# Patient Record
Sex: MALE | Race: OTHER | HISPANIC OR LATINO
[De-identification: names, ages, dates, MRNs, and addresses within clinical notes are randomized per-mention and may not be internally consistent; named-entity substitution may affect disease eponyms.]

---

## 2018-09-14 ENCOUNTER — APPOINTMENT (OUTPATIENT)
Dept: UROLOGY | Facility: CLINIC | Age: 78
End: 2018-09-14

## 2018-12-31 ENCOUNTER — OUTPATIENT (OUTPATIENT)
Dept: OUTPATIENT SERVICES | Facility: HOSPITAL | Age: 78
LOS: 1 days | Discharge: HOME | End: 2018-12-31
Payer: MEDICARE

## 2018-12-31 DIAGNOSIS — I11.0 HYPERTENSIVE HEART DISEASE WITH HEART FAILURE: ICD-10-CM

## 2018-12-31 PROCEDURE — 93306 TTE W/DOPPLER COMPLETE: CPT | Mod: 26

## 2019-09-18 ENCOUNTER — APPOINTMENT (OUTPATIENT)
Dept: CARDIOLOGY | Facility: CLINIC | Age: 79
End: 2019-09-18
Payer: MEDICARE

## 2019-09-18 PROCEDURE — 93000 ELECTROCARDIOGRAM COMPLETE: CPT

## 2019-09-18 PROCEDURE — 99204 OFFICE O/P NEW MOD 45 MIN: CPT

## 2019-09-26 ENCOUNTER — APPOINTMENT (OUTPATIENT)
Dept: CARDIOLOGY | Facility: CLINIC | Age: 79
End: 2019-09-26
Payer: MEDICARE

## 2019-09-26 PROCEDURE — 93880 EXTRACRANIAL BILAT STUDY: CPT

## 2019-10-28 ENCOUNTER — APPOINTMENT (OUTPATIENT)
Dept: CARDIOLOGY | Facility: CLINIC | Age: 79
End: 2019-10-28

## 2019-11-26 ENCOUNTER — APPOINTMENT (OUTPATIENT)
Dept: CARDIOLOGY | Facility: CLINIC | Age: 79
End: 2019-11-26
Payer: MEDICARE

## 2019-11-26 PROCEDURE — 93000 ELECTROCARDIOGRAM COMPLETE: CPT

## 2019-11-26 PROCEDURE — 99213 OFFICE O/P EST LOW 20 MIN: CPT

## 2020-01-22 ENCOUNTER — APPOINTMENT (OUTPATIENT)
Dept: CARDIOLOGY | Facility: CLINIC | Age: 80
End: 2020-01-22
Payer: MEDICARE

## 2020-01-22 PROCEDURE — 93325 DOPPLER ECHO COLOR FLOW MAPG: CPT

## 2020-01-22 PROCEDURE — 93320 DOPPLER ECHO COMPLETE: CPT

## 2020-01-22 PROCEDURE — 93351 STRESS TTE COMPLETE: CPT

## 2020-02-27 ENCOUNTER — APPOINTMENT (OUTPATIENT)
Dept: CARDIOLOGY | Facility: CLINIC | Age: 80
End: 2020-02-27
Payer: MEDICARE

## 2020-02-27 PROCEDURE — 93000 ELECTROCARDIOGRAM COMPLETE: CPT

## 2020-02-27 PROCEDURE — 99214 OFFICE O/P EST MOD 30 MIN: CPT

## 2020-07-22 ENCOUNTER — RECORD ABSTRACTING (OUTPATIENT)
Age: 80
End: 2020-07-22

## 2020-08-06 ENCOUNTER — RECORD ABSTRACTING (OUTPATIENT)
Age: 80
End: 2020-08-06

## 2020-08-06 DIAGNOSIS — Z78.9 OTHER SPECIFIED HEALTH STATUS: ICD-10-CM

## 2020-08-06 DIAGNOSIS — Z86.39 PERSONAL HISTORY OF OTHER ENDOCRINE, NUTRITIONAL AND METABOLIC DISEASE: ICD-10-CM

## 2020-08-06 DIAGNOSIS — Z86.79 PERSONAL HISTORY OF OTHER DISEASES OF THE CIRCULATORY SYSTEM: ICD-10-CM

## 2020-08-21 ENCOUNTER — APPOINTMENT (OUTPATIENT)
Dept: CARDIOLOGY | Facility: CLINIC | Age: 80
End: 2020-08-21
Payer: MEDICARE

## 2020-08-21 ENCOUNTER — RESULT CHARGE (OUTPATIENT)
Age: 80
End: 2020-08-21

## 2020-08-21 VITALS
HEIGHT: 72 IN | BODY MASS INDEX: 31.56 KG/M2 | SYSTOLIC BLOOD PRESSURE: 118 MMHG | DIASTOLIC BLOOD PRESSURE: 70 MMHG | WEIGHT: 233 LBS

## 2020-08-21 PROCEDURE — 93000 ELECTROCARDIOGRAM COMPLETE: CPT

## 2020-08-21 PROCEDURE — 99213 OFFICE O/P EST LOW 20 MIN: CPT | Mod: 25

## 2020-08-21 RX ORDER — UBIDECARENONE 100 MG
100 CAPSULE ORAL DAILY
Refills: 0 | Status: DISCONTINUED | COMMUNITY
End: 2020-08-21

## 2020-08-21 NOTE — PHYSICAL EXAM
[Well Groomed] : well groomed [General Appearance - Well Developed] : well developed [Normal Appearance] : normal appearance [General Appearance - Well Nourished] : well nourished [No Deformities] : no deformities [General Appearance - In No Acute Distress] : no acute distress [Normal Conjunctiva] : the conjunctiva exhibited no abnormalities [Eyelids - No Xanthelasma] : the eyelids demonstrated no xanthelasmas [Rhythm Regular] : regular [Normal Rate] : normal [Normal S1] : normal S1 [Normal S2] : normal S2 [S3] : no S3 [S4] : an S4 was heard [No Murmur] : no murmurs heard [2+] : left 2+ [No Pitting Edema] : no pitting edema present [Bowel Sounds] : normal bowel sounds [Abdomen Soft] : soft [Abdomen Tenderness] : non-tender [Abdomen Mass (___ Cm)] : no abdominal mass palpated [Skin Color & Pigmentation] : normal skin color and pigmentation [Cyanosis, Localized] : no localized cyanosis [Oriented To Time, Place, And Person] : oriented to person, place, and time [] : no rash [Mood] : the mood was normal [Affect] : the affect was normal

## 2020-08-21 NOTE — HISTORY OF PRESENT ILLNESS
[FreeTextEntry1] : 80-yo male who c/o increased COLLIER (climbing stairs), was found to have mild LV dysfunction (LVEF 44%).\par \par H/o HTN, hyperlipidemia. Quit smoking 45 years ago.\par \par BP has been better controlled. \par \par Patient denies CP, SOB but he has walked very little due to Covid.

## 2020-08-21 NOTE — REVIEW OF SYSTEMS
[Blurry Vision] : no blurred vision [Seeing Double (Diplopia)] : no diplopia [Skin: A Rash] : no rash: [Anxiety] : no anxiety [Easy Bruising] : no tendency for easy bruising [Negative] : Heme/Lymph

## 2020-08-21 NOTE — DISCUSSION/SUMMARY
[FreeTextEntry1] : 80-yo male with h/o HTN, well controlled now. Mild LV dysfunction, clinically stable, no evidence of ischemia.\par \par Plan:\par Continue treatment.\par Blood work ordered.\par Will need repeat ECHO after next visit.\par F/u in 6 months.\par \par Tera Judd MD\par

## 2020-08-21 NOTE — ASSESSMENT
[FreeTextEntry1] : ECG: SR 80/min, no ST changes.\par \par Stress ECHO 01/22/2020  \par 4.6 METs, no ischemia\par LVEF 44% (global hypokinesis)\par Mild LVH, grade 1 diastolic dysfunction\par Mild AI  \par

## 2021-02-26 ENCOUNTER — APPOINTMENT (OUTPATIENT)
Dept: CARDIOLOGY | Facility: CLINIC | Age: 81
End: 2021-02-26
Payer: MEDICARE

## 2021-02-26 VITALS
DIASTOLIC BLOOD PRESSURE: 70 MMHG | SYSTOLIC BLOOD PRESSURE: 122 MMHG | WEIGHT: 234 LBS | BODY MASS INDEX: 32.76 KG/M2 | HEIGHT: 71 IN

## 2021-02-26 PROCEDURE — 93000 ELECTROCARDIOGRAM COMPLETE: CPT

## 2021-02-26 PROCEDURE — 99214 OFFICE O/P EST MOD 30 MIN: CPT

## 2021-03-02 NOTE — ASSESSMENT
[FreeTextEntry1] : 81-yo male with h/o HTN, mild systolic dysfunction.\par Patient appears euvolemic today.\par Anemia, significant Hb drop in 11 months.\par \par Plan:\par Continue treatment.\par Repeat blood work with iron studies.\par Colonoscopy scheduled next month.\par F/u in 4  months. Will repeat ECHO after that.\par \par Tera Judd MD\par

## 2021-03-02 NOTE — HISTORY OF PRESENT ILLNESS
[FreeTextEntry1] : 81-yo male who c/o increased COLLIER (climbing stairs), was found to have mild LV dysfunction (LVEF 44%).\par \par H/o HTN, hyperlipidemia. Quit smoking 45 years ago.\par \par BP has been better controlled. \par \par Patient denies CP, SOB but he has walked very little due to Covid.\par \par GFR 74\par Hb 11.6 (14 in Februalry)\par LDL 76\par HDL 55\par Triglycerides 123\par TSH 2.08.\par

## 2021-03-21 ENCOUNTER — RX RENEWAL (OUTPATIENT)
Age: 81
End: 2021-03-21

## 2021-04-30 ENCOUNTER — RESULT CHARGE (OUTPATIENT)
Age: 81
End: 2021-04-30

## 2021-04-30 ENCOUNTER — APPOINTMENT (OUTPATIENT)
Dept: CARDIOLOGY | Facility: CLINIC | Age: 81
End: 2021-04-30
Payer: MEDICARE

## 2021-04-30 VITALS
HEIGHT: 71 IN | WEIGHT: 231 LBS | DIASTOLIC BLOOD PRESSURE: 71 MMHG | BODY MASS INDEX: 32.34 KG/M2 | SYSTOLIC BLOOD PRESSURE: 128 MMHG

## 2021-04-30 PROCEDURE — 93000 ELECTROCARDIOGRAM COMPLETE: CPT

## 2021-04-30 PROCEDURE — 99214 OFFICE O/P EST MOD 30 MIN: CPT

## 2021-04-30 NOTE — ASSESSMENT
[FreeTextEntry1] : 81-yo male with h/o HTN, mild systolic dysfunction.\par New-onset A-fib with RVR. CHADS Vasc score 4.\par Iron deficiency anemia, significant Hb drop in 11 months. Recent colonoscopy normal according to patient.\par \par Plan:\par Increase Metoprolol to 100 mg daily.\par Hold Losartan for now.\par Start Xarelto 20 mg (samples given).\par Repeat CBC in 2 weeks.\par 2D ECHO.\par Patient will call his gastroenterologist to schedule EGD.\par F/u in 3 weeks. Will schedule cardioversion if tolerates  anticoagulation.\par \par Tera Judd MD\par

## 2021-04-30 NOTE — PHYSICAL EXAM
[Well Developed] : well developed [Well Nourished] : well nourished [No Acute Distress] : no acute distress [Normal Conjunctiva] : normal conjunctiva [Normal Venous Pressure] : normal venous pressure [No Carotid Bruit] : no carotid bruit [No Murmur] : no murmur [No Rub] : no rub [No Gallop] : no gallop [Normal Rate] : normal [Irregularly Irregular] : irregularly irregular [Normal S1] : normal S1 [S3] : no S3 [S4] : no S4 [II] : a grade 2 [No Pitting Edema] : no pitting edema present [Good Air Entry] : good air entry [Clear Lung Fields] : clear lung fields [No Respiratory Distress] : no respiratory distress  [Soft] : abdomen soft [Non Tender] : non-tender [Normal Bowel Sounds] : normal bowel sounds [Normal Gait] : normal gait [No Edema] : no edema [No Cyanosis] : no cyanosis [No Clubbing] : no clubbing [No Varicosities] : no varicosities [No Rash] : no rash [Moves all extremities] : moves all extremities [No Focal Deficits] : no focal deficits [Normal Speech] : normal speech [Alert and Oriented] : alert and oriented [Normal memory] : normal memory

## 2021-04-30 NOTE — REVIEW OF SYSTEMS
[Rash] : no rash [Anxiety] : no anxiety [Easy Bleeding] : no tendency for easy bleeding [Easy Bruising] : no tendency for easy bruising [Negative] : Neurological

## 2021-04-30 NOTE — HISTORY OF PRESENT ILLNESS
[FreeTextEntry1] : 81-yo male who c/o increased COLLIER (climbing stairs), was found to have mild LV dysfunction (LVEF 44%).\par \par H/o HTN, hyperlipidemia. Quit smoking 45 years ago.\par \par BP has been better controlled. \par \par Patient denies CP, SOB, palpitations but he has walked very little due to Covid. Colonoscopy normal 2 weeks ago according to patient.\par \par Hb 10.8 (11.6 in Februalry)\par Iron 22\par % saturation 6.

## 2021-05-14 ENCOUNTER — APPOINTMENT (OUTPATIENT)
Dept: CARDIOLOGY | Facility: CLINIC | Age: 81
End: 2021-05-14
Payer: MEDICARE

## 2021-05-14 PROCEDURE — 93306 TTE W/DOPPLER COMPLETE: CPT

## 2021-05-21 LAB
ALBUMIN SERPL ELPH-MCNC: 3.8 G/DL
ALP BLD-CCNC: 56 U/L
ALT SERPL-CCNC: 11 U/L
ANION GAP SERPL CALC-SCNC: 12 MMOL/L
AST SERPL-CCNC: 16 U/L
BASOPHILS # BLD AUTO: 0.02 K/UL
BASOPHILS NFR BLD AUTO: 0.2 %
BILIRUB SERPL-MCNC: 0.5 MG/DL
BUN SERPL-MCNC: 7 MG/DL
CALCIUM SERPL-MCNC: 9.2 MG/DL
CHLORIDE SERPL-SCNC: 105 MMOL/L
CO2 SERPL-SCNC: 25 MMOL/L
CREAT SERPL-MCNC: 1 MG/DL
EOSINOPHIL # BLD AUTO: 0.18 K/UL
EOSINOPHIL NFR BLD AUTO: 2.1 %
GLUCOSE SERPL-MCNC: 102 MG/DL
HCT VFR BLD CALC: 32.7 %
HGB BLD-MCNC: 9.4 G/DL
IMM GRANULOCYTES NFR BLD AUTO: 0.5 %
LYMPHOCYTES # BLD AUTO: 1.87 K/UL
LYMPHOCYTES NFR BLD AUTO: 21.8 %
MAN DIFF?: NORMAL
MCHC RBC-ENTMCNC: 22.9 PG
MCHC RBC-ENTMCNC: 28.7 G/DL
MCV RBC AUTO: 79.6 FL
MONOCYTES # BLD AUTO: 0.9 K/UL
MONOCYTES NFR BLD AUTO: 10.5 %
NEUTROPHILS # BLD AUTO: 5.57 K/UL
NEUTROPHILS NFR BLD AUTO: 64.9 %
PLATELET # BLD AUTO: 313 K/UL
POTASSIUM SERPL-SCNC: 4.1 MMOL/L
PROT SERPL-MCNC: 7 G/DL
RBC # BLD: 4.11 M/UL
RBC # FLD: 18 %
SODIUM SERPL-SCNC: 142 MMOL/L
TSH SERPL-ACNC: 2.38 UIU/ML
WBC # FLD AUTO: 8.58 K/UL

## 2021-05-25 LAB
BASOPHILS # BLD AUTO: 0.03 K/UL
BASOPHILS NFR BLD AUTO: 0.4 %
EOSINOPHIL # BLD AUTO: 0.13 K/UL
EOSINOPHIL NFR BLD AUTO: 1.7 %
FERRITIN SERPL-MCNC: 15 NG/ML
HCT VFR BLD CALC: 32.7 %
HGB BLD-MCNC: 9.6 G/DL
IMM GRANULOCYTES NFR BLD AUTO: 0.4 %
IRON SATN MFR SERPL: 7 %
IRON SERPL-MCNC: 24 UG/DL
LYMPHOCYTES # BLD AUTO: 1.93 K/UL
LYMPHOCYTES NFR BLD AUTO: 24.6 %
MAN DIFF?: NORMAL
MCHC RBC-ENTMCNC: 22.7 PG
MCHC RBC-ENTMCNC: 29.4 G/DL
MCV RBC AUTO: 77.5 FL
MONOCYTES # BLD AUTO: 0.76 K/UL
MONOCYTES NFR BLD AUTO: 9.7 %
NEUTROPHILS # BLD AUTO: 4.98 K/UL
NEUTROPHILS NFR BLD AUTO: 63.2 %
PLATELET # BLD AUTO: 361 K/UL
RBC # BLD: 4.22 M/UL
RBC # FLD: 17.3 %
TIBC SERPL-MCNC: 322 UG/DL
UIBC SERPL-MCNC: 298 UG/DL
WBC # FLD AUTO: 7.86 K/UL

## 2021-05-27 ENCOUNTER — APPOINTMENT (OUTPATIENT)
Dept: CARDIOLOGY | Facility: CLINIC | Age: 81
End: 2021-05-27
Payer: MEDICARE

## 2021-05-27 VITALS
BODY MASS INDEX: 31.64 KG/M2 | HEIGHT: 71 IN | WEIGHT: 226 LBS | TEMPERATURE: 97.9 F | DIASTOLIC BLOOD PRESSURE: 80 MMHG | HEART RATE: 90 BPM | SYSTOLIC BLOOD PRESSURE: 120 MMHG

## 2021-05-27 DIAGNOSIS — Z00.00 ENCOUNTER FOR GENERAL ADULT MEDICAL EXAMINATION W/OUT ABNORMAL FINDINGS: ICD-10-CM

## 2021-05-27 PROCEDURE — 93000 ELECTROCARDIOGRAM COMPLETE: CPT

## 2021-05-27 PROCEDURE — 99214 OFFICE O/P EST MOD 30 MIN: CPT

## 2021-05-27 NOTE — CARDIOLOGY SUMMARY
[de-identified] : 05/27/21:\par SR 90/min, incomplete RBBB, no ST changes. [de-identified] : 05/14/21:\par LVEF 44%\par G2DD\par Mod ALKE\par Mod-severe MR\par Mild AI.

## 2021-05-27 NOTE — ASSESSMENT
[FreeTextEntry1] : 81-yo male with h/o HTN, mild systolic dysfunction.\par New-onset A-fib. CHADS Vasc score 4. in SR today.\par Moderate to severe MR now.\par Iron deficiency anemia, significant Hb drop in 11 months. Recent colonoscopy normal according to patient.\par \par Plan:\par Continue Metoprolol 100 mg daily.\par Continue Xarelto 20 mg.\par Start Ferrous sulfate.\par Repeat CBC in 3 weeks.\par Patient will call his gastroenterologist to schedule EGD.\par F/u in 4 weeks.\par \par Tera Judd MD\par

## 2021-05-27 NOTE — REVIEW OF SYSTEMS
0 [Negative] : Neurological [Lower Ext Edema] : no extremity edema [Leg Claudication] : no intermittent leg claudication [Orthopnea] : no orthopnea [Rash] : no rash [Anxiety] : no anxiety [Easy Bleeding] : no tendency for easy bleeding [Easy Bruising] : no tendency for easy bruising

## 2021-05-27 NOTE — HISTORY OF PRESENT ILLNESS
[FreeTextEntry1] : 81-yo male with h/o mild LV dysfunction (LVEF 44%).\par \par H/o HTN, hyperlipidemia. Patient was found to be in A-fib 2 weeks ago. Xarelto started.\par \par Patient denies blood in the stool or black stool. Colonoscopy normal in April 2021 according to patient. He denies CP but c/o increased COLLIER.\par \par Hb 9.6 05/24 (9.4 on 05/14)\par Iron 24\par % saturation 7\par Ferritin 15.

## 2021-05-27 NOTE — PHYSICAL EXAM
[Well Developed] : well developed [Well Nourished] : well nourished [No Acute Distress] : no acute distress [Normal Conjunctiva] : normal conjunctiva [Normal Venous Pressure] : normal venous pressure [No Carotid Bruit] : no carotid bruit [No Gallop] : no gallop [Normal Rate] : normal [Normal S1] : normal S1 [II] : a grade 2 [No Pitting Edema] : no pitting edema present [Clear Lung Fields] : clear lung fields [Good Air Entry] : good air entry [No Respiratory Distress] : no respiratory distress  [Soft] : abdomen soft [Non Tender] : non-tender [Normal Bowel Sounds] : normal bowel sounds [Normal Gait] : normal gait [No Edema] : no edema [No Cyanosis] : no cyanosis [No Clubbing] : no clubbing [No Varicosities] : no varicosities [No Rash] : no rash [Moves all extremities] : moves all extremities [No Focal Deficits] : no focal deficits [Normal Speech] : normal speech [Alert and Oriented] : alert and oriented [Normal memory] : normal memory [Rhythm Regular] : regular [Normal S2] : normal S2 [S4] : an S4 was heard [S3] : no S3

## 2021-06-01 ENCOUNTER — APPOINTMENT (OUTPATIENT)
Age: 81
End: 2021-06-01
Payer: MEDICARE

## 2021-06-01 VITALS
SYSTOLIC BLOOD PRESSURE: 118 MMHG | BODY MASS INDEX: 31.92 KG/M2 | HEART RATE: 84 BPM | WEIGHT: 228 LBS | RESPIRATION RATE: 14 BRPM | OXYGEN SATURATION: 100 % | DIASTOLIC BLOOD PRESSURE: 72 MMHG | HEIGHT: 71 IN

## 2021-06-01 DIAGNOSIS — Z86.79 PERSONAL HISTORY OF OTHER DISEASES OF THE CIRCULATORY SYSTEM: ICD-10-CM

## 2021-06-01 PROCEDURE — 99203 OFFICE O/P NEW LOW 30 MIN: CPT | Mod: 25

## 2021-06-01 PROCEDURE — 71046 X-RAY EXAM CHEST 2 VIEWS: CPT

## 2021-06-28 ENCOUNTER — LABORATORY RESULT (OUTPATIENT)
Age: 81
End: 2021-06-28

## 2021-06-28 LAB
RBC # BLD: 5.1 M/UL
RETICS # AUTO: 1.8 %
RETICS AGGREG/RBC NFR: 89.8 K/UL

## 2021-07-02 ENCOUNTER — APPOINTMENT (OUTPATIENT)
Dept: CARDIOLOGY | Facility: CLINIC | Age: 81
End: 2021-07-02
Payer: MEDICARE

## 2021-07-02 ENCOUNTER — RESULT CHARGE (OUTPATIENT)
Age: 81
End: 2021-07-02

## 2021-07-02 VITALS
HEIGHT: 71 IN | BODY MASS INDEX: 31.36 KG/M2 | DIASTOLIC BLOOD PRESSURE: 70 MMHG | WEIGHT: 224 LBS | SYSTOLIC BLOOD PRESSURE: 118 MMHG

## 2021-07-02 LAB
BASOPHILS # BLD AUTO: 0.02 K/UL
BASOPHILS NFR BLD AUTO: 0.3 %
EOSINOPHIL # BLD AUTO: 0.12 K/UL
EOSINOPHIL NFR BLD AUTO: 1.6 %
HCT VFR BLD CALC: 42.1 %
HGB BLD-MCNC: 12.5 G/DL
IMM GRANULOCYTES NFR BLD AUTO: 0.4 %
LYMPHOCYTES # BLD AUTO: 1.42 K/UL
LYMPHOCYTES NFR BLD AUTO: 18.7 %
MAN DIFF?: NORMAL
MCHC RBC-ENTMCNC: 24.5 PG
MCHC RBC-ENTMCNC: 29.7 G/DL
MCV RBC AUTO: 82.5 FL
MONOCYTES # BLD AUTO: 0.89 K/UL
MONOCYTES NFR BLD AUTO: 11.7 %
NEUTROPHILS # BLD AUTO: 5.11 K/UL
NEUTROPHILS NFR BLD AUTO: 67.3 %
PLATELET # BLD AUTO: 286 K/UL
RBC # BLD: 5.1 M/UL
RBC # FLD: 21.6 %
WBC # FLD AUTO: 7.59 K/UL

## 2021-07-02 PROCEDURE — 93000 ELECTROCARDIOGRAM COMPLETE: CPT

## 2021-07-02 PROCEDURE — 99214 OFFICE O/P EST MOD 30 MIN: CPT

## 2021-07-02 RX ORDER — FAMOTIDINE 20 MG/1
20 TABLET, FILM COATED ORAL
Refills: 0 | Status: DISCONTINUED | COMMUNITY
Start: 2019-08-08 | End: 2021-07-02

## 2021-07-04 RX ORDER — LATANOPROST/PF 0.005 %
0.01 DROPS OPHTHALMIC (EYE)
Qty: 8 | Refills: 0 | Status: DISCONTINUED | COMMUNITY
Start: 2021-03-25

## 2021-07-04 RX ORDER — LOSARTAN POTASSIUM AND HYDROCHLOROTHIAZIDE 25; 100 MG/1; MG/1
100-25 TABLET ORAL
Qty: 90 | Refills: 0 | Status: DISCONTINUED | COMMUNITY
Start: 2020-11-18

## 2021-07-04 NOTE — PHYSICAL EXAM
[Well Developed] : well developed [Well Nourished] : well nourished [No Acute Distress] : no acute distress [Normal Conjunctiva] : normal conjunctiva [Normal Venous Pressure] : normal venous pressure [No Carotid Bruit] : no carotid bruit [Normal S1, S2] : normal S1, S2 [No Murmur] : no murmur [No Rub] : no rub [S4] : S4 [Clear Lung Fields] : clear lung fields [Good Air Entry] : good air entry [No Respiratory Distress] : no respiratory distress  [Soft] : abdomen soft [Non Tender] : non-tender [Normal Bowel Sounds] : normal bowel sounds [Normal Gait] : normal gait [No Cyanosis] : no cyanosis [No Edema] : no edema [No Clubbing] : no clubbing [No Varicosities] : no varicosities [No Rash] : no rash [Moves all extremities] : moves all extremities [Normal Speech] : normal speech [Alert and Oriented] : alert and oriented [Normal memory] : normal memory

## 2021-07-04 NOTE — ASSESSMENT
[FreeTextEntry1] : 81-yo male with h/o HTN, mild systolic dysfunction.\par New-onset A-fib. CHADS Vasc score 4. in SR today.\par Moderate to severe MR now.\par Iron deficiency anemia, improving with iron supplementation. Recent colonoscopy normal according to patient.\par \par Plan:\par Continue Metoprolol 100 mg daily.\par Resume Xarelto 20 mg (samples given again). Will try to clarify coverage for Xarelto, Eliquis).\par Continue Ferrous sulfate.\par GI f/u.\par F/u in 4 weeks.\par \par Tera Judd MD\par

## 2021-07-04 NOTE — HISTORY OF PRESENT ILLNESS
[FreeTextEntry1] : 81-yo male with h/o mild LV dysfunction (LVEF 44%).\par \par H/o HTN, hyperlipidemia. Patient was found to be in A-fib several weeks ago. Xarelto started (patient has finished the samples, has not picked up from the pharmacy due to high price).\par \par Patient denies blood in the stool or black stool. Colonoscopy normal in April 2021 according to patient. He denies CP but c/o increased COLLIER. GI f/u scheduled next week.\par \par Hb 12.5 06/26 (9.6 05/24, 9.4 on 05/14)\par Iron 24\par % saturation 7\par Ferritin 15.

## 2021-07-04 NOTE — REVIEW OF SYSTEMS
[Lower Ext Edema] : no extremity edema [Palpitations] : no palpitations [Leg Claudication] : no intermittent leg claudication [Orthopnea] : no orthopnea [Syncope] : no syncope [Rash] : no rash [Easy Bleeding] : no tendency for easy bleeding [Anxiety] : no anxiety [Easy Bruising] : no tendency for easy bruising [Negative] : Neurological

## 2021-08-06 ENCOUNTER — APPOINTMENT (OUTPATIENT)
Dept: CARDIOLOGY | Facility: CLINIC | Age: 81
End: 2021-08-06
Payer: MEDICARE

## 2021-08-06 ENCOUNTER — RESULT CHARGE (OUTPATIENT)
Age: 81
End: 2021-08-06

## 2021-08-06 VITALS
SYSTOLIC BLOOD PRESSURE: 122 MMHG | WEIGHT: 226 LBS | BODY MASS INDEX: 31.64 KG/M2 | DIASTOLIC BLOOD PRESSURE: 70 MMHG | HEIGHT: 71 IN

## 2021-08-06 PROCEDURE — 93000 ELECTROCARDIOGRAM COMPLETE: CPT

## 2021-08-06 PROCEDURE — 99214 OFFICE O/P EST MOD 30 MIN: CPT

## 2021-08-06 RX ORDER — METOPROLOL SUCCINATE 50 MG/1
50 TABLET, EXTENDED RELEASE ORAL
Qty: 90 | Refills: 0 | Status: DISCONTINUED | COMMUNITY
Start: 2021-03-21 | End: 2021-08-06

## 2021-08-06 RX ORDER — RIVAROXABAN 20 MG/1
20 TABLET, FILM COATED ORAL
Qty: 90 | Refills: 1 | Status: DISCONTINUED | COMMUNITY
Start: 2021-05-27 | End: 2021-08-06

## 2021-08-06 RX ORDER — FUROSEMIDE 20 MG/1
20 TABLET ORAL
Qty: 90 | Refills: 3 | Status: DISCONTINUED | COMMUNITY
Start: 2021-07-09 | End: 2021-08-06

## 2021-08-06 NOTE — CARDIOLOGY SUMMARY
[de-identified] : 08/06021:\par SR 95/min, incomplete RBBB. [de-identified] : 05/14/21:\par LVEF 44%, G2DD\par Mod LAE\par Mod-severe MR.

## 2021-08-06 NOTE — ASSESSMENT
[FreeTextEntry1] : 81-yo male with h/o HTN, mild systolic dysfunction.\par Paroxysmal A-fib. CHADS Vasc score 4. In SR today.\par Moderate to severe MR now.\par Iron deficiency anemia, improving with iron supplementation. Recent colonoscopy normal according to patient.\par \par Plan:\par Continue Metoprolol 100 mg daily.\par Resume Xarelto 20 mg (samples given again). Will try to clarify coverage for Xarelto, Eliquis).\par Continue Ferrous sulfate.\par F/u in 8 weeks.\par \par Tera Judd MD\par

## 2021-08-06 NOTE — HISTORY OF PRESENT ILLNESS
[FreeTextEntry1] : 81-yo male with h/o mild LV dysfunction (LVEF 44%).\par \par H/o HTN, hyperlipidemia. Patient was found to be in A-fib in April 2021. Xarelto started (patient has finished the samples, has not picked up from the pharmacy due to high price).\par \par Patient denies blood in the stool or black stool. Colonoscopy normal in April 2021 according to patient. He denies CP. COLLIER has also improved.\par \par Hb 12.5 06/26 (9.6 05/24, 9.4 on 05/14)\par

## 2021-08-06 NOTE — REVIEW OF SYSTEMS
[Negative] : Neurological [Lower Ext Edema] : no extremity edema [Leg Claudication] : no intermittent leg claudication [Palpitations] : no palpitations [Orthopnea] : no orthopnea [Syncope] : no syncope [Rash] : no rash [Anxiety] : no anxiety [Easy Bleeding] : no tendency for easy bleeding [Easy Bruising] : no tendency for easy bruising

## 2021-08-10 ENCOUNTER — OUTPATIENT (OUTPATIENT)
Dept: OUTPATIENT SERVICES | Facility: HOSPITAL | Age: 81
LOS: 1 days | Discharge: HOME | End: 2021-08-10

## 2021-08-10 ENCOUNTER — LABORATORY RESULT (OUTPATIENT)
Age: 81
End: 2021-08-10

## 2021-08-10 DIAGNOSIS — Z11.59 ENCOUNTER FOR SCREENING FOR OTHER VIRAL DISEASES: ICD-10-CM

## 2021-09-19 ENCOUNTER — OUTPATIENT (OUTPATIENT)
Dept: OUTPATIENT SERVICES | Facility: HOSPITAL | Age: 81
LOS: 1 days | Discharge: HOME | End: 2021-09-19

## 2021-09-19 DIAGNOSIS — Z11.59 ENCOUNTER FOR SCREENING FOR OTHER VIRAL DISEASES: ICD-10-CM

## 2021-09-22 ENCOUNTER — TRANSCRIPTION ENCOUNTER (OUTPATIENT)
Age: 81
End: 2021-09-22

## 2021-09-22 ENCOUNTER — OUTPATIENT (OUTPATIENT)
Dept: OUTPATIENT SERVICES | Facility: HOSPITAL | Age: 81
LOS: 1 days | Discharge: HOME | End: 2021-09-22
Payer: MEDICARE

## 2021-09-22 ENCOUNTER — RESULT REVIEW (OUTPATIENT)
Age: 81
End: 2021-09-22

## 2021-09-22 VITALS
HEART RATE: 94 BPM | DIASTOLIC BLOOD PRESSURE: 84 MMHG | WEIGHT: 218.92 LBS | HEIGHT: 78 IN | SYSTOLIC BLOOD PRESSURE: 119 MMHG | TEMPERATURE: 98 F | RESPIRATION RATE: 18 BRPM

## 2021-09-22 VITALS
DIASTOLIC BLOOD PRESSURE: 72 MMHG | SYSTOLIC BLOOD PRESSURE: 103 MMHG | RESPIRATION RATE: 15 BRPM | OXYGEN SATURATION: 99 % | HEART RATE: 80 BPM

## 2021-09-22 PROCEDURE — 88305 TISSUE EXAM BY PATHOLOGIST: CPT | Mod: 26

## 2021-09-22 PROCEDURE — 88312 SPECIAL STAINS GROUP 1: CPT | Mod: 26

## 2021-09-22 NOTE — PRE-ANESTHESIA EVALUATION ADULT - NSANTHOSAYNRD_GEN_A_CORE
denies/No. AURELIO screening performed.  STOP BANG Legend: 0-2 = LOW Risk; 3-4 = INTERMEDIATE Risk; 5-8 = HIGH Risk

## 2021-09-22 NOTE — ASU PREOP CHECKLIST - WARM FLUIDS/WARM BLANKETS
Shyann with [facility]  called to inform you pt is being d/c'd from nursing services she has met all goals.   
fyi  
no

## 2021-09-22 NOTE — PRE-ANESTHESIA EVALUATION ADULT - NSANTHADDINFOFT_GEN_ALL_CORE
risks, benefits, alternatives, general anesthesia as a backup discussed with the patient and he agrees to proceed as planned. Patient seen and evaluated prior to transport to endoscopy procedure room

## 2021-09-23 LAB — SURGICAL PATHOLOGY STUDY: SIGNIFICANT CHANGE UP

## 2021-09-29 DIAGNOSIS — K20.90 ESOPHAGITIS, UNSPECIFIED WITHOUT BLEEDING: ICD-10-CM

## 2021-09-29 DIAGNOSIS — K44.9 DIAPHRAGMATIC HERNIA WITHOUT OBSTRUCTION OR GANGRENE: ICD-10-CM

## 2021-09-29 DIAGNOSIS — K31.811 ANGIODYSPLASIA OF STOMACH AND DUODENUM WITH BLEEDING: ICD-10-CM

## 2021-09-29 DIAGNOSIS — E78.00 PURE HYPERCHOLESTEROLEMIA, UNSPECIFIED: ICD-10-CM

## 2021-09-29 DIAGNOSIS — I10 ESSENTIAL (PRIMARY) HYPERTENSION: ICD-10-CM

## 2021-09-29 DIAGNOSIS — Z87.891 PERSONAL HISTORY OF NICOTINE DEPENDENCE: ICD-10-CM

## 2021-09-29 DIAGNOSIS — K31.9 DISEASE OF STOMACH AND DUODENUM, UNSPECIFIED: ICD-10-CM

## 2021-10-10 ENCOUNTER — LABORATORY RESULT (OUTPATIENT)
Age: 81
End: 2021-10-10

## 2021-10-10 ENCOUNTER — OUTPATIENT (OUTPATIENT)
Dept: OUTPATIENT SERVICES | Facility: HOSPITAL | Age: 81
LOS: 1 days | Discharge: HOME | End: 2021-10-10

## 2021-10-10 DIAGNOSIS — Z11.59 ENCOUNTER FOR SCREENING FOR OTHER VIRAL DISEASES: ICD-10-CM

## 2021-10-10 PROBLEM — E78.00 PURE HYPERCHOLESTEROLEMIA, UNSPECIFIED: Chronic | Status: ACTIVE | Noted: 2021-09-22

## 2021-10-10 PROBLEM — I10 ESSENTIAL (PRIMARY) HYPERTENSION: Chronic | Status: ACTIVE | Noted: 2021-09-22

## 2021-10-10 PROBLEM — K21.9 GASTRO-ESOPHAGEAL REFLUX DISEASE WITHOUT ESOPHAGITIS: Chronic | Status: ACTIVE | Noted: 2021-09-22

## 2021-10-13 ENCOUNTER — APPOINTMENT (OUTPATIENT)
Age: 81
End: 2021-10-13
Payer: MEDICARE

## 2021-10-13 VITALS
HEIGHT: 71 IN | DIASTOLIC BLOOD PRESSURE: 80 MMHG | RESPIRATION RATE: 14 BRPM | HEART RATE: 80 BPM | OXYGEN SATURATION: 92 % | SYSTOLIC BLOOD PRESSURE: 128 MMHG | BODY MASS INDEX: 31.64 KG/M2 | WEIGHT: 226 LBS

## 2021-10-13 PROCEDURE — 94727 GAS DIL/WSHOT DETER LNG VOL: CPT

## 2021-10-13 PROCEDURE — 94010 BREATHING CAPACITY TEST: CPT

## 2021-10-13 PROCEDURE — 94729 DIFFUSING CAPACITY: CPT

## 2021-10-13 PROCEDURE — 99214 OFFICE O/P EST MOD 30 MIN: CPT | Mod: 25

## 2021-10-13 NOTE — HISTORY OF PRESENT ILLNESS
[Dyspnea] : dyspnea [Exercise Intolerance] : exercise intolerance [Fatigue] : fatigue [Chest Tightness] : chest tightness [Cough] : cough [Fever] : no fever [Hemoptysis] : no hemoptysis [Leg Pain] : no leg pain [Edema] : no edema

## 2021-10-29 ENCOUNTER — APPOINTMENT (OUTPATIENT)
Dept: CARDIOLOGY | Facility: CLINIC | Age: 81
End: 2021-10-29
Payer: MEDICARE

## 2021-10-29 VITALS
WEIGHT: 221 LBS | BODY MASS INDEX: 30.94 KG/M2 | DIASTOLIC BLOOD PRESSURE: 80 MMHG | SYSTOLIC BLOOD PRESSURE: 126 MMHG | HEIGHT: 71 IN

## 2021-10-29 PROCEDURE — 93000 ELECTROCARDIOGRAM COMPLETE: CPT

## 2021-10-29 PROCEDURE — 99214 OFFICE O/P EST MOD 30 MIN: CPT

## 2021-10-31 NOTE — HISTORY OF PRESENT ILLNESS
[FreeTextEntry1] : 81-yo male with h/o mild LV dysfunction (LVEF 44%).\par \par H/o HTN, hyperlipidemia. Patient was found to be in A-fib in April 2021. Eliquis started.\par \par Iron deficiency anemia, no overt bleeding. Colonoscopy negative in 2021.\par \par He denies chest pain or palpitations but c/o persistent COLLIER. He is not always compliant with Furosemide.\par \par GFR 65\par LDL 44\par TSH 2.96\par Hb 10.6.

## 2021-10-31 NOTE — ASSESSMENT
[FreeTextEntry1] : 81-yo male with h/o HTN, mild systolic dysfunction.\par Paroxysmal A-fib. CHADS Vasc score 4. In SR today.\par Moderate to severe MR now.\par Iron deficiency anemia, improving with iron supplementation. Recent colonoscopy normal according to patient.\par \par Plan:\par Continue treatment.\par Resume Furosemide 20 mg daily.\par Right and left heart catheterization discussed with patient. Will schedule at [facility].\par F/u after the procedure.\par \par Tera Judd MD\par

## 2021-10-31 NOTE — REVIEW OF SYSTEMS
[Negative] : Neurological [Dyspnea on exertion] : dyspnea during exertion [Lower Ext Edema] : no extremity edema [Leg Claudication] : no intermittent leg claudication [Palpitations] : no palpitations [Orthopnea] : no orthopnea [Syncope] : no syncope [Rash] : no rash [Anxiety] : no anxiety [Easy Bleeding] : no tendency for easy bleeding [Easy Bruising] : no tendency for easy bruising

## 2021-10-31 NOTE — CARDIOLOGY SUMMARY
[de-identified] : [id number]:\par SR 98/min, incomplete RBBB. [de-identified] : 05/14/21:\par LVEF 44%, G2DD\par Mod LAE\par Mod-severe MR.

## 2021-11-01 ENCOUNTER — RESULT CHARGE (OUTPATIENT)
Age: 81
End: 2021-11-01

## 2021-11-06 ENCOUNTER — LABORATORY RESULT (OUTPATIENT)
Age: 81
End: 2021-11-06

## 2021-11-09 ENCOUNTER — INPATIENT (INPATIENT)
Facility: HOSPITAL | Age: 81
LOS: 0 days | Discharge: HOME | End: 2021-11-10
Attending: STUDENT IN AN ORGANIZED HEALTH CARE EDUCATION/TRAINING PROGRAM | Admitting: STUDENT IN AN ORGANIZED HEALTH CARE EDUCATION/TRAINING PROGRAM
Payer: MEDICARE

## 2021-11-09 ENCOUNTER — OUTPATIENT (OUTPATIENT)
Dept: OUTPATIENT SERVICES | Facility: HOSPITAL | Age: 81
LOS: 1 days | Discharge: HOME | End: 2021-11-09

## 2021-11-09 VITALS
WEIGHT: 210.1 LBS | OXYGEN SATURATION: 98 % | DIASTOLIC BLOOD PRESSURE: 84 MMHG | RESPIRATION RATE: 12 BRPM | SYSTOLIC BLOOD PRESSURE: 121 MMHG

## 2021-11-09 DIAGNOSIS — E78.00 PURE HYPERCHOLESTEROLEMIA, UNSPECIFIED: ICD-10-CM

## 2021-11-09 DIAGNOSIS — D64.9 ANEMIA, UNSPECIFIED: ICD-10-CM

## 2021-11-09 DIAGNOSIS — Z79.01 LONG TERM (CURRENT) USE OF ANTICOAGULANTS: ICD-10-CM

## 2021-11-09 DIAGNOSIS — I48.91 UNSPECIFIED ATRIAL FIBRILLATION: ICD-10-CM

## 2021-11-09 DIAGNOSIS — I50.9 HEART FAILURE, UNSPECIFIED: ICD-10-CM

## 2021-11-09 DIAGNOSIS — I11.0 HYPERTENSIVE HEART DISEASE WITH HEART FAILURE: ICD-10-CM

## 2021-11-09 DIAGNOSIS — I25.10 ATHEROSCLEROTIC HEART DISEASE OF NATIVE CORONARY ARTERY WITHOUT ANGINA PECTORIS: ICD-10-CM

## 2021-11-09 LAB
ANION GAP SERPL CALC-SCNC: 16 MMOL/L — HIGH (ref 7–14)
BUN SERPL-MCNC: 7 MG/DL — LOW (ref 10–20)
CALCIUM SERPL-MCNC: 9.2 MG/DL — SIGNIFICANT CHANGE UP (ref 8.5–10.1)
CHLORIDE SERPL-SCNC: 103 MMOL/L — SIGNIFICANT CHANGE UP (ref 98–110)
CO2 SERPL-SCNC: 21 MMOL/L — SIGNIFICANT CHANGE UP (ref 17–32)
CREAT SERPL-MCNC: 1.1 MG/DL — SIGNIFICANT CHANGE UP (ref 0.7–1.5)
GLUCOSE SERPL-MCNC: 118 MG/DL — HIGH (ref 70–99)
HCT VFR BLD CALC: 35.9 % — LOW (ref 42–52)
HCT VFR BLD CALC: 37.4 % — LOW (ref 42–52)
HGB BLD-MCNC: 10.4 G/DL — LOW (ref 14–18)
HGB BLD-MCNC: 11 G/DL — LOW (ref 14–18)
MCHC RBC-ENTMCNC: 22.5 PG — LOW (ref 27–31)
MCHC RBC-ENTMCNC: 22.7 PG — LOW (ref 27–31)
MCHC RBC-ENTMCNC: 29 G/DL — LOW (ref 32–37)
MCHC RBC-ENTMCNC: 29.4 G/DL — LOW (ref 32–37)
MCV RBC AUTO: 77.1 FL — LOW (ref 80–94)
MCV RBC AUTO: 77.7 FL — LOW (ref 80–94)
NRBC # BLD: 0 /100 WBCS — SIGNIFICANT CHANGE UP (ref 0–0)
NRBC # BLD: 0 /100 WBCS — SIGNIFICANT CHANGE UP (ref 0–0)
PLATELET # BLD AUTO: 265 K/UL — SIGNIFICANT CHANGE UP (ref 130–400)
PLATELET # BLD AUTO: 265 K/UL — SIGNIFICANT CHANGE UP (ref 130–400)
POTASSIUM SERPL-MCNC: 4.3 MMOL/L — SIGNIFICANT CHANGE UP (ref 3.5–5)
POTASSIUM SERPL-SCNC: 4.3 MMOL/L — SIGNIFICANT CHANGE UP (ref 3.5–5)
RBC # BLD: 4.62 M/UL — LOW (ref 4.7–6.1)
RBC # BLD: 4.85 M/UL — SIGNIFICANT CHANGE UP (ref 4.7–6.1)
RBC # FLD: 16.8 % — HIGH (ref 11.5–14.5)
RBC # FLD: 16.8 % — HIGH (ref 11.5–14.5)
SODIUM SERPL-SCNC: 140 MMOL/L — SIGNIFICANT CHANGE UP (ref 135–146)
WBC # BLD: 7.37 K/UL — SIGNIFICANT CHANGE UP (ref 4.8–10.8)
WBC # BLD: 8.35 K/UL — SIGNIFICANT CHANGE UP (ref 4.8–10.8)
WBC # FLD AUTO: 7.37 K/UL — SIGNIFICANT CHANGE UP (ref 4.8–10.8)
WBC # FLD AUTO: 8.35 K/UL — SIGNIFICANT CHANGE UP (ref 4.8–10.8)

## 2021-11-09 PROCEDURE — 93456 R HRT CORONARY ARTERY ANGIO: CPT | Mod: 26

## 2021-11-09 PROCEDURE — 92928 PRQ TCAT PLMT NTRAC ST 1 LES: CPT | Mod: LD

## 2021-11-09 PROCEDURE — 92978 ENDOLUMINL IVUS OCT C 1ST: CPT | Mod: 26,LD

## 2021-11-09 RX ORDER — PANTOPRAZOLE SODIUM 20 MG/1
40 TABLET, DELAYED RELEASE ORAL
Refills: 0 | Status: DISCONTINUED | OUTPATIENT
Start: 2021-11-09 | End: 2021-11-10

## 2021-11-09 RX ORDER — METOPROLOL TARTRATE 50 MG
100 TABLET ORAL DAILY
Refills: 0 | Status: DISCONTINUED | OUTPATIENT
Start: 2021-11-09 | End: 2021-11-10

## 2021-11-09 RX ORDER — LOSARTAN POTASSIUM 100 MG/1
25 TABLET, FILM COATED ORAL DAILY
Refills: 0 | Status: DISCONTINUED | OUTPATIENT
Start: 2021-11-10 | End: 2021-11-10

## 2021-11-09 RX ORDER — INFLUENZA VIRUS VACCINE 15; 15; 15; 15 UG/.5ML; UG/.5ML; UG/.5ML; UG/.5ML
0.7 SUSPENSION INTRAMUSCULAR ONCE
Refills: 0 | Status: DISCONTINUED | OUTPATIENT
Start: 2021-11-09 | End: 2021-11-10

## 2021-11-09 RX ORDER — ATORVASTATIN CALCIUM 80 MG/1
40 TABLET, FILM COATED ORAL AT BEDTIME
Refills: 0 | Status: DISCONTINUED | OUTPATIENT
Start: 2021-11-09 | End: 2021-11-10

## 2021-11-09 RX ORDER — METOPROLOL TARTRATE 50 MG
0 TABLET ORAL
Qty: 0 | Refills: 0 | DISCHARGE

## 2021-11-09 RX ORDER — APIXABAN 2.5 MG/1
5 TABLET, FILM COATED ORAL EVERY 12 HOURS
Refills: 0 | Status: DISCONTINUED | OUTPATIENT
Start: 2021-11-10 | End: 2021-11-10

## 2021-11-09 RX ORDER — CLOPIDOGREL BISULFATE 75 MG/1
75 TABLET, FILM COATED ORAL DAILY
Refills: 0 | Status: DISCONTINUED | OUTPATIENT
Start: 2021-11-10 | End: 2021-11-10

## 2021-11-09 RX ORDER — FAMOTIDINE 10 MG/ML
0 INJECTION INTRAVENOUS
Qty: 0 | Refills: 0 | DISCHARGE

## 2021-11-09 RX ORDER — ATORVASTATIN CALCIUM 80 MG/1
0 TABLET, FILM COATED ORAL
Qty: 0 | Refills: 0 | DISCHARGE

## 2021-11-09 RX ADMIN — ATORVASTATIN CALCIUM 40 MILLIGRAM(S): 80 TABLET, FILM COATED ORAL at 21:31

## 2021-11-09 NOTE — CHART NOTE - NSCHARTNOTEFT_GEN_A_CORE
PRE-OP DIAGNOSIS:  Stable angina; new cardiomyopathy      PROCEDURE:     [X] Coronary Angiogram     [] LHC     [] LVG     [X] RHC     [X] Intervention (see below)         PHYSICIAN:  Dr. Judd / Dr. Bliss    FELLOW: Dr. Feng         PROCEDURE DESCRIPTION:     Consent:      [X] Patient     [] Family Member     []  Used        Anesthesia:     [] General     [X] Sedation     [] Local        Access & Closure:     [] Fr Radial Artery     [X] 6Fr Femoral Artery --> Perclose     [X] 7Fr Femoral Vein --> Manual      [] Fr Brachial Vein       IV Contrast: 220mL        Intervention: IVUS guided PCI to       Implants: 4.5 X 20 Synergy XD CAMDEN       FINDINGS:     Coronary Dominance: Right      LM: No disease    LAD:        Prox: 95% hazy stenosis; s/p IVUS guided PCI with balloon angioplasty and CAMDEN (4.5 X 20 Synergy XD CAMDEN)       Mid: Mild disease       Dist: Mild disease  Diag: small vessel, 80% stenosis     CX: Mild diffuse disease    RCA: Ectatic with diffuse mild to moderate disease    LVEDP: Not measured    EF: 44% on echo (5/14/2021)        ESTIMATED BLOOD LOSS: < 10 mL        CONDITION:     [X] Good     [] Fair     [] Critical        SPECIMEN REMOVED: N/A       POST-OP DIAGNOSIS:      [] Normal Coronary Angiogram     [] Mild Coronary Artery Disease (< 50% stenosis)     [X] 1 Vessel Coronary Artery Disease: AUC 7 for revascularization; s/p IVUS guided PCI to LAD with CAMDEN       PLAN OF CARE:     [X] Admit for observation     [X] Will give one dose of IV lasix; re-assess volume status in AM for diuretics    [X] Medications: Plavix 75mg daily, Eliquis 5mg BID (to start on 11/10/2021 if no bleeding); continue statin, beta-blockers and ARB     [X] IV Fluids: No IV fluids; patient in fluid overload PRE-OP DIAGNOSIS:  Stable angina; new cardiomyopathy      PROCEDURE:     [X] Coronary Angiogram     [] LHC     [] LVG     [X] RHC     [X] Intervention (see below)         PHYSICIAN:  Dr. Judd / Dr. Bliss    FELLOW: Dr. Feng         PROCEDURE DESCRIPTION:     Consent:      [X] Patient     [] Family Member     []  Used        Anesthesia:     [] General     [X] Sedation     [] Local        Access & Closure:     [] Fr Radial Artery     [X] 6Fr Femoral Artery --> Perclose     [X] 7Fr Femoral Vein --> Manual      [] Fr Brachial Vein       IV Contrast: 220mL        Intervention: IVUS guided PCI to       Implants: 4.5 X 20 Synergy XD CAMDNE       FINDINGS:     Coronary Dominance: Right      LM: No disease    LAD:        Prox: 95% hazy stenosis; s/p IVUS guided PCI with balloon angioplasty and CAMDEN (4.5 X 20 Synergy XD CAMDEN)       Mid: Mild disease       Dist: Mild disease  Diag: small vessel, 80% stenosis     CX: Mild diffuse disease    RCA: Ectatic with diffuse mild to moderate disease    LVEDP: Not measured    EF: 44% on echo (5/14/2021)        RHC findings:  PCW: 23  PASP: 59/25/39        ESTIMATED BLOOD LOSS: < 10 mL        CONDITION:     [X] Good     [] Fair     [] Critical        SPECIMEN REMOVED: N/A       POST-OP DIAGNOSIS:      [] Normal Coronary Angiogram     [] Mild Coronary Artery Disease (< 50% stenosis)     [X] 1 Vessel Coronary Artery Disease: AUC 7 for revascularization; s/p IVUS guided PCI to LAD with CAMDEN       PLAN OF CARE:     [X] Admit for observation     [X] Will give one dose of IV lasix; re-assess volume status in AM for diuretics    [X] Medications: Plavix 75mg daily, Eliquis 5mg BID (to start on 11/10/2021 if no bleeding); continue statin, beta-blockers and ARB     [X] IV Fluids: No IV fluids; patient in fluid overload PRE-OP DIAGNOSIS:  Stable angina; new cardiomyopathy      PROCEDURE:     [X] Coronary Angiogram     [] LHC     [] LVG     [X] RHC     [X] Intervention (see below)         PHYSICIAN:  Dr. Judd / Dr. Bliss    FELLOW: Dr. Feng         PROCEDURE DESCRIPTION:     Consent:      [X] Patient     [] Family Member     []  Used        Anesthesia:     [] General     [X] Sedation     [] Local        Access & Closure:     [] Fr Radial Artery     [X] 6Fr Femoral Artery --> Perclose     [X] 7Fr Femoral Vein --> Manual      [] Fr Brachial Vein       IV Contrast: 220mL        Intervention: IVUS guided PCI to       Implants: 4.5 X 20 Synergy XD CAMDEN       FINDINGS:     Coronary Dominance: Right      LM: No disease    LAD:        Prox: 95% stenosis, hazy lesion; s/p IVUS guided PCI with balloon angioplasty and CAMDEN (4.5 X 20 Synergy XD CAMDEN)       Mid: Mild disease       Dist: Mild disease  Diag: small vessel, 80% stenosis     CX: Mild diffuse disease    RCA: Ectatic with diffuse mild to moderate disease    LVEDP: Not measured    EF: 44% on echo (5/14/2021)        RHC findings:  PCW: 23  PASP: 59/25/39        ESTIMATED BLOOD LOSS: < 10 mL        CONDITION:     [X] Good     [] Fair     [] Critical        SPECIMEN REMOVED: N/A       POST-OP DIAGNOSIS:      [] Normal Coronary Angiogram     [] Mild Coronary Artery Disease (< 50% stenosis)     [X] 1 Vessel Coronary Artery Disease: AUC 7 for revascularization; s/p IVUS guided PCI to LAD with CAMDEN       PLAN OF CARE:     [X] Admit for observation     [X] Will give one dose of IV lasix; re-assess volume status in AM for diuretics    [X] Medications: Plavix 75mg daily, Eliquis 5mg BID (to start on 11/10/2021 if no bleeding); continue statin, beta-blockers and ARB     [X] IV Fluids: No IV fluids; patient in fluid overload PRE-OP DIAGNOSIS:  Stable angina; new cardiomyopathy      PROCEDURE:     [X] Coronary Angiogram     [] LHC     [] LVG     [X] RHC     [X] Intervention (see below)         PHYSICIAN:  Dr. Judd / Dr. Bliss    FELLOW: Dr. Feng         PROCEDURE DESCRIPTION:     Consent:      [X] Patient     [] Family Member     []  Used        Anesthesia:     [] General     [X] Sedation     [] Local        Access & Closure:     [] Fr Radial Artery     [X] 6Fr Femoral Artery --> Perclose     [X] 7Fr Femoral Vein --> Manual      [] Fr Brachial Vein       IV Contrast: 220mL        Intervention: IVUS guided PCI to       Implants: 4.5 X 20 Synergy XD CAMDEN       FINDINGS:     Coronary Dominance: Right      LM: No disease    LAD:        Prox: 95% stenosis, hazy lesion; s/p IVUS guided PCI with balloon angioplasty and CAMDEN (4.5 X 20 Synergy XD CAMDEN)       Mid: Mild disease       Dist: Mild disease  Diag: small vessel, 80% stenosis     CX: Mild diffuse disease    RCA: Ectatic with diffuse mild to moderate disease    LVEDP: Not measured    EF: 44% on echo (5/14/2021)        RHC findings:    RA: 12/10/9  RV: 58/16  PASP: 59/25/39  PCW: 16/41/23      ESTIMATED BLOOD LOSS: < 10 mL        CONDITION:     [X] Good     [] Fair     [] Critical        SPECIMEN REMOVED: N/A       POST-OP DIAGNOSIS:      [X] 1 Vessel Coronary Artery Disease: AUC 7 for revascularization; s/p IVUS guided PCI to LAD with CAMDEN    [X] Elevated PCWP; moderate to severe pulmonary hypertension       PLAN OF CARE:     [X] Admit for observation     [X] Will give one dose of IV lasix; re-assess volume status in AM for diuretics    [X] Medications: Plavix 75mg daily, Eliquis 5mg BID (to start on 11/10/2021 if no bleeding); continue statin, beta-blockers and ARB     [X] IV Fluids: No IV fluids; patient in fluid overload PRE-OP DIAGNOSIS:  Stable angina; new cardiomyopathy      PROCEDURE:     [X] Coronary Angiogram     [] LHC     [] LVG     [X] RHC     [X] Intervention (see below)         PHYSICIAN:  Dr. Judd / Dr. Bliss    FELLOW: Dr. Fegn         PROCEDURE DESCRIPTION:     Consent:      [X] Patient     [] Family Member     []  Used        Anesthesia:     [] General     [X] Sedation     [] Local        Access & Closure:     [] Fr Radial Artery     [X] 6Fr Femoral Artery --> Perclose     [X] 7Fr Femoral Vein --> Manual      [] Fr Brachial Vein       IV Contrast: 220mL        Intervention: IVUS guided PCI to       Implants: 4.5 X 20 Synergy XD CAMDEN       FINDINGS:     Coronary Dominance: Right      LM: No disease    LAD:        Prox: 95% stenosis, hazy lesion; s/p IVUS guided PCI with balloon angioplasty and CAMDEN (4.5 X 20 Synergy XD CAMDEN)       Mid: Mild disease       Dist: Mild disease  Diag: small vessel, 80% stenosis     CX: Mild diffuse disease    RCA: Ectatic with diffuse mild to moderate disease    LVEDP: Not measured    EF: 44% on echo (5/14/2021)        RHC findings:    RA: 12/10/9  RV: 58/16  PASP: 59/25/39  PCW: 16/41/23  CO/CI (Thermodilution): 3.87 / 1.78      ESTIMATED BLOOD LOSS: < 10 mL        CONDITION:     [X] Good     [] Fair     [] Critical        SPECIMEN REMOVED: N/A       POST-OP DIAGNOSIS:      [X] 1 Vessel Coronary Artery Disease: AUC 7 for revascularization; s/p IVUS guided PCI to LAD with CAMDEN    [X] Elevated PCWP; moderate to severe pulmonary hypertension       PLAN OF CARE:     [X] Admit for observation     [X] Will give one dose of IV lasix; re-assess volume status in AM for diuretics    [X] Medications: Plavix 75mg daily, Eliquis 5mg BID (to start on 11/10/2021 if no bleeding); continue statin, beta-blockers and ARB     [X] IV Fluids: No IV fluids; patient in fluid overload PRE-OP DIAGNOSIS:  Stable angina; new cardiomyopathy      PROCEDURE:     [X] Coronary Angiogram     [] LHC     [] LVG     [X] RHC     [X] Intervention (see below)         PHYSICIAN:  Dr. Judd / Dr. Bliss    FELLOW: Dr. Feng         PROCEDURE DESCRIPTION:     Consent:      [X] Patient     [] Family Member     []  Used        Anesthesia:     [] General     [X] Sedation     [] Local        Access & Closure:     [] Fr Radial Artery     [X] 6Fr Femoral Artery --> Perclose     [X] 7Fr Femoral Vein --> Manual      [] Fr Brachial Vein       IV Contrast: 220mL        Intervention: IVUS guided PCI to proximal LAD      Implants: 4.5 X 20 Synergy XD CAMDEN       FINDINGS:     Coronary Dominance: Right      LM: No disease    LAD:        Prox: 95% stenosis, hazy lesion; s/p IVUS guided PCI with balloon angioplasty and CAMDEN (4.5 X 20 Synergy XD CAMDEN)       Mid: Mild disease       Dist: Mild disease  Diag: small vessel, 80% stenosis     CX: Mild diffuse disease    RCA: Ectatic with diffuse mild to moderate disease    LVEDP: Not measured    EF: 44% on echo (5/14/2021)        RHC findings:    RA: 12/10/9  RV: 58/16  PASP: 59/25/39  PCW: 16/41/23  CO/CI (Thermodilution): 3.87 / 1.78      ESTIMATED BLOOD LOSS: < 10 mL        CONDITION:     [X] Good     [] Fair     [] Critical        SPECIMEN REMOVED: N/A       POST-OP DIAGNOSIS:      [X] 1 Vessel Coronary Artery Disease: AUC 7 for revascularization; s/p IVUS guided PCI to LAD with CAMDEN    [X] Elevated PCWP; moderate to severe pulmonary hypertension    [X] During procedure, patient developed ventricular tachycardia that terminated spontaneously       PLAN OF CARE:     [X] Admit for observation     [X] Will give one dose of IV lasix; re-assess volume status in AM for diuretics    [X] Medications: Plavix 75mg daily, Eliquis 5mg BID (to start on 11/10/2021 if no bleeding); continue statin, beta-blockers and ARB     [X] IV Fluids: No IV fluids; patient in fluid overload

## 2021-11-09 NOTE — ASU PATIENT PROFILE, ADULT - NS PRO TALK SOMEONE YN
0730 - Received pt at bedside from [facility], VSS on ventilator, no apparent signs of distress 0930 - Rounded with treatment team, progression of care discussed, recommendations made, new orders received, pt to go to CT with contrast today, will coordinate with CT to schedule 1030 - Visitors present at bedside, VSS on vent, no apparent signs of distress 1217 - NP Mary Del Valle in to assess pt, no new orders received 1500 - Pt taken down for CT with bedside nurse, RT, and transport, sam well 
 
1600 - Resting in bed quietly, VSS on vent, no apparent signs of distress 36 - Dr. Ava Genao in to speak to family 1800 - Trickle feeds started at 10cc/hr per Dr. Curry Pina, Dialysis RN present at bedside, VSS on vent, no apparent signs of distress 1930 - Bedside and Verbal shift change report given to [address] (oncoming nurse) by Shiv Anaya (offgoing nurse). Report included the following information SBAR, Kardex, Intake/Output, MAR, Recent Results and Cardiac Rhythm NSR. no

## 2021-11-09 NOTE — H&P CARDIOLOGY - HISTORY OF PRESENT ILLNESS
81 y/old M here for C due to COLLIER  PMH: HTN, Mild Systolic dysfunction, Paroxysmal A-Fib, CHADS VASC 4. Moderate to severe MR, Iron deficiency anemia   PSH: GI ulcer 5 years ago   FH: None    Pre cath note:    indication:  [ ] STEMI                [ ] NSTEMI                 [ ] Acute coronary syndrome                     [ ]Unstable Angina   [ ] high risk  [ ] intermediate risk  [ ] low risk                     [ ] Stable Angina     non-invasive testing: none                   Date:                     result: [ ] high risk  [ ] intermediate risk  [ ] low risk    Anti- Anginal medications:                    [ ] not used                       [x ] used                   [ ] not used but strong indication not to use    Ejection Fraction                   [ ] <29            [ ] 30-39%   [x ] 40-49%     [ ]>50%    CHF                   [ ] active (within last 14 days on meds   [x ] Chronic (on meds but no exacerbation)    COPD                   [ ] mild (on chronic bronchodilators)  [ ] moderate (on chronic steroid therapy)      [ ] severe (indication for home O2 or PACO2 >50)    Other risk factors:                       [ ] Previous MI                     [ ] CVA/ stroke                    [ ] carotid stent/ CEA                    [ ] PVD/PAD- (arterial aneurysm, non-palpable pulses, tortuous vessel with inability to insert catheter, infra-renal dissection, renal or subclavian artery stenosis)                    [ ] diabetic                    [ ] previous CABG                    [ ] Renal Failure       Last dose of Eliquis on 11/6/21  Adjusted CathPCI Bleeding Event Risk: 2.3 %  Chepe Test ( Abnormal )

## 2021-11-10 ENCOUNTER — TRANSCRIPTION ENCOUNTER (OUTPATIENT)
Age: 81
End: 2021-11-10

## 2021-11-10 VITALS
TEMPERATURE: 97 F | DIASTOLIC BLOOD PRESSURE: 83 MMHG | SYSTOLIC BLOOD PRESSURE: 127 MMHG | HEART RATE: 96 BPM | OXYGEN SATURATION: 99 % | WEIGHT: 215.17 LBS

## 2021-11-10 LAB
ALBUMIN SERPL ELPH-MCNC: 3.9 G/DL — SIGNIFICANT CHANGE UP (ref 3.5–5.2)
ALP SERPL-CCNC: 73 U/L — SIGNIFICANT CHANGE UP (ref 30–115)
ALT FLD-CCNC: 11 U/L — SIGNIFICANT CHANGE UP (ref 0–41)
ANION GAP SERPL CALC-SCNC: 18 MMOL/L — HIGH (ref 7–14)
APTT BLD: 34.7 SEC — SIGNIFICANT CHANGE UP (ref 27–39.2)
AST SERPL-CCNC: 19 U/L — SIGNIFICANT CHANGE UP (ref 0–41)
BASOPHILS # BLD AUTO: 0.03 K/UL — SIGNIFICANT CHANGE UP (ref 0–0.2)
BASOPHILS NFR BLD AUTO: 0.2 % — SIGNIFICANT CHANGE UP (ref 0–1)
BILIRUB SERPL-MCNC: 0.6 MG/DL — SIGNIFICANT CHANGE UP (ref 0.2–1.2)
BUN SERPL-MCNC: 13 MG/DL — SIGNIFICANT CHANGE UP (ref 10–20)
CALCIUM SERPL-MCNC: 9 MG/DL — SIGNIFICANT CHANGE UP (ref 8.5–10.1)
CHLORIDE SERPL-SCNC: 99 MMOL/L — SIGNIFICANT CHANGE UP (ref 98–110)
CO2 SERPL-SCNC: 21 MMOL/L — SIGNIFICANT CHANGE UP (ref 17–32)
COVID-19 NUCLEOCAPSID GAM AB INTERP: NEGATIVE — SIGNIFICANT CHANGE UP
COVID-19 NUCLEOCAPSID TOTAL GAM ANTIBODY RESULT: 0.08 INDEX — SIGNIFICANT CHANGE UP
COVID-19 SPIKE DOMAIN AB INTERP: POSITIVE
COVID-19 SPIKE DOMAIN ANTIBODY RESULT: >250 U/ML — HIGH
CREAT SERPL-MCNC: 1.1 MG/DL — SIGNIFICANT CHANGE UP (ref 0.7–1.5)
EOSINOPHIL # BLD AUTO: 0.03 K/UL — SIGNIFICANT CHANGE UP (ref 0–0.7)
EOSINOPHIL NFR BLD AUTO: 0.2 % — SIGNIFICANT CHANGE UP (ref 0–8)
GLUCOSE SERPL-MCNC: 142 MG/DL — HIGH (ref 70–99)
HCT VFR BLD CALC: 36.4 % — LOW (ref 42–52)
HGB BLD-MCNC: 10.9 G/DL — LOW (ref 14–18)
IMM GRANULOCYTES NFR BLD AUTO: 0.5 % — HIGH (ref 0.1–0.3)
INR BLD: 1.23 RATIO — SIGNIFICANT CHANGE UP (ref 0.65–1.3)
LYMPHOCYTES # BLD AUTO: 1.53 K/UL — SIGNIFICANT CHANGE UP (ref 1.2–3.4)
LYMPHOCYTES # BLD AUTO: 11.9 % — LOW (ref 20.5–51.1)
MAGNESIUM SERPL-MCNC: 1.7 MG/DL — LOW (ref 1.8–2.4)
MCHC RBC-ENTMCNC: 22.7 PG — LOW (ref 27–31)
MCHC RBC-ENTMCNC: 29.9 G/DL — LOW (ref 32–37)
MCV RBC AUTO: 75.8 FL — LOW (ref 80–94)
MONOCYTES # BLD AUTO: 1.01 K/UL — HIGH (ref 0.1–0.6)
MONOCYTES NFR BLD AUTO: 7.8 % — SIGNIFICANT CHANGE UP (ref 1.7–9.3)
NEUTROPHILS # BLD AUTO: 10.21 K/UL — HIGH (ref 1.4–6.5)
NEUTROPHILS NFR BLD AUTO: 79.4 % — HIGH (ref 42.2–75.2)
NRBC # BLD: 0 /100 WBCS — SIGNIFICANT CHANGE UP (ref 0–0)
PLATELET # BLD AUTO: 268 K/UL — SIGNIFICANT CHANGE UP (ref 130–400)
POTASSIUM SERPL-MCNC: 3.6 MMOL/L — SIGNIFICANT CHANGE UP (ref 3.5–5)
POTASSIUM SERPL-SCNC: 3.6 MMOL/L — SIGNIFICANT CHANGE UP (ref 3.5–5)
PROT SERPL-MCNC: 7.5 G/DL — SIGNIFICANT CHANGE UP (ref 6–8)
PROTHROM AB SERPL-ACNC: 14.1 SEC — HIGH (ref 9.95–12.87)
RBC # BLD: 4.8 M/UL — SIGNIFICANT CHANGE UP (ref 4.7–6.1)
RBC # FLD: 17 % — HIGH (ref 11.5–14.5)
SARS-COV-2 IGG+IGM SERPL QL IA: 0.08 INDEX — SIGNIFICANT CHANGE UP
SARS-COV-2 IGG+IGM SERPL QL IA: >250 U/ML — HIGH
SARS-COV-2 IGG+IGM SERPL QL IA: NEGATIVE — SIGNIFICANT CHANGE UP
SARS-COV-2 IGG+IGM SERPL QL IA: POSITIVE
SODIUM SERPL-SCNC: 138 MMOL/L — SIGNIFICANT CHANGE UP (ref 135–146)
TROPONIN T SERPL-MCNC: 0.02 NG/ML — HIGH
WBC # BLD: 12.87 K/UL — HIGH (ref 4.8–10.8)
WBC # FLD AUTO: 12.87 K/UL — HIGH (ref 4.8–10.8)

## 2021-11-10 PROCEDURE — 99239 HOSP IP/OBS DSCHRG MGMT >30: CPT

## 2021-11-10 PROCEDURE — 93010 ELECTROCARDIOGRAM REPORT: CPT

## 2021-11-10 RX ORDER — ATORVASTATIN CALCIUM 80 MG/1
1 TABLET, FILM COATED ORAL
Qty: 0 | Refills: 0 | DISCHARGE

## 2021-11-10 RX ORDER — ATORVASTATIN CALCIUM 80 MG/1
1 TABLET, FILM COATED ORAL
Qty: 30 | Refills: 0
Start: 2021-11-10 | End: 2021-12-09

## 2021-11-10 RX ORDER — MAGNESIUM SULFATE 500 MG/ML
2 VIAL (ML) INJECTION ONCE
Refills: 0 | Status: COMPLETED | OUTPATIENT
Start: 2021-11-10 | End: 2021-11-10

## 2021-11-10 RX ORDER — CLOPIDOGREL BISULFATE 75 MG/1
1 TABLET, FILM COATED ORAL
Qty: 30 | Refills: 0
Start: 2021-11-10 | End: 2021-12-09

## 2021-11-10 RX ADMIN — Medication 50 GRAM(S): at 10:30

## 2021-11-10 RX ADMIN — CLOPIDOGREL BISULFATE 75 MILLIGRAM(S): 75 TABLET, FILM COATED ORAL at 11:52

## 2021-11-10 RX ADMIN — APIXABAN 5 MILLIGRAM(S): 2.5 TABLET, FILM COATED ORAL at 05:33

## 2021-11-10 RX ADMIN — LOSARTAN POTASSIUM 25 MILLIGRAM(S): 100 TABLET, FILM COATED ORAL at 05:33

## 2021-11-10 RX ADMIN — Medication 100 MILLIGRAM(S): at 05:33

## 2021-11-10 RX ADMIN — PANTOPRAZOLE SODIUM 40 MILLIGRAM(S): 20 TABLET, DELAYED RELEASE ORAL at 05:33

## 2021-11-10 NOTE — DISCHARGE NOTE PROVIDER - HOSPITAL COURSE
81 year old man known to have HTN,  Paroxysmal A Fib, Iron Deficiency Anemia, and history of gastric ulcer presented for [facility] due to Dyspnea on exertion:    FINDINGS of C    Coronary Dominance: Right      LM: No disease    LAD:        Prox: 95% stenosis, hazy lesion; s/p IVUS guided PCI with balloon angioplasty and CAMDEN (4.5 X 20 Synergy XD CAMDEN)       Mid: Mild disease       Dist: Mild disease  Diag: small vessel, 80% stenosis     CX: Mild diffuse disease    RCA: Ectatic with diffuse mild to moderate disease    LVEDP: Not measured    EF: 44% on echo (5/14/2021)        RHC findings:    RA: 12/10/9  RV: 58/16  PASP: 59/25/39  PCW: 16/41/23  CO/CI (Thermodilution): 3.87 / 1.78      ESTIMATED BLOOD LOSS: < 10 mL        CONDITION:     [X] Good     [] Fair     [] Critical        SPECIMEN REMOVED: N/A       POST-OP DIAGNOSIS:      [X] 1 Vessel Coronary Artery Disease: AUC 7 for revascularization; s/p IVUS guided PCI to LAD with CAMDEN    [X] Elevated PCWP; moderate to severe pulmonary hypertension    [X] During procedure, patient developed ventricular tachycardia that terminated spontaneously       PLAN OF CARE:     [X] Medications: Plavix 75mg daily, Eliquis 5mg BID (to start on 11/10/2021 if no bleeding); continue statin, beta-blockers and ARB     [X] IV Fluids: No IV fluids; patient in fluid overload.         81 year old man known to have HTN,  Paroxysmal A Fib, Iron Deficiency Anemia, and history of gastric ulcer presented for [facility] due to Dyspnea on exertion:    FINDINGS of C    Coronary Dominance: Right      LM: No disease    LAD:        Prox: 95% stenosis, hazy lesion; s/p IVUS guided PCI with balloon angioplasty and CAMDEN (4.5 X 20 Synergy XD CAMDEN)       Mid: Mild disease       Dist: Mild disease  Diag: small vessel, 80% stenosis     CX: Mild diffuse disease    RCA: Ectatic with diffuse mild to moderate disease    LVEDP: Not measured    EF: 44% on echo (5/14/2021)        RHC findings:    RA: 12/10/9  RV: 58/16  PASP: 59/25/39  PCW: 16/41/23  CO/CI (Thermodilution): 3.87 / 1.78      ESTIMATED BLOOD LOSS: < 10 mL        CONDITION:     [X] Good     [] Fair     [] Critical        SPECIMEN REMOVED: N/A       POST-OP DIAGNOSIS:      [X] 1 Vessel Coronary Artery Disease: AUC 7 for revascularization; s/p IVUS guided PCI to LAD with CAMDEN    [X] Elevated PCWP; moderate to severe pulmonary hypertension    [X] During procedure, patient developed ventricular tachycardia that terminated spontaneously       PLAN OF CARE:     [X] Medications: Plavix 75mg dailyas Eliquis 5mg BID (to start on 11/10/2021 if no bleeding); continue statin, beta-blockers and ARB     [X] IV Fluids: No IV fluids; patient in fluid overload    Attending Attestation:  Patient seen and examined. Pertinent labs, imaging and telemetry reviewed. I agree with the above with exceptions below:     Feeling well post procedure. Does not complain of SOB. Appears euvolemic on exam.     CAD s/p PCI  -Continue on Plavix 75mg PO daily and Eliquis 5mg PO BID.  -No ASA due to high bleeding risk.   -Continue on ARB and BB.  -Continue Lasix 20mg PO daily.   -Follow up in 1 week with Dr. Judd.     Stable for discharge home with outpatient follow up.

## 2021-11-10 NOTE — DISCHARGE NOTE PROVIDER - CARE PROVIDER_API CALL
Tera Judd)  Cardiovascular Disease; Nuclear Cardiology  [address] [place] [address], [address] [place] [address]  Phone: [phone]  Fax: [phone]  Follow Up Time: 2 weeks

## 2021-11-10 NOTE — DISCHARGE NOTE PROVIDER - NSDCMRMEDTOKEN_GEN_ALL_CORE_FT
atorvastatin 40 mg oral tablet: 1 tab(s) orally once a day (at bedtime)  Breo Ellipta 100 mcg-25 mcg/inh inhalation powder: 1 puff(s) inhaled once a day  Eliquis 5 mg oral tablet: 1 tab(s) orally 2 times a day  ferrous sulfate 325 mg (65 mg elemental iron) oral delayed release tablet: 1 tab(s) orally once a day  Fish Oil 1000 mg oral capsule: 2 cap(s) orally once a day  folic acid 1 mg oral tablet: 1 tab(s) orally once a day  furosemide 20 mg oral tablet: 1 tab(s) orally once a day  losartan 25 mg oral tablet: 1 tab(s) orally once a day  metoprolol succinate 100 mg oral tablet, extended release: 1 tab(s) orally once a day  pantoprazole 40 mg oral delayed release tablet: 1 tab(s) orally once a day  Plavix 75 mg oral tablet: 1 tab(s) orally once a day  Zantac 150 oral tablet: 1 tab(s) orally once a day

## 2021-11-10 NOTE — DISCHARGE NOTE PROVIDER - NSDCCPTREATMENT_GEN_ALL_CORE_FT
PRINCIPAL PROCEDURE  Procedure: Percutaneous coronary intervention (PCI), primary  Findings and Treatment: You had a narrowing in your LAD artery of your heart to which a stent was inserted.

## 2021-11-10 NOTE — PROVIDER CONTACT NOTE (OTHER) - SITUATION
Pt reported new onset chest pain despite cath on 11/9/21. MD requested an EKG and new a new set of vitals.

## 2021-11-10 NOTE — DISCHARGE NOTE NURSING/CASE MANAGEMENT/SOCIAL WORK - PATIENT PORTAL LINK FT
You can access the FollowMyHealth Patient Portal offered by [facility] by registering at the following website: [email or web address]. By joining [facility]’s FollowMyHealth portal, you will also be able to view your health information using other applications (apps) compatible with our system.

## 2021-11-10 NOTE — DISCHARGE NOTE PROVIDER - NSDCCPCAREPLAN_GEN_ALL_CORE_FT
PRINCIPAL DISCHARGE DIAGNOSIS  Diagnosis: CAD (coronary artery disease)  Assessment and Plan of Treatment: You were having shortness of breath on exertion. When the cardiac catheterization was done, it showed narrowing of your heart arteries. A stent was put in the narrowing.   Please take your medications as indiacted and follow up with your cardiologist.

## 2021-11-10 NOTE — PROGRESS NOTE ADULT - SUBJECTIVE AND OBJECTIVE BOX
Cardiology Follow up    MIGEL MOYA   81y Male  PAST MEDICAL & SURGICAL HISTORY:  HTN (hypertension)    High cholesterol    GERD (gastroesophageal reflux disease)         HPI:  81 y/old M here for C due to COLLIER  PMH: HTN, Mild Systolic dysfunction, Paroxysmal A-Fib, CHADS VASC 4. Moderate to severe MR, Iron deficiency anemia   PSH: GI ulcer 5 years ago   FH: None    Pre cath note:    indication:  [ ] STEMI                [ ] NSTEMI                 [ ] Acute coronary syndrome                     [ ]Unstable Angina   [ ] high risk  [ ] intermediate risk  [ ] low risk                     [ ] Stable Angina     non-invasive testing: none                   Date:                     result: [ ] high risk  [ ] intermediate risk  [ ] low risk    Anti- Anginal medications:                    [ ] not used                       [x ] used                   [ ] not used but strong indication not to use    Ejection Fraction                   [ ] <29            [ ] 30-39%   [x ] 40-49%     [ ]>50%    CHF                   [ ] active (within last 14 days on meds   [x ] Chronic (on meds but no exacerbation)    COPD                   [ ] mild (on chronic bronchodilators)  [ ] moderate (on chronic steroid therapy)      [ ] severe (indication for home O2 or PACO2 >50)    Other risk factors:                       [ ] Previous MI                     [ ] CVA/ stroke                    [ ] carotid stent/ CEA                    [ ] PVD/PAD- (arterial aneurysm, non-palpable pulses, tortuous vessel with inability to insert catheter, infra-renal dissection, renal or subclavian artery stenosis)                    [ ] diabetic                    [ ] previous CABG                    [ ] Renal Failure       Last dose of Eliquis on 11/6/21  Adjusted CathPCI Bleeding Event Risk: 2.3 %  Chepe Test ( Abnormal )   (09 Nov 2021 06:54)    Allergies    No Known Allergies    Intolerances      Patient seen and examined at bedside. No acute events overnight.  Patient without complaints. Pt ambulated without issues/symptoms  Denies CP, SOB, palpitations, or dizziness  No events on telemetry overnight    Vital Signs Last 24 Hrs  T(C): 36.2 (10 Nov 2021 04:05), Max: 36.2 (10 Nov 2021 04:05)  T(F): 97.1 (10 Nov 2021 04:05), Max: 97.1 (10 Nov 2021 04:05)  HR: 96 (10 Nov 2021 04:05) (91 - 98)  BP: 127/83 (10 Nov 2021 04:05) (114/71 - 128/81)  BP(mean): --  RR: --  SpO2: 99% (10 Nov 2021 04:05) (99% - 99%)    MEDICATIONS  (STANDING):  apixaban 5 milliGRAM(s) Oral every 12 hours  atorvastatin 40 milliGRAM(s) Oral at bedtime  clopidogrel Tablet 75 milliGRAM(s) Oral daily  influenza  Vaccine (HIGH DOSE) 0.7 milliLiter(s) IntraMuscular once  losartan 25 milliGRAM(s) Oral daily  magnesium sulfate  IVPB 2 Gram(s) IV Intermittent once  metoprolol succinate  milliGRAM(s) Oral daily  pantoprazole    Tablet 40 milliGRAM(s) Oral before breakfast    MEDICATIONS  (PRN):      REVIEW OF SYSTEMS:          All negative except as mentioned in HPI    PHYSICAL EXAM:           CONSTITUTIONAL: Well-developed; well-nourished; in no acute distress  	SKIN: warm, dry  	HEAD: Normocephalic; atraumatic  	EYES: PERRL.  	ENT: No nasal discharge, airway clear, mucous membranes moist  	NECK: Supple; non tender.  	CARD: +S1, +S2, no murmurs, gallops, or rubs. Regular rate and rhythm    	RESP: No wheezes, rales or rhonchi. CTA B/L  	ABD: soft ntnd, + BS x 4 quadrants  	EXT: moves all extremities,  no clubbing, cyanosis or edema  	NEURO: Alert and oriented x3, no focal deficits          PSYCH: Cooperative, appropriate          VASCULAR:  + Rad / + PTs / +  DPs          EXTREMITY:              Right Groin: dressing removed, access site soft, Perclose appreciated, no hematoma, no pain, + pulses, no sign of infection, no numbness  	            ECG:   < from: 12 Lead ECG (11.10.21 @ 07:44) >  Ventricular Rate 105 BPM    Atrial Rate 105 BPM    P-R Interval 176 ms    QRS Duration 102 ms    Q-T Interval 376 ms    QTC Calculation(Bazett) 496 ms    P Axis 72 degrees    R Axis 13 degrees    T Axis 10 degrees    Diagnosis Line Sinus tachycardia with Premature supraventricular complexes and with  occasional Premature ventricular complexes  Incomplete right bundle branch block  Borderline ECG    Confirmed by Colt Bliss ([id number]) on 11/10/2021 9:01:59 AM                                                                                          2D ECHO:  < from: Transthoracic Echocardiogram (12.31.18 @ 10:57) >  Summary:   1. Normal global leftventricular systolic function.   2. Mild aortic regurgitation.   3. Pulmonic valve regurgitation.   4. Dilatation of the ascending aorta.    LABS:                        10.9   12.87 )-----------( 268      ( 10 Nov 2021 04:30 )             36.4     11-10    138  |  99  |  13  ----------------------------<  142<H>  3.6   |  21  |  1.1    Ca    9.0      10 Nov 2021 04:30  Mg     1.7     11-10    TPro  7.5  /  Alb  3.9  /  TBili  0.6  /  DBili  x   /  AST  19  /  ALT  11  /  AlkPhos  73  11-10    CARDIAC MARKERS ( 10 Nov 2021 04:30 )  x     / 0.02 ng/mL / x     / x     / x        Magnesium, Serum: 1.7 mg/dL *L* [1.8 - 2.4] (11-10-21 @ 04:30)  LIVER FUNCTIONS - ( 10 Nov 2021 04:30 )  Alb: 3.9 g/dL / Pro: 7.5 g/dL / ALK PHOS: 73 U/L / ALT: 11 U/L / AST: 19 U/L / GGT: x             A/P:  I discussed the case with Cardiologist Dr. Bliss and recommend the following:    S/P PCI:   Intervention: IVUS guided PCI to proximal LAD  Implants: 4.5 X 20 Synergy XD CAMDEN    FINDINGS:     Coronary Dominance: Right    LM: No disease    LAD:        Prox: 95% stenosis, hazy lesion; s/p IVUS guided PCI with balloon angioplasty and CAMDEN (4.5 X 20 Synergy XD CAMDEN)       Mid: Mild disease       Dist: Mild disease  Diag: small vessel, 80% stenosis     CX: Mild diffuse disease    RCA: Ectatic with diffuse mild to moderate disease    LVEDP: Not measured    EF: 44% on echo (5/14/2021)      RHC findings:    RA: 12/10/9  RV: 58/16  PASP: 59/25/39  PCW: 16/41/23  CO/CI (Thermodilution): 3.87 / 1.78                      No Aspirin due to risk of bleeding on triple therapy  	     Continue Plavix 75 mg daily, Eliquis, ARB, B-Blocker, Statin Therapy                   Patient given 30 day supply of Plavix 75 mg daily to take at home                   Patient agreeing to take Plavix and Eliquis as directed by cardiologist                    Pt given instructions on importance of taking antiplatelet medication or risk acute stent thrombosis/death                   Post cath instructions, access site care and activity restrictions reviewed with patient                     Discussed with patient to return to hospital if experience chest pain, shortness breath, dizziness and site bleeding                   Aggressive risk factor modification, diet counseling, smoking cessation discussed with patient                       Can discharge patient from cardiac standpoint after ambulating without symptoms and access site wnl, ECG and blood work reviewed                    Follow up with Cardiology Dr. Judd in two weeks. Instructed to call and make an appointment

## 2021-11-19 DIAGNOSIS — I11.0 HYPERTENSIVE HEART DISEASE WITH HEART FAILURE: ICD-10-CM

## 2021-11-19 DIAGNOSIS — I42.9 CARDIOMYOPATHY, UNSPECIFIED: ICD-10-CM

## 2021-11-19 DIAGNOSIS — I25.10 ATHEROSCLEROTIC HEART DISEASE OF NATIVE CORONARY ARTERY WITHOUT ANGINA PECTORIS: ICD-10-CM

## 2021-11-19 DIAGNOSIS — Z87.891 PERSONAL HISTORY OF NICOTINE DEPENDENCE: ICD-10-CM

## 2021-11-19 DIAGNOSIS — I48.0 PAROXYSMAL ATRIAL FIBRILLATION: ICD-10-CM

## 2021-11-19 DIAGNOSIS — R06.00 DYSPNEA, UNSPECIFIED: ICD-10-CM

## 2021-11-19 DIAGNOSIS — D50.9 IRON DEFICIENCY ANEMIA, UNSPECIFIED: ICD-10-CM

## 2021-11-19 DIAGNOSIS — I50.22 CHRONIC SYSTOLIC (CONGESTIVE) HEART FAILURE: ICD-10-CM

## 2021-11-22 ENCOUNTER — RX RENEWAL (OUTPATIENT)
Age: 81
End: 2021-11-22

## 2021-12-22 ENCOUNTER — RX RENEWAL (OUTPATIENT)
Age: 81
End: 2021-12-22

## 2022-02-17 ENCOUNTER — RX RENEWAL (OUTPATIENT)
Age: 82
End: 2022-02-17

## 2022-02-22 ENCOUNTER — APPOINTMENT (OUTPATIENT)
Dept: CARDIOLOGY | Facility: CLINIC | Age: 82
End: 2022-02-22
Payer: MEDICARE

## 2022-02-22 VITALS
BODY MASS INDEX: 29.68 KG/M2 | DIASTOLIC BLOOD PRESSURE: 70 MMHG | HEIGHT: 71 IN | HEART RATE: 106 BPM | WEIGHT: 212 LBS | SYSTOLIC BLOOD PRESSURE: 122 MMHG

## 2022-02-22 PROCEDURE — 93000 ELECTROCARDIOGRAM COMPLETE: CPT

## 2022-02-22 PROCEDURE — 99214 OFFICE O/P EST MOD 30 MIN: CPT

## 2022-02-22 NOTE — CARDIOLOGY SUMMARY
[de-identified] : 02/22/22:\par /min, iRBBB. [de-identified] : 05/14/21:\par LVEF 44%, G2DD\par Mod LAE\par Mod-severe MR.

## 2022-02-22 NOTE — REVIEW OF SYSTEMS
[Dyspnea on exertion] : dyspnea during exertion [Negative] : Neurological [Lower Ext Edema] : lower extremity edema [Leg Claudication] : no intermittent leg claudication [Palpitations] : no palpitations [Orthopnea] : no orthopnea [Syncope] : no syncope [Rash] : no rash [Anxiety] : no anxiety [Easy Bleeding] : no tendency for easy bleeding [Easy Bruising] : no tendency for easy bruising

## 2022-02-22 NOTE — ASSESSMENT
[FreeTextEntry1] : 81-yo male with h/o HTN, mild systolic dysfunction.\par Paroxysmal A-fib. CHADS Vasc score 4. In SR today.\par Moderate to severe MR now.\par Iron deficiency anemia.\par CAD, s/p CAMDEN of proximal LAD.\par Sinus tachycardia today, probably due to inhaler.\par \par Plan:\par Continue treatment.\par Fasting blood work ordered.\par Repeat 2D ECHO.\par F/u in 6 weeks.\par \par Tera Judd MD\par

## 2022-02-22 NOTE — HISTORY OF PRESENT ILLNESS
[FreeTextEntry1] : 82-yo male with h/o mild LV dysfunction (LVEF 44%).\par \par H/o HTN, hyperlipidemia. Patient was found to be in A-fib in April 2021. Eliquis started.\par \par Iron deficiency anemia, no overt bleeding. Colonoscopy negative in 2021.\par \par S/p PCI of proximal LAD.\par \par Patient denies CP now. he still c/o COLLIER. Some improvement with Breo Ellipta inhaler.\par \par

## 2022-02-22 NOTE — PHYSICAL EXAM
[Well Developed] : well developed [Well Nourished] : well nourished [No Acute Distress] : no acute distress [Normal Conjunctiva] : normal conjunctiva [Normal Venous Pressure] : normal venous pressure [No Carotid Bruit] : no carotid bruit [Normal S1, S2] : normal S1, S2 [No Murmur] : no murmur [No Rub] : no rub [S4] : S4 [Clear Lung Fields] : clear lung fields [Good Air Entry] : good air entry [No Respiratory Distress] : no respiratory distress  [Soft] : abdomen soft [Non Tender] : non-tender [Normal Bowel Sounds] : normal bowel sounds [Normal Gait] : normal gait [No Cyanosis] : no cyanosis [No Clubbing] : no clubbing [No Varicosities] : no varicosities [No Rash] : no rash [Moves all extremities] : moves all extremities [Normal Speech] : normal speech [Alert and Oriented] : alert and oriented [Normal memory] : normal memory [Edema ___] : edema [unfilled]

## 2022-02-28 ENCOUNTER — APPOINTMENT (OUTPATIENT)
Dept: UROLOGY | Facility: CLINIC | Age: 82
End: 2022-02-28
Payer: MEDICARE

## 2022-02-28 PROCEDURE — 99204 OFFICE O/P NEW MOD 45 MIN: CPT

## 2022-02-28 NOTE — PHYSICAL EXAM
[General Appearance - Well Nourished] : well nourished [Normal Appearance] : normal appearance [Well Groomed] : well groomed [General Appearance - In No Acute Distress] : no acute distress [Abdomen Soft] : soft [Abdomen Tenderness] : non-tender [] : no hepato-splenomegaly [Abdomen Mass (___ Cm)] : no abdominal mass palpated [Abdomen Hernia] : no hernia was discovered [Costovertebral Angle Tenderness] : no ~M costovertebral angle tenderness [Urethral Meatus] : meatus normal [Penis Abnormality] : normal uncircumcised penis [Scrotum] : the scrotum was normal [Testes Tenderness] : no tenderness of the testes [Testes Mass (___cm)] : there were no testicular masses [Prostate Tenderness] : the prostate was not tender [No Prostate Nodules] : no prostate nodules [Prostate Size ___ gm] : prostate size [unfilled] gm [FreeTextEntry1] : left epididymal cyst

## 2022-02-28 NOTE — REVIEW OF SYSTEMS
[Shortness Of Breath] : shortness of breath [SOB on Exertion] : shortness of breath during exertion [see HPI] : see HPI [Joint Pain] : joint pain [Negative] : Heme/Lymph

## 2022-02-28 NOTE — HISTORY OF PRESENT ILLNESS
[FreeTextEntry1] : Ms. Miguel is a david 82-year-old male who complains primarily of difficulty initiating urination.  He also has somewhat slow stream.  He also has frequency but this seems to be more related to his use of diuretics and he has nocturia x2.  He denies any hematuria dysuria heat history of UTIs or stone disease.  He also complains of sexual dysfunction but has significant cardiac disease and I would tend to doubt that he should be engaging in intercourse unless that is cleared by his cardiologist and we discussed this to some extent.  Overall he is mild to moderately bothered by his urinary symptoms.  He denies significant urgency or urge incontinence per se.

## 2022-02-28 NOTE — ASSESSMENT
[FreeTextEntry1] : Shanae 82-year-old male with some mild lower urinary tract symptoms.  Primarily complains of difficulty initiating and somewhat slow stream.  However he does feel that he empties well.  He has had no UTIs or hematuria.  He does have frequency and nocturia but this likely is related to significant fluid mobilization especially with the use of diuretics as he has significant heart failure.  He also has some sexual dysfunction and we addressed that in some detail.  He has a left approximately 2.5 to 3 cm epididymal cyst and we will confirm this with ultrasound at some point in the future.  I started him on a trial of alfuzosin to see if that will help with his initiating his stream and his flow.  Aside from that I do not feel that further work-up is indicated at this point and we will see him back in about 3 months with a flow and a PVR.  All his questions were otherwise answered.

## 2022-03-10 ENCOUNTER — APPOINTMENT (OUTPATIENT)
Dept: CARDIOLOGY | Facility: CLINIC | Age: 82
End: 2022-03-10
Payer: MEDICARE

## 2022-03-10 PROCEDURE — 93306 TTE W/DOPPLER COMPLETE: CPT

## 2022-03-25 ENCOUNTER — INPATIENT (INPATIENT)
Facility: HOSPITAL | Age: 82
LOS: 3 days | Discharge: HOME | End: 2022-03-29
Attending: INTERNAL MEDICINE | Admitting: INTERNAL MEDICINE
Payer: MEDICARE

## 2022-03-25 VITALS
HEIGHT: 72 IN | DIASTOLIC BLOOD PRESSURE: 71 MMHG | OXYGEN SATURATION: 98 % | RESPIRATION RATE: 20 BRPM | HEART RATE: 98 BPM | TEMPERATURE: 98 F | SYSTOLIC BLOOD PRESSURE: 118 MMHG

## 2022-03-25 LAB
ABO RH CONFIRMATION: SIGNIFICANT CHANGE UP
ALBUMIN SERPL ELPH-MCNC: 3.6 G/DL — SIGNIFICANT CHANGE UP (ref 3.5–5.2)
ALP SERPL-CCNC: 62 U/L — SIGNIFICANT CHANGE UP (ref 30–115)
ALT FLD-CCNC: 11 U/L — SIGNIFICANT CHANGE UP (ref 0–41)
ANION GAP SERPL CALC-SCNC: 11 MMOL/L — SIGNIFICANT CHANGE UP (ref 7–14)
ANISOCYTOSIS BLD QL: SIGNIFICANT CHANGE UP
APTT BLD: 32.1 SEC — SIGNIFICANT CHANGE UP (ref 27–39.2)
AST SERPL-CCNC: 38 U/L — SIGNIFICANT CHANGE UP (ref 0–41)
BASOPHILS # BLD AUTO: 0.06 K/UL — SIGNIFICANT CHANGE UP (ref 0–0.2)
BASOPHILS NFR BLD AUTO: 0.9 % — SIGNIFICANT CHANGE UP (ref 0–1)
BILIRUB SERPL-MCNC: 0.6 MG/DL — SIGNIFICANT CHANGE UP (ref 0.2–1.2)
BLD GP AB SCN SERPL QL: SIGNIFICANT CHANGE UP
BUN SERPL-MCNC: 8 MG/DL — LOW (ref 10–20)
CALCIUM SERPL-MCNC: 8.7 MG/DL — SIGNIFICANT CHANGE UP (ref 8.5–10.1)
CHLORIDE SERPL-SCNC: 104 MMOL/L — SIGNIFICANT CHANGE UP (ref 98–110)
CO2 SERPL-SCNC: 23 MMOL/L — SIGNIFICANT CHANGE UP (ref 17–32)
CREAT SERPL-MCNC: 1.1 MG/DL — SIGNIFICANT CHANGE UP (ref 0.7–1.5)
EGFR: 67 ML/MIN/1.73M2 — SIGNIFICANT CHANGE UP
EOSINOPHIL # BLD AUTO: 0.11 K/UL — SIGNIFICANT CHANGE UP (ref 0–0.7)
EOSINOPHIL NFR BLD AUTO: 1.7 % — SIGNIFICANT CHANGE UP (ref 0–8)
GIANT PLATELETS BLD QL SMEAR: PRESENT — SIGNIFICANT CHANGE UP
GLUCOSE SERPL-MCNC: 108 MG/DL — HIGH (ref 70–99)
HCT VFR BLD CALC: 26.8 % — LOW (ref 42–52)
HGB BLD-MCNC: 6.9 G/DL — CRITICAL LOW (ref 14–18)
HYPOCHROMIA BLD QL: SIGNIFICANT CHANGE UP
INR BLD: 1.4 RATIO — HIGH (ref 0.65–1.3)
LYMPHOCYTES # BLD AUTO: 0.86 K/UL — LOW (ref 1.2–3.4)
LYMPHOCYTES # BLD AUTO: 13.2 % — LOW (ref 20.5–51.1)
MANUAL SMEAR VERIFICATION: SIGNIFICANT CHANGE UP
MCHC RBC-ENTMCNC: 16.4 PG — LOW (ref 27–31)
MCHC RBC-ENTMCNC: 25.7 G/DL — LOW (ref 32–37)
MCV RBC AUTO: 63.8 FL — LOW (ref 80–94)
MICROCYTES BLD QL: SIGNIFICANT CHANGE UP
MONOCYTES # BLD AUTO: 0.29 K/UL — SIGNIFICANT CHANGE UP (ref 0.1–0.6)
MONOCYTES NFR BLD AUTO: 4.4 % — SIGNIFICANT CHANGE UP (ref 1.7–9.3)
NEUTROPHILS # BLD AUTO: 5.23 K/UL — SIGNIFICANT CHANGE UP (ref 1.4–6.5)
NEUTROPHILS NFR BLD AUTO: 79.8 % — HIGH (ref 42.2–75.2)
OVALOCYTES BLD QL SMEAR: SLIGHT — SIGNIFICANT CHANGE UP
PLAT MORPH BLD: NORMAL — SIGNIFICANT CHANGE UP
PLATELET # BLD AUTO: 191 K/UL — SIGNIFICANT CHANGE UP (ref 130–400)
POIKILOCYTOSIS BLD QL AUTO: SIGNIFICANT CHANGE UP
POLYCHROMASIA BLD QL SMEAR: SLIGHT — SIGNIFICANT CHANGE UP
POTASSIUM SERPL-MCNC: 4.4 MMOL/L — SIGNIFICANT CHANGE UP (ref 3.5–5)
POTASSIUM SERPL-SCNC: 4.4 MMOL/L — SIGNIFICANT CHANGE UP (ref 3.5–5)
PROT SERPL-MCNC: 6.7 G/DL — SIGNIFICANT CHANGE UP (ref 6–8)
PROTHROM AB SERPL-ACNC: 16 SEC — HIGH (ref 9.95–12.87)
RBC # BLD: 4.2 M/UL — LOW (ref 4.7–6.1)
RBC # FLD: 23 % — HIGH (ref 11.5–14.5)
RBC BLD AUTO: ABNORMAL
SARS-COV-2 RNA SPEC QL NAA+PROBE: SIGNIFICANT CHANGE UP
SCHISTOCYTES BLD QL AUTO: SLIGHT — SIGNIFICANT CHANGE UP
SODIUM SERPL-SCNC: 138 MMOL/L — SIGNIFICANT CHANGE UP (ref 135–146)
TARGETS BLD QL SMEAR: SLIGHT — SIGNIFICANT CHANGE UP
WBC # BLD: 6.55 K/UL — SIGNIFICANT CHANGE UP (ref 4.8–10.8)
WBC # FLD AUTO: 6.55 K/UL — SIGNIFICANT CHANGE UP (ref 4.8–10.8)

## 2022-03-25 PROCEDURE — 99285 EMERGENCY DEPT VISIT HI MDM: CPT | Mod: FS

## 2022-03-25 PROCEDURE — 99232 SBSQ HOSP IP/OBS MODERATE 35: CPT | Mod: GC

## 2022-03-25 RX ORDER — FOLIC ACID 0.8 MG
1 TABLET ORAL DAILY
Refills: 0 | Status: DISCONTINUED | OUTPATIENT
Start: 2022-03-25 | End: 2022-03-29

## 2022-03-25 RX ORDER — FERROUS SULFATE 325(65) MG
325 TABLET ORAL DAILY
Refills: 0 | Status: DISCONTINUED | OUTPATIENT
Start: 2022-03-25 | End: 2022-03-29

## 2022-03-25 RX ORDER — SODIUM CHLORIDE 9 MG/ML
1000 INJECTION INTRAMUSCULAR; INTRAVENOUS; SUBCUTANEOUS ONCE
Refills: 0 | Status: COMPLETED | OUTPATIENT
Start: 2022-03-25 | End: 2022-03-25

## 2022-03-25 RX ORDER — CHLORHEXIDINE GLUCONATE 213 G/1000ML
1 SOLUTION TOPICAL DAILY
Refills: 0 | Status: DISCONTINUED | OUTPATIENT
Start: 2022-03-25 | End: 2022-03-29

## 2022-03-25 RX ORDER — LOSARTAN POTASSIUM 100 MG/1
25 TABLET, FILM COATED ORAL DAILY
Refills: 0 | Status: DISCONTINUED | OUTPATIENT
Start: 2022-03-25 | End: 2022-03-26

## 2022-03-25 RX ORDER — PANTOPRAZOLE SODIUM 20 MG/1
80 TABLET, DELAYED RELEASE ORAL ONCE
Refills: 0 | Status: COMPLETED | OUTPATIENT
Start: 2022-03-25 | End: 2022-03-25

## 2022-03-25 RX ORDER — ATORVASTATIN CALCIUM 80 MG/1
40 TABLET, FILM COATED ORAL AT BEDTIME
Refills: 0 | Status: DISCONTINUED | OUTPATIENT
Start: 2022-03-25 | End: 2022-03-29

## 2022-03-25 RX ORDER — METOPROLOL TARTRATE 50 MG
100 TABLET ORAL DAILY
Refills: 0 | Status: DISCONTINUED | OUTPATIENT
Start: 2022-03-25 | End: 2022-03-29

## 2022-03-25 RX ORDER — FUROSEMIDE 40 MG
20 TABLET ORAL DAILY
Refills: 0 | Status: DISCONTINUED | OUTPATIENT
Start: 2022-03-25 | End: 2022-03-26

## 2022-03-25 RX ORDER — CLOPIDOGREL BISULFATE 75 MG/1
75 TABLET, FILM COATED ORAL DAILY
Refills: 0 | Status: DISCONTINUED | OUTPATIENT
Start: 2022-03-25 | End: 2022-03-26

## 2022-03-25 RX ORDER — PANTOPRAZOLE SODIUM 20 MG/1
40 TABLET, DELAYED RELEASE ORAL EVERY 12 HOURS
Refills: 0 | Status: DISCONTINUED | OUTPATIENT
Start: 2022-03-25 | End: 2022-03-29

## 2022-03-25 RX ADMIN — ATORVASTATIN CALCIUM 40 MILLIGRAM(S): 80 TABLET, FILM COATED ORAL at 21:35

## 2022-03-25 RX ADMIN — SODIUM CHLORIDE 1000 MILLILITER(S): 9 INJECTION INTRAMUSCULAR; INTRAVENOUS; SUBCUTANEOUS at 16:13

## 2022-03-25 RX ADMIN — PANTOPRAZOLE SODIUM 80 MILLIGRAM(S): 20 TABLET, DELAYED RELEASE ORAL at 17:27

## 2022-03-25 NOTE — CONSULT NOTE ADULT - SUBJECTIVE AND OBJECTIVE BOX
HISTORY OF PRESENT ILLNESS:   82-year-old, M patient with PMHx: CAD s/p PCI to LAD in November 2021, hypertension, dyslipidemia, chronic microcytic anemia likely secondary to iron deficiency presents to the ED for a drop in Hb. The patient reports that the findings were incidental on his labs, he denies any acute chest pains, shortness of breath, dizziness/lightheadedness. He also denies any blood in the GI ( no melena, no BRBPR) or  tract. The patient reports fatigue and weakness especially upon minimal exertion.    PAST MEDICAL & SURGICAL HISTORY  HTN (hypertension)    High cholesterol    GERD (gastroesophageal reflux disease)        FAMILY HISTORY:  FAMILY HISTORY:      SOCIAL HISTORY:  []smoker  []Alcohol  []Drug    ROS:  Negative except as mentioned in HPI    ALLERGIES:  No Known Allergies      MEDICATIONS:  MEDICATIONS  (STANDING):  atorvastatin 40 milliGRAM(s) Oral at bedtime  chlorhexidine 4% Liquid 1 Application(s) Topical daily  clopidogrel Tablet 75 milliGRAM(s) Oral daily  ferrous    sulfate 325 milliGRAM(s) Oral daily  folic acid 1 milliGRAM(s) Oral daily  furosemide    Tablet 20 milliGRAM(s) Oral daily  losartan 25 milliGRAM(s) Oral daily  metoprolol succinate  milliGRAM(s) Oral daily  pantoprazole  Injectable 40 milliGRAM(s) IV Push every 12 hours    MEDICATIONS  (PRN):      HOME MEDICATIONS:  Home Medications:  Breo Ellipta 100 mcg-25 mcg/inh inhalation powder: 1 puff(s) inhaled once a day (09 Nov 2021 11:50)  Eliquis 5 mg oral tablet: 1 tab(s) orally 2 times a day (09 Nov 2021 11:07)  ferrous sulfate 325 mg (65 mg elemental iron) oral delayed release tablet: 1 tab(s) orally once a day (09 Nov 2021 11:07)  Fish Oil 1000 mg oral capsule: 2 cap(s) orally once a day (09 Nov 2021 11:49)  folic acid 1 mg oral tablet: 1 tab(s) orally once a day (09 Nov 2021 11:07)  furosemide 20 mg oral tablet: 1 tab(s) orally once a day (09 Nov 2021 11:48)  losartan 25 mg oral tablet: 1 tab(s) orally once a day (09 Nov 2021 11:07)  metoprolol succinate 100 mg oral tablet, extended release: 1 tab(s) orally once a day (09 Nov 2021 11:47)  pantoprazole 40 mg oral delayed release tablet: 1 tab(s) orally once a day (09 Nov 2021 11:07)  Zantac 150 oral tablet: 1 tab(s) orally once a day (09 Nov 2021 11:50)      VITALS:   T(F): 97.8 (03-25 @ 15:42), Max: 97.8 (03-25 @ 15:42)  HR: 87 (03-25 @ 15:42) (87 - 98)  BP: 124/76 (03-25 @ 15:42) (118/71 - 124/76)  BP(mean): --  RR: 18 (03-25 @ 15:42) (18 - 20)  SpO2: 98% (03-25 @ 15:42) (98% - 98%)    I&O's Summary      PHYSICAL EXAM:  GEN: appears pale  HEENT: NCAT, PERRL, EOMI  LUNGS: Clear to auscultation bilaterally   CARDIOVASCULAR: RRR, S1/S2  ABD: Soft, non-tender, non-distended  EXT: No JOANNE  SKIN: Intact  NEURO: AAOx3    LABS:                        6.9    6.55  )-----------( 191      ( 25 Mar 2022 15:15 )             26.8     03-25    138  |  104  |  8<L>  ----------------------------<  108<H>  4.4   |  23  |  1.1    Ca    8.7      25 Mar 2022 15:15    TPro  6.7  /  Alb  3.6  /  TBili  0.6  /  DBili  x   /  AST  38  /  ALT  11  /  AlkPhos  62  03-25    PT/INR - ( 25 Mar 2022 15:15 )   PT: 16.00 sec;   INR: 1.40 ratio         PTT - ( 25 Mar 2022 15:15 )  PTT:32.1 sec

## 2022-03-25 NOTE — H&P ADULT - ASSESSMENT
82-year-old, M with the above medical history, presented to the ED for an incidental finding of an acute on top of chronic anemia.     #Acute on chronic anemia:   - Patient was known to have JONAH and was on iron supplementation  - Hb:6.9,  MCV: 63.8, RDW: 23, likely c/w with JONAH  - Follow up Iron studies  - Transfuse 1 unit of PRBCs, ideally after obtaining iron studies  - Transfusion, keep Hb >7  - Patient on Plavix and Eliquis for CAMDEN 4 months ago.  Will continue Plavix for now, hold eliquis for now.  - GI evaluation   - Cardiology evaluation ( Dr. Judd)    #CAD  - S/p PCI with CAMDEN to LAD on November 2021 ( 4 months ago)  - Holding Eliquis fro now, continue plavix  - C/w BB and ARB  - C/w atorvastatin  - Cardiologist: Dr. Judd    #Paroxysmal Afib:   - CHADSVasc2: 4   - Hold eliquis for now  - C/w bb for rate control    #Hypertension:  - C/w losartan and bb, hold if bp becomes soft    #Hyperlipidemia  - C/w atorvastatin    #Misc:   - DVT proph: No chemical prophylaxis for now, Bilateral SCDs  - GI prophylaxis: Pantoprazole 40mg IV q12hrs  - Diet: DASH/TLC, NPO after midnight for possible intervention.  82-year-old, M with the above medical history, presented to the ED for an incidental finding of an acute on top of chronic anemia.     #Acute on chronic anemia:   - Patient was known to have JONAH and was on iron supplementation  - Hb:6.9,  MCV: 63.8, RDW: 23, likely c/w with JONAH  - Follow up Iron studies  - Transfuse 1 unit of PRBCs, ideally after obtaining iron studies  - Transfusion, keep Hb >7  - Patient on Plavix and Eliquis for CAMDEN 4 months ago.  Will continue Plavix for now. Hold eliquis for now.  - Denies any blood in the GI ( no melena, no BRBPR) or  tract. The patient reports fatigue and weakness especially upon minimal exertion.   - No melena on my exam in the ED  - Patient denies any family history of malignancies  - The patient reports 20lbs weight loss in 1 year, which he attributes to lasix  - GI evaluation   - Cardiology evaluation ( Dr. Judd)    #CAD  - S/p PCI with CAMDEN to LAD on November 2021 ( 4 months ago)  - Holding Eliquis fro now, continue plavix  - C/w BB and ARB  - C/w atorvastatin  - Cardiologist: Dr. Judd    #Paroxysmal Afib:   - CHADSVasc2: 4   - Hold eliquis for now  - C/w bb for rate control    #Hypertension:  - C/w losartan and bb, hold if bp becomes soft    #Hyperlipidemia  - C/w atorvastatin    #Misc:   - DVT proph: No chemical prophylaxis for now, Bilateral SCDs  - GI prophylaxis: Pantoprazole 40mg IV q12hrs  - Diet: DASH/TLC, NPO after midnight for possible intervention.

## 2022-03-25 NOTE — ED PROVIDER NOTE - PHYSICAL EXAMINATION
GENERAL: Well-nourished, Well-developed. NAD.  HEAD: No visible or palpable bumps or hematomas. No ecchymosis behind ears B/L.  Eyes: PERRLA, EOMI. No asymmetry. No nystagmus. No conjunctival injection. Non-icteric sclera.  ENMT: MMM.   Neck: Supple. FROM  CVS: RRR. Normal S1,S2. No murmurs appreciated on auscultation   RESP: No use of accessory muscles. Chest rise symmetrical with good expansion. Lungs clear to auscultation B/L. No wheezing, rales, or rhonchi auscultated.  GI: Normal auscultation of bowel sounds in all 4 quadrants. Soft, Nontender, Nondistended. No guarding or rebound tenderness. No CVAT B/L.  Skin: Warm, Dry. No rashes or lesions. Good cap refill < 2 sec B/L. (+)pallor  EXT: Radial and pedal pulses present B/L. No calf tenderness or swelling B/L. No palpable cords. No pedal edema B/L.  Neuro: AA&O x 3. Sensation grossly intact. Strength 5/5 B/L. Gait within normal limits

## 2022-03-25 NOTE — CONSULT NOTE ADULT - ASSESSMENT
CAD s/p PCI on DAPT  Anemia    - Hold DAPT in the context of acute anemia.  - Awaiting further GI w/u. Once cleared by GI, will restart ASAP.  - Transfuse as needed per primary team.  - Will discuss plan with attending cardiologist.

## 2022-03-25 NOTE — H&P ADULT - HISTORY OF PRESENT ILLNESS
82-year-old, M patient with PMHx: CAD s/p PCI to LAD in November 2021, hypertension, dyslipidemia, chronic microcytic anemia likely secondary to iron deficiency presents to the ED for a drop in Hb. The patient reports that the findings were incidental on his labs, he denies any acute chest pains, shortness of breath, dizziness/lightheadedness. He also denies any blood in the GI ( no melena, no BRBPR) or  tract. The patient reports fatigue and weakness especially upon minimal exertion.

## 2022-03-25 NOTE — ED PROVIDER NOTE - OBJECTIVE STATEMENT
81 yo M pmhx htn, hld, afib on eliquis, JONAH (no longer taking iron supplements) presenting to the ED for evaluation of low H/H, pt had routine outpt labs, was called today and instructed to come to ED for blood transfusion. Pt asymptomatic. Pt has never had blood transfusion in the past. Denies any sob, chest pain, fever, chills, nausea, vomiting, abd pain, melena, brbpr, hematuria.

## 2022-03-25 NOTE — ED PROVIDER NOTE - NS ED ROS FT
Constitutional: (-) fever (-) malaise (-) diaphoresis (-) chills  Eyes: (-) visual changes (-) eye pain (-) eye discharge (-) photophobia (-) FB sensation  Cardiac: (-) chest pain  (-) palpitations (-) syncope (-) edema  Respiratory: (-) cough (-) SOB (-) COLLIER  GI: (-) nausea (-) vomiting (-) diarrhea (-) abdominal pain (-)melena (-)brbpr  MS: (-) back pain (-) myalgia (-) muscle weakness (-)  joint pain  Neuro: (-) headache (-) dizziness (-) numbness/tingling to extremities B/L (-) weakness  Skin: (-) rash (-) laceration    Except as documented in the HPI, all other systems are negative.

## 2022-03-25 NOTE — H&P ADULT - NSHPPHYSICALEXAM_GEN_ALL_CORE
General:   Heart:   Lungs:  Abdomen:   LE: General: NAD  HEENT: Pale conjunctiva  Heart: RRR, no audible murmurs  Lungs: GBAE, diffuse wheezing  Abdomen: +BS, soft, nontender, nondistended  LE: Bilateral JOANNE  DANIELA performed by myself in the ED: No evidence of Melena.

## 2022-03-25 NOTE — PATIENT PROFILE ADULT - FALL HARM RISK - HARM RISK INTERVENTIONS

## 2022-03-25 NOTE — ED PROVIDER NOTE - ATTENDING CONTRIBUTION TO CARE
81 yo m with pmh of htn, hld, afib on eliquis, anemia (used to be on iron pills), sent by grecia morin for anemia.  pt was told need to go to ED for transfusion.  pt had seen dr. pierce in past for cscope but cannot recall when.  pt admits fraser, mild tiredness.  no cp.  no dizziness.  no abd pain, no rectal bleeding or dark stool.  exam: nad, ncat, perrl, eomi, mmm, rrr, ctab, abd soft, nt, nd aox3, +pallor, +pale conjunctiva imp: pt with anemia on eliquis, will need transfusion and likely admission for further gi eval

## 2022-03-25 NOTE — ED PROVIDER NOTE - NS ED ATTENDING STATEMENT MOD
This was a shared visit with the OTMI. I reviewed and verified the documentation and independently performed the documented:

## 2022-03-25 NOTE — ED ADULT NURSE NOTE - PRO INTERPRETER NEED 2
Refill policies:    • Allow 2-3 business days for refills; controlled substances may take longer.   • Contact your pharmacy at least 5 days prior to running out of medication and have them send an electronic request or submit request through the “request re entire amount billed. Precertification and Prior Authorizations: If your physician has recommended that you have a procedure or additional testing performed.   [facility]) will contact your insurance carrier to obtain pre-certi English

## 2022-03-25 NOTE — H&P ADULT - ATTENDING COMMENTS
82-year-old, M with the above medical history, presented to the ED for an incidental finding of an acute on top of chronic anemia.     #Acute vs Subacute anemia on chronic anemia:   #Suspected chronic JONAH on ferrous sulfate   - Denies any blood in the GI ( no melena, no BRBPR) or  tract. The patient reports fatigue and weakness especially upon minimal exertion.   - No melena on my exam in the ED  - Iron studies likely reflect post transfusion iron levels   Plan:  - Clears for now, trend hgb, Gentle IVF, hold Lasix   - IV PPI BID   - f/u ferritin level   - Transfuse to maintain hgb above 8.0 given obstructive CAD   - Patient on Plavix and Eliquis for CAMDEN 4 months ago.  Holding for now as per cardio. Will need GI clearance to resume.   - GI evaluation noted - prior EGD showed small EV, will empirically treat for variceal bleed though less likely - started on Rocephin and Octreotide   - Check CT abd to evaluate for cirrhosis     #CAD  - S/p PCI with CAMDEN to LAD on November 2021 ( 4 months ago)  - Holding Eliquis and Plavix as per cardio   - C/w BB and ARB  - C/w atorvastatin  - Cardiologist: Dr. Judd    #Paroxysmal Afib:   - CHADSVasc2: 4   - Hold Eliquis for now  - C/w bb for rate control    #Hypertension:  - C/w bb  - Hold ARB     #Hyperlipidemia  - C/w atorvastatin       - DVT proph: No chemical prophylaxis for now, Bilateral SCDs    Pending: GI w/u

## 2022-03-25 NOTE — H&P ADULT - NSHPLABSRESULTS_GEN_ALL_CORE
WBC Count: 6.55 K/uL   RBC Count: 4.20 M/uL   Hemoglobin: 6.9: This result has been called to LIDIA MILLAN by Lulú Hall on 03 25 2022   at 1535, and has been read back. g/dL   Hematocrit: 26.8 %   Mean Cell Volume: 63.8 fL   Mean Cell Hemoglobin: 16.4 pg   Mean Cell Hemoglobin Conc: 25.7 g/dL   Red Cell Distrib Width: 23.0 %   Platelet Count - Automated: 191 K/uL   Auto Neutrophil #: 5.23 K/uL   Auto Lymphocyte #: 0.86 K/uL   Auto Monocyte #: 0.29 K/uL   Auto Eosinophil #: 0.11 K/uL   Auto Basophil #: 0.06 K/uL   Auto Neutrophil %: 79.8: Differential percentages must be correlated with absolute numbers for   clinical significance. %   Auto Lymphocyte %: 13.2 %   Auto Monocyte %: 4.4 %   Auto Eosinophil %: 1.7 %   Auto Basophil %: 0.9 %     Sodium, Serum: 138 mmol/L   Potassium, Serum: 4.4: Slighty Hemolyzed use with Caution mmol/L   Chloride, Serum: 104 mmol/L   Carbon Dioxide, Serum: 23 mmol/L   Anion Gap, Serum: 11 mmol/L   Blood Urea Nitrogen, Serum: 8 mg/dL   Creatinine, Serum: 1.1 mg/dL   Glucose, Serum: 108 mg/dL   Calcium, Total Serum: 8.7 mg/dL   Protein Total, Serum: 6.7 g/dL   Albumin, Serum: 3.6 g/dL   Bilirubin Total, Serum: 0.6 mg/dL   Alkaline Phosphatase, Serum: 62 U/L   Aspartate Aminotransferase (AST/SGOT): 38: Hemolyzed. Interpret with caution U/L   Alanine Aminotransferase (ALT/SGPT): 11 U/L

## 2022-03-26 LAB
ALBUMIN SERPL ELPH-MCNC: 3.7 G/DL — SIGNIFICANT CHANGE UP (ref 3.5–5.2)
ALP SERPL-CCNC: 59 U/L — SIGNIFICANT CHANGE UP (ref 30–115)
ALT FLD-CCNC: 9 U/L — SIGNIFICANT CHANGE UP (ref 0–41)
ANION GAP SERPL CALC-SCNC: 12 MMOL/L — SIGNIFICANT CHANGE UP (ref 7–14)
AST SERPL-CCNC: 16 U/L — SIGNIFICANT CHANGE UP (ref 0–41)
BILIRUB SERPL-MCNC: 1.5 MG/DL — HIGH (ref 0.2–1.2)
BUN SERPL-MCNC: 10 MG/DL — SIGNIFICANT CHANGE UP (ref 10–20)
CALCIUM SERPL-MCNC: 8.7 MG/DL — SIGNIFICANT CHANGE UP (ref 8.5–10.1)
CHLORIDE SERPL-SCNC: 107 MMOL/L — SIGNIFICANT CHANGE UP (ref 98–110)
CO2 SERPL-SCNC: 23 MMOL/L — SIGNIFICANT CHANGE UP (ref 17–32)
CREAT SERPL-MCNC: 1 MG/DL — SIGNIFICANT CHANGE UP (ref 0.7–1.5)
EGFR: 75 ML/MIN/1.73M2 — SIGNIFICANT CHANGE UP
FERRITIN SERPL-MCNC: 9 NG/ML — LOW (ref 30–400)
GLUCOSE SERPL-MCNC: 102 MG/DL — HIGH (ref 70–99)
HCT VFR BLD CALC: 26.7 % — LOW (ref 42–52)
HCT VFR BLD CALC: 27.7 % — LOW (ref 42–52)
HGB BLD-MCNC: 7.4 G/DL — LOW (ref 14–18)
HGB BLD-MCNC: 7.5 G/DL — LOW (ref 14–18)
IRON SATN MFR SERPL: 141 UG/DL — SIGNIFICANT CHANGE UP (ref 35–150)
IRON SATN MFR SERPL: 48 % — SIGNIFICANT CHANGE UP (ref 15–50)
MAGNESIUM SERPL-MCNC: 1.4 MG/DL — LOW (ref 1.8–2.4)
MCHC RBC-ENTMCNC: 17.5 PG — LOW (ref 27–31)
MCHC RBC-ENTMCNC: 17.7 PG — LOW (ref 27–31)
MCHC RBC-ENTMCNC: 27.1 G/DL — LOW (ref 32–37)
MCHC RBC-ENTMCNC: 27.7 G/DL — LOW (ref 32–37)
MCV RBC AUTO: 63.3 FL — LOW (ref 80–94)
MCV RBC AUTO: 65.3 FL — LOW (ref 80–94)
NRBC # BLD: 0 /100 WBCS — SIGNIFICANT CHANGE UP (ref 0–0)
NRBC # BLD: 0 /100 WBCS — SIGNIFICANT CHANGE UP (ref 0–0)
PLATELET # BLD AUTO: 168 K/UL — SIGNIFICANT CHANGE UP (ref 130–400)
PLATELET # BLD AUTO: 184 K/UL — SIGNIFICANT CHANGE UP (ref 130–400)
POTASSIUM SERPL-MCNC: 3.8 MMOL/L — SIGNIFICANT CHANGE UP (ref 3.5–5)
POTASSIUM SERPL-SCNC: 3.8 MMOL/L — SIGNIFICANT CHANGE UP (ref 3.5–5)
PROT SERPL-MCNC: 6.1 G/DL — SIGNIFICANT CHANGE UP (ref 6–8)
RBC # BLD: 4.22 M/UL — LOW (ref 4.7–6.1)
RBC # BLD: 4.24 M/UL — LOW (ref 4.7–6.1)
RBC # FLD: 23.9 % — HIGH (ref 11.5–14.5)
RBC # FLD: 23.9 % — HIGH (ref 11.5–14.5)
SODIUM SERPL-SCNC: 142 MMOL/L — SIGNIFICANT CHANGE UP (ref 135–146)
TIBC SERPL-MCNC: 292 UG/DL — SIGNIFICANT CHANGE UP (ref 220–430)
TRANSFERRIN SERPL-MCNC: 252 MG/DL — SIGNIFICANT CHANGE UP (ref 200–360)
UIBC SERPL-MCNC: 151 UG/DL — SIGNIFICANT CHANGE UP (ref 110–370)
WBC # BLD: 12.69 K/UL — HIGH (ref 4.8–10.8)
WBC # BLD: 16.24 K/UL — HIGH (ref 4.8–10.8)
WBC # FLD AUTO: 12.69 K/UL — HIGH (ref 4.8–10.8)
WBC # FLD AUTO: 16.24 K/UL — HIGH (ref 4.8–10.8)

## 2022-03-26 PROCEDURE — 74177 CT ABD & PELVIS W/CONTRAST: CPT | Mod: 26

## 2022-03-26 PROCEDURE — 99223 1ST HOSP IP/OBS HIGH 75: CPT

## 2022-03-26 PROCEDURE — 99222 1ST HOSP IP/OBS MODERATE 55: CPT

## 2022-03-26 RX ORDER — MAGNESIUM SULFATE 500 MG/ML
2 VIAL (ML) INJECTION ONCE
Refills: 0 | Status: COMPLETED | OUTPATIENT
Start: 2022-03-26 | End: 2022-03-26

## 2022-03-26 RX ORDER — IRON SUCROSE 20 MG/ML
200 INJECTION, SOLUTION INTRAVENOUS EVERY 24 HOURS
Refills: 0 | Status: DISCONTINUED | OUTPATIENT
Start: 2022-03-26 | End: 2022-03-26

## 2022-03-26 RX ORDER — CEFTRIAXONE 500 MG/1
1000 INJECTION, POWDER, FOR SOLUTION INTRAMUSCULAR; INTRAVENOUS EVERY 24 HOURS
Refills: 0 | Status: DISCONTINUED | OUTPATIENT
Start: 2022-03-26 | End: 2022-03-27

## 2022-03-26 RX ORDER — SODIUM CHLORIDE 9 MG/ML
1000 INJECTION INTRAMUSCULAR; INTRAVENOUS; SUBCUTANEOUS
Refills: 0 | Status: DISCONTINUED | OUTPATIENT
Start: 2022-03-26 | End: 2022-03-27

## 2022-03-26 RX ORDER — OCTREOTIDE ACETATE 200 UG/ML
50 INJECTION, SOLUTION INTRAVENOUS; SUBCUTANEOUS
Qty: 500 | Refills: 0 | Status: DISCONTINUED | OUTPATIENT
Start: 2022-03-26 | End: 2022-03-27

## 2022-03-26 RX ORDER — IRON SUCROSE 20 MG/ML
200 INJECTION, SOLUTION INTRAVENOUS EVERY 24 HOURS
Refills: 0 | Status: DISCONTINUED | OUTPATIENT
Start: 2022-03-26 | End: 2022-03-29

## 2022-03-26 RX ORDER — CEFTRIAXONE 500 MG/1
1000 INJECTION, POWDER, FOR SOLUTION INTRAMUSCULAR; INTRAVENOUS EVERY 24 HOURS
Refills: 0 | Status: DISCONTINUED | OUTPATIENT
Start: 2022-03-26 | End: 2022-03-26

## 2022-03-26 RX ADMIN — Medication 20 MILLIGRAM(S): at 05:48

## 2022-03-26 RX ADMIN — Medication 325 MILLIGRAM(S): at 11:41

## 2022-03-26 RX ADMIN — Medication 25 GRAM(S): at 14:51

## 2022-03-26 RX ADMIN — Medication 1 MILLIGRAM(S): at 11:41

## 2022-03-26 RX ADMIN — ATORVASTATIN CALCIUM 40 MILLIGRAM(S): 80 TABLET, FILM COATED ORAL at 21:01

## 2022-03-26 RX ADMIN — SODIUM CHLORIDE 60 MILLILITER(S): 9 INJECTION INTRAMUSCULAR; INTRAVENOUS; SUBCUTANEOUS at 11:42

## 2022-03-26 RX ADMIN — IRON SUCROSE 110 MILLIGRAM(S): 20 INJECTION, SOLUTION INTRAVENOUS at 13:13

## 2022-03-26 RX ADMIN — OCTREOTIDE ACETATE 10 MICROGRAM(S)/HR: 200 INJECTION, SOLUTION INTRAVENOUS; SUBCUTANEOUS at 13:13

## 2022-03-26 RX ADMIN — OCTREOTIDE ACETATE 10 MICROGRAM(S)/HR: 200 INJECTION, SOLUTION INTRAVENOUS; SUBCUTANEOUS at 22:15

## 2022-03-26 RX ADMIN — CEFTRIAXONE 100 MILLIGRAM(S): 500 INJECTION, POWDER, FOR SOLUTION INTRAMUSCULAR; INTRAVENOUS at 14:52

## 2022-03-26 RX ADMIN — Medication 100 MILLIGRAM(S): at 05:48

## 2022-03-26 RX ADMIN — LOSARTAN POTASSIUM 25 MILLIGRAM(S): 100 TABLET, FILM COATED ORAL at 05:48

## 2022-03-26 RX ADMIN — PANTOPRAZOLE SODIUM 40 MILLIGRAM(S): 20 TABLET, DELAYED RELEASE ORAL at 05:48

## 2022-03-26 RX ADMIN — PANTOPRAZOLE SODIUM 40 MILLIGRAM(S): 20 TABLET, DELAYED RELEASE ORAL at 17:01

## 2022-03-26 NOTE — CONSULT NOTE ADULT - SUBJECTIVE AND OBJECTIVE BOX
Gastroenterology Consultation:    Patient is a 82y old  Male who presents with a chief complaint of Low Hb (26 Mar 2022 07:56)      Admitted on: 03-25-22  HPI:  82-year-old, M patient with PMHx: CAD s/p PCI to LAD in November 2021, hypertension, dyslipidemia, chronic microcytic anemia likely secondary to iron deficiency presents to the ED for a drop in Hb. The patient reports that the findings were incidental on his labs, he denies any acute chest pains, shortness of breath, dizziness/lightheadedness. He also denies any blood in the GI ( no melena, no BRBPR) or  tract. The patient reports fatigue and weakness especially upon minimal exertion.  (25 Mar 2022 16:51)    Prior EGD:  Prior Colonoscopy:      PAST MEDICAL & SURGICAL HISTORY:  HTN (hypertension)    High cholesterol    GERD (gastroesophageal reflux disease)        FAMILY HISTORY:      Social History:  Tobacco:  Alcohol:  Drugs:    Home Medications:  Breo Ellipta 100 mcg-25 mcg/inh inhalation powder: 1 puff(s) inhaled once a day (09 Nov 2021 11:50)  Eliquis 5 mg oral tablet: 1 tab(s) orally 2 times a day (09 Nov 2021 11:07)  ferrous sulfate 325 mg (65 mg elemental iron) oral delayed release tablet: 1 tab(s) orally once a day (09 Nov 2021 11:07)  Fish Oil 1000 mg oral capsule: 2 cap(s) orally once a day (09 Nov 2021 11:49)  folic acid 1 mg oral tablet: 1 tab(s) orally once a day (09 Nov 2021 11:07)  furosemide 20 mg oral tablet: 1 tab(s) orally once a day (09 Nov 2021 11:48)  losartan 25 mg oral tablet: 1 tab(s) orally once a day (09 Nov 2021 11:07)  metoprolol succinate 100 mg oral tablet, extended release: 1 tab(s) orally once a day (09 Nov 2021 11:47)  pantoprazole 40 mg oral delayed release tablet: 1 tab(s) orally once a day (09 Nov 2021 11:07)  Zantac 150 oral tablet: 1 tab(s) orally once a day (09 Nov 2021 11:50)    MEDICATIONS  (STANDING):  atorvastatin 40 milliGRAM(s) Oral at bedtime  chlorhexidine 4% Liquid 1 Application(s) Topical daily  ferrous    sulfate 325 milliGRAM(s) Oral daily  folic acid 1 milliGRAM(s) Oral daily  metoprolol succinate  milliGRAM(s) Oral daily  pantoprazole  Injectable 40 milliGRAM(s) IV Push every 12 hours  sodium chloride 0.9%. 1000 milliLiter(s) (60 mL/Hr) IV Continuous <Continuous>    MEDICATIONS  (PRN):      Allergies  No Known Allergies      Review of Systems:   Constitutional:  No Fever, No Chills  ENT/Mouth:  No Hearing Changes,  No Difficulty Swallowing  Eyes:  No Eye Pain, No Vision Changes  Cardiovascular:  No Chest Pain, No Palpitations  Respiratory:  No Cough, No Dyspnea  Gastrointestinal:  As described in HPI  Musculoskeletal:  No Joint Swelling, No Back Pain  Skin:  No Skin Lesions, No Jaundice  Neuro:  No Syncope, No Dizziness  Heme/Lymph:  No Bruising, No Bleeding.          Physical Examination:  T(C): 37.4 (03-26-22 @ 05:50), Max: 37.4 (03-26-22 @ 05:50)  HR: 93 (03-26-22 @ 08:30) (81 - 114)  BP: 107/56 (03-26-22 @ 05:50) (107/56 - 132/86)  RR: 18 (03-26-22 @ 05:50) (16 - 20)  SpO2: 96% (03-26-22 @ 08:30) (96% - 99%)  Height (cm): 182.9 (03-25-22 @ 14:02)    03-26-22 @ 07:01  -  03-26-22 @ 11:18  --------------------------------------------------------  IN: 0 mL / OUT: 200 mL / NET: -200 mL        Constitutional: No acute distress.  Eyes:. Conjunctivae are clear, Sclera is non-icteric.  Ears Nose and Throat: The external ears are normal appearing,  Oral mucosa is pink and moist.  Respiratory:  No signs of respiratory distress. Lung sounds are clear bilaterally.  Cardiovascular:  S1 S2, Regular rate and rhythm.  GI: Abdomen is soft, symmetric, and non-tender without distention. There are no visible lesions or scars. Bowel sounds are present and normoactive in all four quadrants. No masses, hepatomegaly, or splenomegaly are noted.   Neuro: AO*3, no asterixis  Skin: No rashes, No Jaundice.          Data: (reviewed by attending)                        7.4    12.69 )-----------( 184      ( 26 Mar 2022 04:30 )             26.7     Hgb Trend:  7.4  03-26-22 @ 04:30  7.5  03-25-22 @ 20:00  6.9  03-25-22 @ 15:15        03-26    142  |  107  |  10  ----------------------------<  102<H>  3.8   |  23  |  1.0    Ca    8.7      26 Mar 2022 04:30  Mg     1.4     03-26    TPro  6.1  /  Alb  3.7  /  TBili  1.5<H>  /  DBili  x   /  AST  16  /  ALT  9   /  AlkPhos  59  03-26    Liver panel trend:  TBili 1.5   /   AST 16   /   ALT 9   /   AlkP 59   /   Tptn 6.1   /   Alb 3.7    /   DBili --      03-26  TBili 0.6   /   AST 38   /   ALT 11   /   AlkP 62   /   Tptn 6.7   /   Alb 3.6    /   DBili --      03-25      PT/INR - ( 25 Mar 2022 15:15 )   PT: 16.00 sec;   INR: 1.40 ratio         PTT - ( 25 Mar 2022 15:15 )  PTT:32.1 sec        Radiology:(reviewed by attending)       Gastroenterology Consultation:    Patient is a 82y old  Male who presents with a chief complaint of Low Hb (26 Mar 2022 07:56)      Admitted on: 03-25-22  HPI:  82-year-old, M patient with PMHx: CAD s/p PCI to LAD in November 2021 on plavix and eliquis last dose 12/25, hypertension, dyslipidemia,  presents to the ED for a drop in Hb. The patient reports that the findings were incidental on his labs, he denies any acute chest pains, shortness of breath, dizziness/lightheadedness. He also denies any blood in the GI ( no melena, no BRBPR) or  tract. The patient reports fatigue and weakness especially upon minimal exertion.       Prior EGD: Impressions: 2021   Grade 2 esophagitis in the gastroesophageal junction compatible with  non-erosive esophagitis.    Hiatal Hernia in the gastroesophageal junction.    Possible small grade 1 varices.    Erythema in the whole stomach and antrum compatible with non-erosive gastritis.  (Biopsy).    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 (Hemostasis).   Prior Colonoscopy: in office, 2021 normal per patient       PAST MEDICAL & SURGICAL HISTORY:  HTN (hypertension)   High cholesterol  GERD (gastroesophageal reflux disease)    FAMILY HISTORY: non relevant       Social History:  Tobacco: N  Alcohol: N  Drugs: N    Home Medications:  Breo Ellipta 100 mcg-25 mcg/inh inhalation powder: 1 puff(s) inhaled once a day (09 Nov 2021 11:50)  Eliquis 5 mg oral tablet: 1 tab(s) orally 2 times a day (09 Nov 2021 11:07)  ferrous sulfate 325 mg (65 mg elemental iron) oral delayed release tablet: 1 tab(s) orally once a day (09 Nov 2021 11:07)  Fish Oil 1000 mg oral capsule: 2 cap(s) orally once a day (09 Nov 2021 11:49)  folic acid 1 mg oral tablet: 1 tab(s) orally once a day (09 Nov 2021 11:07)  furosemide 20 mg oral tablet: 1 tab(s) orally once a day (09 Nov 2021 11:48)  losartan 25 mg oral tablet: 1 tab(s) orally once a day (09 Nov 2021 11:07)  metoprolol succinate 100 mg oral tablet, extended release: 1 tab(s) orally once a day (09 Nov 2021 11:47)  pantoprazole 40 mg oral delayed release tablet: 1 tab(s) orally once a day (09 Nov 2021 11:07)  Zantac 150 oral tablet: 1 tab(s) orally once a day (09 Nov 2021 11:50)    MEDICATIONS  (STANDING):  atorvastatin 40 milliGRAM(s) Oral at bedtime  chlorhexidine 4% Liquid 1 Application(s) Topical daily  ferrous    sulfate 325 milliGRAM(s) Oral daily  folic acid 1 milliGRAM(s) Oral daily  metoprolol succinate  milliGRAM(s) Oral daily  pantoprazole  Injectable 40 milliGRAM(s) IV Push every 12 hours  sodium chloride 0.9%. 1000 milliLiter(s) (60 mL/Hr) IV Continuous <Continuous>    MEDICATIONS  (PRN):      Allergies  No Known Allergies      Review of Systems:   Constitutional:  No Fever, No Chills  ENT/Mouth:  No Hearing Changes,  No Difficulty Swallowing  Eyes:  No Eye Pain, No Vision Changes  Cardiovascular:  No Chest Pain, No Palpitations  Respiratory:  No Cough, No Dyspnea  Gastrointestinal:  As described in HPI  Musculoskeletal:  No Joint Swelling, No Back Pain  Skin:  No Skin Lesions, No Jaundice  Neuro:  No Syncope, No Dizziness       Physical Examination:  T(C): 37.4 (03-26-22 @ 05:50), Max: 37.4 (03-26-22 @ 05:50)  HR: 93 (03-26-22 @ 08:30) (81 - 114)  BP: 107/56 (03-26-22 @ 05:50) (107/56 - 132/86)  RR: 18 (03-26-22 @ 05:50) (16 - 20)  SpO2: 96% (03-26-22 @ 08:30) (96% - 99%)  Height (cm): 182.9 (03-25-22 @ 14:02)    Constitutional: No acute distress.  Eyes:. Conjunctivae are clear, Sclera is non-icteric.  Ears Nose and Throat: The external ears are normal appearing,  Oral mucosa is pink and moist.  Respiratory:  No signs of respiratory distress. Lung sounds are clear bilaterally.  Cardiovascular:  S1 S2, Regular rate and rhythm.  GI: Abdomen is soft, symmetric, and non-tender without distention. There are no visible lesions or scars. DANIELA no melena   Neuro: AO*3, no asterixis  Skin: No rashes, No Jaundice.     Data: (reviewed by attending)                        7.4    12.69 )-----------( 184      ( 26 Mar 2022 04:30 )             26.7     Hgb Trend:  7.4  03-26-22 @ 04:30  7.5  03-25-22 @ 20:00  6.9  03-25-22 @ 15:15    03-26    142  |  107  |  10  ----------------------------<  102<H>  3.8   |  23  |  1.0    Ca    8.7      26 Mar 2022 04:30  Mg     1.4     03-26    TPro  6.1  /  Alb  3.7  /  TBili  1.5<H>  /  DBili  x   /  AST  16  /  ALT  9   /  AlkPhos  59  03-26    Liver panel trend:  TBili 1.5   /   AST 16   /   ALT 9   /   AlkP 59   /   Tptn 6.1   /   Alb 3.7    /   DBili --      03-26  TBili 0.6   /   AST 38   /   ALT 11   /   AlkP 62   /   Tptn 6.7   /   Alb 3.6    /   DBili --      03-25      PT/INR - ( 25 Mar 2022 15:15 )   PT: 16.00 sec;   INR: 1.40 ratio      PTT - ( 25 Mar 2022 15:15 )  PTT:32.1 sec

## 2022-03-26 NOTE — CONSULT NOTE ADULT - ASSESSMENT
81 yo M with PMH of CAD s/p PCI to LAD in November 2021, HTN, DLD and anemia presents to the ED due to low Hgb. Hematology consulted for the same reason.    # Microcytic anemia likely secondary to iron deficiency s/p 1U of PRBC   - other DDx include hemolytic anemia, B12/folate deficiency (in addition to iron deficiency)   - had normocytic anemia before which makes thalassemia less likely   - iron studies noted and f/u ferritin   - obtain B12 and folate   - check LDH, haptoglobin to r/o hemolytic anemia although T. bili is normal   - GI consult for EGD/Colonoscopy  - keep Hgb > 8 given cardiac hx     # Leukocytosis r/o infection  - panculture and management per primary team 83 yo M with PMH of CAD s/p PCI to LAD in November 2021, HTN, DLD and anemia presents to the ED due to low Hgb. Hematology consulted for the same reason.    # Microcytic anemia likely secondary to iron deficiency s/p 1U of PRBC   - other DDx include hemolytic anemia, B12/folate deficiency (in addition to iron deficiency)   - had normocytic anemia before which makes thalassemia less likely   - iron studies noted and f/u ferritin   - obtain B12 and folate   - check LDH, haptoglobin to r/o hemolytic anemia although T. bili is normal   - reported colonoscopy and EGD last year with evidence of UGI bleed; GI consult for EGD/Colonoscopy  - keep Hgb > 8 given cardiac hx     # Leukocytosis r/o infection  - panculture and management per primary team 83 yo M with PMH of CAD s/p PCI to LAD in November 2021, HTN, DLD and anemia presents to the ED due to low Hgb. Hematology consulted for the same reason.    # Microcytic anemia likely secondary to iron deficiency s/p 1U of PRBC   - other DDx include hemolytic anemia, B12/folate deficiency (in addition to iron deficiency)   - had normocytic anemia before which makes thalassemia less likely   - iron studies noted and f/u ferritin   - obtain B12 and folate   - check LDH, haptoglobin to r/o hemolytic anemia although T. bili is normal   - reported colonoscopy and EGD last year with evidence of UGI bleed; GI consult for EGD/Colonoscopy  - give 1 more unit PRBC to keep Hgb > 8 given cardiac hx   - give venofer 200 mg x 1 dose (iron storage likely low)    # Leukocytosis r/o infection  - panculture and management per primary team

## 2022-03-26 NOTE — CONSULT NOTE ADULT - SUBJECTIVE AND OBJECTIVE BOX
Patient is a 82y old  Male who presents with a chief complaint of Low Hb (25 Mar 2022 18:08)    HPI:  82-year-old, M patient with PMHx: CAD s/p PCI to LAD in November 2021, hypertension, dyslipidemia, chronic microcytic anemia likely secondary to iron deficiency presents to the ED for a drop in Hb. The patient reports that the findings were incidental on his labs, he denies any acute chest pains, shortness of breath, dizziness/lightheadedness. He also denies any blood in the GI ( no melena, no BRBPR) or  tract. The patient reports fatigue and weakness especially upon minimal exertion.  (25 Mar 2022 16:51)       ROS:  Negative except for:     PAST MEDICAL & SURGICAL HISTORY:  HTN (hypertension)  High cholesterol  GERD (gastroesophageal reflux disease)    SOCIAL HISTORY:    FAMILY HISTORY:      MEDICATIONS  (STANDING):  atorvastatin 40 milliGRAM(s) Oral at bedtime  chlorhexidine 4% Liquid 1 Application(s) Topical daily  clopidogrel Tablet 75 milliGRAM(s) Oral daily  ferrous    sulfate 325 milliGRAM(s) Oral daily  folic acid 1 milliGRAM(s) Oral daily  furosemide    Tablet 20 milliGRAM(s) Oral daily  losartan 25 milliGRAM(s) Oral daily  metoprolol succinate  milliGRAM(s) Oral daily  pantoprazole  Injectable 40 milliGRAM(s) IV Push every 12 hours    MEDICATIONS  (PRN):    Height (cm): 182.9 (03-25-22 @ 14:02)  Allergies    No Known Allergies    Intolerances        Vital Signs Last 24 Hrs  T(C): 37.4 (26 Mar 2022 05:50), Max: 37.4 (26 Mar 2022 05:50)  T(F): 99.3 (26 Mar 2022 05:50), Max: 99.3 (26 Mar 2022 05:50)  HR: 98 (26 Mar 2022 05:50) (81 - 114)  BP: 107/56 (26 Mar 2022 05:50) (107/56 - 132/86)  BP(mean): --  RR: 18 (26 Mar 2022 05:50) (16 - 20)  SpO2: 98% (25 Mar 2022 22:22) (98% - 99%)    PHYSICAL EXAM  General: adult in NAD  HEENT: clear oropharynx, anicteric sclera, pink conjunctiva  Neck: supple  CV: normal S1/S2 with no murmur rubs or gallops  Lungs: positive air movement b/l ant lungs,clear to auscultation, no wheezes, no rales  Abdomen: soft non-tender non-distended, no hepatosplenomegaly  Ext: no clubbing cyanosis or edema  Skin: no rashes and no petechiae  Neuro: alert and oriented X 4, no focal deficits      LABS:                          7.5    16.24 )-----------( 168      ( 25 Mar 2022 20:00 )             27.7         Mean Cell Volume : 65.3 fL  Mean Cell Hemoglobin : 17.7 pg  Mean Cell Hemoglobin Concentration : 27.1 g/dL  Auto Neutrophil # : x  Auto Lymphocyte # : x  Auto Monocyte # : x  Auto Eosinophil # : x  Auto Basophil # : x  Auto Neutrophil % : x  Auto Lymphocyte % : x  Auto Monocyte % : x  Auto Eosinophil % : x  Auto Basophil % : x      Serial CBC's  03-25 @ 20:00  Hct-27.7 / Hgb-7.5 / Plat-168 / RBC-4.24 / WBC-16.24  Serial CBC's  03-25 @ 15:15  Hct-26.8 / Hgb-6.9 / Plat-191 / RBC-4.20 / WBC-6.55      03-25    138  |  104  |  8<L>  ----------------------------<  108<H>  4.4   |  23  |  1.1    Ca    8.7      25 Mar 2022 15:15    TPro  6.7  /  Alb  3.6  /  TBili  0.6  /  DBili  x   /  AST  38  /  ALT  11  /  AlkPhos  62  03-25      PT/INR - ( 25 Mar 2022 15:15 )   PT: 16.00 sec;   INR: 1.40 ratio         PTT - ( 25 Mar 2022 15:15 )  PTT:32.1 sec    Iron - Total Binding Capacity.: 292 ug/dL (03-25 @ 20:00)        BLOOD SMEAR INTERPRETATION:       RADIOLOGY & ADDITIONAL STUDIES:     Patient is a 82y old  Male who presents with a chief complaint of Low Hb (25 Mar 2022 18:08)    HPI:  82-year-old, M patient with PMHx: CAD s/p PCI to LAD in November 2021, hypertension, dyslipidemia, chronic microcytic anemia likely secondary to iron deficiency presents to the ED for a drop in Hb. The patient reports that the findings were incidental on his labs, he denies any acute chest pains, shortness of breath, dizziness/lightheadedness. He also denies any blood in the GI ( no melena, no BRBPR) or  tract. The patient reports fatigue and weakness especially upon minimal exertion.  (25 Mar 2022 16:51)       ROS:  Negative except for: generalized weakness     PAST MEDICAL & SURGICAL HISTORY:  HTN (hypertension)  High cholesterol  GERD (gastroesophageal reflux disease)    SOCIAL HISTORY:    FAMILY HISTORY:      MEDICATIONS  (STANDING):  atorvastatin 40 milliGRAM(s) Oral at bedtime  chlorhexidine 4% Liquid 1 Application(s) Topical daily  clopidogrel Tablet 75 milliGRAM(s) Oral daily  ferrous    sulfate 325 milliGRAM(s) Oral daily  folic acid 1 milliGRAM(s) Oral daily  furosemide    Tablet 20 milliGRAM(s) Oral daily  losartan 25 milliGRAM(s) Oral daily  metoprolol succinate  milliGRAM(s) Oral daily  pantoprazole  Injectable 40 milliGRAM(s) IV Push every 12 hours    MEDICATIONS  (PRN):    Height (cm): 182.9 (03-25-22 @ 14:02)  Allergies    No Known Allergies    Intolerances        Vital Signs Last 24 Hrs  T(C): 37.4 (26 Mar 2022 05:50), Max: 37.4 (26 Mar 2022 05:50)  T(F): 99.3 (26 Mar 2022 05:50), Max: 99.3 (26 Mar 2022 05:50)  HR: 98 (26 Mar 2022 05:50) (81 - 114)  BP: 107/56 (26 Mar 2022 05:50) (107/56 - 132/86)  BP(mean): --  RR: 18 (26 Mar 2022 05:50) (16 - 20)  SpO2: 98% (25 Mar 2022 22:22) (98% - 99%)    PHYSICAL EXAM  General: adult in NAD  HEENT: clear oropharynx, anicteric sclera, pink conjunctiva  Neck: supple  CV: normal S1/S2 with no murmur rubs or gallops  Lungs: positive air movement b/l ant lungs,clear to auscultation, no wheezes, no rales  Abdomen: soft non-tender non-distended, no hepatosplenomegaly  Ext: no clubbing cyanosis or edema  Skin: no rashes and no petechiae  Neuro: alert and oriented X 4, no focal deficits      LABS:                          7.5    16.24 )-----------( 168      ( 25 Mar 2022 20:00 )             27.7         Mean Cell Volume : 65.3 fL  Mean Cell Hemoglobin : 17.7 pg  Mean Cell Hemoglobin Concentration : 27.1 g/dL  Auto Neutrophil # : x  Auto Lymphocyte # : x  Auto Monocyte # : x  Auto Eosinophil # : x  Auto Basophil # : x  Auto Neutrophil % : x  Auto Lymphocyte % : x  Auto Monocyte % : x  Auto Eosinophil % : x  Auto Basophil % : x      Serial CBC's  03-25 @ 20:00  Hct-27.7 / Hgb-7.5 / Plat-168 / RBC-4.24 / WBC-16.24  Serial CBC's  03-25 @ 15:15  Hct-26.8 / Hgb-6.9 / Plat-191 / RBC-4.20 / WBC-6.55      03-25    138  |  104  |  8<L>  ----------------------------<  108<H>  4.4   |  23  |  1.1    Ca    8.7      25 Mar 2022 15:15    TPro  6.7  /  Alb  3.6  /  TBili  0.6  /  DBili  x   /  AST  38  /  ALT  11  /  AlkPhos  62  03-25      PT/INR - ( 25 Mar 2022 15:15 )   PT: 16.00 sec;   INR: 1.40 ratio         PTT - ( 25 Mar 2022 15:15 )  PTT:32.1 sec    Iron - Total Binding Capacity.: 292 ug/dL (03-25 @ 20:00)        BLOOD SMEAR INTERPRETATION:       RADIOLOGY & ADDITIONAL STUDIES:

## 2022-03-26 NOTE — PROGRESS NOTE ADULT - ASSESSMENT
ASSESSMENT & PLAN:  Mr Miguel is an 82 YOM w a PMH of CAD (s/p PCI to LAD 11/2021 on DAPT), paroxismal a-fib (on eliquis), bicuspid aortic valve, chronic microcitic anemia (Iron deficient anemia) and 20lbs weight loss over 1 year which he attributes to lasix use presenting to ED with Hgb <7 found on labs. Patient reports diffuse weakness/fatigue and decreased exercise tolerance. Denies: changes in BM, blood in stool/urine/from mouth or nose, denies joint swelling or increased bruising, chest pain or SOB. Patient had Hbg = 6.9 on admission and was DANIELA Negative in ED. Patient s/p 1 U PRBC Labs notable today for elevated Total Bilirubin and transferrin 252. Iron studies performed post transfusion and may not reflect acurate values. GI and Onc consulted, pending recs. Of note patient has EGD on record but no Colonoscopy. EGD showed possible esophageal varicese (non bleeding at the time)     Problem List:    #Anemia  Patient has Hx of iron deficiency anemia and is on iron at home   on admission patient Hgb = 6.9, microcytic, rdw = 23 suggestive of JONAH (IRON STUDIES ON FILE ARE POST 1 U PRBC AND NOT REFLECT HOW HE CAME IN)  Patient on DAPT at home, HOLD AS PER CARDIO DESPITE CAMDEN PLACED NOV UNTIL GI CLEARS TO RESTART   T bili elevated  -s/p 1 U PRBC in ED  -additional 1 unit PRBC ordered  -start venofer 200mg IV for 2 doses (first one 3/26)  -given cardiac Hx keep Hgb>8  -eliquis held due to anemia   -GI: CT A/P W iv CONTRAST, OCTERIOTIDE (AS HAD POSSIBLE VARICESE ON EGD) AT 50/HR, CEFTRIAXONE 1G QD   -Heme/Onc recs appreciated   -f/u indirect bilirubin, haptoglobin and LDH    #20 lbs weight loss unintentional  -f/u CT AP  -f/u Onc recs  -if none done in patient, get out patient Colonoscopy    #CAD s/p CAMDEN in Nov 2021  cardiology recs appreciated  - Hold DAPT in the context of acute anemia.  - Awaiting further GI w/u. Once cleared by GI, will restart DAPT and Eliquis  ASAP.  - Transfuse as needed to keep Hgb>8    #Paroxismal A-fib  -hole eliquis until GI cleared     #HTN  -c/w home metoprolol, losartan and lasix    #Dyslipidemia  -c/e home atorvostatin 40mg qhs          #Misc  - DVT Prophylaxis: BL SCDs  - GI Prophylaxis: protonix 40mg IV q 12  - Diet: clear liquid  - Activity: bed  - IV Fluids: NA  - Code Status: full     Dispo: acute

## 2022-03-26 NOTE — CONSULT NOTE ADULT - ASSESSMENT
82-year-old, M patient with PMHx: CAD s/p PCI to LAD in November 2021 on plavix and eliquis last dose 12/25, hypertension, dyslipidemia,  presents to the ED for a drop in Hb. The patient reports that the findings were incidental on his labs, he denies any acute chest pains, shortness of breath, dizziness/lightheadedness. He also denies any blood in the GI ( no melena, no BRBPR) or  tract. The patient reports fatigue and weakness especially upon minimal exertion.       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 GFR normal    Recommendations  -transfuse to target 8, cardiac patient  -2 large bore IV  -clear liquid diet do not advance  -IV PPI BID  -CT scan abdomen pelvis with IV contrast, evaluate for signs of portal HTN or liver cirrhosis   -until CT done, start octreotide drip and ceftriaxone 1 g daily   -cardiology risk stratification  -hold eliquis  -if hematemesis or HD instability call GI STAT    for questions, text me or call me on [employer] teams or call GI service  82-year-old, M patient with PMHx: CAD s/p PCI to LAD in November 2021 on plavix and eliquis last dose 12/25, hypertension, dyslipidemia,  presents to the ED for a drop in Hb. The patient reports that the findings were incidental on his labs, he denies any acute chest pains, shortness of breath, dizziness/lightheadedness. He also denies any blood in the GI ( no melena, no BRBPR) or  tract. The patient reports fatigue and weakness especially upon minimal exertion.       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 GFR normal    Recommendations  -transfuse to target 8, cardiac patient  -2 large bore IV  -IV PPI BID  -clear liquid diet do not advance  -IV PPI BID  -CT scan abdomen pelvis with IV contrast, evaluate for signs of portal HTN or liver cirrhosis   -until CT done, start octreotide drip and ceftriaxone 1 g daily   -cardiology risk stratification  -hold eliquis  -EGD after above, timing depending on results   -if hematemesis or HD instability call GI STAT    for questions, text me or call me on [employer] teams or call GI service

## 2022-03-27 LAB
BILIRUB DIRECT SERPL-MCNC: 0.4 MG/DL — HIGH (ref 0–0.3)
BILIRUB INDIRECT FLD-MCNC: 1.1 MG/DL — SIGNIFICANT CHANGE UP (ref 0.2–1.2)
BILIRUB SERPL-MCNC: 1.5 MG/DL — HIGH (ref 0.2–1.2)
FERRITIN SERPL-MCNC: 18 NG/ML — LOW (ref 30–400)
HCT VFR BLD CALC: 31.7 % — LOW (ref 42–52)
HGB BLD-MCNC: 8.5 G/DL — LOW (ref 14–18)
MCHC RBC-ENTMCNC: 17.9 PG — LOW (ref 27–31)
MCHC RBC-ENTMCNC: 26.8 G/DL — LOW (ref 32–37)
MCV RBC AUTO: 66.6 FL — LOW (ref 80–94)
NRBC # BLD: 1 /100 WBCS — HIGH (ref 0–0)
PLATELET # BLD AUTO: 208 K/UL — SIGNIFICANT CHANGE UP (ref 130–400)
RBC # BLD: 4.76 M/UL — SIGNIFICANT CHANGE UP (ref 4.7–6.1)
RBC # FLD: 24.8 % — HIGH (ref 11.5–14.5)
WBC # BLD: 9.96 K/UL — SIGNIFICANT CHANGE UP (ref 4.8–10.8)
WBC # FLD AUTO: 9.96 K/UL — SIGNIFICANT CHANGE UP (ref 4.8–10.8)

## 2022-03-27 PROCEDURE — 99233 SBSQ HOSP IP/OBS HIGH 50: CPT

## 2022-03-27 PROCEDURE — 99232 SBSQ HOSP IP/OBS MODERATE 35: CPT

## 2022-03-27 PROCEDURE — 93010 ELECTROCARDIOGRAM REPORT: CPT

## 2022-03-27 RX ORDER — FUROSEMIDE 40 MG
40 TABLET ORAL ONCE
Refills: 0 | Status: COMPLETED | OUTPATIENT
Start: 2022-03-27 | End: 2022-03-27

## 2022-03-27 RX ADMIN — OCTREOTIDE ACETATE 10 MICROGRAM(S)/HR: 200 INJECTION, SOLUTION INTRAVENOUS; SUBCUTANEOUS at 09:01

## 2022-03-27 RX ADMIN — IRON SUCROSE 110 MILLIGRAM(S): 20 INJECTION, SOLUTION INTRAVENOUS at 11:12

## 2022-03-27 RX ADMIN — Medication 40 MILLIGRAM(S): at 13:07

## 2022-03-27 RX ADMIN — Medication 325 MILLIGRAM(S): at 11:04

## 2022-03-27 RX ADMIN — PANTOPRAZOLE SODIUM 40 MILLIGRAM(S): 20 TABLET, DELAYED RELEASE ORAL at 17:07

## 2022-03-27 RX ADMIN — PANTOPRAZOLE SODIUM 40 MILLIGRAM(S): 20 TABLET, DELAYED RELEASE ORAL at 05:23

## 2022-03-27 RX ADMIN — Medication 100 MILLIGRAM(S): at 05:23

## 2022-03-27 RX ADMIN — SODIUM CHLORIDE 60 MILLILITER(S): 9 INJECTION INTRAMUSCULAR; INTRAVENOUS; SUBCUTANEOUS at 09:01

## 2022-03-27 RX ADMIN — ATORVASTATIN CALCIUM 40 MILLIGRAM(S): 80 TABLET, FILM COATED ORAL at 21:54

## 2022-03-27 RX ADMIN — Medication 1 MILLIGRAM(S): at 11:03

## 2022-03-27 NOTE — PROGRESS NOTE ADULT - ASSESSMENT
81 yo M with PMH of CAD s/p PCI to LAD in November 2021, HFmrEF, AF, HTN, DLD and anemia presents to the ED due to low Hgb. Need for preprocedural cardiac risk stratification and optimization.     Preprocedural cardiac risk assessment: Patient to go for colonoscopy. Currently without ACS symptoms, ADCHF or unstable arrhythmia. Likely able to perform 4 METS, but SOB which may be due to symptomatic anemia which caused this admission. RCRI 2 with 10.1% 30 day risk of cardiac arrest, MI or death. Recent LAD stent in 11/2021.   -Patient is elevated/high risk for low risk procedure.  -Please obtain EKG. No documented EKG from this admission.   -Recent outpatient TTE reviewed.   -Given possible GIB, holding Plavix and eliquis.   -If patient has CP, please obtain EKG and troponin.   -Will need to restart Plavix as soon as Hgb stable.   -Continue with toprol 100mg PO daily, atorvastatin 40mg PO daily.   -Would continue with Lasix 40mg IVP daily. Strict I&Os and daily weights.   -Replete lytes as needed.

## 2022-03-27 NOTE — PROGRESS NOTE ADULT - ASSESSMENT
82-year-old, M with the above medical history, presented to the ED for an incidental finding of an acute on top of chronic anemia.     #Acute vs Subacute anemia on chronic anemia:   #Suspected chronic JONAH on ferrous sulfate   - Denies any blood in the GI ( no melena, no BRBPR) or  tract. The patient reports fatigue and weakness especially upon minimal exertion.   - No melena on my exam in the ED  - Iron studies likely reflect post transfusion iron levels   Plan:  - Clears for now, trend hgb, EGD in AM  - IV PPI BID   - Venofer 200mg x 5 days   - Transfuse to maintain hgb above 8.0 given CAD   - Patient on Plavix and Eliquis for CAMDEN 4 months ago.  Holding for now as per cardio. Will need GI clearance to resume.   - Cardio clearance requested     #CAD  - S/p PCI with CAMDEN to LAD on November 2021 ( 4 months ago)  - Holding Eliquis and Plavix as per cardio   - C/w BB and ARB  - C/w atorvastatin  - Cardiologist: Dr. Judd    #Paroxysmal Afib:   - CHADSVasc2: 4   - Hold Eliquis for now  - C/w bb for rate control    #Hypertension:  - C/w bb  - Hold ARB     #Hyperlipidemia  - C/w atorvastatin       - DVT proph: No chemical prophylaxis for now, Bilateral SCDs    Pending: EGD in AM

## 2022-03-27 NOTE — PROGRESS NOTE ADULT - ASSESSMENT
82-year-old, M patient with PMHx: CAD s/p PCI to LAD in November 2021 on plavix and eliquis last dose 12/25, hypertension, dyslipidemia,  presents to the ED for a drop in Hb. The patient reports that the findings were incidental on his labs, he denies any acute chest pains, shortness of breath, dizziness/lightheadedness. He also denies any blood in the GI ( no melena, no BRBPR) or  tract. The patient reports fatigue and weakness especially upon minimal exertion.       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 GFR normal  -cardiology note appreciated: high risk     Recommendations  -transfuse to target 8, cardiac patient. please repeat CBC today   -2 large bore IV  -IV PPI BID  -NPO after midnight   -IV PPI BID   -hold eliquis  -EGD in am   -if hematemesis or HD instability call GI STAT  -correct electrolytes     -for questions text me on [employer] teams, call [phone] on weekdays before 5pm or call GI service at [phone]  after 5 pm and on weekends

## 2022-03-28 ENCOUNTER — TRANSCRIPTION ENCOUNTER (OUTPATIENT)
Age: 82
End: 2022-03-28

## 2022-03-28 ENCOUNTER — RESULT REVIEW (OUTPATIENT)
Age: 82
End: 2022-03-28

## 2022-03-28 LAB
BLD GP AB SCN SERPL QL: SIGNIFICANT CHANGE UP
HCT VFR BLD CALC: 34.5 % — LOW (ref 42–52)
HGB BLD-MCNC: 9.4 G/DL — LOW (ref 14–18)
LDH SERPL L TO P-CCNC: 256 U/L — HIGH (ref 50–242)
MCHC RBC-ENTMCNC: 18.5 PG — LOW (ref 27–31)
MCHC RBC-ENTMCNC: 27.2 G/DL — LOW (ref 32–37)
MCV RBC AUTO: 67.9 FL — LOW (ref 80–94)
NRBC # BLD: 1 /100 WBCS — HIGH (ref 0–0)
PLATELET # BLD AUTO: 235 K/UL — SIGNIFICANT CHANGE UP (ref 130–400)
RBC # BLD: 5.08 M/UL — SIGNIFICANT CHANGE UP (ref 4.7–6.1)
RBC # FLD: 26.3 % — HIGH (ref 11.5–14.5)
WBC # BLD: 14.04 K/UL — HIGH (ref 4.8–10.8)
WBC # FLD AUTO: 14.04 K/UL — HIGH (ref 4.8–10.8)

## 2022-03-28 PROCEDURE — 88312 SPECIAL STAINS GROUP 1: CPT | Mod: 26

## 2022-03-28 PROCEDURE — 88305 TISSUE EXAM BY PATHOLOGIST: CPT | Mod: 26

## 2022-03-28 PROCEDURE — 43239 EGD BIOPSY SINGLE/MULTIPLE: CPT

## 2022-03-28 PROCEDURE — 99232 SBSQ HOSP IP/OBS MODERATE 35: CPT

## 2022-03-28 RX ORDER — CLOPIDOGREL BISULFATE 75 MG/1
75 TABLET, FILM COATED ORAL DAILY
Refills: 0 | Status: DISCONTINUED | OUTPATIENT
Start: 2022-03-29 | End: 2022-03-29

## 2022-03-28 RX ORDER — SUCRALFATE 1 G
1 TABLET ORAL EVERY 6 HOURS
Refills: 0 | Status: DISCONTINUED | OUTPATIENT
Start: 2022-03-28 | End: 2022-03-29

## 2022-03-28 RX ORDER — IRON SUCROSE 20 MG/ML
200 INJECTION, SOLUTION INTRAVENOUS EVERY 24 HOURS
Refills: 0 | Status: DISCONTINUED | OUTPATIENT
Start: 2022-03-28 | End: 2022-03-28

## 2022-03-28 RX ADMIN — IRON SUCROSE 110 MILLIGRAM(S): 20 INJECTION, SOLUTION INTRAVENOUS at 14:37

## 2022-03-28 RX ADMIN — ATORVASTATIN CALCIUM 40 MILLIGRAM(S): 80 TABLET, FILM COATED ORAL at 21:56

## 2022-03-28 RX ADMIN — Medication 1 GRAM(S): at 18:15

## 2022-03-28 RX ADMIN — Medication 325 MILLIGRAM(S): at 11:33

## 2022-03-28 RX ADMIN — Medication 1 GRAM(S): at 23:27

## 2022-03-28 RX ADMIN — PANTOPRAZOLE SODIUM 40 MILLIGRAM(S): 20 TABLET, DELAYED RELEASE ORAL at 18:15

## 2022-03-28 RX ADMIN — Medication 1 MILLIGRAM(S): at 11:33

## 2022-03-28 RX ADMIN — Medication 100 MILLIGRAM(S): at 05:30

## 2022-03-28 RX ADMIN — PANTOPRAZOLE SODIUM 40 MILLIGRAM(S): 20 TABLET, DELAYED RELEASE ORAL at 05:29

## 2022-03-28 NOTE — PROGRESS NOTE ADULT - ASSESSMENT
82-year-old, M with the above medical history, presented to the ED for an incidental finding of an acute on top of chronic anemia.     #Acute vs Subacute anemia on chronic anemia:   #Suspected chronic JONAH on ferrous sulfate   - Denies any blood in the GI ( no melena, no BRBPR) or  tract. The patient reports fatigue and weakness especially upon minimal exertion.   - No melena on my exam in the ED  - Iron studies likely reflect post transfusion iron levels   Plan:  - EGD today   - IV PPI BID   - Venofer 200mg x 5 days   - Transfuse to maintain hgb above 8.0 given CAD   - Patient on Plavix and Eliquis for CAMDEN 4 months ago.  Holding for now as per cardio. Will need GI clearance to resume.   - Cardio clearance requested     #CAD  - S/p PCI with CAMDEN to LAD on November 2021 ( 4 months ago)  - Holding Eliquis and Plavix as per cardio   - C/w BB and ARB  - C/w atorvastatin  - Cardiologist: Dr. Judd    #Paroxysmal Afib:   - CHADSVasc2: 4   - Hold Eliquis for now  - C/w bb for rate control    #Hypertension:  - C/w BB   - Hold ARB     #Hyperlipidemia  - C/w atorvastatin       - DVT proph: No chemical prophylaxis for now, Bilateral SCDs    Pending: EGD today

## 2022-03-28 NOTE — PROGRESS NOTE ADULT - ASSESSMENT
Mr Lamont is an 82 YOM w a PMH of CAD (s/p PCI to LAD 11/2021 on DAPT), paroxismal a-fib (on eliquis), bicuspid aortic valve, chronic microcitic anemia (Iron deficient anemia) and 20lbs weight loss over 1 year which he attributes to lasix use presenting to ED with Hgb <7 found on labs. Patient reports diffuse weakness/fatigue and decreased exercise tolerance. Denies: changes in BM, blood in stool/urine/from mouth or nose, denies joint swelling or increased bruising, chest pain or SOB. Patient had Hbg = 6.9 on admission and was DANIELA Negative in ED. Patient s/p 1 U PRBC Labs notable today for elevated Total Bilirubin and transferrin 252. Iron studies performed post transfusion and may not reflect acurate values. GI and Onc consulted, pending recs. Of note patient has EGD on record but no Colonoscopy. EGD showed possible esophageal varicese (non bleeding at the time)     #Anemia  Patient has Hx of iron deficiency anemia and is on iron at home   on admission patient Hgb = 6.9, microcytic, rdw = 23 suggestive of JONAH (IRON STUDIES ON FILE ARE POST 1 U PRBC AND NOT REFLECT HOW HE CAME IN)  Patient on DAPT at home, HOLD AS PER CARDIO DESPITE CAMDEN PLACED NOV UNTIL GI CLEARS TO RESTART   T bili elevated  -s/p 1 U PRBC in ED  -additional 1 unit PRBC ordered  -start venofer 200mg IV for 2 doses (first one 3/26)  -given cardiac Hx keep Hgb>8  -eliquis held due to anemia   -GI: CT A/P W iv CONTRAST, OCTERIOTIDE (AS HAD POSSIBLE VARICESE ON EGD) AT 50/HR, CEFTRIAXONE 1G QD   -Heme/Onc recs appreciated   - indirect bilirubin 1.1  - fup haptoglobin and LDH, was not collected on the 26th    #20 lbs weight loss unintentional  -f/u CT AP  -f/u Onc recs  -if none done in patient, get out patient Colonoscopy or if egd neg    #CAD s/p CAMDEN in Nov 2021  cardiology recs appreciated  - Hold DAPT in the context of acute anemia.  - Awaiting further GI w/u. Once cleared by GI, will restart DAPT and Eliquis  ASAP.  - Transfuse as needed to keep Hgb>8    #Paroxismal A-fib  -hole eliquis until GI cleared     #HTN  -c/w home metoprolol, losartan and lasix    #Dyslipidemia  -c/e home atorvostatin 40mg qhs    #Misc  - DVT Prophylaxis: BL SCDs  - GI Prophylaxis: protonix 40mg IV q 12  - Diet: clear liquid  - Activity: bed  - IV Fluids: NA  - Code Status: full    Mr Lamont is an 82 YOM w a PMH of CAD (s/p PCI to LAD 11/2021 on DAPT), paroxismal a-fib (on eliquis), bicuspid aortic valve, chronic microcitic anemia (Iron deficient anemia) and 20lbs weight loss over 1 year which he attributes to lasix use presenting to ED with Hgb <7 found on labs. Patient reports diffuse weakness/fatigue and decreased exercise tolerance. Denies: changes in BM, blood in stool/urine/from mouth or nose, denies joint swelling or increased bruising, chest pain or SOB. Patient had Hbg = 6.9 on admission and was DANIELA Negative in ED. Patient s/p 1 U PRBC Labs notable today for elevated Total Bilirubin and transferrin 252. Iron studies performed post transfusion and may not reflect acurate values. GI and Onc consulted, pending recs. Of note patient has EGD on record but no Colonoscopy. EGD showed possible esophageal varicese (non bleeding at the time)     #Anemia  Patient has Hx of iron deficiency anemia and is on iron at home   on admission patient Hgb = 6.9, microcytic, rdw = 23 suggestive of JONAH (IRON STUDIES ON FILE ARE POST 1 U PRBC AND NOT REFLECT HOW HE CAME IN)  Patient on DAPT at home, HOLD AS PER CARDIO DESPITE CAMDEN PLACED NOV UNTIL GI CLEARS TO RESTART   T bili elevated  -s/p 1 U PRBC in ED  -additional 1 unit PRBC ordered  -start venofer 200mg IV for 3 more doses, received 2 already for a total of 5  -given cardiac Hx keep Hgb>8  -eliquis held due to anemia   -GI: CT A/P W iv CONTRAST, OCTERIOTIDE (AS HAD POSSIBLE VARICESE ON EGD) AT 50/HR, CEFTRIAXONE 1G QD   -Heme/Onc recs appreciated   - indirect bilirubin 1.1  - fup haptoglobin and LDH, was not collected on the 26th    #20 lbs weight loss unintentional  -CT AP: No CT evidence for acute intra-abdominal pathology. Diverticulosis   without evidence for diverticulitis.  -f/u Heme/ Onc recs after hemolysis workup.  -if none done in patient, get out patient Colonoscopy or if egd neg    #CAD s/p CAMDEN in Nov 2021  cardiology recs appreciated  - Hold DAPT in the context of acute anemia.  - Awaiting further GI w/u. Once cleared by GI, will restart DAPT and Eliquis  ASAP.  - Transfuse as needed to keep Hgb>8    #Paroxismal A-fib  -hold eliquis until GI cleared     #HTN  -c/w home metoprolol, losartan and lasix    #Dyslipidemia  -c/e home atorvostatin 40mg qhs    #Misc  - DVT Prophylaxis: BL SCDs  - GI Prophylaxis: protonix 40mg IV q 12  - Diet: clear liquid  - Activity: bed  - IV Fluids: NA  - Code Status: full

## 2022-03-28 NOTE — CHART NOTE - NSCHARTNOTEFT_GEN_A_CORE
EGD findings:   Normal mucosa was noted in the whole esophagus.   Stomach Mucosa Erosions of the mucosa was noted in the stomach. These findings are compatible with erosive gastritis. Multiple cold forceps biopsies were performed for histology.    Additional stomach findings Severe erosive gastritis with adherent white material, in fundus, biopsied-.   Duodenum Mucosa Normal mucosa was noted in the whole examined duodenum. Multiple cold forceps biopsies were performed for histology.    Additional duodenum findings -Whitish plaque like lesion noted in second part of duodenum multiple cold forceps biopsy was performed for histology..     Recs:   Resume regular diet  Await pathology results  Avoid NSAID  PPI BID  Carafate 1gm 4 times/day  Monitor CBC overnight  If hb is stable can resume Plavix and Eliquis tomorrow  Likely need OP capsule endoscopy +/- repeat colonoscopy with Dr. Alvarez

## 2022-03-28 NOTE — CHART NOTE - NSCHARTNOTEFT_GEN_A_CORE
ENDO RECOVERY ADMISSION NOTE      Procedure:   Post op diagnosis:      ____  Intubated  TV:______       Rate: ______      FiO2: ______    __x__  Patent Airway    __x__  Full return of protective reflexes    __x__  Full recovery from anesthesia / back to baseline status    Vitals  HR: 84  BP: 117/78  RR: 16  O2 Sat: 96%  Temp: 97.5    Mental Status:  __x__ Awake   ___x__ Alert   _____ Drowsy   _____ Sedated    Nausea/Vomiting:  __x__ NO  ______Yes,   See Post - Op Orders          Pain Scale (0-10):  _____    Treatment: ____ None    __x__ See Post - Op/PCA Orders    Post - Operative Fluids:   ____ Oral   __x__ See Post - Op Orders    Plan: Discharge when criteria met:   ____Home       __x___Floor     _____Critical Care   Other:_________________    Comments: Patient had smooth intraoperative event, no anesthesia complication.

## 2022-03-29 ENCOUNTER — TRANSCRIPTION ENCOUNTER (OUTPATIENT)
Age: 82
End: 2022-03-29

## 2022-03-29 VITALS
TEMPERATURE: 97 F | SYSTOLIC BLOOD PRESSURE: 115 MMHG | DIASTOLIC BLOOD PRESSURE: 55 MMHG | RESPIRATION RATE: 17 BRPM | HEART RATE: 81 BPM

## 2022-03-29 LAB
ALBUMIN SERPL ELPH-MCNC: 3.6 G/DL — SIGNIFICANT CHANGE UP (ref 3.5–5.2)
ALP SERPL-CCNC: 64 U/L — SIGNIFICANT CHANGE UP (ref 30–115)
ALT FLD-CCNC: 49 U/L — HIGH (ref 0–41)
ANION GAP SERPL CALC-SCNC: 14 MMOL/L — SIGNIFICANT CHANGE UP (ref 7–14)
AST SERPL-CCNC: 42 U/L — HIGH (ref 0–41)
BASOPHILS # BLD AUTO: 0.04 K/UL — SIGNIFICANT CHANGE UP (ref 0–0.2)
BASOPHILS NFR BLD AUTO: 0.4 % — SIGNIFICANT CHANGE UP (ref 0–1)
BILIRUB SERPL-MCNC: 1.3 MG/DL — HIGH (ref 0.2–1.2)
BUN SERPL-MCNC: 10 MG/DL — SIGNIFICANT CHANGE UP (ref 10–20)
CALCIUM SERPL-MCNC: 8.7 MG/DL — SIGNIFICANT CHANGE UP (ref 8.5–10.1)
CHLORIDE SERPL-SCNC: 104 MMOL/L — SIGNIFICANT CHANGE UP (ref 98–110)
CO2 SERPL-SCNC: 22 MMOL/L — SIGNIFICANT CHANGE UP (ref 17–32)
CREAT SERPL-MCNC: 1 MG/DL — SIGNIFICANT CHANGE UP (ref 0.7–1.5)
EGFR: 75 ML/MIN/1.73M2 — SIGNIFICANT CHANGE UP
EOSINOPHIL # BLD AUTO: 0.17 K/UL — SIGNIFICANT CHANGE UP (ref 0–0.7)
EOSINOPHIL NFR BLD AUTO: 1.7 % — SIGNIFICANT CHANGE UP (ref 0–8)
GLUCOSE SERPL-MCNC: 81 MG/DL — SIGNIFICANT CHANGE UP (ref 70–99)
HAPTOGLOB SERPL-MCNC: 49 MG/DL — SIGNIFICANT CHANGE UP (ref 34–200)
HCT VFR BLD CALC: 31.2 % — LOW (ref 42–52)
HGB BLD-MCNC: 8.7 G/DL — LOW (ref 14–18)
IMM GRANULOCYTES NFR BLD AUTO: 0.9 % — HIGH (ref 0.1–0.3)
LYMPHOCYTES # BLD AUTO: 1.27 K/UL — SIGNIFICANT CHANGE UP (ref 1.2–3.4)
LYMPHOCYTES # BLD AUTO: 12.5 % — LOW (ref 20.5–51.1)
MAGNESIUM SERPL-MCNC: 1.8 MG/DL — SIGNIFICANT CHANGE UP (ref 1.8–2.4)
MCHC RBC-ENTMCNC: 18.9 PG — LOW (ref 27–31)
MCHC RBC-ENTMCNC: 27.9 G/DL — LOW (ref 32–37)
MCV RBC AUTO: 67.8 FL — LOW (ref 80–94)
MONOCYTES # BLD AUTO: 0.9 K/UL — HIGH (ref 0.1–0.6)
MONOCYTES NFR BLD AUTO: 8.9 % — SIGNIFICANT CHANGE UP (ref 1.7–9.3)
NEUTROPHILS # BLD AUTO: 7.66 K/UL — HIGH (ref 1.4–6.5)
NEUTROPHILS NFR BLD AUTO: 75.6 % — HIGH (ref 42.2–75.2)
NRBC # BLD: 1 /100 WBCS — HIGH (ref 0–0)
PLATELET # BLD AUTO: 208 K/UL — SIGNIFICANT CHANGE UP (ref 130–400)
POTASSIUM SERPL-MCNC: 3.7 MMOL/L — SIGNIFICANT CHANGE UP (ref 3.5–5)
POTASSIUM SERPL-SCNC: 3.7 MMOL/L — SIGNIFICANT CHANGE UP (ref 3.5–5)
PROT SERPL-MCNC: 6.4 G/DL — SIGNIFICANT CHANGE UP (ref 6–8)
RBC # BLD: 4.6 M/UL — LOW (ref 4.7–6.1)
RBC # FLD: 27.4 % — HIGH (ref 11.5–14.5)
SODIUM SERPL-SCNC: 140 MMOL/L — SIGNIFICANT CHANGE UP (ref 135–146)
SURGICAL PATHOLOGY STUDY: SIGNIFICANT CHANGE UP
WBC # BLD: 10.13 K/UL — SIGNIFICANT CHANGE UP (ref 4.8–10.8)
WBC # FLD AUTO: 10.13 K/UL — SIGNIFICANT CHANGE UP (ref 4.8–10.8)

## 2022-03-29 PROCEDURE — 99232 SBSQ HOSP IP/OBS MODERATE 35: CPT

## 2022-03-29 PROCEDURE — 99239 HOSP IP/OBS DSCHRG MGMT >30: CPT

## 2022-03-29 RX ORDER — PANTOPRAZOLE SODIUM 20 MG/1
1 TABLET, DELAYED RELEASE ORAL
Qty: 60 | Refills: 0
Start: 2022-03-29 | End: 2022-04-27

## 2022-03-29 RX ORDER — SUCRALFATE 1 G
1 TABLET ORAL
Qty: 120 | Refills: 0
Start: 2022-03-29 | End: 2022-04-27

## 2022-03-29 RX ORDER — FERROUS SULFATE 325(65) MG
1 TABLET ORAL
Qty: 0 | Refills: 0 | DISCHARGE

## 2022-03-29 RX ORDER — POLYETHYLENE GLYCOL 3350 17 G/17G
17 POWDER, FOR SOLUTION ORAL ONCE
Refills: 0 | Status: COMPLETED | OUTPATIENT
Start: 2022-03-29 | End: 2022-03-29

## 2022-03-29 RX ORDER — PANTOPRAZOLE SODIUM 20 MG/1
1 TABLET, DELAYED RELEASE ORAL
Qty: 0 | Refills: 0 | DISCHARGE

## 2022-03-29 RX ORDER — APIXABAN 2.5 MG/1
5 TABLET, FILM COATED ORAL
Refills: 0 | Status: DISCONTINUED | OUTPATIENT
Start: 2022-03-29 | End: 2022-03-29

## 2022-03-29 RX ADMIN — CLOPIDOGREL BISULFATE 75 MILLIGRAM(S): 75 TABLET, FILM COATED ORAL at 11:36

## 2022-03-29 RX ADMIN — Medication 100 MILLIGRAM(S): at 05:50

## 2022-03-29 RX ADMIN — POLYETHYLENE GLYCOL 3350 17 GRAM(S): 17 POWDER, FOR SOLUTION ORAL at 09:45

## 2022-03-29 RX ADMIN — Medication 1 MILLIGRAM(S): at 11:36

## 2022-03-29 RX ADMIN — Medication 1 GRAM(S): at 05:50

## 2022-03-29 RX ADMIN — Medication 1 GRAM(S): at 11:35

## 2022-03-29 RX ADMIN — Medication 325 MILLIGRAM(S): at 11:36

## 2022-03-29 RX ADMIN — CHLORHEXIDINE GLUCONATE 1 APPLICATION(S): 213 SOLUTION TOPICAL at 11:35

## 2022-03-29 RX ADMIN — PANTOPRAZOLE SODIUM 40 MILLIGRAM(S): 20 TABLET, DELAYED RELEASE ORAL at 05:50

## 2022-03-29 NOTE — DISCHARGE NOTE PROVIDER - PROVIDER TOKENS
PROVIDER:[TOKEN:[[id number]],FOLLOWUP:[1 month]],PROVIDER:[TOKEN:[[id number]],FOLLOWUP:[2 weeks]] PROVIDER:[TOKEN:[[id number]],FOLLOWUP:[1 month]],PROVIDER:[TOKEN:[[id number]],FOLLOWUP:[2 weeks]],PROVIDER:[TOKEN:[[id number]],FOLLOWUP:[2 weeks]]

## 2022-03-29 NOTE — DISCHARGE NOTE PROVIDER - CARE PROVIDERS DIRECT ADDRESSES
[email or web address]

## 2022-03-29 NOTE — DISCHARGE NOTE PROVIDER - NSDCFUSCHEDAPPT_GEN_ALL_CORE_FT
MIGEL MOYA ; 04/05/2022 ; NPP PULMED [address] MIGEL [place] ; 04/07/2022 ; NPP Cardio [address] [facility] [place] ; 05/31/2022 ; NPP Urology [facility]

## 2022-03-29 NOTE — DISCHARGE NOTE PROVIDER - NSDCFUADDAPPT_GEN_ALL_CORE_FT
Please follow up with Dr. Farah your primary care doctor in 2 weeks  Please follow up with Dr. Judd in a month   Please follow up with Gastroenterologist Dr. Antony in 2 weeks for your capsule endoscopy as well Please follow up with Dr. Farah your primary care doctor in 2 weeks  Please follow up with Dr. Judd in a month   Please follow up with Dr. Goodson in 2 weeks for iron infusions  Please follow up with Gastroenterologist Dr. Antony in 2 weeks for your capsule endoscopy as well

## 2022-03-29 NOTE — DISCHARGE NOTE NURSING/CASE MANAGEMENT/SOCIAL WORK - NSDCPEFALRISK_GEN_ALL_CORE
For information on Fall & Injury Prevention, visit: [email or web address] OR  [email or web address] OR  https://www.cdc.gov/steadi/patient.html

## 2022-03-29 NOTE — DISCHARGE NOTE PROVIDER - CARE PROVIDER_API CALL
Tera Judd)  Cardiovascular Disease; Nuclear Cardiology  [address] [place] [address], [address]  [place] [address]  Phone: [phone]  Fax: [phone]  Follow Up Time: 1 month    Christine Antony)  Gastroenterology; Internal Medicine  [address] [place] [address]  Phone: [phone]  Fax: [phone]  Follow Up Time: 2 weeks   Tera Judd)  Cardiovascular Disease; Nuclear Cardiology  [address] [facility], [address]  [place] [address]  Phone: [phone]  Fax: [phone]  Follow Up Time: 1 month    Christine Antony)  Gastroenterology; Internal Medicine  [address] [place] [address]  Phone: [phone]  Fax: [phone]  Follow Up Time: 2 weeks    Brant Goodson)  Internal Medicine; Medical Oncology  [address] [address]  Phone: [phone]  Fax: [phone]  Follow Up Time: 2 weeks

## 2022-03-29 NOTE — PROGRESS NOTE ADULT - ASSESSMENT
82-year-old, M patient with PMHx: CAD s/p PCI to LAD in November 2021 on plavix and eliquis last dose 12/25, hypertension, dyslipidemia,  presents to the ED for a drop in Hb. The patient reports that the findings were incidental on his labs, he denies any acute chest pains, shortness of breath, dizziness/lightheadedness. He also denies any blood in the GI ( no melena, no BRBPR) or  tract. The patient reports fatigue and weakness especially upon minimal exertion.       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     Recs:   Resume regular diet, plavix, eliquis  c/w iron supplementation   Await pathology results  Avoid NSAID  PPI BID  Carafate 1gm 4 times/day  Need OP capsule endoscopy +/- repeat colonoscopy with Dr. Alvarez

## 2022-03-29 NOTE — DISCHARGE NOTE NURSING/CASE MANAGEMENT/SOCIAL WORK - NSDCFUADDAPPT_GEN_ALL_CORE_FT
Please follow up with Dr. Farah your primary care doctor in 2 weeks  Please follow up with Dr. Judd in a month   Please follow up with Dr. Goodson in 2 weeks for iron infusions  Please follow up with Gastroenterologist Dr. Antony in 2 weeks for your capsule endoscopy as well

## 2022-03-29 NOTE — DISCHARGE NOTE PROVIDER - HOSPITAL COURSE
82-year-old, M patient with PMHx: CAD s/p PCI to LAD in November 2021, hypertension, dyslipidemia, chronic microcytic anemia likely secondary to iron deficiency presents to the ED for a drop in Hb. The patient reports that the findings were incidental on his labs, he denies any acute chest pains, shortness of breath, dizziness/lightheadedness. He also denies any blood in the GI ( no melena, no BRBPR) or  tract. The patient reports fatigue and weakness especially upon minimal exertion.    Pts previous 82-year-old, M patient with PMHx: CAD s/p PCI to LAD in November 2021, hypertension, dyslipidemia, chronic microcytic anemia likely secondary to iron deficiency presents to the ED for a drop in Hb. The patient reports that the findings were incidental on his labs, he denies any acute chest pains, shortness of breath, dizziness/lightheadedness. He also denies any blood in the GI ( no melena, no BRBPR) or  tract. The patient reports fatigue and weakness especially upon minimal exertion.    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  Heme/ Onc was on board and so was GI  Pt received venofer x 4  Plavix and Eliquis was held as precaution  Hg was stable  EGD performed on 03/28 revealed erosive gastritis with no active bleeding  Pt on PPI BID and carafate q6h  Plavix and Eliquis resumed    Pt to follow up with GI for capsule endoscopy out patient, Pt to follow up with PCP and Cardiologist out pt as well  Pt is stable for discharge     82-year-old, M patient with PMHx: CAD s/p PCI to LAD in November 2021, hypertension, dyslipidemia, chronic microcytic anemia likely secondary to iron deficiency presents to the ED for a drop in Hb. The patient reports that the findings were incidental on his labs, he denies any acute chest pains, shortness of breath, dizziness/lightheadedness. He also denies any blood in the GI ( no melena, no BRBPR) or  tract. The patient reports fatigue and weakness especially upon minimal exertion.    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  Heme/ Onc was on board and so was GI  Pt received venofer x 4  Plavix and Eliquis was held as precaution  Hg was stable  EGD performed on 03/28 revealed erosive gastritis with no active bleeding  Biopsy revealed gastritis due to iron pills  Pt on PPI BID and carafate q6h  Plavix and Eliquis resumed    Pt to follow up with GI for capsule endoscopy out patient, Pt to follow up with PCP and Cardiologist out pt as well  Pt to follow up out patient with Hematologist out pt for iron infusions dur to low iron stores  Pt is stable for discharge     82-year-old, M patient with PMHx: CAD s/p PCI to LAD in November 2021, hypertension, dyslipidemia, chronic microcytic anemia likely secondary to iron deficiency presents to the ED for a drop in Hb. The patient reports that the findings were incidental on his labs, he denies any acute chest pains, shortness of breath, dizziness/lightheadedness. He also denies any blood in the GI ( no melena, no BRBPR) or  tract. The patient reports fatigue and weakness especially upon minimal exertion.    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 Patient is stable for discharge. Med rec reviewed.

## 2022-03-29 NOTE — DISCHARGE NOTE PROVIDER - NSDCMRMEDTOKEN_GEN_ALL_CORE_FT
atorvastatin 40 mg oral tablet: 1 tab(s) orally once a day (at bedtime)  Breo Ellipta 100 mcg-25 mcg/inh inhalation powder: 1 puff(s) inhaled once a day  Eliquis 5 mg oral tablet: 1 tab(s) orally 2 times a day  ferrous sulfate 325 mg (65 mg elemental iron) oral delayed release tablet: 1 tab(s) orally once a day  Fish Oil 1000 mg oral capsule: 2 cap(s) orally once a day  folic acid 1 mg oral tablet: 1 tab(s) orally once a day  furosemide 20 mg oral tablet: 1 tab(s) orally once a day  losartan 25 mg oral tablet: 1 tab(s) orally once a day  metoprolol succinate 100 mg oral tablet, extended release: 1 tab(s) orally once a day  pantoprazole 40 mg oral delayed release tablet: 1 tab(s) orally once a day  Plavix 75 mg oral tablet: 1 tab(s) orally once a day  Zantac 150 oral tablet: 1 tab(s) orally once a day   atorvastatin 40 mg oral tablet: 1 tab(s) orally once a day (at bedtime)  Breo Ellipta 100 mcg-25 mcg/inh inhalation powder: 1 puff(s) inhaled once a day  Eliquis 5 mg oral tablet: 1 tab(s) orally 2 times a day  ferrous sulfate 325 mg (65 mg elemental iron) oral delayed release tablet: 1 tab(s) orally once a day  Fish Oil 1000 mg oral capsule: 2 cap(s) orally once a day  folic acid 1 mg oral tablet: 1 tab(s) orally once a day  furosemide 20 mg oral tablet: 1 tab(s) orally once a day  losartan 25 mg oral tablet: 1 tab(s) orally once a day  metoprolol succinate 100 mg oral tablet, extended release: 1 tab(s) orally once a day  pantoprazole 40 mg oral delayed release tablet: 1 tab(s) orally 2 times a day   Plavix 75 mg oral tablet: 1 tab(s) orally once a day  sucralfate 1 g oral tablet: 1 tab(s) orally every 6 hours  Zantac 150 oral tablet: 1 tab(s) orally once a day   atorvastatin 40 mg oral tablet: 1 tab(s) orally once a day (at bedtime)  Breo Ellipta 100 mcg-25 mcg/inh inhalation powder: 1 puff(s) inhaled once a day  Eliquis 5 mg oral tablet: 1 tab(s) orally 2 times a day  Fish Oil 1000 mg oral capsule: 2 cap(s) orally once a day  folic acid 1 mg oral tablet: 1 tab(s) orally once a day  furosemide 20 mg oral tablet: 1 tab(s) orally once a day  losartan 25 mg oral tablet: 1 tab(s) orally once a day  metoprolol succinate 100 mg oral tablet, extended release: 1 tab(s) orally once a day  pantoprazole 40 mg oral delayed release tablet: 1 tab(s) orally 2 times a day   Plavix 75 mg oral tablet: 1 tab(s) orally once a day  sucralfate 1 g oral tablet: 1 tab(s) orally every 6 hours  Zantac 150 oral tablet: 1 tab(s) orally once a day

## 2022-03-29 NOTE — PROGRESS NOTE ADULT - ATTENDING COMMENTS
As described above, patient with anemia found to have erosive gastritis on egd.  See above for details  I have reviewed  the above note and agree with the impressions and findings and recommendations
Case discussed with fellow. agree with above plan, modified where needed

## 2022-03-29 NOTE — PROGRESS NOTE ADULT - PROVIDER SPECIALTY LIST ADULT
Cardiology
Gastroenterology
Internal Medicine
Gastroenterology
Internal Medicine

## 2022-03-29 NOTE — PROGRESS NOTE ADULT - SUBJECTIVE AND OBJECTIVE BOX
Gastroenterology progress note:     Patient is a 82y old  Male who presents with a chief complaint of Low Hb (27 Mar 2022 13:43)       Admitted on: 03-25-22    We are following the patient for anemia      Interval History: no abdominal pain no nausea no vomiting     PAST MEDICAL & SURGICAL HISTORY:  HTN (hypertension)   High cholesterol  GERD (gastroesophageal reflux disease)    MEDICATIONS  (STANDING):  atorvastatin 40 milliGRAM(s) Oral at bedtime  chlorhexidine 4% Liquid 1 Application(s) Topical daily  ferrous    sulfate 325 milliGRAM(s) Oral daily  folic acid 1 milliGRAM(s) Oral daily  iron sucrose IVPB 200 milliGRAM(s) IV Intermittent every 24 hours  metoprolol succinate  milliGRAM(s) Oral daily  pantoprazole  Injectable 40 milliGRAM(s) IV Push every 12 hours    MEDICATIONS  (PRN):      Allergies  No Known Allergies      Review of Systems:   General: no fever  HEENT: no hemoptysis  Cardiovascular:  No Chest Pain, No Palpitations  Respiratory:  No Cough, No Dyspnea  Gastrointestinal:  As described in HPI  Hematology: no bruising or hematoma   Neurology: no new motor deficit  Skin: no new rash    Physical Examination:  T(C): 37 (03-27-22 @ 12:56), Max: 37 (03-27-22 @ 12:56)  HR: 92 (03-27-22 @ 12:56) (86 - 94)  BP: 113/67 (03-27-22 @ 12:56) (98/71 - 117/69)  RR: 20 (03-27-22 @ 12:56) (19 - 20)  SpO2: 93% (03-27-22 @ 07:54) (93% - 93%)      Constitutional: No acute distress.  Head: normocephalic  Neck: supple   Eyes: EOMI  Respiratory:  No signs of respiratory distress. Lung sounds are clear bilaterally.  Cardiovascular:  S1 S2, Regular rate and rhythm.  Abdominal: Abdomen is soft, symmetric, and non-tender without distention.   Extremities: no pitting edema  Neurology: alert oriented *3, no asterixis   Skin: No rashes, No Jaundice.      Data: (reviewed by attending)                        7.4    12.69 )-----------( 184      ( 26 Mar 2022 04:30 )             26.7     Hgb trend:  7.4  03-26-22 @ 04:30  7.5  03-25-22 @ 20:00  6.9  03-25-22 @ 15:15        03-26    142  |  107  |  10  ----------------------------<  102<H>  3.8   |  23  |  1.0    Ca    8.7      26 Mar 2022 04:30  Mg     1.4     03-26    TPro  x   /  Alb  x   /  TBili  1.5<H>  /  DBili  0.4<H>  /  AST  x   /  ALT  x   /  AlkPhos  x   03-27    Liver panel trend:  TBili 1.5   /   AST --   /   ALT --   /   AlkP --   /   Tptn --   /   Alb --    /   DBili 0.4      03-27  TBili 1.5   /   AST 16   /   ALT 9   /   AlkP 59   /   Tptn 6.1   /   Alb 3.7    /   DBili --      03-26  TBili 0.6   /   AST 38   /   ALT 11   /   AlkP 62   /   Tptn 6.7   /   Alb 3.6    /   DBili --      03-25             Radiology: (reviewed by attending)  CT Abdomen and Pelvis w/ IV Cont:   ACC: [id number] EXAM:  CT ABDOMEN AND PELVIS IC                          PROCEDURE DATE:  03/26/2022          INTERPRETATION:  CLINICAL STATEMENT: Anemia    TECHNIQUE: Contiguous axial CT images were obtained from the lower chest   to the pubic symphysis following administration of 100cc Optiray 320   intravenous contrast.  Oral contrast was not administered.  Reformatted   images in the coronal and sagittal planes were acquired.    COMPARISON CT: None.    OTHER STUDIES USED FOR CORRELATION: None.      FINDINGS:    LOWER CHEST: Cardiomegaly with small pericardial effusion. Small left   pleural effusion with superimposed atelectasis..    HEPATOBILIARY: Cholelithiasis. Scattered hepatic cysts and additional   subcentimeter hypodensities too small to further characterize..    SPLEEN: Unremarkable.    PANCREAS: Unremarkable.    ADRENAL GLANDS: Unremarkable.    KIDNEYS: Symmetric renal enhancement without hydronephrosis bilaterally.   Bilateral renal cysts and additional subcentimeter hypodensities too   small to further characterize..    ABDOMINOPELVIC NODES: Unremarkable.    PELVIC ORGANS: Unremarkable.    PERITONEUM/MESENTERY/BOWEL: No bowel obstruction, pneumoperitoneum or   ascites. Colonic diverticulosis without diverticulitis. Normal caliber   appendix..    BONES/SOFT TISSUES: No acute osseous abnormality. Mild degenerative   changes of the spine..    OTHER: Scattered atherosclerotic calcifications of abdominal aorta and   branches.      IMPRESSION:    No CT evidence for acute intra-abdominal pathology. Diverticulosis   without evidence for diverticulitis.    Cardiomegaly with small pericardial and small left pleural effusions.    --- End of Report ---            ELLIOT LANDAU MD; Attending Radiologist  This document has been electronically signed. Mar 27 2022  8:12AM (03-26-22 @ 18:13)      
     Pt seen and examined at bedside.    VITAL SIGNS (Last 24 hrs):  T(C): 36.7 (22 @ 11:59), Max: 37 (22 @ 12:56)  HR: 100 (22 @ 11:59) (85 - 100)  BP: 135/94 (22 @ 11:59) (93/67 - 135/94)  RR: 18 (22 @ 11:59) (18 - 20)  SpO2: 97% (22 @ 11:59) (97% - 97%)  Wt(kg): --  Daily Height in cm: 182.88 (28 Mar 2022 11:59)    Daily Weight in k.3 (28 Mar 2022 08:04)    I&O's Summary    27 Mar 2022 07:01  -  28 Mar 2022 07:00  --------------------------------------------------------  IN: 0 mL / OUT: 825 mL / NET: -825 mL        PHYSICAL EXAM:  GENERAL: NAD, well-developed  HEAD:  Atraumatic, Normocephalic  EYES: conjunctiva and sclera clear  NECK: Supple, No JVD  CHEST/LUNG: Clear to auscultation bilaterally; No wheeze  HEART: Regular rate and rhythm; No murmurs, rubs, or gallops  ABDOMEN: Soft, Nontender, Nondistended; Bowel sounds present  EXTREMITIES:  2+ Peripheral Pulses, No clubbing, cyanosis, or edema  PSYCH: AAOx3  NEUROLOGY: non-focal  SKIN: No rashes or lesions    Labs Reviewed  Spoke to patient in regards to abnormal labs.    CBC Full  -  ( 28 Mar 2022 09:17 )  WBC Count : 14.04 K/uL  Hemoglobin : 9.4 g/dL  Hematocrit : 34.5 %  Platelet Count - Automated : 235 K/uL  Mean Cell Volume : 67.9 fL  Mean Cell Hemoglobin : 18.5 pg  Mean Cell Hemoglobin Concentration : 27.2 g/dL  Auto Neutrophil # : x  Auto Lymphocyte # : x  Auto Monocyte # : x  Auto Eosinophil # : x  Auto Basophil # : x  Auto Neutrophil % : x  Auto Lymphocyte % : x  Auto Monocyte % : x  Auto Eosinophil % : x  Auto Basophil % : x    BMP:    03-26 @ 04:30    Blood Urea Nitrogen - 10  Calcium - 8.7  Carbond Dioxide - 23  Chloride - 107  Creatinine - 1.0  Glucose - 102  Potassium - 3.8  Sodium - 142       PT/INR - ( 25 Mar 2022 15:15 )   PT: 16.00 sec;   INR: 1.40 ratio         PTT - ( 25 Mar 2022 15:15 )  PTT:32.1 sec       MEDICATIONS  (STANDING):  atorvastatin 40 milliGRAM(s) Oral at bedtime  chlorhexidine 4% Liquid 1 Application(s) Topical daily  ferrous    sulfate 325 milliGRAM(s) Oral daily  folic acid 1 milliGRAM(s) Oral daily  iron sucrose IVPB 200 milliGRAM(s) IV Intermittent every 24 hours  metoprolol succinate  milliGRAM(s) Oral daily  pantoprazole  Injectable 40 milliGRAM(s) IV Push every 12 hours    MEDICATIONS  (PRN):  
     Pt seen and examined at bedside. No sob, cp. No complaints.     VITAL SIGNS (Last 24 hrs):  T(C): 36.2 (03-27-22 @ 06:09), Max: 36.2 (03-26-22 @ 20:02)  HR: 86 (03-27-22 @ 07:54) (86 - 94)  BP: 117/69 (03-27-22 @ 06:09) (98/71 - 117/69)  RR: 19 (03-27-22 @ 06:09) (19 - 20)  SpO2: 93% (03-27-22 @ 07:54) (93% - 93%)  Wt(kg): --  Daily     Daily     I&O's Summary    26 Mar 2022 07:01  -  27 Mar 2022 07:00  --------------------------------------------------------  IN: 0 mL / OUT: 1425 mL / NET: -1425 mL        PHYSICAL EXAM:  GENERAL: NAD   HEAD:  Atraumatic, Normocephalic  EYES:  conjunctiva and sclera clear  NECK: Supple, No JVD  CHEST/LUNG: Clear to auscultation bilaterally; No wheeze  HEART: Regular rate and rhythm; No murmurs, rubs, or gallops  ABDOMEN: Soft, Nontender, Nondistended; Bowel sounds present  EXTREMITIES:  2+ Peripheral Pulses, No clubbing, cyanosis, or edema  PSYCH: AAOx3  NEUROLOGY: non-focal  SKIN: No rashes or lesions    Labs Reviewed  Spoke to patient in regards to abnormal labs.    CBC Full  -  ( 26 Mar 2022 04:30 )  WBC Count : 12.69 K/uL  Hemoglobin : 7.4 g/dL  Hematocrit : 26.7 %  Platelet Count - Automated : 184 K/uL  Mean Cell Volume : 63.3 fL  Mean Cell Hemoglobin : 17.5 pg  Mean Cell Hemoglobin Concentration : 27.7 g/dL  Auto Neutrophil # : x  Auto Lymphocyte # : x  Auto Monocyte # : x  Auto Eosinophil # : x  Auto Basophil # : x  Auto Neutrophil % : x  Auto Lymphocyte % : x  Auto Monocyte % : x  Auto Eosinophil % : x  Auto Basophil % : x    BMP:    03-26 @ 04:30    Blood Urea Nitrogen - 10  Calcium - 8.7  Carbon Dioxide - 23  Chloride - 107  Creatinine - 1.0  Glucose - 102  Potassium - 3.8  Sodium - 142         PT/INR - ( 25 Mar 2022 15:15 )   PT: 16.00 sec;   INR: 1.40 ratio         PTT - ( 25 Mar 2022 15:15 )  PTT:32.1 sec       MEDICATIONS  (STANDING):  atorvastatin 40 milliGRAM(s) Oral at bedtime  chlorhexidine 4% Liquid 1 Application(s) Topical daily  ferrous    sulfate 325 milliGRAM(s) Oral daily  folic acid 1 milliGRAM(s) Oral daily  furosemide   Injectable 40 milliGRAM(s) IV Push once  iron sucrose IVPB 200 milliGRAM(s) IV Intermittent every 24 hours  metoprolol succinate  milliGRAM(s) Oral daily  pantoprazole  Injectable 40 milliGRAM(s) IV Push every 12 hours    MEDICATIONS  (PRN):  
Gastroenterology progress note:     Patient is a 82y old  Male who presents with a chief complaint of Low Hb (29 Mar 2022 09:36)       Admitted on: 03-25-22    We are following the patient for anemia     Interval History:  Denies any abd pain, n/v.  No BM.   Tolerating diet       PAST MEDICAL & SURGICAL HISTORY:  HTN (hypertension)    High cholesterol    GERD (gastroesophageal reflux disease)        MEDICATIONS  (STANDING):  apixaban 5 milliGRAM(s) Oral two times a day  atorvastatin 40 milliGRAM(s) Oral at bedtime  chlorhexidine 4% Liquid 1 Application(s) Topical daily  clopidogrel Tablet 75 milliGRAM(s) Oral daily  ferrous    sulfate 325 milliGRAM(s) Oral daily  folic acid 1 milliGRAM(s) Oral daily  iron sucrose IVPB 200 milliGRAM(s) IV Intermittent every 24 hours  metoprolol succinate  milliGRAM(s) Oral daily  pantoprazole  Injectable 40 milliGRAM(s) IV Push every 12 hours  sucralfate 1 Gram(s) Oral every 6 hours    MEDICATIONS  (PRN):      Allergies  No Known Allergies      Review of Systems:   Cardiovascular:  No Chest Pain, No Palpitations  Respiratory:  No Cough, No Dyspnea  Gastrointestinal:  As described in HPI    Physical Examination:  T(C): 36.6 (03-29-22 @ 05:27), Max: 36.7 (03-28-22 @ 11:59)  HR: 87 (03-29-22 @ 05:27) (77 - 100)  BP: 111/63 (03-29-22 @ 05:27) (95/59 - 135/94)  RR: 18 (03-29-22 @ 05:27) (17 - 20)  SpO2: 95% (03-28-22 @ 13:34) (94% - 98%)  Weight (kg): 95.3 (03-28-22 @ 11:59)    Constitutional: No acute distress.  Respiratory:  No signs of respiratory distress. Lung sounds are clear bilaterally.  Cardiovascular:  S1 S2, Regular rate and rhythm.  Abdominal: Abdomen is soft, symmetric, and non-tender without distention. There are no visible lesions or scars. Bowel sounds are present and normoactive in all four quadrants. No masses, hepatomegaly, or splenomegaly are noted.   Skin: No rashes, No Jaundice.        Data:                        8.7    10.13 )-----------( 208      ( 29 Mar 2022 04:30 )             31.2     Hgb trend:  8.7  03-29-22 @ 04:30  9.4  03-28-22 @ 09:17  8.5  03-27-22 @ 16:00        03-29    140  |  104  |  10  ----------------------------<  81  3.7   |  22  |  1.0    Ca    8.7      29 Mar 2022 04:30  Mg     1.8     03-29    TPro  6.4  /  Alb  3.6  /  TBili  1.3<H>  /  DBili  x   /  AST  42<H>  /  ALT  49<H>  /  AlkPhos  64  03-29    Liver panel trend:  TBili 1.3   /   AST 42   /   ALT 49   /   AlkP 64   /   Tptn 6.4   /   Alb 3.6    /   DBili --      03-29  TBili 1.5   /   AST --   /   ALT --   /   AlkP --   /   Tptn --   /   Alb --    /   DBili 0.4      03-27  TBili 1.5   /   AST 16   /   ALT 9   /   AlkP 59   /   Tptn 6.1   /   Alb 3.7    /   DBili --      03-26  TBili 0.6   /   AST 38   /   ALT 11   /   AlkP 62   /   Tptn 6.7   /   Alb 3.6    /   DBili --      03-25             Radiology:      
INTERVAL EVENTS: Patient feeling well today. Denies any CP, SOB, palpitations, orthopnea.   LE edema improved from yesterday.   Patient without active ACS symptoms currently. Patient with borderline functional status with SOB, which may be due to symptomatic anemia. Otherwise no other physical limitations.     PAST MEDICAL & SURGICAL HISTORY:  HTN (hypertension)    High cholesterol    GERD (gastroesophageal reflux disease)        MEDICATIONS  (STANDING):  atorvastatin 40 milliGRAM(s) Oral at bedtime  chlorhexidine 4% Liquid 1 Application(s) Topical daily  ferrous    sulfate 325 milliGRAM(s) Oral daily  folic acid 1 milliGRAM(s) Oral daily  iron sucrose IVPB 200 milliGRAM(s) IV Intermittent every 24 hours  metoprolol succinate  milliGRAM(s) Oral daily  pantoprazole  Injectable 40 milliGRAM(s) IV Push every 12 hours    MEDICATIONS  (PRN):      Vital Signs Last 24 Hrs  T(C): 37 (27 Mar 2022 12:56), Max: 37 (27 Mar 2022 12:56)  T(F): 98.6 (27 Mar 2022 12:56), Max: 98.6 (27 Mar 2022 12:56)  HR: 92 (27 Mar 2022 12:56) (86 - 94)  BP: 113/67 (27 Mar 2022 12:56) (98/71 - 117/69)  BP(mean): --  RR: 20 (27 Mar 2022 12:56) (19 - 20)  SpO2: 93% (27 Mar 2022 07:54) (93% - 93%)     PHYSICAL EXAM:  GEN: Awake, alert. NAD.   HEENT: NCAT, PERRL, EOMI. Mucosa moist. No JVD.  RESP: CTA b/l  CV: RRR. Normal S1/S2. No m/r/g.  ABD: Soft. NT/ND. BS+  EXT: Warm. 1+ pitting edema B/L LE.   NEURO: AAOx3. No focal deficits.     LABS:                        7.4    12.69 )-----------( 184      ( 26 Mar 2022 04:30 )             26.7     03-26    142  |  107  |  10  ----------------------------<  102<H>  3.8   |  23  |  1.0    Ca    8.7      26 Mar 2022 04:30  Mg     1.4     03-26    TPro  x   /  Alb  x   /  TBili  1.5<H>  /  DBili  0.4<H>  /  AST  x   /  ALT  x   /  AlkPhos  x   03-27        PT/INR - ( 25 Mar 2022 15:15 )   PT: 16.00 sec;   INR: 1.40 ratio         PTT - ( 25 Mar 2022 15:15 )  PTT:32.1 sec    I&O's Summary    26 Mar 2022 07:01  -  27 Mar 2022 07:00  --------------------------------------------------------  IN: 0 mL / OUT: 1425 mL / NET: -1425 mL          EKG: No EKG.   TTE: 3/10/22-EF 48% with mild LV cavity dilation, grade II DD. Mod-severe MR, mild TR, mild AI with mild AoR dilation 4.0 cm.         
MIGEL MOYA 82y Male  MRN#: [id number]   Hospital Day: 3d    HPI:  82-year-old, M patient with PMHx: CAD s/p PCI to LAD in November 2021, hypertension, dyslipidemia, chronic microcytic anemia likely secondary to iron deficiency presents to the ED for a drop in Hb. The patient reports that the findings were incidental on his labs, he denies any acute chest pains, shortness of breath, dizziness/lightheadedness. He also denies any blood in the GI ( no melena, no BRBPR) or  tract. The patient reports fatigue and weakness especially upon minimal exertion.  (25 Mar 2022 16:51)      SUBJECTIVE  Patient is a 82y old Male who presents with a chief complaint of Low Hb (28 Mar 2022 12:15)  Currently admitted to medicine with the primary diagnosis of Anemia      INTERVAL HPI AND OVERNIGHT EVENTS:  Patient was examined and seen at bedside. This morning he is resting comfortably in bed and reports no issues or overnight events.    REVIEW OF SYMPTOMS:      OBJECTIVE  PAST MEDICAL & SURGICAL HISTORY  HTN (hypertension)    High cholesterol    GERD (gastroesophageal reflux disease)      ALLERGIES:  No Known Allergies    MEDICATIONS:  STANDING MEDICATIONS  atorvastatin 40 milliGRAM(s) Oral at bedtime  chlorhexidine 4% Liquid 1 Application(s) Topical daily  ferrous    sulfate 325 milliGRAM(s) Oral daily  folic acid 1 milliGRAM(s) Oral daily  iron sucrose IVPB 200 milliGRAM(s) IV Intermittent every 24 hours  metoprolol succinate  milliGRAM(s) Oral daily  pantoprazole  Injectable 40 milliGRAM(s) IV Push every 12 hours    PRN MEDICATIONS      VITAL SIGNS: Last 24 Hours  T(C): 36.7 (28 Mar 2022 11:59), Max: 36.7 (28 Mar 2022 08:40)  T(F): 98 (28 Mar 2022 08:40), Max: 98 (28 Mar 2022 08:40)  HR: 77 (28 Mar 2022 13:19) (77 - 100)  BP: 97/59 (28 Mar 2022 13:19) (93/67 - 135/94)  BP(mean): --  RR: 17 (28 Mar 2022 13:19) (17 - 20)  SpO2: 95% (28 Mar 2022 13:19) (94% - 98%)    LABS:                        9.4    14.04 )-----------( 235      ( 28 Mar 2022 09:17 )             34.5         TPro  x   /  Alb  x   /  TBili  1.5<H>  /  DBili  0.4<H>  /  AST  x   /  ALT  x   /  AlkPhos  x   03-27    RADIOLOGY:      PHYSICAL EXAM:  CONSTITUTIONAL: No acute distress, well-developed, well-groomed, AAOx3  HEAD: Atraumatic, normocephalic  EYES: EOM intact, PERRLA, conjunctiva and sclera clear  ENT: Supple, no masses, no thyromegaly, no bruits, no JVD; moist mucous membranes  PULMONARY: Clear to auscultation bilaterally; no wheezes, rales, or rhonchi  CARDIOVASCULAR: Regular rate and rhythm; no murmurs, rubs, or gallops  GASTROINTESTINAL: Soft, non-tender, non-distended; bowel sounds present  MUSCULOSKELETAL: 2+ peripheral pulses; no clubbing, no cyanosis, no edema      
MIGEL MOYA 82y Male  MRN#: [id number]   Hospital Day: 1d    SUBJECTIVE  Patient is a 82y old Male who presents with a chief complaint of Low Hb (26 Mar 2022 11:17)  Currently admitted to medicine with the primary diagnosis of Anemia      INTERVAL HPI AND OVERNIGHT EVENTS:  Patient was examined and seen at bedside. This morning he is resting comfortably in bed and reports FATIGUE AND COLLIER  REVIEW OF SYMPTOMS:  CONSTITUTIONAL: 'JUST FEEL WEEK'   EYES: No visual changes  ENT: No vertigo; No change in hearing; No sore throat NO EPISTAXIS   NECK: No pain or stiffness  RESPIRATORY: No cough, wheezing, or hemoptysis; No shortness of breath  CARDIOVASCULAR: No chest pain or palpitations  GASTROINTESTINAL: No abdominal or epigastric pain; No nausea, NO hematemesis; No diarrhea or constipation; No melena or hematochezia  GENITOURINARY: No dysuria, frequency or NO hematuria  MUSCULOSKELETAL: No joint pain, no muscle pain,  NEUROLOGICAL: No numbness   SKIN: No itching or rashes    OBJECTIVE  PAST MEDICAL & SURGICAL HISTORY  HTN (hypertension)    High cholesterol    GERD (gastroesophageal reflux disease)      ALLERGIES:  No Known Allergies    MEDICATIONS:  STANDING MEDICATIONS  atorvastatin 40 milliGRAM(s) Oral at bedtime  chlorhexidine 4% Liquid 1 Application(s) Topical daily  ferrous    sulfate 325 milliGRAM(s) Oral daily  folic acid 1 milliGRAM(s) Oral daily  iron sucrose Injectable 200 milliGRAM(s) IV Push every 24 hours  metoprolol succinate  milliGRAM(s) Oral daily  pantoprazole  Injectable 40 milliGRAM(s) IV Push every 12 hours  sodium chloride 0.9%. 1000 milliLiter(s) IV Continuous <Continuous>    PRN MEDICATIONS      VITAL SIGNS: Last 24 Hours  T(C): 37.4 (26 Mar 2022 05:50), Max: 37.4 (26 Mar 2022 05:50)  T(F): 99.3 (26 Mar 2022 05:50), Max: 99.3 (26 Mar 2022 05:50)  HR: 93 (26 Mar 2022 08:30) (81 - 114)  BP: 107/56 (26 Mar 2022 05:50) (107/56 - 132/86)  BP(mean): --  RR: 18 (26 Mar 2022 05:50) (16 - 20)  SpO2: 96% (26 Mar 2022 08:30) (96% - 99%)    LABS:                        7.4    12.69 )-----------( 184      ( 26 Mar 2022 04:30 )             26.7     03-26    142  |  107  |  10  ----------------------------<  102<H>  3.8   |  23  |  1.0    Ca    8.7      26 Mar 2022 04:30  Mg     1.4     03-26    TPro  6.1  /  Alb  3.7  /  TBili  1.5<H>  /  DBili  x   /  AST  16  /  ALT  9   /  AlkPhos  59  03-26    Bilirubin Total, Serum: 1.5 mg/dL (03.26.22 @ 04:30)   Bilirubin Total, Serum: 0.6 mg/dL (03.25.22 @ 15:15)     PT/INR - ( 25 Mar 2022 15:15 )   PT: 16.00 sec;   INR: 1.40 ratio         PTT - ( 25 Mar 2022 15:15 )  PTT:32.1 sec    Iron with Total Binding Capacity (03.25.22 @ 20:00)   Iron - Total Binding Capacity.: 292 ug/dL   % Saturation, Iron: 48 %   Iron Total, Serum: 141 ug/dL   Unsaturated Iron Binding Capacity: 151 ug/dL     Transferrin, Serum: 252 mg/dL (03.25.22 @ 20:00)           RADIOLOGY:      PHYSICAL EXAM:  CONSTITUTIONAL: No acute distress, well-developed  HEAD: Atraumatic, normocephalic  EYES: PERRLA, conjunctiva and sclera clear  ENT: Supple, no masses, no thyromegaly, no bruits, no JVD; moist mucous membranes  PULMONARY: Clear to auscultation bilaterally; no wheezes, rales, or rhonchi  CARDIOVASCULAR: Regular rate and rhythm; no murmurs, rubs, or gallops  GASTROINTESTINAL: Soft, non-tender, non-distended; bowel sounds present  MUSCULOSKELETAL: 2+ peripheral pulses; no clubbing, no cyanosis, no edema  NEUROLOGY: A&OX3  SKIN: No rashes or lesions; warm and dry

## 2022-03-29 NOTE — DISCHARGE NOTE NURSING/CASE MANAGEMENT/SOCIAL WORK - PATIENT PORTAL LINK FT
You can access the FollowMyHealth Patient Portal offered by [facility] by registering at the following website: [email or web address]. By joining [facility]’s FollowMyHealth portal, you will also be able to view your health information using other applications (apps) compatible with our system.

## 2022-03-29 NOTE — DISCHARGE NOTE PROVIDER - NSDCCPCAREPLAN_GEN_ALL_CORE_FT
PRINCIPAL DISCHARGE DIAGNOSIS  Diagnosis: Anemia  Assessment and Plan of Treatment: Anemia  Anemia is a condition in which the concentration of red blood cells or hemoglobin in the blood is below normal. Hemoglobin is a substance in red blood cells that carries oxygen to the tissues of the body. Anemia results in not enough oxygen reaching these tissues which can cause symptoms such as weakness, dizziness/lightheadedness, shortness of breath, chest pain, paleness, or nausea. The cause of your anemia may or may not be determined immediately. If your hemoglobin was dangerously low, you may have received a blood transfusion. Usually reactions to transfusions occur immediately but monitor yourself for any fevers, rash, or shortness of breath.  SEEK IMMEDIATE MEDICAL CARE IF YOU HAVE ANY OF THE FOLLOWING SYMPTOMS: extreme weakness/chest pain/shortness of breath, black or bloody stools, vomiting blood, fainting, fever, or any signs of dehydration.         PRINCIPAL DISCHARGE DIAGNOSIS  Diagnosis: Anemia  Assessment and Plan of Treatment: Anemia  Anemia is a condition in which the concentration of red blood cells or hemoglobin in the blood is below normal. Hemoglobin is a substance in red blood cells that carries oxygen to the tissues of the body. Anemia results in not enough oxygen reaching these tissues which can cause symptoms such as weakness, dizziness/lightheadedness, shortness of breath, chest pain, paleness, or nausea. The cause of your anemia may or may not be determined immediately. If your hemoglobin was dangerously low, you may have received a blood transfusion. Usually reactions to transfusions occur immediately but monitor yourself for any fevers, rash, or shortness of breath.  SEEK IMMEDIATE MEDICAL CARE IF YOU HAVE ANY OF THE FOLLOWING SYMPTOMS: extreme weakness/chest pain/shortness of breath, black or bloody stools, vomiting blood, fainting, fever, or any signs of dehydration.  Follow up with Hematologist for Iron infusions as directed        SECONDARY DISCHARGE DIAGNOSES  Diagnosis: Gastritis  Assessment and Plan of Treatment: Gastritis  Gastritis is soreness and swelling (inflammation) of the lining of the stomach. Gastritis can develop as a sudden onset (acute) or long-term (chronic) condition. If gastritis is not treated, it can lead to stomach bleeding and ulcers. Causes include viral and bacterial infections, excessive alcohol consumption, tobacco use, or certain medications.   Your Gastritis was caused by iron pills that you were taking for your anemia  Symptoms include nausea, vomiting, or abdominal pain or burning especially after eating. Avoid foods or drinks that make your symptoms worse such as caffeine, chocolate, spicy foods, acidic foods, or alcohol.  SEEK IMMEDIATE MEDICAL CARE IF YOU HAVE ANY OF THE FOLLOWING SYMPTOMS: black or bloody stools, blood or coffee-ground-colored vomitus, worsening abdominal pain, fever, or inability to keep fluids down.

## 2022-04-05 ENCOUNTER — APPOINTMENT (OUTPATIENT)
Age: 82
End: 2022-04-05
Payer: MEDICARE

## 2022-04-05 VITALS
OXYGEN SATURATION: 95 % | DIASTOLIC BLOOD PRESSURE: 80 MMHG | BODY MASS INDEX: 30.52 KG/M2 | WEIGHT: 218 LBS | SYSTOLIC BLOOD PRESSURE: 130 MMHG | HEART RATE: 111 BPM | HEIGHT: 71 IN | RESPIRATION RATE: 14 BRPM

## 2022-04-05 DIAGNOSIS — I85.00 ESOPHAGEAL VARICES WITHOUT BLEEDING: ICD-10-CM

## 2022-04-05 DIAGNOSIS — I48.0 PAROXYSMAL ATRIAL FIBRILLATION: ICD-10-CM

## 2022-04-05 DIAGNOSIS — I08.3 COMBINED RHEUMATIC DISORDERS OF MITRAL, AORTIC AND TRICUSPID VALVES: ICD-10-CM

## 2022-04-05 DIAGNOSIS — K21.9 GASTRO-ESOPHAGEAL REFLUX DISEASE WITHOUT ESOPHAGITIS: ICD-10-CM

## 2022-04-05 DIAGNOSIS — T45.4X5A ADVERSE EFFECT OF IRON AND ITS COMPOUNDS, INITIAL ENCOUNTER: ICD-10-CM

## 2022-04-05 DIAGNOSIS — R63.4 ABNORMAL WEIGHT LOSS: ICD-10-CM

## 2022-04-05 DIAGNOSIS — K31.819 ANGIODYSPLASIA OF STOMACH AND DUODENUM WITHOUT BLEEDING: ICD-10-CM

## 2022-04-05 DIAGNOSIS — E83.42 HYPOMAGNESEMIA: ICD-10-CM

## 2022-04-05 DIAGNOSIS — I11.0 HYPERTENSIVE HEART DISEASE WITH HEART FAILURE: ICD-10-CM

## 2022-04-05 DIAGNOSIS — I50.22 CHRONIC SYSTOLIC (CONGESTIVE) HEART FAILURE: ICD-10-CM

## 2022-04-05 DIAGNOSIS — K44.9 DIAPHRAGMATIC HERNIA WITHOUT OBSTRUCTION OR GANGRENE: ICD-10-CM

## 2022-04-05 DIAGNOSIS — Z79.02 LONG TERM (CURRENT) USE OF ANTITHROMBOTICS/ANTIPLATELETS: ICD-10-CM

## 2022-04-05 DIAGNOSIS — E78.5 HYPERLIPIDEMIA, UNSPECIFIED: ICD-10-CM

## 2022-04-05 DIAGNOSIS — K29.70 GASTRITIS, UNSPECIFIED, WITHOUT BLEEDING: ICD-10-CM

## 2022-04-05 DIAGNOSIS — I25.10 ATHEROSCLEROTIC HEART DISEASE OF NATIVE CORONARY ARTERY WITHOUT ANGINA PECTORIS: ICD-10-CM

## 2022-04-05 DIAGNOSIS — D50.9 IRON DEFICIENCY ANEMIA, UNSPECIFIED: ICD-10-CM

## 2022-04-05 PROCEDURE — 99214 OFFICE O/P EST MOD 30 MIN: CPT

## 2022-04-05 NOTE — PHYSICAL EXAM
[No Acute Distress] : no acute distress [Normal Oropharynx] : normal oropharynx [Normal Appearance] : normal appearance [No Neck Mass] : no neck mass [Normal Rate/Rhythm] : normal rate/rhythm [Normal S1, S2] : normal s1, s2 [No Murmurs] : no murmurs [No Resp Distress] : no resp distress [Clear to Auscultation Bilaterally] : clear to auscultation bilaterally [2+ Pitting] : 2+ pitting [TextBox_54] : Edema to bilateral ankles [TextBox_105] : Edema in bilateral LE up to ankle

## 2022-04-05 NOTE — HISTORY OF PRESENT ILLNESS
[Follow-Up - Routine Clinic] : a routine clinic follow-up of [None] : The patient is currently asymptomatic [Shortness of Breath] : Shortness of Breath [Dyspnea] : dyspnea [Exercise Intolerance] : exercise intolerance [Edema] : edema [Fatigue] : no fatigue [Chest Tightness] : no chest tightness [Cough] : no cough [Wheezing] : no wheezing [Hemoptysis] : no hemoptysis

## 2022-04-05 NOTE — END OF VISIT
[] : Fellow [FreeTextEntry3] : patient seen and examined agree above \par continue breo will consider to stop doubt help \par i think his sob for failure and pulmonary edema \par increase lasix to 40 mg daily for 2 days follow cardiology [facility] \par ct scan ordered for nodule follow up as requested by cardiology

## 2022-04-07 ENCOUNTER — RESULT CHARGE (OUTPATIENT)
Age: 82
End: 2022-04-07

## 2022-04-07 ENCOUNTER — APPOINTMENT (OUTPATIENT)
Dept: CARDIOLOGY | Facility: CLINIC | Age: 82
End: 2022-04-07
Payer: MEDICARE

## 2022-04-07 VITALS
BODY MASS INDEX: 29.68 KG/M2 | TEMPERATURE: 97.2 F | WEIGHT: 212 LBS | SYSTOLIC BLOOD PRESSURE: 112 MMHG | HEIGHT: 71 IN | DIASTOLIC BLOOD PRESSURE: 70 MMHG

## 2022-04-07 PROCEDURE — 93000 ELECTROCARDIOGRAM COMPLETE: CPT

## 2022-04-07 PROCEDURE — 99214 OFFICE O/P EST MOD 30 MIN: CPT

## 2022-04-07 NOTE — REVIEW OF SYSTEMS
[Dyspnea on exertion] : dyspnea during exertion [Lower Ext Edema] : lower extremity edema [Leg Claudication] : no intermittent leg claudication [Palpitations] : no palpitations [Orthopnea] : no orthopnea [Syncope] : no syncope [Rash] : no rash [Anxiety] : no anxiety [Easy Bleeding] : no tendency for easy bleeding [Easy Bruising] : no tendency for easy bruising [Negative] : Neurological

## 2022-04-07 NOTE — HISTORY OF PRESENT ILLNESS
[FreeTextEntry1] : 82-yo male with h/o mild LV dysfunction (LVEF 44%).\par \par H/o HTN, hyperlipidemia. Patient was found to be in A-fib in April 2021. Eliquis now stopped due to recurrent GIB.\par \par S/p PCI of proximal LAD.\par \par Patient denies CP now. He still c/o COLLIER. \par \par Repeat EGD scheduled by GI.\par \par Hb 7.0 3/22/22.

## 2022-04-07 NOTE — ASSESSMENT
[FreeTextEntry1] : 82-yo male with h/o HTN, mild systolic dysfunction.\par Paroxysmal A-fib. CHADS Vasc score 4. In SR today.\par Moderate to severe MR now. Persistent SOB and leg edema.\par Recurrent GIB. Treatment started during last [facility] admission. Repeat EGD pending. Eliquis on hold.\par CAD, s/p CAMDEN of proximal LAD.\par \par \par Plan:\par Continue treatment.\par Will repeat CBC today.\par Will discuss EGD timing with GI and schedule a ROSARIO for evaluation of MR.\par F/u after ROSARIO.\par \par Tera Judd MD\par

## 2022-04-07 NOTE — PHYSICAL EXAM
[Well Developed] : well developed [Well Nourished] : well nourished [No Acute Distress] : no acute distress [Normal Conjunctiva] : normal conjunctiva [Normal Venous Pressure] : normal venous pressure [No Carotid Bruit] : no carotid bruit [Normal S1, S2] : normal S1, S2 [No Murmur] : no murmur [No Rub] : no rub [S4] : S4 [Clear Lung Fields] : clear lung fields [Good Air Entry] : good air entry [No Respiratory Distress] : no respiratory distress  [Soft] : abdomen soft [Non Tender] : non-tender [Normal Bowel Sounds] : normal bowel sounds [Normal Gait] : normal gait [No Cyanosis] : no cyanosis [No Clubbing] : no clubbing [No Varicosities] : no varicosities [Edema ___] : edema [unfilled] [No Rash] : no rash [Moves all extremities] : moves all extremities [Normal Speech] : normal speech [Alert and Oriented] : alert and oriented [Normal memory] : normal memory

## 2022-04-07 NOTE — CARDIOLOGY SUMMARY
[de-identified] : 04/0722:\par /min, iRBBB. [de-identified] : 03/10/22:\par LVEF 48%, G2DD\par LAE, RENEE\par Mod-severe MR.\par \par 05/14/21:\par LVEF 44%, G2DD\par Mod LAE\par Mod-severe MR.

## 2022-04-08 LAB
ALBUMIN SERPL ELPH-MCNC: 3.8 G/DL
ALP BLD-CCNC: 69 U/L
ALT SERPL-CCNC: 22 U/L
ANION GAP SERPL CALC-SCNC: 14 MMOL/L
AST SERPL-CCNC: 23 U/L
BASOPHILS # BLD AUTO: 0.03 K/UL
BASOPHILS NFR BLD AUTO: 0.4 %
BILIRUB SERPL-MCNC: 1.1 MG/DL
BUN SERPL-MCNC: 7 MG/DL
CALCIUM SERPL-MCNC: 9.5 MG/DL
CHLORIDE SERPL-SCNC: 104 MMOL/L
CHOLEST SERPL-MCNC: 92 MG/DL
CO2 SERPL-SCNC: 25 MMOL/L
CREAT SERPL-MCNC: 1 MG/DL
EGFR: 75 ML/MIN/1.73M2
EOSINOPHIL # BLD AUTO: 0.06 K/UL
EOSINOPHIL NFR BLD AUTO: 0.8 %
GLUCOSE SERPL-MCNC: 106 MG/DL
HCT VFR BLD CALC: 38.2 %
HDLC SERPL-MCNC: 43 MG/DL
HGB BLD-MCNC: 10.2 G/DL
IMM GRANULOCYTES NFR BLD AUTO: 0.3 %
LDLC SERPL CALC-MCNC: 41 MG/DL
LYMPHOCYTES # BLD AUTO: 0.89 K/UL
LYMPHOCYTES NFR BLD AUTO: 12 %
MAN DIFF?: NORMAL
MCHC RBC-ENTMCNC: 20.1 PG
MCHC RBC-ENTMCNC: 26.7 G/DL
MCV RBC AUTO: 75.3 FL
MONOCYTES # BLD AUTO: 0.75 K/UL
MONOCYTES NFR BLD AUTO: 10.1 %
NEUTROPHILS # BLD AUTO: 5.66 K/UL
NEUTROPHILS NFR BLD AUTO: 76.4 %
NONHDLC SERPL-MCNC: 49 MG/DL
PLATELET # BLD AUTO: 303 K/UL
POTASSIUM SERPL-SCNC: 4 MMOL/L
PROT SERPL-MCNC: 7.4 G/DL
RBC # BLD: 5.07 M/UL
RBC # FLD: 31.8 %
SODIUM SERPL-SCNC: 143 MMOL/L
TRIGL SERPL-MCNC: 66 MG/DL
TSH SERPL-ACNC: 2.02 UIU/ML
WBC # FLD AUTO: 7.41 K/UL

## 2022-04-11 ENCOUNTER — APPOINTMENT (OUTPATIENT)
Dept: UROLOGY | Facility: CLINIC | Age: 82
End: 2022-04-11
Payer: MEDICARE

## 2022-04-11 PROCEDURE — 99212 OFFICE O/P EST SF 10 MIN: CPT

## 2022-04-11 NOTE — HISTORY OF PRESENT ILLNESS
[FreeTextEntry1] : 82-year-old male who was started on alfuzosin for lower urinary tract symptoms and feels that his stream has improved.  He complains of a slight feeling of heaviness in the pelvic area but aside from that is feeling better.  He has nocturia x1.  Stream as noted has improved and so has intermittency.  He denies any hematuria, dysuria, UTIs.

## 2022-04-11 NOTE — PHYSICAL EXAM
[General Appearance - Well Nourished] : well nourished [Normal Appearance] : normal appearance [General Appearance - In No Acute Distress] : no acute distress [Abdomen Soft] : soft [Abdomen Tenderness] : non-tender [Abdomen Mass (___ Cm)] : no abdominal mass palpated [Abdomen Hernia] : no hernia was discovered [Costovertebral Angle Tenderness] : no ~M costovertebral angle tenderness

## 2022-04-11 NOTE — ASSESSMENT
[FreeTextEntry1] : We had a discussion about these issues.  He tried to do a flow today but only voided 56 mL and this was invalid.  Postvoid residual was minimal.  We discussed that overall he is feeling better and we will just see him back in 3 to 4 months.  If he continues to have this feeling of heaviness we will proceed with a cystoscopy.  All his questions were otherwise answered. 36.6

## 2022-04-27 ENCOUNTER — OUTPATIENT (OUTPATIENT)
Dept: OUTPATIENT SERVICES | Facility: HOSPITAL | Age: 82
LOS: 1 days | Discharge: HOME | End: 2022-04-27

## 2022-04-27 ENCOUNTER — RESULT REVIEW (OUTPATIENT)
Age: 82
End: 2022-04-27

## 2022-04-27 ENCOUNTER — TRANSCRIPTION ENCOUNTER (OUTPATIENT)
Age: 82
End: 2022-04-27

## 2022-04-27 VITALS
TEMPERATURE: 97 F | RESPIRATION RATE: 18 BRPM | SYSTOLIC BLOOD PRESSURE: 123 MMHG | WEIGHT: 210.1 LBS | HEART RATE: 100 BPM | DIASTOLIC BLOOD PRESSURE: 85 MMHG

## 2022-04-27 VITALS
OXYGEN SATURATION: 97 % | RESPIRATION RATE: 18 BRPM | HEART RATE: 87 BPM | DIASTOLIC BLOOD PRESSURE: 85 MMHG | SYSTOLIC BLOOD PRESSURE: 120 MMHG

## 2022-04-27 NOTE — CHART NOTE - NSCHARTNOTEFT_GEN_A_CORE
PACU ANESTHESIA ADMISSION NOTE      Procedure: EGD  Post op diagnosis:      ____  Intubated  TV:______       Rate: ______      FiO2: ______    _x___  Patent Airway    _x___  Full return of protective reflexes    _x___  Full recovery from anesthesia / back to baseline status    Vitals  SPO2:-96%  HR:-82   RR:-12  B.P:-128/84  TEMP:-97.5    Mental Status:  _x___ Awake   ___x_ Alert   _____ Drowsy   _____ Sedated    Nausea/Vomiting:  _x___  NO       ______Yes,   See Post - Op Orders         Pain Scale (0-10):  __0___    Treatment: _x___ None    __x__ See Post - Op/PCA Orders    Post - Operative Fluids:   ___ Oral   ____x See Post - Op Orders    Plan: Discharge:   _x___Home       _____Floor     _____Critical Care    _____  Other:_________________    Comments:  Report endorsed to RN in pacu  Vitals stable  No anesthesia issues or complications noted.  Discharge to patient to when criteria met.

## 2022-04-27 NOTE — ASU DISCHARGE PLAN (ADULT/PEDIATRIC) - NS MD DC FALL RISK RISK
For information on Fall & Injury Prevention, visit: [email or web address] OR  [email or web address] OR  https://www.cdc.gov/steadi/patient.html

## 2022-04-27 NOTE — ASU PATIENT PROFILE, ADULT - FALL HARM RISK - HARM RISK INTERVENTIONS

## 2022-05-02 DIAGNOSIS — K44.9 DIAPHRAGMATIC HERNIA WITHOUT OBSTRUCTION OR GANGRENE: ICD-10-CM

## 2022-05-02 DIAGNOSIS — K21.00 GASTRO-ESOPHAGEAL REFLUX DISEASE WITH ESOPHAGITIS, WITHOUT BLEEDING: ICD-10-CM

## 2022-05-02 DIAGNOSIS — E78.00 PURE HYPERCHOLESTEROLEMIA, UNSPECIFIED: ICD-10-CM

## 2022-05-02 DIAGNOSIS — Z79.01 LONG TERM (CURRENT) USE OF ANTICOAGULANTS: ICD-10-CM

## 2022-05-02 DIAGNOSIS — K31.819 ANGIODYSPLASIA OF STOMACH AND DUODENUM WITHOUT BLEEDING: ICD-10-CM

## 2022-05-02 DIAGNOSIS — D50.9 IRON DEFICIENCY ANEMIA, UNSPECIFIED: ICD-10-CM

## 2022-05-02 DIAGNOSIS — I10 ESSENTIAL (PRIMARY) HYPERTENSION: ICD-10-CM

## 2022-05-02 LAB — SURGICAL PATHOLOGY STUDY: SIGNIFICANT CHANGE UP

## 2022-05-12 ENCOUNTER — APPOINTMENT (OUTPATIENT)
Dept: HEMATOLOGY ONCOLOGY | Facility: CLINIC | Age: 82
End: 2022-05-12
Payer: MEDICARE

## 2022-05-12 ENCOUNTER — LABORATORY RESULT (OUTPATIENT)
Age: 82
End: 2022-05-12

## 2022-05-12 VITALS
RESPIRATION RATE: 14 BRPM | OXYGEN SATURATION: 98 % | SYSTOLIC BLOOD PRESSURE: 113 MMHG | BODY MASS INDEX: 30.52 KG/M2 | DIASTOLIC BLOOD PRESSURE: 81 MMHG | HEIGHT: 71 IN | HEART RATE: 103 BPM | WEIGHT: 218 LBS | TEMPERATURE: 98.4 F

## 2022-05-12 PROCEDURE — 99214 OFFICE O/P EST MOD 30 MIN: CPT

## 2022-05-13 LAB
FERRITIN SERPL-MCNC: 23 NG/ML
HCT VFR BLD CALC: 35.4 %
HGB BLD-MCNC: 10.4 G/DL
MCHC RBC-ENTMCNC: 21.4 PG
MCHC RBC-ENTMCNC: 29.4 G/DL
MCV RBC AUTO: 72.8 FL
PLATELET # BLD AUTO: 209 K/UL
PMV BLD: NORMAL
RBC # BLD: 4.86 M/UL
RBC # FLD: 25.8 %
RETICS # AUTO: 0.8 %
RETICS AGGREG/RBC NFR: 37.9 K/UL
WBC # FLD AUTO: 5.97 K/UL

## 2022-05-16 NOTE — PHYSICAL EXAM
[Normal] : normoactive bowel sounds, soft and nontender, no hepatosplenomegaly or masses appreciated [de-identified] : well preserved male in no acute distress.  [de-identified] : irregular.

## 2022-05-16 NOTE — ASSESSMENT
[FreeTextEntry1] : 82 year old male with iron deficiency anemia due to upper GI bleeding on anticoagulation .\par CBC reviewed , still with microcytic anemia , low retic .\par check ferritin , Methylmalonic acid ( low normal B 12 ) \par resume iron infusions and monitor for bleeding recurrence .

## 2022-05-16 NOTE — HISTORY OF PRESENT ILLNESS
[de-identified] : 82 year old white male who was seen on consultation in the hospital for iron deficiency anemia , ferritin was 8 . he received packed RBCs and venofer . most recent Hgb was 10 . He feels well and denies any hematochezia. EGD showed erosive gastritis , bleeder was cauterized and resumed eliquis on discharge . He had prior episodes of bleeding in the past ( AV malformations ? )

## 2022-05-19 ENCOUNTER — APPOINTMENT (OUTPATIENT)
Dept: INFUSION THERAPY | Facility: CLINIC | Age: 82
End: 2022-05-19

## 2022-05-19 RX ORDER — IRON SUCROSE 20 MG/ML
200 INJECTION, SOLUTION INTRAVENOUS ONCE
Refills: 0 | Status: COMPLETED | OUTPATIENT
Start: 2022-05-19 | End: 2022-05-19

## 2022-05-19 RX ADMIN — IRON SUCROSE 220 MILLIGRAM(S): 20 INJECTION, SOLUTION INTRAVENOUS at 16:41

## 2022-05-20 LAB — METHYLMALONATE SERPL-SCNC: 118 NMOL/L

## 2022-05-26 ENCOUNTER — APPOINTMENT (OUTPATIENT)
Dept: INFUSION THERAPY | Facility: CLINIC | Age: 82
End: 2022-05-26

## 2022-05-26 RX ORDER — IRON SUCROSE 20 MG/ML
200 INJECTION, SOLUTION INTRAVENOUS ONCE
Refills: 0 | Status: COMPLETED | OUTPATIENT
Start: 2022-05-26 | End: 2022-05-26

## 2022-05-26 RX ADMIN — IRON SUCROSE 220 MILLIGRAM(S): 20 INJECTION, SOLUTION INTRAVENOUS at 10:12

## 2022-05-31 ENCOUNTER — APPOINTMENT (OUTPATIENT)
Dept: UROLOGY | Facility: CLINIC | Age: 82
End: 2022-05-31
Payer: MEDICARE

## 2022-05-31 VITALS — HEIGHT: 71 IN | WEIGHT: 218 LBS | BODY MASS INDEX: 30.52 KG/M2

## 2022-05-31 DIAGNOSIS — R07.9 CHEST PAIN, UNSPECIFIED: ICD-10-CM

## 2022-05-31 DIAGNOSIS — Z87.891 PERSONAL HISTORY OF NICOTINE DEPENDENCE: ICD-10-CM

## 2022-05-31 PROCEDURE — 99214 OFFICE O/P EST MOD 30 MIN: CPT

## 2022-05-31 NOTE — ASSESSMENT
[FreeTextEntry1] : Mr. Miguel is an 82 year old male with lower urinary tract symptoms on Alfuzosin and a heaviness feeling in the pelvic area. Encouraged patient to take warm baths with Epsom salt for 15min daily to see if that helps. We will go ahead and set him up for a cystoscopy to visualize the prostate and bladder. All of his questions were otherwise answered. \par The patient was seen with nurse practitioner Agatha Low.  I answered all his questions went over these issues in detail and agree with the note above.  Total time spent with him between myself and Agatha Low was 30 minutes.

## 2022-05-31 NOTE — HISTORY OF PRESENT ILLNESS
[Currently Experiencing ___] :  [unfilled] [Nocturia] : nocturia [FreeTextEntry1] : Mr. Miguel is an 82 year old male on Alfuzosin for lower urinary tract symptoms. He has nocturia x1, denies any hematuria, dysuria, UTIs. He is still complaining of a slight feeling of heaviness in the pelvic area and some testicular swelling. \par Uroflow today shows a voided volume of 219ml, peak flowrate of 7.8ml/s, flow time of 43.4s. PVR was 163. Pt then voided again and repeat PVR was 70.

## 2022-05-31 NOTE — PHYSICAL EXAM
[Normal Appearance] : normal appearance [General Appearance - In No Acute Distress] : no acute distress [Abdomen Soft] : soft [Abdomen Tenderness] : non-tender [Edema] : no peripheral edema [] : no respiratory distress [Oriented To Time, Place, And Person] : oriented to person, place, and time [Affect] : the affect was normal [Normal Station and Gait] : the gait and station were normal for the patient's age

## 2022-06-01 ENCOUNTER — RX RENEWAL (OUTPATIENT)
Age: 82
End: 2022-06-01

## 2022-06-02 ENCOUNTER — APPOINTMENT (OUTPATIENT)
Dept: CARDIOLOGY | Facility: CLINIC | Age: 82
End: 2022-06-02
Payer: MEDICARE

## 2022-06-02 ENCOUNTER — APPOINTMENT (OUTPATIENT)
Dept: INFUSION THERAPY | Facility: CLINIC | Age: 82
End: 2022-06-02

## 2022-06-02 ENCOUNTER — OUTPATIENT (OUTPATIENT)
Dept: OUTPATIENT SERVICES | Facility: HOSPITAL | Age: 82
LOS: 1 days | Discharge: HOME | End: 2022-06-02

## 2022-06-02 VITALS
HEART RATE: 88 BPM | DIASTOLIC BLOOD PRESSURE: 72 MMHG | SYSTOLIC BLOOD PRESSURE: 106 MMHG | WEIGHT: 213 LBS | BODY MASS INDEX: 29.82 KG/M2 | HEIGHT: 71 IN

## 2022-06-02 DIAGNOSIS — D50.9 IRON DEFICIENCY ANEMIA, UNSPECIFIED: ICD-10-CM

## 2022-06-02 PROCEDURE — 99214 OFFICE O/P EST MOD 30 MIN: CPT

## 2022-06-02 PROCEDURE — 93000 ELECTROCARDIOGRAM COMPLETE: CPT

## 2022-06-02 RX ORDER — IRON SUCROSE 20 MG/ML
200 INJECTION, SOLUTION INTRAVENOUS ONCE
Refills: 0 | Status: COMPLETED | OUTPATIENT
Start: 2022-06-02 | End: 2022-06-02

## 2022-06-02 RX ADMIN — IRON SUCROSE 220 MILLIGRAM(S): 20 INJECTION, SOLUTION INTRAVENOUS at 17:05

## 2022-06-02 RX ADMIN — IRON SUCROSE 200 MILLIGRAM(S): 20 INJECTION, SOLUTION INTRAVENOUS at 17:39

## 2022-06-02 NOTE — HISTORY OF PRESENT ILLNESS
[FreeTextEntry1] : 82-yo male with h/o mild LV dysfunction (LVEF 44%).\par \par H/o HTN, hyperlipidemia. Patient was found to be in A-fib in April 2021. Prior GIB\par \par S/p PCI of proximal LAD.\par \par Patient denies CP now. He still c/o COLLIER. \par \par Repeat EGD was done revealing source of bleed, status post cauterization, now back on AC .\par \par

## 2022-06-02 NOTE — REVIEW OF SYSTEMS
[Dyspnea on exertion] : dyspnea during exertion [Lower Ext Edema] : lower extremity edema [Negative] : Neurological [Leg Claudication] : no intermittent leg claudication [Palpitations] : no palpitations [Orthopnea] : no orthopnea [Syncope] : no syncope [Rash] : no rash [Anxiety] : no anxiety [Easy Bleeding] : no tendency for easy bleeding [Easy Bruising] : no tendency for easy bruising

## 2022-06-02 NOTE — ASSESSMENT
[FreeTextEntry1] : 82-yo male with h/o HTN, mild systolic dysfunction.\par Paroxysmal A-fib. CHADS Vasc score 4. In SR today.\par Moderate to severe MR now. Persistent SOB and leg edema.\par Recurrent GIB. Repeat EGD was done revealing source of bleed post cauterization now back on AC.\par CAD, s/p CAMDEN of proximal LAD.\par \par \par Plan:\par Continue treatment.\par Furosemide 40 mg daily for now.\par CBC, BMP ordered.\par Will schedule ROSARIO at [facility].\par F/u after ROSARIO.\par \par Tera Judd MD\par

## 2022-06-02 NOTE — CARDIOLOGY SUMMARY
[de-identified] : 06/0222:\par SR 88/min, PVCs, RBBB. [de-identified] : 03/10/22:\par LVEF 48%, G2DD\par LAE, RENEE\par Mod-severe MR.\par \par 05/14/21:\par LVEF 44%, G2DD\par Mod LAE\par Mod-severe MR.

## 2022-06-03 ENCOUNTER — RESULT CHARGE (OUTPATIENT)
Age: 82
End: 2022-06-03

## 2022-06-09 LAB
ANION GAP SERPL CALC-SCNC: 12 MMOL/L
BASOPHILS # BLD AUTO: 0.02 K/UL
BASOPHILS NFR BLD AUTO: 0.4 %
BUN SERPL-MCNC: 8 MG/DL
CALCIUM SERPL-MCNC: 9.1 MG/DL
CHLORIDE SERPL-SCNC: 104 MMOL/L
CO2 SERPL-SCNC: 27 MMOL/L
CREAT SERPL-MCNC: 1.1 MG/DL
EGFR: 67 ML/MIN/1.73M2
EOSINOPHIL # BLD AUTO: 0.05 K/UL
EOSINOPHIL NFR BLD AUTO: 0.9 %
GLUCOSE SERPL-MCNC: 109 MG/DL
HCT VFR BLD CALC: 40.1 %
HGB BLD-MCNC: 11.1 G/DL
IMM GRANULOCYTES NFR BLD AUTO: 0.4 %
INR PPP: 1.28 RATIO
LYMPHOCYTES # BLD AUTO: 1.01 K/UL
LYMPHOCYTES NFR BLD AUTO: 18.3 %
MAN DIFF?: NORMAL
MCHC RBC-ENTMCNC: 21.1 PG
MCHC RBC-ENTMCNC: 27.7 G/DL
MCV RBC AUTO: 76.4 FL
MONOCYTES # BLD AUTO: 0.56 K/UL
MONOCYTES NFR BLD AUTO: 10.1 %
NEUTROPHILS # BLD AUTO: 3.86 K/UL
NEUTROPHILS NFR BLD AUTO: 69.9 %
PLATELET # BLD AUTO: 186 K/UL
POTASSIUM SERPL-SCNC: 3.7 MMOL/L
PT BLD: 14.7 SEC
RBC # BLD: 5.25 M/UL
RBC # FLD: 26.5 %
SODIUM SERPL-SCNC: 143 MMOL/L
WBC # FLD AUTO: 5.52 K/UL

## 2022-06-18 ENCOUNTER — LABORATORY RESULT (OUTPATIENT)
Age: 82
End: 2022-06-18

## 2022-06-21 ENCOUNTER — OUTPATIENT (OUTPATIENT)
Dept: OUTPATIENT SERVICES | Facility: HOSPITAL | Age: 82
LOS: 1 days | Discharge: HOME | End: 2022-06-21
Payer: MEDICARE

## 2022-06-21 DIAGNOSIS — R06.02 SHORTNESS OF BREATH: ICD-10-CM

## 2022-06-21 PROCEDURE — 93312 ECHO TRANSESOPHAGEAL: CPT | Mod: 26,XU

## 2022-06-21 PROCEDURE — 93320 DOPPLER ECHO COMPLETE: CPT | Mod: 26

## 2022-06-21 PROCEDURE — 93325 DOPPLER ECHO COLOR FLOW MAPG: CPT | Mod: 26

## 2022-06-21 RX ORDER — LOSARTAN POTASSIUM 100 MG/1
1 TABLET, FILM COATED ORAL
Qty: 0 | Refills: 0 | DISCHARGE

## 2022-06-21 RX ORDER — FUROSEMIDE 40 MG
1 TABLET ORAL
Qty: 0 | Refills: 0 | DISCHARGE

## 2022-06-21 RX ORDER — OMEGA-3 ACID ETHYL ESTERS 1 G
2 CAPSULE ORAL
Qty: 0 | Refills: 0 | DISCHARGE

## 2022-06-21 RX ORDER — FOLIC ACID 0.8 MG
1 TABLET ORAL
Qty: 0 | Refills: 0 | DISCHARGE

## 2022-06-21 RX ORDER — APIXABAN 2.5 MG/1
1 TABLET, FILM COATED ORAL
Qty: 0 | Refills: 0 | DISCHARGE

## 2022-06-21 RX ORDER — RANITIDINE HYDROCHLORIDE 150 MG/1
1 TABLET, FILM COATED ORAL
Qty: 0 | Refills: 0 | DISCHARGE

## 2022-06-21 NOTE — CHART NOTE - NSCHARTNOTEFT_GEN_A_CORE
POST OPERATIVE PROCEDURAL DOCUMENTATION  PRE-OP DIAGNOSIS: Moderate to severe MR    POST-OP DIAGNOSIS: Moderate MR    PROCEDURE: Transesophageal echocardiogram    Primary Physician: Dr. Ashley  Fellow: Dr. Feng    ANESTHESIA TYPE  [  ] General Anesthesia  [ x ] Conscious Sedation  [  ] Local/Regional    CONDITION  [  ] Critical  [  ] Serious  [  ] Fair  [X] Good    SPECIMENS REMOVED (IF APPLICABLE): N/A    IMPLANTS (IF APPLICABLE): None    ESTIMATED BLOOD LOSS: None    COMPLICATIONS: None      FINDINGS:    After risks and benefits of procedures were explained, informed consent was obtained and placed in chart. Refer to Anesthesia note for sedation details.  The ROSARIO probe was passed into the esophagus without difficulty.  Transesophageal images were obtained.  The ROSARIO probe was removed without difficulty and examined.  There was no evidence for bleeding.  The patient tolerated the procedure well without any immediate ROSARIO-related complications.      Preliminary Findings:  LA: Enlarged  ANGY: Left atrial appendage was clear of clot and smoke.  LV: LVEF was estimated at 35-40%  MV: Moderate MR by visual estimation and PISA  AV: Mild AI  RA: Normal  TV: Mild TR.   PV: Mild PI.   IAS: no PFO. No R-> L shunt by color doppler    DIAGNOSIS/IMPRESSION:  - Moderate MR  - EF 35-40%    PLAN OF CARE:  - D/C home today  - Outpatient follow-up with Dr. Judd POST OPERATIVE PROCEDURAL DOCUMENTATION  PRE-OP DIAGNOSIS: Moderate to severe MR    POST-OP DIAGNOSIS: Moderate MR    PROCEDURE: Transesophageal echocardiogram    Primary Physician: Dr. Gabi DRISCOLL [facility]  Fellow: Dr. Feng    ANESTHESIA TYPE  [  ] General Anesthesia  [ x ] Conscious Sedation  [  ] Local/Regional    CONDITION  [  ] Critical  [  ] Serious  [  ] Fair  [X] Good    SPECIMENS REMOVED (IF APPLICABLE): N/A    IMPLANTS (IF APPLICABLE): None    ESTIMATED BLOOD LOSS: None    COMPLICATIONS: None      FINDINGS:    After risks and benefits of procedures were explained, informed consent was obtained and placed in chart. Refer to Anesthesia note for sedation details.  The ROSARIO probe was passed into the esophagus without difficulty.  Transesophageal images were obtained.  The ROSARIO probe was removed without difficulty and examined.  There was no evidence for bleeding.  The patient tolerated the procedure well without any immediate ROSARIO-related complications.      Preliminary Findings:  LA: Enlarged  ANGY: Left atrial appendage was clear of clot and smoke.  LV: LVEF was estimated at 45-45%  MV: Moderate MR by visual estimation and PISA  AV: Mild AI  RA: Normal  TV: Mild TR.   PV: Mild PI.   IAS: no PFO. No R-> L shunt by color doppler    DIAGNOSIS/IMPRESSION:  - Moderate MR  - EF 35-40%    PLAN OF CARE:  - D/C home today  - Outpatient follow-up with Dr. Judd

## 2022-06-21 NOTE — PACU DISCHARGE NOTE - AIRWAY PATENCY:
Encounter Date: 10/27/2019       History     Chief Complaint   Patient presents with    Arm Injury     pt was helping kids clean room and fell onto bed frame landed on her left forearm. heard a snap. states can't really squeeze her fingers. no obvious deformity or open wounds.      28-year-old female presents with chief complaint of left forearm pain.  Patient reports was cleaning her children's room his with at which time she tripped over a toy and fell onto the headboard.  Patient's  reported that he heard a crack so the patient decided to present for evaluation.  Patient does report the left arm is currently numb although is able to move the digits well on the left hand.        Review of patient's allergies indicates:  No Known Allergies  Past Medical History:   Diagnosis Date    Anemia      Past Surgical History:   Procedure Laterality Date    HYSTERECTOMY      TONSILLECTOMY      TUBAL LIGATION       Family History   Problem Relation Age of Onset    No Known Problems Mother     No Known Problems Father      Social History     Tobacco Use    Smoking status: Never Smoker    Smokeless tobacco: Never Used   Substance Use Topics    Alcohol use: No    Drug use: No     Review of Systems   Constitutional: Negative for chills and fever.   Respiratory: Negative for shortness of breath.    Musculoskeletal: Positive for arthralgias. Negative for joint swelling.   All other systems reviewed and are negative.      Physical Exam     Initial Vitals [10/27/19 1629]   BP Pulse Resp Temp SpO2   118/67 (!) 57 18 98.2 °F (36.8 °C) 99 %      MAP       --         Physical Exam    Nursing note and vitals reviewed.  Constitutional: She appears well-developed and well-nourished.   HENT:   Head: Normocephalic and atraumatic.   Eyes: Conjunctivae and EOM are normal. Pupils are equal, round, and reactive to light.   Neck: Normal range of motion. Neck supple.   Cardiovascular: Normal rate, regular rhythm and normal heart  Satisfactory sounds.   Pulmonary/Chest: Breath sounds normal.   Abdominal: Soft.   Musculoskeletal: She exhibits tenderness.        Left forearm: She exhibits bony tenderness and swelling. She exhibits no tenderness, no edema and no deformity.   Neurological: She is alert and oriented to person, place, and time. She has normal strength. GCS score is 15. GCS eye subscore is 4. GCS verbal subscore is 5. GCS motor subscore is 6.   Skin: Skin is warm. Capillary refill takes less than 2 seconds.   Psychiatric: She has a normal mood and affect. Her behavior is normal. Thought content normal.         ED Course   Procedures  Labs Reviewed - No data to display       Imaging Results    None          Medical Decision Making:   Differential Diagnosis:   Fracture, sprain, contusion  Clinical Tests:   Radiological Study: Ordered and Reviewed  ED Management:  Anterior of re-examine the patient and reviewing the x-rays no evidence of fracture noted no evidence of significant tendon disorder appears that the patient simply has a forearm sprain will discharge with oral Motrin and instructions to ice.  Follow up with PCP in 2-3 days.                      Clinical Impression:       ICD-10-CM ICD-9-CM   1. Forearm sprain, left, initial encounter S63.502A 841.9   2. Forearm pain M79.639 729.5                                Stefano Ashraf MD  10/27/19 1738

## 2022-06-21 NOTE — H&P CARDIOLOGY - HISTORY OF PRESENT ILLNESS
82 y.o male patient with PMH of CAD s/p PCI, atrial fibrillation, HLD, HTN presents for ROSARIO for evaluation of moderate to severe MR seen on TTE. Patient complains of shortness of breath on exertion    HPI    REVIEW OF SYSTEMS:  CONSTITUTIONAL: No weakness, fevers or chills  EYES/ENT: No visual changes;  No vertigo or throat pain   NECK: No pain or stiffness  RESPIRATORY: No cough, wheezing, hemoptysis; SEE HPI  CARDIOVASCULAR: SEE HPI  GASTROINTESTINAL: No abdominal or epigastric pain. No nausea, vomiting, or hematemesis; No diarrhea or constipation. No melena or hematochezia.  GENITOURINARY: No dysuria, frequency or hematuria  NEUROLOGICAL: No numbness or weakness  SKIN: No itching, rashes      PHYSICAL EXAM:  GENERAL: NAD  HEAD:  Atraumatic, Normocephalic  EYES: conjunctiva and sclera clear  NECK: No JVD  CHEST/LUNG: Clear to auscultation bilaterally  HEART: Regular rate and rhythm; Systolic murmur  ABDOMEN: Soft, Nontender, Nondistended; Bowel sounds present  EXTREMITIES:  1-2+bilateral LE edema  NEUROLOGY:  A&Ox3, appropriate  SKIN: No rashes or lesions

## 2022-06-21 NOTE — H&P CARDIOLOGY - NSICDXPASTMEDICALHX_GEN_ALL_CORE_FT
PAST MEDICAL HISTORY:  Atrial fibrillation     CAD (coronary artery disease) s/p PCI    GERD (gastroesophageal reflux disease)     High cholesterol     HTN (hypertension)

## 2022-06-27 ENCOUNTER — APPOINTMENT (OUTPATIENT)
Dept: UROLOGY | Facility: CLINIC | Age: 82
End: 2022-06-27

## 2022-06-27 PROBLEM — I25.10 ATHEROSCLEROTIC HEART DISEASE OF NATIVE CORONARY ARTERY WITHOUT ANGINA PECTORIS: Chronic | Status: ACTIVE | Noted: 2022-06-21

## 2022-06-27 PROBLEM — I48.91 UNSPECIFIED ATRIAL FIBRILLATION: Chronic | Status: ACTIVE | Noted: 2022-06-21

## 2022-06-27 PROCEDURE — 52000 CYSTOURETHROSCOPY: CPT

## 2022-06-30 ENCOUNTER — APPOINTMENT (OUTPATIENT)
Dept: HEMATOLOGY ONCOLOGY | Facility: CLINIC | Age: 82
End: 2022-06-30

## 2022-06-30 LAB — URINE CYTOLOGY: NORMAL

## 2022-07-01 LAB
BILIRUB UR QL STRIP: NORMAL
COLLECTION METHOD: NORMAL
GLUCOSE UR-MCNC: NORMAL
HCG UR QL: 1 EU/DL
HGB UR QL STRIP.AUTO: NORMAL
KETONES UR-MCNC: NORMAL
LEUKOCYTE ESTERASE UR QL STRIP: NORMAL
NITRITE UR QL STRIP: NORMAL
PH UR STRIP: 6.5
PROT UR STRIP-MCNC: NORMAL
SP GR UR STRIP: 1.01

## 2022-07-08 ENCOUNTER — RESULT REVIEW (OUTPATIENT)
Age: 82
End: 2022-07-08

## 2022-07-08 ENCOUNTER — OUTPATIENT (OUTPATIENT)
Dept: OUTPATIENT SERVICES | Facility: HOSPITAL | Age: 82
LOS: 1 days | Discharge: HOME | End: 2022-07-08

## 2022-07-08 DIAGNOSIS — N40.1 BENIGN PROSTATIC HYPERPLASIA WITH LOWER URINARY TRACT SYMPTOMS: ICD-10-CM

## 2022-07-08 DIAGNOSIS — N43.3 HYDROCELE, UNSPECIFIED: ICD-10-CM

## 2022-07-08 PROCEDURE — 76770 US EXAM ABDO BACK WALL COMP: CPT | Mod: 26

## 2022-07-08 PROCEDURE — 76870 US EXAM SCROTUM: CPT | Mod: 26

## 2022-07-19 NOTE — HISTORY OF PRESENT ILLNESS
[FreeTextEntry1] : Mr. MIGEL MOYA 82 year M arrives  today for evaluation of their mitral regurgitation. Patient PMH include __________________________. Symptoms include ________________. NYHA class      . All questions and concerns were addressed with the patient. Pre-op planning was discussed with the patient, including dental clearance. Patient was educated on the risks and alternatives to surgery.\par \par NYHA class\par Pt lives home / home with aid / NH\par \par  Their healthcare team includes the following\par PMD:\par Cardio:\par Pulmonary:\par \par 5 Meter walk\par 1. \par 2. \par 3. \par \par Frailty: \par Right\par Left\par

## 2022-07-21 ENCOUNTER — APPOINTMENT (OUTPATIENT)
Dept: CARDIOTHORACIC SURGERY | Facility: CLINIC | Age: 82
End: 2022-07-21

## 2022-07-21 ENCOUNTER — APPOINTMENT (OUTPATIENT)
Dept: CARDIOLOGY | Facility: CLINIC | Age: 82
End: 2022-07-21

## 2022-07-21 VITALS
WEIGHT: 207 LBS | DIASTOLIC BLOOD PRESSURE: 66 MMHG | HEIGHT: 71 IN | HEART RATE: 96 BPM | SYSTOLIC BLOOD PRESSURE: 102 MMHG | BODY MASS INDEX: 28.98 KG/M2 | OXYGEN SATURATION: 98 % | TEMPERATURE: 98.6 F | RESPIRATION RATE: 14 BRPM

## 2022-07-21 PROCEDURE — 99214 OFFICE O/P EST MOD 30 MIN: CPT

## 2022-07-22 ENCOUNTER — NON-APPOINTMENT (OUTPATIENT)
Age: 82
End: 2022-07-22

## 2022-07-22 ENCOUNTER — RX RENEWAL (OUTPATIENT)
Age: 82
End: 2022-07-22

## 2022-07-24 NOTE — HISTORY OF PRESENT ILLNESS
[FreeTextEntry1] : Mr. MIGEL MOYA 82 year M arrives  today for evaluation of their mitral regurgitation. Patient PMH -  HTN, hyperlipidemia,  A-fib in April 2021. Prior GIB s/p EGD with cauterization and PCI to the proximal LAD.\par \par Symptoms include SOB and Fatigue. NYHA class   II   . All questions and concerns were addressed with the patient. Pre-op planning was discussed with the patient, including dental clearance. Patient was educated on the risks and alternatives to surgery.\par \par NYHA class II\par Pt lives home no aid \par \par c/o SOB on exertion and fatigue that has worsened over the past few months\par \par Their healthcare team includes the following\par PMD: Elina\par Cardio: Dutch\par Pulmonary: Maroun\par \par 5 minute walk: 200 feet in 2 minutes - SOB. \par \par Frailty: \par Right 22.1, 23.4, 21.6\par Left: 23.4, 21.7, 24.5 \par \par

## 2022-07-24 NOTE — ASSESSMENT
[FreeTextEntry1] : Mr. MIGEL MOYA 82 year M arrives  today for  evaluation of their mitral regurgitation.  All questions and concerns were addressed with the patient. Pre-op planning was discussed with the patient, including dental clearance. PAtient was educated on the risks and alternatives to surgery.\par \par Needs carotids, PFT and Dental\par Hold losartan  and eliquis 48 hours prior to procedure - verbalized understanding \par \par \par

## 2022-07-24 NOTE — END OF VISIT
[FreeTextEntry3] : Seen / examined and above reviewed.\par \par CAD s/p PCI\par Cardiomyopathy (moderate LV dysfunction)\par AF\par Chronic Systolic CHF\par \par Comorbidities as above.\par \par Exertional dyspnea.\par On GDMT.  Required diuretic.  BP controlled.\par \par ROSARIO reviewed: Moderate LV dysfunction.  Mod  Sev MR (functional secondary to dilatation / tethering).  Anatomy appears suitable for MitraClip.\par \par Plan for MitraClip\par Pre-op testing.

## 2022-07-27 ENCOUNTER — APPOINTMENT (OUTPATIENT)
Dept: CARDIOTHORACIC SURGERY | Facility: CLINIC | Age: 82
End: 2022-07-27

## 2022-07-27 PROCEDURE — 99447 NTRPROF PH1/NTRNET/EHR 11-20: CPT

## 2022-07-30 NOTE — HISTORY OF PRESENT ILLNESS
[FreeTextEntry1] : Mr. MIGEL MOYA 82 year M called today for evaluation of their mitral regurgitation. Patient PMH - HTN, hyperlipidemia, A-fib in April 2021. Prior GIB s/p EGD with cauterization and PCI to the proximal LAD.\par \par Symptoms include SOB and Fatigue. NYHA class II . All questions and concerns were addressed with the patient. Pre-op planning was discussed with the patient, including dental clearance. Patient was educated on the risks and alternatives to surgery.\par \par NYHA class II\par Pt lives home no aid \par \par c/o SOB on exertion and fatigue that has worsened over the past few months\par \par Their healthcare team includes the following\par PMD: Elina\par Cardio: Dutch\par Pulmonary: Migdalia\par

## 2022-07-30 NOTE — ASSESSMENT
[FreeTextEntry1] : Mr. MIGEL MOYA 82 year M arrives today for evaluation of their mitral regurgitation. All questions and concerns were addressed with the patient. Pre-op planning was discussed with the patient, including dental clearance. PAtient was educated on the risks and alternatives to surgery.\par \par Needs carotids, PFT and Dental\par Pts wife will be away for the month of august and returning in September\par Will f/u in office once wife return for follow up discussion of MR \par \par Aura MANRIQUEZ [facility], am acting as scribe for Dr. Gaffney \par  \par wants to wait until august to FU and decide about clip. has mod MR.\par

## 2022-08-15 ENCOUNTER — APPOINTMENT (OUTPATIENT)
Dept: UROLOGY | Facility: CLINIC | Age: 82
End: 2022-08-15

## 2022-08-22 ENCOUNTER — APPOINTMENT (OUTPATIENT)
Dept: UROLOGY | Facility: CLINIC | Age: 82
End: 2022-08-22

## 2022-08-22 VITALS
DIASTOLIC BLOOD PRESSURE: 78 MMHG | HEIGHT: 72 IN | HEART RATE: 99 BPM | WEIGHT: 206 LBS | BODY MASS INDEX: 27.9 KG/M2 | SYSTOLIC BLOOD PRESSURE: 117 MMHG

## 2022-08-22 DIAGNOSIS — N40.1 BENIGN PROSTATIC HYPERPLASIA WITH LOWER URINARY TRACT SYMPMS: ICD-10-CM

## 2022-08-22 DIAGNOSIS — N50.3 CYST OF EPIDIDYMIS: ICD-10-CM

## 2022-08-22 DIAGNOSIS — N43.3 HYDROCELE, UNSPECIFIED: ICD-10-CM

## 2022-08-22 PROCEDURE — 99213 OFFICE O/P EST LOW 20 MIN: CPT

## 2022-08-23 ENCOUNTER — APPOINTMENT (OUTPATIENT)
Dept: ORTHOPEDIC SURGERY | Facility: CLINIC | Age: 82
End: 2022-08-23

## 2022-08-24 ENCOUNTER — APPOINTMENT (OUTPATIENT)
Dept: ORTHOPEDIC SURGERY | Facility: CLINIC | Age: 82
End: 2022-08-24

## 2022-08-25 ENCOUNTER — APPOINTMENT (OUTPATIENT)
Dept: ORTHOPEDIC SURGERY | Facility: CLINIC | Age: 82
End: 2022-08-25

## 2022-08-25 PROCEDURE — 99213 OFFICE O/P EST LOW 20 MIN: CPT

## 2022-08-25 NOTE — DISCUSSION/SUMMARY
[de-identified] :   Patient will take OTC Tylenol as needed for pain.  The patient was advised to rest/ice the area and can alternate with warm compresses as needed.  The knee conditioning program from the [facility] was printed and given to the patient so he may try that at home.  \par \par He kindly declined a cortisone injection today.  Bilateral knee Synvisc-One gel injections were ordered for the patient so he may receive that his next visit.  Patient will follow-up in 4-6 weeks for further evaluation.  All of the patient's questions/concerns were answered in detail.  \par \par Patient was seen under the supervision of Dr. Toney.\par \par

## 2022-08-25 NOTE — HISTORY OF PRESENT ILLNESS
[de-identified] :  Patient is 82-year-old male who reports the office for subsequent re-evaluation of his bilateral knee pain.  He states that the pain has been acting up recently.  It is worse on the right knee.  Walking, certain range of motion, and palpating certain areas of the knee aggravate the patient's pain at times.  Denies any numbness or tingling.

## 2022-08-25 NOTE — IMAGING
[de-identified] :   Examination of the bilateral knees as follows:  Minimal effusion noted.  No erythema or ecchymosis.  Able to perform active straight leg raise.  Knee flexion from 0-90 degrees of mild stiffness and pain.  Medial and lateral joint line tenderness to palpation.  Calves are soft and nontender.  Light touch intact throughout.  Nonantalgic gait.

## 2022-08-26 NOTE — HISTORY OF PRESENT ILLNESS
[FreeTextEntry1] : Mr. Miguel is an 82 year old male on Alfuzosin for lower urinary tract symptoms. States he is urinating well on Alfuzosin. Still complaining of feeling of heaviness in suprapubic area and some testicular swelling. \par \par RBUS showed bilateral renal cysts, mildly enlarged prostate. \par US Testicles shows large left hydrocele, left spermatocele, tubular ectasia of the rete testes, and right testicular cysts.

## 2022-08-26 NOTE — ASSESSMENT
[FreeTextEntry1] : Mr Miguel is an 82 year old male with BPH/LUTS currently well managed on Alfuzosin. It is unclear why he has the suprapubic heaviness and it may not be related to his  system. Recommend following up with his PCP for further workup. All of his questions were otherwise answered. \par Patient was noted discussed with nurse practitioner gAatha Low.  I agree with the note above.  We reviewed the ultrasound and there is no evidence of any urinary retention.  It is unclear what this heaviness is and there are some skin changes which may be related to lymphatics.  I think for further work-up he should go through his primary care physician at this point as we do not see any specific  pathology.

## 2022-09-06 ENCOUNTER — OUTPATIENT (OUTPATIENT)
Dept: OUTPATIENT SERVICES | Facility: HOSPITAL | Age: 82
LOS: 1 days | Discharge: HOME | End: 2022-09-06

## 2022-09-06 ENCOUNTER — RESULT REVIEW (OUTPATIENT)
Age: 82
End: 2022-09-06

## 2022-09-06 PROCEDURE — 93880 EXTRACRANIAL BILAT STUDY: CPT | Mod: 26

## 2022-09-09 ENCOUNTER — RESULT REVIEW (OUTPATIENT)
Age: 82
End: 2022-09-09

## 2022-09-09 ENCOUNTER — OUTPATIENT (OUTPATIENT)
Dept: OUTPATIENT SERVICES | Facility: HOSPITAL | Age: 82
LOS: 1 days | Discharge: HOME | End: 2022-09-09

## 2022-09-09 VITALS
SYSTOLIC BLOOD PRESSURE: 105 MMHG | RESPIRATION RATE: 17 BRPM | OXYGEN SATURATION: 98 % | HEIGHT: 72 IN | WEIGHT: 201.06 LBS | TEMPERATURE: 98 F | HEART RATE: 89 BPM | DIASTOLIC BLOOD PRESSURE: 77 MMHG

## 2022-09-09 DIAGNOSIS — I34.0 NONRHEUMATIC MITRAL (VALVE) INSUFFICIENCY: ICD-10-CM

## 2022-09-09 DIAGNOSIS — Z01.818 ENCOUNTER FOR OTHER PREPROCEDURAL EXAMINATION: ICD-10-CM

## 2022-09-09 DIAGNOSIS — Z98.61 CORONARY ANGIOPLASTY STATUS: Chronic | ICD-10-CM

## 2022-09-09 LAB
A1C WITH ESTIMATED AVERAGE GLUCOSE RESULT: 5.5 % — SIGNIFICANT CHANGE UP (ref 4–5.6)
ALBUMIN SERPL ELPH-MCNC: 3.8 G/DL — SIGNIFICANT CHANGE UP (ref 3.5–5.2)
ALP SERPL-CCNC: 77 U/L — SIGNIFICANT CHANGE UP (ref 30–115)
ALT FLD-CCNC: 15 U/L — SIGNIFICANT CHANGE UP (ref 0–41)
ANION GAP SERPL CALC-SCNC: 11 MMOL/L — SIGNIFICANT CHANGE UP (ref 7–14)
APPEARANCE UR: CLEAR — SIGNIFICANT CHANGE UP
APTT BLD: 34.5 SEC — SIGNIFICANT CHANGE UP (ref 27–39.2)
AST SERPL-CCNC: 23 U/L — SIGNIFICANT CHANGE UP (ref 0–41)
BACTERIA # UR AUTO: NEGATIVE — SIGNIFICANT CHANGE UP
BASOPHILS # BLD AUTO: 0.02 K/UL — SIGNIFICANT CHANGE UP (ref 0–0.2)
BASOPHILS NFR BLD AUTO: 0.3 % — SIGNIFICANT CHANGE UP (ref 0–1)
BILIRUB SERPL-MCNC: 0.7 MG/DL — SIGNIFICANT CHANGE UP (ref 0.2–1.2)
BILIRUB UR-MCNC: NEGATIVE — SIGNIFICANT CHANGE UP
BLD GP AB SCN SERPL QL: SIGNIFICANT CHANGE UP
BUN SERPL-MCNC: 11 MG/DL — SIGNIFICANT CHANGE UP (ref 10–20)
CALCIUM SERPL-MCNC: 9.2 MG/DL — SIGNIFICANT CHANGE UP (ref 8.5–10.1)
CHLORIDE SERPL-SCNC: 103 MMOL/L — SIGNIFICANT CHANGE UP (ref 98–110)
CO2 SERPL-SCNC: 26 MMOL/L — SIGNIFICANT CHANGE UP (ref 17–32)
COLOR SPEC: YELLOW — SIGNIFICANT CHANGE UP
CREAT SERPL-MCNC: 1.1 MG/DL — SIGNIFICANT CHANGE UP (ref 0.7–1.5)
DIFF PNL FLD: NEGATIVE — SIGNIFICANT CHANGE UP
EGFR: 67 ML/MIN/1.73M2 — SIGNIFICANT CHANGE UP
EOSINOPHIL # BLD AUTO: 0.08 K/UL — SIGNIFICANT CHANGE UP (ref 0–0.7)
EOSINOPHIL NFR BLD AUTO: 1.3 % — SIGNIFICANT CHANGE UP (ref 0–8)
EPI CELLS # UR: 0 /HPF — SIGNIFICANT CHANGE UP (ref 0–5)
ESTIMATED AVERAGE GLUCOSE: 111 MG/DL — SIGNIFICANT CHANGE UP (ref 68–114)
GLUCOSE SERPL-MCNC: 100 MG/DL — HIGH (ref 70–99)
GLUCOSE UR QL: NEGATIVE — SIGNIFICANT CHANGE UP
HCT VFR BLD CALC: 40.4 % — LOW (ref 42–52)
HGB BLD-MCNC: 12 G/DL — LOW (ref 14–18)
HYALINE CASTS # UR AUTO: 2 /LPF — SIGNIFICANT CHANGE UP (ref 0–7)
IMM GRANULOCYTES NFR BLD AUTO: 0.3 % — SIGNIFICANT CHANGE UP (ref 0.1–0.3)
INR BLD: 1.34 RATIO — HIGH (ref 0.65–1.3)
KETONES UR-MCNC: NEGATIVE — SIGNIFICANT CHANGE UP
LEUKOCYTE ESTERASE UR-ACNC: NEGATIVE — SIGNIFICANT CHANGE UP
LYMPHOCYTES # BLD AUTO: 1.12 K/UL — LOW (ref 1.2–3.4)
LYMPHOCYTES # BLD AUTO: 18.6 % — LOW (ref 20.5–51.1)
MCHC RBC-ENTMCNC: 23.5 PG — LOW (ref 27–31)
MCHC RBC-ENTMCNC: 29.7 G/DL — LOW (ref 32–37)
MCV RBC AUTO: 79.2 FL — LOW (ref 80–94)
MONOCYTES # BLD AUTO: 0.56 K/UL — SIGNIFICANT CHANGE UP (ref 0.1–0.6)
MONOCYTES NFR BLD AUTO: 9.3 % — SIGNIFICANT CHANGE UP (ref 1.7–9.3)
MRSA PCR RESULT.: NEGATIVE — SIGNIFICANT CHANGE UP
NEUTROPHILS # BLD AUTO: 4.21 K/UL — SIGNIFICANT CHANGE UP (ref 1.4–6.5)
NEUTROPHILS NFR BLD AUTO: 70.2 % — SIGNIFICANT CHANGE UP (ref 42.2–75.2)
NITRITE UR-MCNC: NEGATIVE — SIGNIFICANT CHANGE UP
NRBC # BLD: 0 /100 WBCS — SIGNIFICANT CHANGE UP (ref 0–0)
NT-PROBNP SERPL-SCNC: 1518 PG/ML — HIGH (ref 0–300)
PH UR: 7 — SIGNIFICANT CHANGE UP (ref 5–8)
PLATELET # BLD AUTO: 169 K/UL — SIGNIFICANT CHANGE UP (ref 130–400)
POTASSIUM SERPL-MCNC: 4.1 MMOL/L — SIGNIFICANT CHANGE UP (ref 3.5–5)
POTASSIUM SERPL-SCNC: 4.1 MMOL/L — SIGNIFICANT CHANGE UP (ref 3.5–5)
PROT SERPL-MCNC: 7.1 G/DL — SIGNIFICANT CHANGE UP (ref 6–8)
PROT UR-MCNC: ABNORMAL
PROTHROM AB SERPL-ACNC: 15.4 SEC — HIGH (ref 9.95–12.87)
RBC # BLD: 5.1 M/UL — SIGNIFICANT CHANGE UP (ref 4.7–6.1)
RBC # FLD: 23.3 % — HIGH (ref 11.5–14.5)
RBC CASTS # UR COMP ASSIST: 3 /HPF — SIGNIFICANT CHANGE UP (ref 0–4)
SODIUM SERPL-SCNC: 140 MMOL/L — SIGNIFICANT CHANGE UP (ref 135–146)
SP GR SPEC: 1.01 — SIGNIFICANT CHANGE UP (ref 1.01–1.03)
UROBILINOGEN FLD QL: ABNORMAL
WBC # BLD: 6.01 K/UL — SIGNIFICANT CHANGE UP (ref 4.8–10.8)
WBC # FLD AUTO: 6.01 K/UL — SIGNIFICANT CHANGE UP (ref 4.8–10.8)
WBC UR QL: 1 /HPF — SIGNIFICANT CHANGE UP (ref 0–5)

## 2022-09-09 PROCEDURE — 93010 ELECTROCARDIOGRAM REPORT: CPT

## 2022-09-09 PROCEDURE — 71046 X-RAY EXAM CHEST 2 VIEWS: CPT | Mod: 26

## 2022-09-09 RX ORDER — ATORVASTATIN CALCIUM 80 MG/1
1 TABLET, FILM COATED ORAL
Qty: 0 | Refills: 0 | DISCHARGE

## 2022-09-09 NOTE — H&P PST ADULT - REASON FOR ADMISSION
81 Y/O MALE HERE FOR PRE-ADMISSION SURGICAL TESTING. PATIENT REPORTS HE WAS HAVING SOB FOR SEVERAL MONTHS AND FATIGUE. W/U REVEALED + MITRAL REGURGITATION.  NOW FOR SCHEDULED PROCEDURE.

## 2022-09-09 NOTE — H&P PST ADULT - HISTORY OF PRESENT ILLNESS
PATIENT DENIES CHEST PAIN, SHORTNESS OF BREATH, PALPITATIONS, COUGHING, FEVER, DYSURIA.  CAN WALK UP 1 FLIGHT OF STEPS WITHOUT SOB.    NO COUGH, FEVER, SORE THROAT, HEADACHE, LOSS OF TASTE OR SMELL. NO KNOWN EXPOSURE TO ANYONE WITH COVID. PATIENT WAS INSTRUCTED TO ISOLATE FROM NOW UNTIL THE SURGERY.    Anesthesia Alert  NO--Difficult Airway  NO--History of neck surgery or radiation  NO--Limited ROM of neck  NO--History of Malignant hyperthermia  NO--Personal or family history of Pseudocholinesterase deficiency  NO--Prior Anesthesia Complication  NO--Latex Allergy  NO--Loose teeth  NO--History of Rheumatoid Arthritis  NO--AURELIO  + BLEEDING RISK ON XARELTO (WILL STOP 9/10 PER CHEN BARBER)

## 2022-09-09 NOTE — H&P PST ADULT - NSANTHOSAYNRD_GEN_A_CORE
No. AURELIO screening performed.  STOP BANG Legend: 0-2 = LOW Risk; 3-4 = INTERMEDIATE Risk; 5-8 = HIGH Risk

## 2022-09-09 NOTE — H&P PST ADULT - NSICDXPASTMEDICALHX_GEN_ALL_CORE_FT
PAST MEDICAL HISTORY:  Atrial fibrillation     BPH (benign prostatic hyperplasia)     CAD (coronary artery disease) s/p PCI    GERD (gastroesophageal reflux disease)     H/O CHF     High cholesterol     HTN (hypertension)     JONAH (iron deficiency anemia)

## 2022-09-10 LAB
CULTURE RESULTS: SIGNIFICANT CHANGE UP
SPECIMEN SOURCE: SIGNIFICANT CHANGE UP

## 2022-09-13 VITALS — HEIGHT: 72 IN | WEIGHT: 209.44 LBS

## 2022-09-13 DIAGNOSIS — I65.23 OCCLUSION AND STENOSIS OF BILATERAL CAROTID ARTERIES: ICD-10-CM

## 2022-09-13 NOTE — PRE-ANESTHESIA EVALUATION ADULT - NSRADCARDRESULTSFT_GEN_ALL_CORE
Caroid duplex: 2022  "Antegrade flow is noted within both vertebral arteries.    IMPRESSION: Mild 20-39% stenosis in bilateral internal carotid arteries"    CXR 22: "Small left pleural effusion/left basilar opacity."    EK22: Diagnosis Line Normal sinus rhythm  Right bundle branch block  Abnormal ECG    ROSARIO 22:  "Summary:   1. Left ventricular ejection fraction, by visual estimation, is 40 to   45%.   2. Moderately decreased global left ventricular systolic function.   3. Mildly increased left ventricular internal cavity size.   4. Mildly enlarged right ventricle.   5. MV ERO was calculated at 0.29 cm2.      MR regurgitant volume=35 ml      MR Flow rate= 121 ml/s.   6. Mildly enlarged right atrium.   7. Moderately enlarged left atrium.   8. Mild thickening of the anterior and posterior mitral valve leaflets.   9. Moderate mitral valve regurgitation.  10. Mild tricuspid regurgitation.  11. Mild aortic regurgitation.  12. Mild pulmonic valve regurgitation.  13. No left atrial appendage thrombus."    Cardiac Cath 2021:  " A successful stent with balloon angioplasty and    intravascular ultrasound was performed on the 95 % lesion in the proximal    LAD. Following intervention there was an excellent angiographic appearance    with a 5 % residual stenosis. This was a bifurcation lesion. This was an    ACC/AHA type C "high risk" lesion for intervention. There was no evidence    of the transient no-reflow phenomenon. There was MARCE 3 flow before the    procedure and MARCE 3 flow after the procedure. There were no site    complications. There was no dissection."    CT Abd/Pelvis 2022:  "IMPRESSION:    No CT evidence for acute intra-abdominal pathology. Diverticulosis   without evidence for diverticulitis.    Cardiomegaly with small pericardial and small left pleural effusions."

## 2022-09-13 NOTE — PRE-ANESTHESIA EVALUATION ADULT - NSANTHPMHFT_GEN_ALL_CORE
82 y.o male patient with PMH of CAD s/p PCI, atrial fibrillation, HLD, HTN, moderate to severe MR seen on TTE. ROSARIO shows modeatre MR.

## 2022-09-14 ENCOUNTER — APPOINTMENT (OUTPATIENT)
Dept: CARDIOTHORACIC SURGERY | Facility: HOSPITAL | Age: 82
End: 2022-09-14

## 2022-09-14 ENCOUNTER — TRANSCRIPTION ENCOUNTER (OUTPATIENT)
Age: 82
End: 2022-09-14

## 2022-09-14 ENCOUNTER — INPATIENT (INPATIENT)
Facility: HOSPITAL | Age: 82
LOS: 2 days | Discharge: HOME HEALTH | End: 2022-09-17
Attending: THORACIC SURGERY (CARDIOTHORACIC VASCULAR SURGERY) | Admitting: THORACIC SURGERY (CARDIOTHORACIC VASCULAR SURGERY)
Payer: MEDICARE

## 2022-09-14 DIAGNOSIS — Z98.61 CORONARY ANGIOPLASTY STATUS: Chronic | ICD-10-CM

## 2022-09-14 DIAGNOSIS — I31.3 PERICARDIAL EFFUSION (NONINFLAMMATORY): ICD-10-CM

## 2022-09-14 LAB
ALBUMIN SERPL ELPH-MCNC: 3.5 G/DL — SIGNIFICANT CHANGE UP (ref 3.5–5.2)
ALP SERPL-CCNC: 74 U/L — SIGNIFICANT CHANGE UP (ref 30–115)
ALT FLD-CCNC: 21 U/L — SIGNIFICANT CHANGE UP (ref 0–41)
ANION GAP SERPL CALC-SCNC: 11 MMOL/L — SIGNIFICANT CHANGE UP (ref 7–14)
ANION GAP SERPL CALC-SCNC: 14 MMOL/L — SIGNIFICANT CHANGE UP (ref 7–14)
APTT BLD: 37.9 SEC — SIGNIFICANT CHANGE UP (ref 27–39.2)
AST SERPL-CCNC: 35 U/L — SIGNIFICANT CHANGE UP (ref 0–41)
BILIRUB SERPL-MCNC: 1 MG/DL — SIGNIFICANT CHANGE UP (ref 0.2–1.2)
BLD GP AB SCN SERPL QL: SIGNIFICANT CHANGE UP
BUN SERPL-MCNC: 11 MG/DL — SIGNIFICANT CHANGE UP (ref 10–20)
BUN SERPL-MCNC: 11 MG/DL — SIGNIFICANT CHANGE UP (ref 10–20)
CALCIUM SERPL-MCNC: 8.5 MG/DL — SIGNIFICANT CHANGE UP (ref 8.4–10.5)
CALCIUM SERPL-MCNC: 9.1 MG/DL — SIGNIFICANT CHANGE UP (ref 8.4–10.5)
CHLORIDE SERPL-SCNC: 104 MMOL/L — SIGNIFICANT CHANGE UP (ref 98–110)
CHLORIDE SERPL-SCNC: 107 MMOL/L — SIGNIFICANT CHANGE UP (ref 98–110)
CO2 SERPL-SCNC: 21 MMOL/L — SIGNIFICANT CHANGE UP (ref 17–32)
CO2 SERPL-SCNC: 22 MMOL/L — SIGNIFICANT CHANGE UP (ref 17–32)
CREAT SERPL-MCNC: 0.9 MG/DL — SIGNIFICANT CHANGE UP (ref 0.7–1.5)
CREAT SERPL-MCNC: 1.1 MG/DL — SIGNIFICANT CHANGE UP (ref 0.7–1.5)
EGFR: 67 ML/MIN/1.73M2 — SIGNIFICANT CHANGE UP
EGFR: 85 ML/MIN/1.73M2 — SIGNIFICANT CHANGE UP
GAS PNL BLDA: SIGNIFICANT CHANGE UP
GLUCOSE BLDC GLUCOMTR-MCNC: 108 MG/DL — HIGH (ref 70–99)
GLUCOSE BLDC GLUCOMTR-MCNC: 119 MG/DL — HIGH (ref 70–99)
GLUCOSE BLDC GLUCOMTR-MCNC: 135 MG/DL — HIGH (ref 70–99)
GLUCOSE BLDC GLUCOMTR-MCNC: 83 MG/DL — SIGNIFICANT CHANGE UP (ref 70–99)
GLUCOSE BLDC GLUCOMTR-MCNC: 94 MG/DL — SIGNIFICANT CHANGE UP (ref 70–99)
GLUCOSE BLDC GLUCOMTR-MCNC: 99 MG/DL — SIGNIFICANT CHANGE UP (ref 70–99)
GLUCOSE SERPL-MCNC: 105 MG/DL — HIGH (ref 70–99)
GLUCOSE SERPL-MCNC: 93 MG/DL — SIGNIFICANT CHANGE UP (ref 70–99)
HCT VFR BLD CALC: 38.9 % — LOW (ref 42–52)
HCT VFR BLD CALC: 44.7 % — SIGNIFICANT CHANGE UP (ref 42–52)
HGB BLD-MCNC: 12 G/DL — LOW (ref 14–18)
HGB BLD-MCNC: 13.7 G/DL — LOW (ref 14–18)
INR BLD: 1.33 RATIO — HIGH (ref 0.65–1.3)
INR BLD: 1.48 RATIO — HIGH (ref 0.65–1.3)
MAGNESIUM SERPL-MCNC: 1.6 MG/DL — LOW (ref 1.8–2.4)
MCHC RBC-ENTMCNC: 24 PG — LOW (ref 27–31)
MCHC RBC-ENTMCNC: 24.2 PG — LOW (ref 27–31)
MCHC RBC-ENTMCNC: 30.6 G/DL — LOW (ref 32–37)
MCHC RBC-ENTMCNC: 30.8 G/DL — LOW (ref 32–37)
MCV RBC AUTO: 78 FL — LOW (ref 80–94)
MCV RBC AUTO: 79.1 FL — LOW (ref 80–94)
NRBC # BLD: 0 /100 WBCS — SIGNIFICANT CHANGE UP (ref 0–0)
NRBC # BLD: 0 /100 WBCS — SIGNIFICANT CHANGE UP (ref 0–0)
PLATELET # BLD AUTO: 208 K/UL — SIGNIFICANT CHANGE UP (ref 130–400)
PLATELET # BLD AUTO: 225 K/UL — SIGNIFICANT CHANGE UP (ref 130–400)
POTASSIUM SERPL-MCNC: 4.1 MMOL/L — SIGNIFICANT CHANGE UP (ref 3.5–5)
POTASSIUM SERPL-MCNC: 4.6 MMOL/L — SIGNIFICANT CHANGE UP (ref 3.5–5)
POTASSIUM SERPL-SCNC: 4.1 MMOL/L — SIGNIFICANT CHANGE UP (ref 3.5–5)
POTASSIUM SERPL-SCNC: 4.6 MMOL/L — SIGNIFICANT CHANGE UP (ref 3.5–5)
PROT SERPL-MCNC: 6.4 G/DL — SIGNIFICANT CHANGE UP (ref 6–8)
PROTHROM AB SERPL-ACNC: 15.3 SEC — HIGH (ref 9.95–12.87)
PROTHROM AB SERPL-ACNC: 17 SEC — HIGH (ref 9.95–12.87)
RBC # BLD: 4.99 M/UL — SIGNIFICANT CHANGE UP (ref 4.7–6.1)
RBC # BLD: 5.65 M/UL — SIGNIFICANT CHANGE UP (ref 4.7–6.1)
RBC # FLD: 23.2 % — HIGH (ref 11.5–14.5)
RBC # FLD: 23.7 % — HIGH (ref 11.5–14.5)
SODIUM SERPL-SCNC: 139 MMOL/L — SIGNIFICANT CHANGE UP (ref 135–146)
SODIUM SERPL-SCNC: 140 MMOL/L — SIGNIFICANT CHANGE UP (ref 135–146)
WBC # BLD: 10 K/UL — SIGNIFICANT CHANGE UP (ref 4.8–10.8)
WBC # BLD: 7.12 K/UL — SIGNIFICANT CHANGE UP (ref 4.8–10.8)
WBC # FLD AUTO: 10 K/UL — SIGNIFICANT CHANGE UP (ref 4.8–10.8)
WBC # FLD AUTO: 7.12 K/UL — SIGNIFICANT CHANGE UP (ref 4.8–10.8)

## 2022-09-14 PROCEDURE — 33419 REPAIR TCAT MITRAL VALVE: CPT | Mod: 62,Q0

## 2022-09-14 PROCEDURE — 33418 REPAIR TCAT MITRAL VALVE: CPT | Mod: 62

## 2022-09-14 PROCEDURE — 33419 REPAIR TCAT MITRAL VALVE: CPT

## 2022-09-14 PROCEDURE — 93306 TTE W/DOPPLER COMPLETE: CPT | Mod: 26

## 2022-09-14 PROCEDURE — 71045 X-RAY EXAM CHEST 1 VIEW: CPT | Mod: 26

## 2022-09-14 PROCEDURE — 93010 ELECTROCARDIOGRAM REPORT: CPT

## 2022-09-14 RX ORDER — INSULIN HUMAN 100 [IU]/ML
1 INJECTION, SOLUTION SUBCUTANEOUS
Qty: 100 | Refills: 0 | Status: DISCONTINUED | OUTPATIENT
Start: 2022-09-14 | End: 2022-09-15

## 2022-09-14 RX ORDER — ONDANSETRON 8 MG/1
4 TABLET, FILM COATED ORAL ONCE
Refills: 0 | Status: COMPLETED | OUTPATIENT
Start: 2022-09-14 | End: 2022-09-14

## 2022-09-14 RX ORDER — NITROGLYCERIN 6.5 MG
30 CAPSULE, EXTENDED RELEASE ORAL
Qty: 50 | Refills: 0 | Status: DISCONTINUED | OUTPATIENT
Start: 2022-09-14 | End: 2022-09-15

## 2022-09-14 RX ORDER — CHLORHEXIDINE GLUCONATE 213 G/1000ML
5 SOLUTION TOPICAL
Refills: 0 | Status: DISCONTINUED | OUTPATIENT
Start: 2022-09-14 | End: 2022-09-16

## 2022-09-14 RX ORDER — ACETAMINOPHEN 500 MG
650 TABLET ORAL EVERY 6 HOURS
Refills: 0 | Status: DISCONTINUED | OUTPATIENT
Start: 2022-09-14 | End: 2022-09-17

## 2022-09-14 RX ORDER — OXYCODONE HYDROCHLORIDE 5 MG/1
10 TABLET ORAL EVERY 4 HOURS
Refills: 0 | Status: DISCONTINUED | OUTPATIENT
Start: 2022-09-14 | End: 2022-09-17

## 2022-09-14 RX ORDER — ASPIRIN/CALCIUM CARB/MAGNESIUM 324 MG
81 TABLET ORAL DAILY
Refills: 0 | Status: DISCONTINUED | OUTPATIENT
Start: 2022-09-14 | End: 2022-09-17

## 2022-09-14 RX ORDER — POLYETHYLENE GLYCOL 3350 17 G/17G
17 POWDER, FOR SOLUTION ORAL DAILY
Refills: 0 | Status: DISCONTINUED | OUTPATIENT
Start: 2022-09-15 | End: 2022-09-17

## 2022-09-14 RX ORDER — NOREPINEPHRINE BITARTRATE/D5W 8 MG/250ML
0.05 PLASTIC BAG, INJECTION (ML) INTRAVENOUS
Qty: 8 | Refills: 0 | Status: DISCONTINUED | OUTPATIENT
Start: 2022-09-14 | End: 2022-09-15

## 2022-09-14 RX ORDER — DEXTROSE 50 % IN WATER 50 %
50 SYRINGE (ML) INTRAVENOUS
Refills: 0 | Status: DISCONTINUED | OUTPATIENT
Start: 2022-09-14 | End: 2022-09-17

## 2022-09-14 RX ORDER — SODIUM CHLORIDE 9 MG/ML
1000 INJECTION INTRAMUSCULAR; INTRAVENOUS; SUBCUTANEOUS
Refills: 0 | Status: DISCONTINUED | OUTPATIENT
Start: 2022-09-14 | End: 2022-09-16

## 2022-09-14 RX ORDER — OXYCODONE HYDROCHLORIDE 5 MG/1
5 TABLET ORAL EVERY 4 HOURS
Refills: 0 | Status: DISCONTINUED | OUTPATIENT
Start: 2022-09-14 | End: 2022-09-17

## 2022-09-14 RX ORDER — INFLUENZA VIRUS VACCINE 15; 15; 15; 15 UG/.5ML; UG/.5ML; UG/.5ML; UG/.5ML
0.7 SUSPENSION INTRAMUSCULAR ONCE
Refills: 0 | Status: DISCONTINUED | OUTPATIENT
Start: 2022-09-14 | End: 2022-09-17

## 2022-09-14 RX ORDER — NICARDIPINE HYDROCHLORIDE 30 MG/1
5 CAPSULE, EXTENDED RELEASE ORAL
Qty: 40 | Refills: 0 | Status: DISCONTINUED | OUTPATIENT
Start: 2022-09-14 | End: 2022-09-15

## 2022-09-14 RX ORDER — RIVAROXABAN 15 MG-20MG
20 KIT ORAL DAILY
Refills: 0 | Status: DISCONTINUED | OUTPATIENT
Start: 2022-09-14 | End: 2022-09-16

## 2022-09-14 RX ORDER — MEPERIDINE HYDROCHLORIDE 50 MG/ML
25 INJECTION INTRAMUSCULAR; INTRAVENOUS; SUBCUTANEOUS ONCE
Refills: 0 | Status: DISCONTINUED | OUTPATIENT
Start: 2022-09-14 | End: 2022-09-16

## 2022-09-14 RX ORDER — VASOPRESSIN 20 [USP'U]/ML
0.04 INJECTION INTRAVENOUS
Qty: 50 | Refills: 0 | Status: DISCONTINUED | OUTPATIENT
Start: 2022-09-14 | End: 2022-09-15

## 2022-09-14 RX ORDER — PANTOPRAZOLE SODIUM 20 MG/1
40 TABLET, DELAYED RELEASE ORAL
Refills: 0 | Status: DISCONTINUED | OUTPATIENT
Start: 2022-09-14 | End: 2022-09-17

## 2022-09-14 RX ORDER — ATORVASTATIN CALCIUM 80 MG/1
20 TABLET, FILM COATED ORAL AT BEDTIME
Refills: 0 | Status: DISCONTINUED | OUTPATIENT
Start: 2022-09-14 | End: 2022-09-17

## 2022-09-14 RX ORDER — HYDROMORPHONE HYDROCHLORIDE 2 MG/ML
0.5 INJECTION INTRAMUSCULAR; INTRAVENOUS; SUBCUTANEOUS EVERY 6 HOURS
Refills: 0 | Status: DISCONTINUED | OUTPATIENT
Start: 2022-09-14 | End: 2022-09-14

## 2022-09-14 RX ORDER — ACETAMINOPHEN 500 MG
650 TABLET ORAL EVERY 6 HOURS
Refills: 0 | Status: DISCONTINUED | OUTPATIENT
Start: 2022-09-17 | End: 2022-09-17

## 2022-09-14 RX ORDER — FLUTICASONE FUROATE AND VILANTEROL TRIFENATATE 100; 25 UG/1; UG/1
1 POWDER RESPIRATORY (INHALATION)
Qty: 0 | Refills: 0 | DISCHARGE

## 2022-09-14 RX ORDER — VANCOMYCIN HCL 1 G
1000 VIAL (EA) INTRAVENOUS EVERY 12 HOURS
Refills: 0 | Status: COMPLETED | OUTPATIENT
Start: 2022-09-14 | End: 2022-09-15

## 2022-09-14 RX ORDER — CEFAZOLIN SODIUM 1 G
2000 VIAL (EA) INJECTION EVERY 8 HOURS
Refills: 0 | Status: COMPLETED | OUTPATIENT
Start: 2022-09-14 | End: 2022-09-15

## 2022-09-14 RX ORDER — SENNA PLUS 8.6 MG/1
2 TABLET ORAL AT BEDTIME
Refills: 0 | Status: DISCONTINUED | OUTPATIENT
Start: 2022-09-15 | End: 2022-09-17

## 2022-09-14 RX ORDER — INSULIN HUMAN 100 [IU]/ML
10 INJECTION, SOLUTION SUBCUTANEOUS
Qty: 100 | Refills: 0 | Status: DISCONTINUED | OUTPATIENT
Start: 2022-09-14 | End: 2022-09-14

## 2022-09-14 RX ORDER — FUROSEMIDE 40 MG
1 TABLET ORAL
Qty: 0 | Refills: 0 | DISCHARGE

## 2022-09-14 RX ORDER — DEXTROSE 50 % IN WATER 50 %
25 SYRINGE (ML) INTRAVENOUS
Refills: 0 | Status: DISCONTINUED | OUTPATIENT
Start: 2022-09-14 | End: 2022-09-17

## 2022-09-14 RX ADMIN — Medication 250 MILLIGRAM(S): at 16:40

## 2022-09-14 RX ADMIN — ATORVASTATIN CALCIUM 20 MILLIGRAM(S): 80 TABLET, FILM COATED ORAL at 21:14

## 2022-09-14 RX ADMIN — Medication 9 MICROGRAM(S)/MIN: at 15:36

## 2022-09-14 RX ADMIN — NICARDIPINE HYDROCHLORIDE 25 MG/HR: 30 CAPSULE, EXTENDED RELEASE ORAL at 16:08

## 2022-09-14 RX ADMIN — PANTOPRAZOLE SODIUM 40 MILLIGRAM(S): 20 TABLET, DELAYED RELEASE ORAL at 17:00

## 2022-09-14 RX ADMIN — SODIUM CHLORIDE 20 MILLILITER(S): 9 INJECTION INTRAMUSCULAR; INTRAVENOUS; SUBCUTANEOUS at 15:36

## 2022-09-14 RX ADMIN — Medication 100 MILLIGRAM(S): at 23:59

## 2022-09-14 RX ADMIN — CHLORHEXIDINE GLUCONATE 5 MILLILITER(S): 213 SOLUTION TOPICAL at 17:00

## 2022-09-14 RX ADMIN — RIVAROXABAN 20 MILLIGRAM(S): KIT at 16:38

## 2022-09-14 RX ADMIN — Medication 650 MILLIGRAM(S): at 17:00

## 2022-09-14 RX ADMIN — Medication 100 MILLIGRAM(S): at 16:40

## 2022-09-14 RX ADMIN — Medication 81 MILLIGRAM(S): at 17:00

## 2022-09-14 RX ADMIN — ONDANSETRON 4 MILLIGRAM(S): 8 TABLET, FILM COATED ORAL at 16:04

## 2022-09-14 RX ADMIN — Medication 650 MILLIGRAM(S): at 17:30

## 2022-09-14 NOTE — DISCHARGE NOTE PROVIDER - NSDCACTIVITY_GEN_ALL_CORE
Bathing allowed/Showering allowed/Stairs allowed/Driving allowed/Walking - Indoors allowed/No heavy lifting/straining/Walking - Outdoors allowed/Follow Instructions Provided by your Surgical Team

## 2022-09-14 NOTE — DISCHARGE NOTE PROVIDER - PROVIDER TOKENS
PROVIDER:[TOKEN:[[id number]]],PROVIDER:[TOKEN:[[id number]]],FREE:[LAST:[[facility]],PHONE:[phone]],FAX:[(   )    -],ADDRESS:[[address] [place] [address]],FOLLOWUP:[1 week]]

## 2022-09-14 NOTE — DISCHARGE NOTE NURSING/CASE MANAGEMENT/SOCIAL WORK - PATIENT PORTAL LINK FT
You can access the FollowMyHealth Patient Portal offered by [facility] by registering at the following website: [email or web address]. By joining [facility]s FollowMyHealth portal, you will also be able to view your health information using other applications (apps) compatible with our system.

## 2022-09-14 NOTE — PATIENT PROFILE ADULT - FALL HARM RISK - HARM RISK INTERVENTIONS

## 2022-09-14 NOTE — DISCHARGE NOTE PROVIDER - NSDCFUSCHEDAPPT_GEN_ALL_CORE_FT
[facility]  [address]  Scheduled Appointment: 10/06/2022    Deep Negron  [facility]  PULMED [address]  Scheduled Appointment: 10/14/2022     Aurelio Michele  [facility]  CARDIOLOGY [address]  Scheduled Appointment: 09/22/2022    [facility]  [address]  Scheduled Appointment: 10/06/2022    Deep Negron  [facility]  PULMED [address]  Scheduled Appointment: 10/14/2022

## 2022-09-14 NOTE — DISCHARGE NOTE PROVIDER - CARE PROVIDER_API CALL
Hanna Antoine)  Medicine  [address]  [place] [address]  Phone: [phone]  Fax: [phone]  Follow Up Time:     Tera Judd)  Cardiovascular Disease; Nuclear Cardiology  [address] [place] [address], [address]  [place] [address]  Phone: [phone]  Fax: [phone]  Follow Up Time:     [facility],   [address] [place] [address]  [address]  [place] [address]  Phone: [phone]  Fax: (   )    -  Follow Up Time: 1 week

## 2022-09-14 NOTE — DISCHARGE NOTE NURSING/CASE MANAGEMENT/SOCIAL WORK - NSDPACMPNY_GEN_ALL_CORE
Caregiver Dupixent Counseling: I discussed with the patient the risks of dupilumab including but not limited to eye infection and irritation, cold sores, injection site reactions, worsening of asthma, allergic reactions and increased risk of parasitic infection.  Live vaccines should be avoided while taking dupilumab. Dupilumab will also interact with certain medications such as warfarin and cyclosporine. The patient understands that monitoring is required and they must alert us or the primary physician if symptoms of infection or other concerning signs are noted.

## 2022-09-14 NOTE — DISCHARGE NOTE NURSING/CASE MANAGEMENT/SOCIAL WORK - NSDCPEFALRISK_GEN_ALL_CORE
For information on Fall & Injury Prevention, visit: [email or web address] OR  [email or web address] OR  https://www.cdc.gov/steadi/patient.html

## 2022-09-14 NOTE — DISCHARGE NOTE PROVIDER - NSDCMRMEDTOKEN_GEN_ALL_CORE_FT
atorvastatin 20 mg oral tablet: 1 tab(s) orally once a day (at bedtime)  metoprolol succinate 100 mg oral tablet, extended release: 1 tab(s) orally once a day  pantoprazole 40 mg oral delayed release tablet: 1 tab(s) orally 2 times a day   Xarelto 20 mg oral tablet: 1 tab(s) orally once a day (in the afternoon)   aspirin 81 mg oral delayed release tablet: 1 tab(s) orally once a day  atorvastatin 20 mg oral tablet: 1 tab(s) orally once a day (at bedtime)  metoprolol tartrate 25 mg oral tablet: 1 tab(s) orally 2 times a day  pantoprazole 40 mg oral delayed release tablet: 1 tab(s) orally once a day (before a meal)  rivaroxaban 10 mg oral tablet: 1 tab(s) orally once a day  tamsulosin 0.4 mg oral capsule: 1 cap(s) orally once a day (at bedtime)  torsemide 20 mg oral tablet: 1 tab(s) orally once a day

## 2022-09-14 NOTE — PATIENT PROFILE ADULT - ARRIVAL FROM
-- Message is from the [facility]--    Patient is requesting a medication refill - medication is on active medication list    Patient is currently OUT of the requested medication.    Was Medication Pended?  Yes.    Rx Name and Dose:  albuterol (VENTOLIN) (2.5 MG/3ML) 0.083% nebulizer solution  hydroCORTisone (CORTIZONE) 2.5 % cream    HYDROcodone-acetaminophen (NORCO)  MG per tablet  sulfamethoxazole-trimethoprim (BACTRIM DS,SEPTRA DS) 800-160 MG per tablet  Duration: 30 days    Pharmacy  [facility] [address] [place] [address] [address] [facility] [address]    Patient confirmed the above pharmacy as correct?  Yes    Caller Information       Type Contact Phone    03/29/2019 03:26 PM Phone (Incoming) Molina Menon (Self) [phone] (H)          Alternative phone number:     Turnaround time given to caller:   \"This message will be sent to [state Provider's name]. The clinical team will fulfill your request as soon as they review your message.\"   Home

## 2022-09-15 PROBLEM — D50.9 IRON DEFICIENCY ANEMIA, UNSPECIFIED: Chronic | Status: ACTIVE | Noted: 2022-09-09

## 2022-09-15 PROBLEM — Z86.79 PERSONAL HISTORY OF OTHER DISEASES OF THE CIRCULATORY SYSTEM: Chronic | Status: ACTIVE | Noted: 2022-09-09

## 2022-09-15 PROBLEM — N40.0 BENIGN PROSTATIC HYPERPLASIA WITHOUT LOWER URINARY TRACT SYMPTOMS: Chronic | Status: ACTIVE | Noted: 2022-09-09

## 2022-09-15 LAB
ALBUMIN SERPL ELPH-MCNC: 3.3 G/DL — LOW (ref 3.5–5.2)
ALP SERPL-CCNC: 65 U/L — SIGNIFICANT CHANGE UP (ref 30–115)
ALT FLD-CCNC: 15 U/L — SIGNIFICANT CHANGE UP (ref 0–41)
ANION GAP SERPL CALC-SCNC: 8 MMOL/L — SIGNIFICANT CHANGE UP (ref 7–14)
AST SERPL-CCNC: 25 U/L — SIGNIFICANT CHANGE UP (ref 0–41)
BASOPHILS # BLD AUTO: 0.02 K/UL — SIGNIFICANT CHANGE UP (ref 0–0.2)
BASOPHILS NFR BLD AUTO: 0.2 % — SIGNIFICANT CHANGE UP (ref 0–1)
BILIRUB SERPL-MCNC: 0.6 MG/DL — SIGNIFICANT CHANGE UP (ref 0.2–1.2)
BUN SERPL-MCNC: 14 MG/DL — SIGNIFICANT CHANGE UP (ref 10–20)
CALCIUM SERPL-MCNC: 8.1 MG/DL — LOW (ref 8.4–10.5)
CHLORIDE SERPL-SCNC: 106 MMOL/L — SIGNIFICANT CHANGE UP (ref 98–110)
CO2 SERPL-SCNC: 24 MMOL/L — SIGNIFICANT CHANGE UP (ref 17–32)
CREAT SERPL-MCNC: 0.9 MG/DL — SIGNIFICANT CHANGE UP (ref 0.7–1.5)
EGFR: 85 ML/MIN/1.73M2 — SIGNIFICANT CHANGE UP
EOSINOPHIL # BLD AUTO: 0.02 K/UL — SIGNIFICANT CHANGE UP (ref 0–0.7)
EOSINOPHIL NFR BLD AUTO: 0.2 % — SIGNIFICANT CHANGE UP (ref 0–8)
GLUCOSE SERPL-MCNC: 113 MG/DL — HIGH (ref 70–99)
HCT VFR BLD CALC: 32.7 % — LOW (ref 42–52)
HGB BLD-MCNC: 10.4 G/DL — LOW (ref 14–18)
IMM GRANULOCYTES NFR BLD AUTO: 0.1 % — SIGNIFICANT CHANGE UP (ref 0.1–0.3)
LYMPHOCYTES # BLD AUTO: 0.71 K/UL — LOW (ref 1.2–3.4)
LYMPHOCYTES # BLD AUTO: 8.5 % — LOW (ref 20.5–51.1)
MAGNESIUM SERPL-MCNC: 1.6 MG/DL — LOW (ref 1.8–2.4)
MCHC RBC-ENTMCNC: 24.2 PG — LOW (ref 27–31)
MCHC RBC-ENTMCNC: 31.8 G/DL — LOW (ref 32–37)
MCV RBC AUTO: 76 FL — LOW (ref 80–94)
MONOCYTES # BLD AUTO: 0.81 K/UL — HIGH (ref 0.1–0.6)
MONOCYTES NFR BLD AUTO: 9.6 % — HIGH (ref 1.7–9.3)
NEUTROPHILS # BLD AUTO: 6.83 K/UL — HIGH (ref 1.4–6.5)
NEUTROPHILS NFR BLD AUTO: 81.4 % — HIGH (ref 42.2–75.2)
NRBC # BLD: 0 /100 WBCS — SIGNIFICANT CHANGE UP (ref 0–0)
PLATELET # BLD AUTO: 164 K/UL — SIGNIFICANT CHANGE UP (ref 130–400)
POTASSIUM SERPL-MCNC: 4.2 MMOL/L — SIGNIFICANT CHANGE UP (ref 3.5–5)
POTASSIUM SERPL-SCNC: 4.2 MMOL/L — SIGNIFICANT CHANGE UP (ref 3.5–5)
PROT SERPL-MCNC: 5.8 G/DL — LOW (ref 6–8)
RBC # BLD: 4.3 M/UL — LOW (ref 4.7–6.1)
RBC # FLD: 22.4 % — HIGH (ref 11.5–14.5)
SODIUM SERPL-SCNC: 138 MMOL/L — SIGNIFICANT CHANGE UP (ref 135–146)
WBC # BLD: 8.4 K/UL — SIGNIFICANT CHANGE UP (ref 4.8–10.8)
WBC # FLD AUTO: 8.4 K/UL — SIGNIFICANT CHANGE UP (ref 4.8–10.8)

## 2022-09-15 PROCEDURE — 93306 TTE W/DOPPLER COMPLETE: CPT | Mod: 26

## 2022-09-15 PROCEDURE — 93010 ELECTROCARDIOGRAM REPORT: CPT

## 2022-09-15 PROCEDURE — 71045 X-RAY EXAM CHEST 1 VIEW: CPT | Mod: 26

## 2022-09-15 PROCEDURE — 99231 SBSQ HOSP IP/OBS SF/LOW 25: CPT

## 2022-09-15 RX ORDER — MAGNESIUM SULFATE 500 MG/ML
2 VIAL (ML) INJECTION ONCE
Refills: 0 | Status: COMPLETED | OUTPATIENT
Start: 2022-09-15 | End: 2022-09-15

## 2022-09-15 RX ORDER — TAMSULOSIN HYDROCHLORIDE 0.4 MG/1
0.4 CAPSULE ORAL AT BEDTIME
Refills: 0 | Status: DISCONTINUED | OUTPATIENT
Start: 2022-09-15 | End: 2022-09-17

## 2022-09-15 RX ORDER — FUROSEMIDE 40 MG
40 TABLET ORAL EVERY 12 HOURS
Refills: 0 | Status: DISCONTINUED | OUTPATIENT
Start: 2022-09-15 | End: 2022-09-16

## 2022-09-15 RX ADMIN — TAMSULOSIN HYDROCHLORIDE 0.4 MILLIGRAM(S): 0.4 CAPSULE ORAL at 11:35

## 2022-09-15 RX ADMIN — TAMSULOSIN HYDROCHLORIDE 0.4 MILLIGRAM(S): 0.4 CAPSULE ORAL at 21:14

## 2022-09-15 RX ADMIN — Medication 250 MILLIGRAM(S): at 05:26

## 2022-09-15 RX ADMIN — Medication 40 MILLIGRAM(S): at 17:48

## 2022-09-15 RX ADMIN — POLYETHYLENE GLYCOL 3350 17 GRAM(S): 17 POWDER, FOR SOLUTION ORAL at 11:35

## 2022-09-15 RX ADMIN — Medication 81 MILLIGRAM(S): at 11:35

## 2022-09-15 RX ADMIN — ATORVASTATIN CALCIUM 20 MILLIGRAM(S): 80 TABLET, FILM COATED ORAL at 21:14

## 2022-09-15 RX ADMIN — RIVAROXABAN 20 MILLIGRAM(S): KIT at 11:35

## 2022-09-15 RX ADMIN — SENNA PLUS 2 TABLET(S): 8.6 TABLET ORAL at 21:14

## 2022-09-15 RX ADMIN — Medication 100 MILLIGRAM(S): at 08:25

## 2022-09-15 RX ADMIN — PANTOPRAZOLE SODIUM 40 MILLIGRAM(S): 20 TABLET, DELAYED RELEASE ORAL at 06:11

## 2022-09-15 RX ADMIN — Medication 25 GRAM(S): at 06:23

## 2022-09-15 RX ADMIN — Medication 650 MILLIGRAM(S): at 11:34

## 2022-09-15 NOTE — PHYSICAL THERAPY INITIAL EVALUATION ADULT - NSACTIVITYREC_GEN_A_PT
Pt. instructed to perform long arc quads, ankle pumps, heel slides, as tolerated.   Pt encouraged frequent ambulation with assist and Incentive Spirometry (pulling 1000 cc today).

## 2022-09-15 NOTE — PHYSICAL THERAPY INITIAL EVALUATION ADULT - GAIT DEVIATIONS NOTED, PT EVAL
decreased hermelinda/decreased velocity of limb motion/decreased step length/decreased stride length

## 2022-09-15 NOTE — PHYSICAL THERAPY INITIAL EVALUATION ADULT - PERTINENT HX OF CURRENT PROBLEM, REHAB EVAL
The patient is an 82 y.o. fully functional male with hx of mitral  regurgitation. He went to the OR 9/14 for mitral clip.     Pt. referred to PT for eval and tx.

## 2022-09-15 NOTE — PHYSICAL THERAPY INITIAL EVALUATION ADULT - GENERAL OBSERVATIONS, REHAB EVAL
230-255 pm Chart reviewed. Pt. seen out of bed in bedside recliner , in No apparent distress , + IV lock, telemetry , c/o minimal pain on incision area, Pt. agreed to activity/therapy.

## 2022-09-15 NOTE — PROGRESS NOTE ADULT - SUBJECTIVE AND OBJECTIVE BOX
CTU Attending Progress Daily Note     15 Sep 2022 08:16    Procedure:                                                  POD#                   Patient seen as post-op critical care follow-up    HPI:    See preop testing chart H&P    Interval event for past 24 hr:  MIGEL MOYA  82y had no event.     Current Complains:  MIGEL MOYA has no new complaints    REVIEW OF SYSTEMS:  CONSTITUTIONAL:  [-] weakness, [-] fevers, [-] chills  EYES/ENT: [-] visual changes, [-] vertigo, [-] throat pain   NECK: [-] pain, [-] stiffness  RESPIRATORY: [-] cough, [-] wheezing, [-] hemoptysis, [-] shortness of breath  CARDIOVASCULAR: [-] chest pain, [-] palpitations, [-] orthopnea  GASTROINTESTINAL:    [-]abdominal pain, [-] nausea, [-] vomiting, [-] hematemesis, [-] diarrhea, [-] constipation, [-] melena, [-] hematochezia.  GENITOURINARY: [-] dysuria, [-] frequency, [-] hematuria  NEUROLOGICAL: [-] numbness, [-] weakness  SKIN: [-] itching, [-] burning, [-] rashes, [-] lesions   All other review of systems is negative unless indicated above.    [  ] Unable to assess ROS because :    OBJECTIVE:  ICU Vital Signs Last 24 Hrs  T(C): 36.2 (15 Sep 2022 04:00), Max: 36.4 (14 Sep 2022 16:00)  T(F): 97.1 (15 Sep 2022 04:00), Max: 97.6 (14 Sep 2022 16:00)  HR: 80 (15 Sep 2022 07:00) (67 - 89)  BP: 104/71 (15 Sep 2022 07:00) (79/53 - 127/87)  BP(mean): 83 (15 Sep 2022 07:00) (61 - 103)  ABP: 121/60 (15 Sep 2022 07:00) (93/58 - 141/81)  ABP(mean): 76 (15 Sep 2022 07:00) (61 - 100)  RR: 20 (15 Sep 2022 07:00) (14 - 28)  SpO2: 95% (15 Sep 2022 07:00) (94% - 100%)    O2 Parameters below as of 15 Sep 2022 07:00  Patient On (Oxygen Delivery Method): nasal cannula  O2 Flow (L/min): 2          I&O's Summary    14 Sep 2022 07:01  -  15 Sep 2022 07:00  --------------------------------------------------------  IN: 1811 mL / OUT: 665 mL / NET: 1146 mL      Adult Advanced Hemodynamics Last 24 Hrs  CVP(mm Hg): --  CVP(cm H2O): --  CO: --  CI: --  PA: --  PA(mean): --  PCWP: --  SVR: --  SVRI: --  PVR: --  PVRI: --      PHYSICAL EXAM:  General: WN/WD NAD    HEENT:     [+] NCAT  [+] EOMI  [-] Conjuctival edema   [-] Icterus   [-] Thrush   [-] ETT  [-] NGT/OGT    Neck:         [+] FROM   [-] JVD     [-] Nodes     [-] Masses    [+] Mid-line trachea    [-] Tracheostomy    Chest:         [-] Sternal click   [-] Sternal drainage   [+] Pacing wires   [+] Chest tubes   [-] SubQ emphysema    Lungs:          [+] CTA   [-] Rhonchi   [-] Rales    [-] Wheezing    [-] Decreased BS    [-] Dullness R L    Cardiac:       [+] S1 [+] S2    [+] RRR   [-] Irregular   [-] S3   [-] S4    [-] Murmurs    [-] Rub    Abdomen:    [+] BS    [+] Soft    [+] Non-tender     [-] Distended    [-] Organomegaly  [-] PEG    Extremities:   [-] Cyanosis U/L   [-] Clubbing  U/L  [-] LE/UE Edema   [+] Capillary refill    [+] Pulses     Neuro:        [+] Awake   [+]  Alert   [-] Confused   [-] Lethargic   [-] Sedated   [-] Generalized Weakness    Skin:        [-] Rashes    [-] Erythema   [+] Normal incisions   [+] IV sites intact   [-] Sacral decubitus    Tubes:  LINES:    CAPILLARY BLOOD GLUCOSE      POCT Blood Glucose.: 135 mg/dL (14 Sep 2022 22:30)    CAPILLARY BLOOD GLUCOSE      POCT Blood Glucose.: 135 mg/dL (14 Sep 2022 22:30)  POCT Blood Glucose.: 83 mg/dL (14 Sep 2022 19:03)  POCT Blood Glucose.: 94 mg/dL (14 Sep 2022 18:16)  POCT Blood Glucose.: 119 mg/dL (14 Sep 2022 17:03)  POCT Blood Glucose.: 108 mg/dL (14 Sep 2022 15:43)  POCT Blood Glucose.: 99 mg/dL (14 Sep 2022 14:28)      HOSPITAL MEDICATIONS:  MEDICATIONS  (STANDING):  acetaminophen     Tablet .. 650 milliGRAM(s) Oral every 6 hours  aspirin enteric coated 81 milliGRAM(s) Oral daily  atorvastatin 20 milliGRAM(s) Oral at bedtime  ceFAZolin   IVPB 2000 milliGRAM(s) IV Intermittent every 8 hours  chlorhexidine 0.12% Liquid 5 milliLiter(s) Oral Mucosa two times a day  dextrose 50% Injectable 50 milliLiter(s) IV Push every 15 minutes  dextrose 50% Injectable 25 milliLiter(s) IV Push every 15 minutes  influenza  Vaccine (HIGH DOSE) 0.7 milliLiter(s) IntraMuscular once  insulin regular Infusion 1 Unit(s)/Hr (1 mL/Hr) IV Continuous <Continuous>  meperidine     Injectable 25 milliGRAM(s) IV Push once  niCARdipine Infusion 5 mG/Hr (25 mL/Hr) IV Continuous <Continuous>  nitroglycerin  Infusion 30 MICROgram(s)/Min (9 mL/Hr) IV Continuous <Continuous>  norepinephrine Infusion 0.05 MICROgram(s)/kG/Min (8.53 mL/Hr) IV Continuous <Continuous>  pantoprazole    Tablet 40 milliGRAM(s) Oral before breakfast  polyethylene glycol 3350 17 Gram(s) Oral daily  rivaroxaban 20 milliGRAM(s) Oral daily  senna 2 Tablet(s) Oral at bedtime  sodium chloride 0.9%. 1000 milliLiter(s) (20 mL/Hr) IV Continuous <Continuous>  vasopressin Infusion 0.04 Unit(s)/Min (2.4 mL/Hr) IV Continuous <Continuous>    MEDICATIONS  (PRN):  HYDROmorphone  Injectable 0.5 milliGRAM(s) IV Push every 6 hours PRN Breakthrough Pain  oxyCODONE    IR 5 milliGRAM(s) Oral every 4 hours PRN Moderate Pain (4 - 6)  oxyCODONE    IR 10 milliGRAM(s) Oral every 4 hours PRN Severe Pain (7 - 10)      LABS:  ABG - ( 15 Sep 2022 02:09 )  pH, Arterial: 7.43  pH, Blood: x     /  pCO2: 36    /  pO2: 97    / HCO3: 24    / Base Excess: -0.2  /  SaO2: 99.3                                    10.4   8.40  )-----------( 164      ( 15 Sep 2022 02:46 )             32.7     09-15    138  |  106  |  14  ----------------------------<  113<H>  4.2   |  24  |  0.9    Ca    8.1<L>      15 Sep 2022 02:46  Mg     1.6     09-15    TPro  5.8<L>  /  Alb  3.3<L>  /  TBili  0.6  /  DBili  x   /  AST  25  /  ALT  15  /  AlkPhos  65  09-15    PT/INR - ( 14 Sep 2022 14:40 )   PT: 17.00 sec;   INR: 1.48 ratio         PTT - ( 14 Sep 2022 14:40 )  PTT:37.9 sec        RADIOLOGY:  Reviewed and interpreted by me  CXR from 09-15-22 shows [+] mild congestion, [-] pneumothorax, [-] R/L effusion, [-] cardiomegaly,   NGT in place, S-G Catheter in place, R/L TLC in place, R/L Chest Tubes in place    ECG:  Reviewed and interpreted by me:   QTC:    Assessment:      PAST MEDICAL & SURGICAL HISTORY:  HTN (hypertension)      High cholesterol      GERD (gastroesophageal reflux disease)      Atrial fibrillation      CAD (coronary artery disease)  s/p PCI      JONAH (iron deficiency anemia)      BPH (benign prostatic hyperplasia)      H/O CHF      S/P coronary angioplasty  s/p pci          PLAN:  Neuro: Pain control  Pulm: Encourage coughing, deep breathing and use of incentive spirometry. Wean off supplemental oxygen as able. Daily CXR.   Cardio: Monitor telemetry/alarms. Continue cardiac meds  GI: Tolerating diet. Continue stool softeners. Continue GI prophylaxis  Renal: monitor urine output, supplement electrolytes as needed  Vasc: Heparin SC/SCDs for DVT prophylaxis  Heme: Monitor H/H.   ID: Off antibiotics. Stable.  Endocrine: Monitor finger stick blood sugar and control hyperglycemia with insulin  Physical Therapy: OOB/ambulate  Tubes: Monitor Chest tube output      Discussed with Cardiothoracic Team at AM rounds.    45 minutes of critical care time spent providing medical care for patient's acute illness/conditions that impairs at least one vital organ system and/or poses a high risk of imminent or life threatening deterioration in the patient's condition. It includes time spent evaluating and treating the patient's acute illness as well as time spent reviewing labs, radiology, discussing goals of care with patient and/or patient's family, and discussing the case with a multidisciplinary team in an effort to prevent further life threatening deterioration or end organ damage. This time is independent of any procedures performed. CTU Attending Progress Daily Note     15 Sep 2022 08:16    Procedure:    Mitral Clip                                              POD#       1            Patient seen as post-op critical care follow-up    HPI:    See preop testing chart H&P    Interval event for past 24 hr:  MIGEL MOYA  82y had no event.     Current Complains:  MIGEL MOYA has no new complaints    REVIEW OF SYSTEMS:  CONSTITUTIONAL:  [-] weakness, [-] fevers, [-] chills  EYES/ENT: [-] visual changes, [-] vertigo, [-] throat pain   NECK: [-] pain, [-] stiffness  RESPIRATORY: [-] cough, [-] wheezing, [-] hemoptysis, [-] shortness of breath  CARDIOVASCULAR: [-] chest pain, [-] palpitations, [-] orthopnea  GASTROINTESTINAL:    [-]abdominal pain, [-] nausea, [-] vomiting, [-] hematemesis, [-] diarrhea, [-] constipation, [-] melena, [-] hematochezia.  GENITOURINARY: [-] dysuria, [-] frequency, [-] hematuria  NEUROLOGICAL: [-] numbness, [-] weakness  SKIN: [-] itching, [-] burning, [-] rashes, [-] lesions   All other review of systems is negative unless indicated above.    [  ] Unable to assess ROS because :    OBJECTIVE:  ICU Vital Signs Last 24 Hrs  T(C): 36.2 (15 Sep 2022 04:00), Max: 36.4 (14 Sep 2022 16:00)  T(F): 97.1 (15 Sep 2022 04:00), Max: 97.6 (14 Sep 2022 16:00)  HR: 80 (15 Sep 2022 07:00) (67 - 89)  BP: 104/71 (15 Sep 2022 07:00) (79/53 - 127/87)  BP(mean): 83 (15 Sep 2022 07:00) (61 - 103)  ABP: 121/60 (15 Sep 2022 07:00) (93/58 - 141/81)  ABP(mean): 76 (15 Sep 2022 07:00) (61 - 100)  RR: 20 (15 Sep 2022 07:00) (14 - 28)  SpO2: 95% (15 Sep 2022 07:00) (94% - 100%)    O2 Parameters below as of 15 Sep 2022 07:00  Patient On (Oxygen Delivery Method): nasal cannula  O2 Flow (L/min): 2          I&O's Summary    14 Sep 2022 07:01  -  15 Sep 2022 07:00  --------------------------------------------------------  IN: 1811 mL / OUT: 665 mL / NET: 1146 mL      PHYSICAL EXAM:  General: WN/WD NAD    HEENT:     [+] NCAT  [+] EOMI  [-] Conjuctival edema   [-] Icterus   [-] Thrush   [-] ETT  [-] NGT/OGT    Neck:         [+] FROM   [-] JVD     [-] Nodes     [-] Masses    [+] Mid-line trachea    [-] Tracheostomy    Chest:           [-] SubQ emphysema    Lungs:          [+] CTA   [-] Rhonchi   [-] Rales    [-] Wheezing    [-] Decreased BS    [-] Dullness R L    Cardiac:       [+] S1 [+] S2    [+] RRR   [-] Irregular   [-] S3   [-] S4    [-] Murmurs    [-] Rub    Abdomen:    [+] BS    [+] Soft    [+] Non-tender     [-] Distended    [-] Organomegaly  [-] PEG    Extremities:   [-] Cyanosis U/L   [-] Clubbing  U/L  [-] LE/UE Edema   [+] Capillary refill    [+] Pulses     Neuro:        [+] Awake   [+]  Alert   [-] Confused   [-] Lethargic   [-] Sedated   [-] Generalized Weakness    Skin:        [-] Rashes    [-] Erythema   [+] Normal incisions   [+] IV sites intact   [-] Sacral decubitus    Tubes:  LINES:    CAPILLARY BLOOD GLUCOSE      POCT Blood Glucose.: 135 mg/dL (14 Sep 2022 22:30)    CAPILLARY BLOOD GLUCOSE      POCT Blood Glucose.: 135 mg/dL (14 Sep 2022 22:30)  POCT Blood Glucose.: 83 mg/dL (14 Sep 2022 19:03)  POCT Blood Glucose.: 94 mg/dL (14 Sep 2022 18:16)  POCT Blood Glucose.: 119 mg/dL (14 Sep 2022 17:03)  POCT Blood Glucose.: 108 mg/dL (14 Sep 2022 15:43)  POCT Blood Glucose.: 99 mg/dL (14 Sep 2022 14:28)      HOSPITAL MEDICATIONS:  MEDICATIONS  (STANDING):  acetaminophen     Tablet .. 650 milliGRAM(s) Oral every 6 hours  aspirin enteric coated 81 milliGRAM(s) Oral daily  atorvastatin 20 milliGRAM(s) Oral at bedtime  ceFAZolin   IVPB 2000 milliGRAM(s) IV Intermittent every 8 hours  chlorhexidine 0.12% Liquid 5 milliLiter(s) Oral Mucosa two times a day  dextrose 50% Injectable 50 milliLiter(s) IV Push every 15 minutes  dextrose 50% Injectable 25 milliLiter(s) IV Push every 15 minutes  influenza  Vaccine (HIGH DOSE) 0.7 milliLiter(s) IntraMuscular once  insulin regular Infusion 1 Unit(s)/Hr (1 mL/Hr) IV Continuous <Continuous>  meperidine     Injectable 25 milliGRAM(s) IV Push once  niCARdipine Infusion 5 mG/Hr (25 mL/Hr) IV Continuous <Continuous>  nitroglycerin  Infusion 30 MICROgram(s)/Min (9 mL/Hr) IV Continuous <Continuous>  norepinephrine Infusion 0.05 MICROgram(s)/kG/Min (8.53 mL/Hr) IV Continuous <Continuous>  pantoprazole    Tablet 40 milliGRAM(s) Oral before breakfast  polyethylene glycol 3350 17 Gram(s) Oral daily  rivaroxaban 20 milliGRAM(s) Oral daily  senna 2 Tablet(s) Oral at bedtime  sodium chloride 0.9%. 1000 milliLiter(s) (20 mL/Hr) IV Continuous <Continuous>  vasopressin Infusion 0.04 Unit(s)/Min (2.4 mL/Hr) IV Continuous <Continuous>    MEDICATIONS  (PRN):  HYDROmorphone  Injectable 0.5 milliGRAM(s) IV Push every 6 hours PRN Breakthrough Pain  oxyCODONE    IR 5 milliGRAM(s) Oral every 4 hours PRN Moderate Pain (4 - 6)  oxyCODONE    IR 10 milliGRAM(s) Oral every 4 hours PRN Severe Pain (7 - 10)      LABS:  ABG - ( 15 Sep 2022 02:09 )  pH, Arterial: 7.43  pH, Blood: x     /  pCO2: 36    /  pO2: 97    / HCO3: 24    / Base Excess: -0.2  /  SaO2: 99.3                                    10.4   8.40  )-----------( 164      ( 15 Sep 2022 02:46 )             32.7     09-15    138  |  106  |  14  ----------------------------<  113<H>  4.2   |  24  |  0.9    Ca    8.1<L>      15 Sep 2022 02:46  Mg     1.6     09-15    TPro  5.8<L>  /  Alb  3.3<L>  /  TBili  0.6  /  DBili  x   /  AST  25  /  ALT  15  /  AlkPhos  65  09-15    PT/INR - ( 14 Sep 2022 14:40 )   PT: 17.00 sec;   INR: 1.48 ratio         PTT - ( 14 Sep 2022 14:40 )  PTT:37.9 sec        RADIOLOGY:    < from: Xray Chest 1 View-PORTABLE IMMEDIATE (Xray Chest 1 View-PORTABLE IMMEDIATE .) (09.14.22 @ 16:11) >  Impression:    Bilateral opacities.  Post mitral valve replacement.    < end of copied text >      ECG:  Reviewed and interpreted by me: NSR 84 + RBBB  QTC: 519    Assessment:  SP mitral clip  RBBB  Prolonged QTC    PAST MEDICAL & SURGICAL HISTORY:  HTN (hypertension)      High cholesterol      GERD (gastroesophageal reflux disease)      Atrial fibrillation      CAD (coronary artery disease)  s/p PCI      JONAH (iron deficiency anemia)      BPH (benign prostatic hyperplasia)      H/O CHF      S/P coronary angioplasty  s/p pci          PLAN:  Neuro: Pain control  Pulm: Encourage coughing, deep breathing and use of incentive spirometry.   Cardio: Monitor telemetry/alarms. echo, monitor QTC - keep mag > 2  GI: Tolerating diet. Continue stool softeners. Continue GI prophylaxis  Renal: monitor urine output, supplement electrolytes as needed  Vasc: DVT prophylaxis, on XArelto  Heme: Monitor H/H.   ID: Off antibiotics. Stable.  Physical Therapy: OOB/ambulate        Discussed with Cardiothoracic Team at AM rounds.

## 2022-09-15 NOTE — PHYSICAL THERAPY INITIAL EVALUATION ADULT - SPECIFY REASON(S)
147 pm Pt approached at bedside for eval, cleared for OOB/ ambulation ;  however, declined at this time ; requesting to be seen later. Will f/u if time allows.
Hold therapy at this time while waiting for femoral sheath removal. Will f/u.

## 2022-09-15 NOTE — PROGRESS NOTE ADULT - SUBJECTIVE AND OBJECTIVE BOX
OPERATIVE PROCEDURE(s):    Mitral Clip            POD #1                       82yMale  SURGEON(s): YONI Hall  SUBJECTIVE ASSESSMENT:  Patient has no complaints at this time.    Vital Signs Last 24 Hrs  T(F): 97.8 (15 Sep 2022 12:00), Max: 97.8 (15 Sep 2022 12:00)  HR: 95 (15 Sep 2022 15:00) (69 - 97)  BP: 115/85 (15 Sep 2022 15:00) (79/53 - 115/85)  BP(mean): 91 (15 Sep 2022 15:00) (61 - 91)  ABP: 123/60 (15 Sep 2022 09:00) (93/58 - 123/67)  ABP(mean): 77 (15 Sep 2022 09:00)  RR: 22 (15 Sep 2022 15:00) (14 - 28)  SpO2: 97% (15 Sep 2022 15:00) (88% - 99%)      I&O's Detail    14 Sep 2022 07:01  -  15 Sep 2022 07:00  --------------------------------------------------------  IN:    Insulin: 3 mL    IV PiggyBack: 650 mL    NiCARdipine: 155 mL    Nitroglycerin: 83 mL    Oral Fluid: 680 mL    sodium chloride 0.9%: 240 mL  Total IN: 1811 mL    OUT:    Indwelling Catheter - Urethral (mL): 665 mL    Norepinephrine: 0 mL    Vasopressin: 0 mL  Total OUT: 665 mL        Net:   I&O's Detail    14 Sep 2022 07:01  -  15 Sep 2022 07:00  --------------------------------------------------------  Total NET: 1146 mL        CAPILLARY BLOOD GLUCOSE  POCT Blood Glucose.: 135 mg/dL (14 Sep 2022 22:30)  POCT Blood Glucose.: 83 mg/dL (14 Sep 2022 19:03)  POCT Blood Glucose.: 94 mg/dL (14 Sep 2022 18:16)  POCT Blood Glucose.: 119 mg/dL (14 Sep 2022 17:03)    Physical Exam:  General: NAD; A&Ox3  Cardiac: S1/S2, RRR, no murmur, no rubs  Lungs: unlabored respirations, CTA b/l, no wheeze, no rales, no crackles  Abdomen: Soft/NT/ND; positive bowel sounds x 4  Extremities: No edema b/l lower extremities; good capillary refill; no cyanosis; palpable 1+ pedal pulses b/l        LABS:                        10.4<L>  8.40  )-----------( 164      ( 15 Sep 2022 02:46 )             32.7<L>                        12.0<L>  10.00 )-----------( 208      ( 14 Sep 2022 14:40 )             38.9<L>    15    138  |  106  |  14  ----------------------------<  113<H>  4.2   |  24  |  0.9      139  |  107  |  11  ----------------------------<  93  4.1   |  21  |  0.9    Ca    8.1<L>      15 Sep 2022 02:46  Mg     1.6     09-15    TPro  5.8<L> [6.0 - 8.0]  /  Alb  3.3<L> [3.5 - 5.2]  /  TBili  0.6 [0.2 - 1.2]  /  DBili  x   /  AST  25 [0 - 41]  /  ALT  15 [0 - 41]  /  AlkPhos  65 [30 - 115]  -15    PT/INR - ( 14 Sep 2022 14:40 )   PT: ;   INR: 1.48 ratio         PT/INR - ( 14 Sep 2022 08:18 )   PT: ;   INR: 1.33 ratio         PTT - ( 14 Sep 2022 14:40 )  PTT:37.9 sec    ABG - ( 15 Sep 2022 02:09 )  pH: 7.43  /  pCO2: 36    /  pO2: 97    / HCO3: 24    / Base Excess: -0.2  /  SaO2: 99.3  /  LA: 1.00       RADIOLOGY & ADDITIONAL TESTS:  CXR: Xray Chest 1 View- PORTABLE-Routine:   ACC: [id number] EXAM:  XR CHEST PORTABLE ROUTINE 1V                          PROCEDURE DATE:  09/15/2022          INTERPRETATION:  Clinical History / Reason for exam: Post mitral valve   replacement.    Comparison : Chest radiograph 2022.    Technique/Positioning: Single portable AP radiograph of the chest..    Findings:    Support devices: None.    Cardiac/mediastinum/hilum: Unchanged    Lung parenchyma/Pleura: No appreciable change to bilateral opacities. No   pneumothorax    Skeleton/softtissues: Unchanged    Impression:    Unchanged bilateral opacities.        --- End of Report ---      CARL YI MD; Attending Radiologist  This document has been electronically signed. Sep 15 2022  9:21AM (09-15-22 @ 06:08)    EK Lead ECG:   Ventricular Rate 84 BPM    Atrial Rate 84 BPM    P-R Interval 148 ms    QRS Duration 132 ms    Q-T Interval 440 ms    QTC Calculation(Bazett) 519 ms    P Axis 77 degrees    R Axis 85 degrees    T Axis -7 degrees    Diagnosis Line Normal sinus rhythm  Right bundle branch block  Abnormal ECG    Confirmed by Amish Siddiqui ([id number]) on 9/15/2022 8:49:53 AM (09-15-22 @ 07:23)    MEDICATIONS  (STANDING):  acetaminophen     Tablet .. 650 milliGRAM(s) Oral every 6 hours  aspirin enteric coated 81 milliGRAM(s) Oral daily  atorvastatin 20 milliGRAM(s) Oral at bedtime  chlorhexidine 0.12% Liquid 5 milliLiter(s) Oral Mucosa two times a day  dextrose 50% Injectable 50 milliLiter(s) IV Push every 15 minutes  dextrose 50% Injectable 25 milliLiter(s) IV Push every 15 minutes  influenza  Vaccine (HIGH DOSE) 0.7 milliLiter(s) IntraMuscular once  meperidine     Injectable 25 milliGRAM(s) IV Push once  pantoprazole    Tablet 40 milliGRAM(s) Oral before breakfast  polyethylene glycol 3350 17 Gram(s) Oral daily  rivaroxaban 20 milliGRAM(s) Oral daily  senna 2 Tablet(s) Oral at bedtime  sodium chloride 0.9%. 1000 milliLiter(s) (20 mL/Hr) IV Continuous <Continuous>  tamsulosin 0.4 milliGRAM(s) Oral at bedtime    MEDICATIONS  (PRN):  HYDROmorphone  Injectable 0.5 milliGRAM(s) IV Push every 6 hours PRN Breakthrough Pain  oxyCODONE    IR 5 milliGRAM(s) Oral every 4 hours PRN Moderate Pain (4 - 6)  oxyCODONE    IR 10 milliGRAM(s) Oral every 4 hours PRN Severe Pain (7 - 10)      Allergies:  No Known Allergies      Ambulation/Activity Status: Ambulates several times daily with assistance.    Assessment/Plan:  82y Male status-post Mitral Clip  - Case and plan discussed with CTU Intensivist and CT Surgeon - Dr. Gaffney/Fredy/Rafael  - Continue CTU supportive care    - Continue DVT/GI prophylaxis  - Incentive Spirometry 10 times an hour  - Continue to advance physical activity as tolerated and continue PT/OT as directed  Neuro: Pain control  Pulm: Encourage coughing, deep breathing and use of incentive spirometry.   Cardio: Monitor telemetry/alarms. echo, monitor QTC - keep mag > 2  GI: Tolerating diet. Continue stool softeners. Continue GI prophylaxis  Renal: monitor urine output, supplement electrolytes as needed; Lasix 40mg IV q12h  Vasc: DVT prophylaxis, on XArelto  Heme: Monitor H/H.   ID: Off antibiotics. Stable.  Physical Therapy: OOB/ambulate

## 2022-09-16 LAB
ALBUMIN SERPL ELPH-MCNC: 3.3 G/DL — LOW (ref 3.5–5.2)
ALP SERPL-CCNC: 73 U/L — SIGNIFICANT CHANGE UP (ref 30–115)
ALT FLD-CCNC: 12 U/L — SIGNIFICANT CHANGE UP (ref 0–41)
ANION GAP SERPL CALC-SCNC: 10 MMOL/L — SIGNIFICANT CHANGE UP (ref 7–14)
APTT BLD: 41.8 SEC — HIGH (ref 27–39.2)
AST SERPL-CCNC: 25 U/L — SIGNIFICANT CHANGE UP (ref 0–41)
BASOPHILS # BLD AUTO: 0.02 K/UL — SIGNIFICANT CHANGE UP (ref 0–0.2)
BASOPHILS NFR BLD AUTO: 0.3 % — SIGNIFICANT CHANGE UP (ref 0–1)
BILIRUB SERPL-MCNC: 0.6 MG/DL — SIGNIFICANT CHANGE UP (ref 0.2–1.2)
BUN SERPL-MCNC: 14 MG/DL — SIGNIFICANT CHANGE UP (ref 10–20)
CALCIUM SERPL-MCNC: 8.6 MG/DL — SIGNIFICANT CHANGE UP (ref 8.4–10.5)
CHLORIDE SERPL-SCNC: 104 MMOL/L — SIGNIFICANT CHANGE UP (ref 98–110)
CO2 SERPL-SCNC: 27 MMOL/L — SIGNIFICANT CHANGE UP (ref 17–32)
CREAT SERPL-MCNC: 1.1 MG/DL — SIGNIFICANT CHANGE UP (ref 0.7–1.5)
EGFR: 67 ML/MIN/1.73M2 — SIGNIFICANT CHANGE UP
EOSINOPHIL # BLD AUTO: 0.09 K/UL — SIGNIFICANT CHANGE UP (ref 0–0.7)
EOSINOPHIL NFR BLD AUTO: 1.4 % — SIGNIFICANT CHANGE UP (ref 0–8)
GLUCOSE SERPL-MCNC: 118 MG/DL — HIGH (ref 70–99)
HCT VFR BLD CALC: 36.1 % — LOW (ref 42–52)
HGB BLD-MCNC: 11.5 G/DL — LOW (ref 14–18)
IMM GRANULOCYTES NFR BLD AUTO: 0.3 % — SIGNIFICANT CHANGE UP (ref 0.1–0.3)
INR BLD: 3.69 RATIO — HIGH (ref 0.65–1.3)
LYMPHOCYTES # BLD AUTO: 0.73 K/UL — LOW (ref 1.2–3.4)
LYMPHOCYTES # BLD AUTO: 11.8 % — LOW (ref 20.5–51.1)
MAGNESIUM SERPL-MCNC: 1.8 MG/DL — SIGNIFICANT CHANGE UP (ref 1.8–2.4)
MCHC RBC-ENTMCNC: 24.5 PG — LOW (ref 27–31)
MCHC RBC-ENTMCNC: 31.9 G/DL — LOW (ref 32–37)
MCV RBC AUTO: 77 FL — LOW (ref 80–94)
MONOCYTES # BLD AUTO: 0.65 K/UL — HIGH (ref 0.1–0.6)
MONOCYTES NFR BLD AUTO: 10.5 % — HIGH (ref 1.7–9.3)
NEUTROPHILS # BLD AUTO: 4.7 K/UL — SIGNIFICANT CHANGE UP (ref 1.4–6.5)
NEUTROPHILS NFR BLD AUTO: 75.7 % — HIGH (ref 42.2–75.2)
NRBC # BLD: 0 /100 WBCS — SIGNIFICANT CHANGE UP (ref 0–0)
PLATELET # BLD AUTO: 178 K/UL — SIGNIFICANT CHANGE UP (ref 130–400)
POTASSIUM SERPL-MCNC: 3.9 MMOL/L — SIGNIFICANT CHANGE UP (ref 3.5–5)
POTASSIUM SERPL-SCNC: 3.9 MMOL/L — SIGNIFICANT CHANGE UP (ref 3.5–5)
PROT SERPL-MCNC: 6.3 G/DL — SIGNIFICANT CHANGE UP (ref 6–8)
PROTHROM AB SERPL-ACNC: >40 SEC — HIGH (ref 9.95–12.87)
RBC # BLD: 4.69 M/UL — LOW (ref 4.7–6.1)
RBC # FLD: 22.5 % — HIGH (ref 11.5–14.5)
SODIUM SERPL-SCNC: 141 MMOL/L — SIGNIFICANT CHANGE UP (ref 135–146)
WBC # BLD: 6.21 K/UL — SIGNIFICANT CHANGE UP (ref 4.8–10.8)
WBC # FLD AUTO: 6.21 K/UL — SIGNIFICANT CHANGE UP (ref 4.8–10.8)

## 2022-09-16 PROCEDURE — 93010 ELECTROCARDIOGRAM REPORT: CPT

## 2022-09-16 PROCEDURE — 71045 X-RAY EXAM CHEST 1 VIEW: CPT | Mod: 26

## 2022-09-16 RX ORDER — POTASSIUM CHLORIDE 20 MEQ
20 PACKET (EA) ORAL ONCE
Refills: 0 | Status: COMPLETED | OUTPATIENT
Start: 2022-09-16 | End: 2022-09-16

## 2022-09-16 RX ORDER — DILTIAZEM HCL 120 MG
10 CAPSULE, EXT RELEASE 24 HR ORAL ONCE
Refills: 0 | Status: COMPLETED | OUTPATIENT
Start: 2022-09-16 | End: 2022-09-16

## 2022-09-16 RX ORDER — METOPROLOL TARTRATE 50 MG
5 TABLET ORAL ONCE
Refills: 0 | Status: COMPLETED | OUTPATIENT
Start: 2022-09-16 | End: 2022-09-16

## 2022-09-16 RX ORDER — DILTIAZEM HCL 120 MG
15 CAPSULE, EXT RELEASE 24 HR ORAL ONCE
Refills: 0 | Status: COMPLETED | OUTPATIENT
Start: 2022-09-16 | End: 2022-09-16

## 2022-09-16 RX ORDER — DIGOXIN 250 MCG
500 TABLET ORAL ONCE
Refills: 0 | Status: COMPLETED | OUTPATIENT
Start: 2022-09-16 | End: 2022-09-16

## 2022-09-16 RX ORDER — METOPROLOL TARTRATE 50 MG
25 TABLET ORAL
Refills: 0 | Status: DISCONTINUED | OUTPATIENT
Start: 2022-09-16 | End: 2022-09-17

## 2022-09-16 RX ORDER — METOPROLOL TARTRATE 50 MG
25 TABLET ORAL ONCE
Refills: 0 | Status: COMPLETED | OUTPATIENT
Start: 2022-09-16 | End: 2022-09-16

## 2022-09-16 RX ADMIN — Medication 81 MILLIGRAM(S): at 11:48

## 2022-09-16 RX ADMIN — Medication 500 MICROGRAM(S): at 10:25

## 2022-09-16 RX ADMIN — Medication 650 MILLIGRAM(S): at 18:30

## 2022-09-16 RX ADMIN — Medication 5 MILLIGRAM(S): at 11:20

## 2022-09-16 RX ADMIN — Medication 10 MILLIGRAM(S): at 09:25

## 2022-09-16 RX ADMIN — Medication 40 MILLIGRAM(S): at 06:00

## 2022-09-16 RX ADMIN — Medication 25 MILLIGRAM(S): at 18:03

## 2022-09-16 RX ADMIN — Medication 20 MILLIEQUIVALENT(S): at 09:00

## 2022-09-16 RX ADMIN — POLYETHYLENE GLYCOL 3350 17 GRAM(S): 17 POWDER, FOR SOLUTION ORAL at 11:48

## 2022-09-16 RX ADMIN — Medication 25 MILLIGRAM(S): at 09:27

## 2022-09-16 RX ADMIN — PANTOPRAZOLE SODIUM 40 MILLIGRAM(S): 20 TABLET, DELAYED RELEASE ORAL at 06:00

## 2022-09-16 RX ADMIN — Medication 15 MILLIGRAM(S): at 09:27

## 2022-09-16 RX ADMIN — Medication 650 MILLIGRAM(S): at 18:02

## 2022-09-16 RX ADMIN — ATORVASTATIN CALCIUM 20 MILLIGRAM(S): 80 TABLET, FILM COATED ORAL at 21:22

## 2022-09-16 RX ADMIN — Medication 20 MILLIEQUIVALENT(S): at 10:25

## 2022-09-16 RX ADMIN — Medication 650 MILLIGRAM(S): at 11:48

## 2022-09-16 RX ADMIN — TAMSULOSIN HYDROCHLORIDE 0.4 MILLIGRAM(S): 0.4 CAPSULE ORAL at 21:22

## 2022-09-16 RX ADMIN — SENNA PLUS 2 TABLET(S): 8.6 TABLET ORAL at 21:23

## 2022-09-16 RX ADMIN — Medication 20 MILLIEQUIVALENT(S): at 06:57

## 2022-09-16 NOTE — PROGRESS NOTE ADULT - SUBJECTIVE AND OBJECTIVE BOX
OPERATIVE PROCEDURE(s):                POD #                       82yMale  SURGEON(s): YONI Hall  SUBJECTIVE ASSESSMENT:  Patient has no complaints at this time.    Vital Signs Last 24 Hrs  T(F): 96.2 (16 Sep 2022 08:00), Max: 98.3 (15 Sep 2022 16:00)  HR: 100 (16 Sep 2022 08:00) (82 - 103)  BP: 122/71 (16 Sep 2022 08:00) (99/67 - 160/68)  BP(mean): 91 (16 Sep 2022 08:00) (79 - 108)  ABP: 123/60 (15 Sep 2022 09:00) (123/60 - 123/60)  ABP(mean): 77 (15 Sep 2022 09:00)  RR: 25 (16 Sep 2022 08:00) (15 - 30)  SpO2: 97% (16 Sep 2022 08:00) (92% - 97%)  CVP(mm Hg): --  CVP(cm H2O): --  CO: --  CI: --  PA: --  SVR: --    I&O's Detail    15 Sep 2022 07:01  -  16 Sep 2022 07:00  --------------------------------------------------------  IN:    IV PiggyBack: 50 mL    Oral Fluid: 960 mL    sodium chloride 0.9%: 20 mL  Total IN: 1030 mL    OUT:    Indwelling Catheter - Urethral (mL): 200 mL    Voided (mL): 5100 mL  Total OUT: 5300 mL        Net:   I&O's Detail    14 Sep 2022 07:01  -  15 Sep 2022 07:00  --------------------------------------------------------  Total NET: 1146 mL      15 Sep 2022 07:01  -  16 Sep 2022 07:00  --------------------------------------------------------  Total NET: -4270 mL        CAPILLARY BLOOD GLUCOSE        Physical Exam:  General: NAD; A&Ox3/Patient is intubated and sedated  Cardiac: S1/S2, RRR, no murmur, no rubs  Lungs: unlabored respirations, CTA b/l, no wheeze, no rales, no crackles  Abdomen: Soft/NT/ND; positive bowel sounds x 4  Sternum: Intact, no click, incision healing well with no drainage  Incisions: Incisions clean/dry/intact  Extremities: No edema b/l lower extremities; good capillary refill; no cyanosis; palpable 1+ pedal pulses b/l    Central Venous Catheter: Yes[]  No[] , If Yes indication:           Day #  Chew Catheter: Yes  [] , No  [] , If yes indication:                      Day #  NGT: Yes [] No [] ,    If Yes Placement:                                     Day #  EPICARDIAL WIRES:  [] YES [] NO                                              Day #  BOWEL MOVEMENT:  [] YES [] NO, If No, Timing since last BM:      Day #  CHEST TUBE(Left/Right):  [] YES [] NO, If yes -  AIR LEAKS:  [] YES [] NO        LABS:                        11.5<L>  6.21  )-----------( 178      ( 16 Sep 2022 02:30 )             36.1<L>                        10.4<L>  8.40  )-----------( 164      ( 15 Sep 2022 02:46 )             32.7<L>        141  |  104  |  14  ----------------------------<  118<H>  3.9   |  27  |  1.1  09-15    138  |  106  |  14  ----------------------------<  113<H>  4.2   |  24  |  0.9    Ca    8.6      16 Sep 2022 02:30  Mg     1.8         TPro  6.3 [6.0 - 8.0]  /  Alb  3.3<L> [3.5 - 5.2]  /  TBili  0.6 [0.2 - 1.2]  /  DBili  x   /  AST  25 [0 - 41]  /  ALT  12 [0 - 41]  /  AlkPhos  73 [30 - 115]  -    PT/INR - ( 16 Sep 2022 02:30 )   PT: ;   INR: 3.69 ratio         PT/INR - ( 14 Sep 2022 14:40 )   PT: ;   INR: 1.48 ratio         PTT - ( 16 Sep 2022 02:30 )  PTT:41.8 sec, PTT - ( 14 Sep 2022 14:40 )  PTT:37.9 sec    ABG - ( 15 Sep 2022 02:09 )  pH: 7.43  /  pCO2: 36    /  pO2: 97    / HCO3: 24    / Base Excess: -0.2  /  SaO2: 99.3  /  LA: 1.00             RADIOLOGY & ADDITIONAL TESTS:  CXR:   EK Lead ECG:   Ventricular Rate 95 BPM    Atrial Rate 95 BPM    P-R Interval 130 ms    QRS Duration 134 ms    Q-T Interval 424 ms    QTC Calculation(Bazett) 532 ms    P Axis 59 degrees    R Axis 173 degrees    T Axis 8 degrees    Diagnosis Line Normal sinus rhythm  Right bundle branch block  Left posterior fascicular block  *** Bifascicular block ***  Abnormal ECG    Confirmed by ELIA ROSAS MD ([id number]) on 2022 8:15:43 AM (22 @ 06:56)    MEDICATIONS  (STANDING):  acetaminophen     Tablet .. 650 milliGRAM(s) Oral every 6 hours  aspirin enteric coated 81 milliGRAM(s) Oral daily  atorvastatin 20 milliGRAM(s) Oral at bedtime  dextrose 50% Injectable 50 milliLiter(s) IV Push every 15 minutes  dextrose 50% Injectable 25 milliLiter(s) IV Push every 15 minutes  influenza  Vaccine (HIGH DOSE) 0.7 milliLiter(s) IntraMuscular once  pantoprazole    Tablet 40 milliGRAM(s) Oral before breakfast  polyethylene glycol 3350 17 Gram(s) Oral daily  senna 2 Tablet(s) Oral at bedtime  tamsulosin 0.4 milliGRAM(s) Oral at bedtime    MEDICATIONS  (PRN):  HYDROmorphone  Injectable 0.5 milliGRAM(s) IV Push every 6 hours PRN Breakthrough Pain  oxyCODONE    IR 5 milliGRAM(s) Oral every 4 hours PRN Moderate Pain (4 - 6)  oxyCODONE    IR 10 milliGRAM(s) Oral every 4 hours PRN Severe Pain (7 - 10)    HEPARIN:  [] YES [] NO  Dose: XX UNITS/HR UNITS Q8H  LOVENOX:[] YES [] NO  Dose: XX mg Q24H  COUMADIN: []  YES [] NO  Dose: XX mg  Q24H  SCD's: YES b/l  GI Prophylaxis: Protonix [], Pepcid [], None [], (Contra-indication:.....)    Post-Op Aspirin: Yes [],  No [], If No, then contra-indication:  Post-Op Statin: Yes [], No[], If No, then contra-indication:  Post-Op Beta-Blockers: Yes [], No [], If No, then contra-indication:    Allergies:  No Known Allergies      Ambulation/Activity Status: Ambulates several times daily with assistance.    Assessment/Plan:  82y Male status-post .....  - Case and plan discussed with CTU Intensivist and CT Surgeon - Dr. Gaffney/Fredy/Rafael  - Continue CTU supportive care    - Continue DVT/GI prophylaxis  - Incentive Spirometry 10 times an hour  - Continue to advance physical activity as tolerated and continue PT/OT as directed  1. CAD: Continue ASA, statin, BB  2. HTN:   3. A. Fib:   4. COPD/Hypoxia:   5. DM/Glucose Control:                              OPERATIVE PROCEDURE(s):     Mitral Clip            POD #2            82yMale  SURGEON(s): YONI Hall  SUBJECTIVE ASSESSMENT:  Patient has no complaints at this time.    Vital Signs Last 24 Hrs  T(F): 96.2 (16 Sep 2022 08:00), Max: 98.3 (15 Sep 2022 16:00)  HR: 100 (16 Sep 2022 08:00) (82 - 103)  BP: 122/71 (16 Sep 2022 08:00) (99/67 - 160/68)  BP(mean): 91 (16 Sep 2022 08:00) (79 - 108)  ABP: 123/60 (15 Sep 2022 09:00) (123/60 - 123/60)  ABP(mean): 77 (15 Sep 2022 09:00)  RR: 25 (16 Sep 2022 08:00) (15 - 30)  SpO2: 97% (16 Sep 2022 08:00) (92% - 97%)      I&O's Detail    15 Sep 2022 07:  -  16 Sep 2022 07:00  --------------------------------------------------------  IN:    IV PiggyBack: 50 mL    Oral Fluid: 960 mL    sodium chloride 0.9%: 20 mL  Total IN: 1030 mL    OUT:    Indwelling Catheter - Urethral (mL): 200 mL    Voided (mL): 5100 mL  Total OUT: 5300 mL        Net:   I&O's Detail    14 Sep 2022 07:01  -  15 Sep 2022 07:00  --------------------------------------------------------  Total NET: 1146 mL      15 Sep 2022 07:01  -  16 Sep 2022 07:00  --------------------------------------------------------  Total NET: -4270 mL      Physical Exam:  General: NAD; A&Ox3  Cardiac: S1/S2, RRR, no murmur, no rubs  Lungs: unlabored respirations, CTA b/l, no wheeze, no rales, no crackles  Abdomen: Soft/NT/ND; positive bowel sounds x 4  Incisions: Incisions clean/dry/intact  Extremities: No edema b/l lower extremities; good capillary refill; no cyanosis; palpable 1+ pedal pulses b/l        LABS:                        11.5<L>  6.21  )-----------( 178      ( 16 Sep 2022 02:30 )             36.1<L>                        10.4<L>  8.40  )-----------( 164      ( 15 Sep 2022 02:46 )             32.7<L>        141  |  104  |  14  ----------------------------<  118<H>  3.9   |  27  |  1.1  09-15    138  |  106  |  14  ----------------------------<  113<H>  4.2   |  24  |  0.9    Ca    8.6      16 Sep 2022 02:30  Mg     1.8         TPro  6.3 [6.0 - 8.0]  /  Alb  3.3<L> [3.5 - 5.2]  /  TBili  0.6 [0.2 - 1.2]  /  DBili  x   /  AST  25 [0 - 41]  /  ALT  12 [0 - 41]  /  AlkPhos  73 [30 - 115]      PT/INR - ( 16 Sep 2022 02:30 )   PT: ;   INR: 3.69 ratio         PT/INR - ( 14 Sep 2022 14:40 )   PT: ;   INR: 1.48 ratio         PTT - ( 16 Sep 2022 02:30 )  PTT:41.8 sec, PTT - ( 14 Sep 2022 14:40 )  PTT:37.9 sec    ABG - ( 15 Sep 2022 02:09 )  pH: 7.43  /  pCO2: 36    /  pO2: 97    / HCO3: 24    / Base Excess: -0.2  /  SaO2: 99.3  /  LA: 1.00       EK Lead ECG:   Ventricular Rate 95 BPM    Atrial Rate 95 BPM    P-R Interval 130 ms    QRS Duration 134 ms    Q-T Interval 424 ms    QTC Calculation(Bazett) 532 ms    P Axis 59 degrees    R Axis 173 degrees    T Axis 8 degrees    Diagnosis Line Normal sinus rhythm  Right bundle branch block  Left posterior fascicular block  *** Bifascicular block ***  Abnormal ECG    Confirmed by ELIA ROSAS MD ([id number]) on 2022 8:15:43 AM (22 @ 06:56)    MEDICATIONS  (STANDING):  acetaminophen     Tablet .. 650 milliGRAM(s) Oral every 6 hours  aspirin enteric coated 81 milliGRAM(s) Oral daily  atorvastatin 20 milliGRAM(s) Oral at bedtime  dextrose 50% Injectable 50 milliLiter(s) IV Push every 15 minutes  dextrose 50% Injectable 25 milliLiter(s) IV Push every 15 minutes  influenza  Vaccine (HIGH DOSE) 0.7 milliLiter(s) IntraMuscular once  pantoprazole    Tablet 40 milliGRAM(s) Oral before breakfast  polyethylene glycol 3350 17 Gram(s) Oral daily  senna 2 Tablet(s) Oral at bedtime  tamsulosin 0.4 milliGRAM(s) Oral at bedtime    MEDICATIONS  (PRN):  HYDROmorphone  Injectable 0.5 milliGRAM(s) IV Push every 6 hours PRN Breakthrough Pain  oxyCODONE    IR 5 milliGRAM(s) Oral every 4 hours PRN Moderate Pain (4 - 6)  oxyCODONE    IR 10 milliGRAM(s) Oral every 4 hours PRN Severe Pain (7 - 10)        Allergies:  No Known Allergies      Ambulation/Activity Status: Ambulates several times daily with assistance.    Assessment/Plan:  82y Male status-post Mitral Clip POD #2    - Case and plan discussed with CTU Intensivist and CT Surgeon - Dr. Gaffney/Fredy/Rafael  - Continue CTU supportive care    - Continue DVT/GI prophylaxis  - Incentive Spirometry 10 times an hour  - Continue to advance physical activity as tolerated and continue PT/OT as directed  Neuro: Pain control  Pulm: Encourage coughing, deep breathing and use of incentive spirometry.   Cardio: Monitor telemetry/alarms. echo, monitor QTC - keep mag > 2  GI: Tolerating diet. Continue stool softeners. Continue GI prophylaxis  Renal: monitor urine output, supplement electrolytes as needed; Lasix 40mg IV q12h  Vasc: DVT prophylaxis, on XArelto  Heme: Monitor H/H.   ID: Off antibiotics. Stable.  Physical Therapy: OOB/ambulate  D/C Planning to home tomorrow

## 2022-09-17 VITALS
SYSTOLIC BLOOD PRESSURE: 97 MMHG | HEART RATE: 86 BPM | OXYGEN SATURATION: 97 % | DIASTOLIC BLOOD PRESSURE: 59 MMHG | RESPIRATION RATE: 18 BRPM

## 2022-09-17 LAB
ALBUMIN SERPL ELPH-MCNC: 3 G/DL — LOW (ref 3.5–5.2)
ALP SERPL-CCNC: 67 U/L — SIGNIFICANT CHANGE UP (ref 30–115)
ALT FLD-CCNC: 10 U/L — SIGNIFICANT CHANGE UP (ref 0–41)
ANION GAP SERPL CALC-SCNC: 6 MMOL/L — LOW (ref 7–14)
APTT BLD: 26.9 SEC — LOW (ref 27–39.2)
AST SERPL-CCNC: 18 U/L — SIGNIFICANT CHANGE UP (ref 0–41)
BASOPHILS # BLD AUTO: 0.02 K/UL — SIGNIFICANT CHANGE UP (ref 0–0.2)
BASOPHILS NFR BLD AUTO: 0.4 % — SIGNIFICANT CHANGE UP (ref 0–1)
BILIRUB SERPL-MCNC: 0.7 MG/DL — SIGNIFICANT CHANGE UP (ref 0.2–1.2)
BUN SERPL-MCNC: 20 MG/DL — SIGNIFICANT CHANGE UP (ref 10–20)
CALCIUM SERPL-MCNC: 8.5 MG/DL — SIGNIFICANT CHANGE UP (ref 8.4–10.5)
CHLORIDE SERPL-SCNC: 105 MMOL/L — SIGNIFICANT CHANGE UP (ref 98–110)
CO2 SERPL-SCNC: 30 MMOL/L — SIGNIFICANT CHANGE UP (ref 17–32)
CREAT SERPL-MCNC: 0.9 MG/DL — SIGNIFICANT CHANGE UP (ref 0.7–1.5)
EGFR: 85 ML/MIN/1.73M2 — SIGNIFICANT CHANGE UP
EOSINOPHIL # BLD AUTO: 0.1 K/UL — SIGNIFICANT CHANGE UP (ref 0–0.7)
EOSINOPHIL NFR BLD AUTO: 2 % — SIGNIFICANT CHANGE UP (ref 0–8)
GLUCOSE SERPL-MCNC: 114 MG/DL — HIGH (ref 70–99)
HCT VFR BLD CALC: 34.7 % — LOW (ref 42–52)
HGB BLD-MCNC: 10.8 G/DL — LOW (ref 14–18)
IMM GRANULOCYTES NFR BLD AUTO: 0.4 % — HIGH (ref 0.1–0.3)
INR BLD: 1.26 RATIO — SIGNIFICANT CHANGE UP (ref 0.65–1.3)
INR BLD: 1.3 RATIO — SIGNIFICANT CHANGE UP (ref 0.65–1.3)
LYMPHOCYTES # BLD AUTO: 0.75 K/UL — LOW (ref 1.2–3.4)
LYMPHOCYTES # BLD AUTO: 14.8 % — LOW (ref 20.5–51.1)
MAGNESIUM SERPL-MCNC: 1.6 MG/DL — LOW (ref 1.8–2.4)
MCHC RBC-ENTMCNC: 24.1 PG — LOW (ref 27–31)
MCHC RBC-ENTMCNC: 31.1 G/DL — LOW (ref 32–37)
MCV RBC AUTO: 77.3 FL — LOW (ref 80–94)
MONOCYTES # BLD AUTO: 0.57 K/UL — SIGNIFICANT CHANGE UP (ref 0.1–0.6)
MONOCYTES NFR BLD AUTO: 11.3 % — HIGH (ref 1.7–9.3)
NEUTROPHILS # BLD AUTO: 3.6 K/UL — SIGNIFICANT CHANGE UP (ref 1.4–6.5)
NEUTROPHILS NFR BLD AUTO: 71.1 % — SIGNIFICANT CHANGE UP (ref 42.2–75.2)
NRBC # BLD: 0 /100 WBCS — SIGNIFICANT CHANGE UP (ref 0–0)
PLATELET # BLD AUTO: 184 K/UL — SIGNIFICANT CHANGE UP (ref 130–400)
POTASSIUM SERPL-MCNC: 4.1 MMOL/L — SIGNIFICANT CHANGE UP (ref 3.5–5)
POTASSIUM SERPL-SCNC: 4.1 MMOL/L — SIGNIFICANT CHANGE UP (ref 3.5–5)
PROT SERPL-MCNC: 5.9 G/DL — LOW (ref 6–8)
PROTHROM AB SERPL-ACNC: 14.5 SEC — HIGH (ref 9.95–12.87)
PROTHROM AB SERPL-ACNC: 14.9 SEC — HIGH (ref 9.95–12.87)
RBC # BLD: 4.49 M/UL — LOW (ref 4.7–6.1)
RBC # FLD: 22.1 % — HIGH (ref 11.5–14.5)
SODIUM SERPL-SCNC: 141 MMOL/L — SIGNIFICANT CHANGE UP (ref 135–146)
WBC # BLD: 5.06 K/UL — SIGNIFICANT CHANGE UP (ref 4.8–10.8)
WBC # FLD AUTO: 5.06 K/UL — SIGNIFICANT CHANGE UP (ref 4.8–10.8)

## 2022-09-17 PROCEDURE — 93010 ELECTROCARDIOGRAM REPORT: CPT

## 2022-09-17 PROCEDURE — 99231 SBSQ HOSP IP/OBS SF/LOW 25: CPT

## 2022-09-17 PROCEDURE — 71045 X-RAY EXAM CHEST 1 VIEW: CPT | Mod: 26

## 2022-09-17 RX ORDER — COLCHICINE 0.6 MG
0.6 TABLET ORAL EVERY 12 HOURS
Refills: 0 | Status: DISCONTINUED | OUTPATIENT
Start: 2022-09-17 | End: 2022-09-17

## 2022-09-17 RX ORDER — ATORVASTATIN CALCIUM 80 MG/1
1 TABLET, FILM COATED ORAL
Qty: 30 | Refills: 0
Start: 2022-09-17 | End: 2022-10-16

## 2022-09-17 RX ORDER — IBUPROFEN 200 MG
400 TABLET ORAL EVERY 8 HOURS
Refills: 0 | Status: DISCONTINUED | OUTPATIENT
Start: 2022-09-17 | End: 2022-09-17

## 2022-09-17 RX ORDER — ASPIRIN/CALCIUM CARB/MAGNESIUM 324 MG
1 TABLET ORAL
Qty: 0 | Refills: 0 | DISCHARGE
Start: 2022-09-17

## 2022-09-17 RX ORDER — METOPROLOL TARTRATE 50 MG
1 TABLET ORAL
Qty: 0 | Refills: 0 | DISCHARGE

## 2022-09-17 RX ORDER — RIVAROXABAN 15 MG-20MG
1 KIT ORAL
Qty: 0 | Refills: 0 | DISCHARGE

## 2022-09-17 RX ORDER — PANTOPRAZOLE SODIUM 20 MG/1
1 TABLET, DELAYED RELEASE ORAL
Qty: 30 | Refills: 0
Start: 2022-09-17 | End: 2022-10-16

## 2022-09-17 RX ORDER — METOPROLOL TARTRATE 50 MG
1 TABLET ORAL
Qty: 60 | Refills: 0
Start: 2022-09-17 | End: 2022-10-16

## 2022-09-17 RX ORDER — TAMSULOSIN HYDROCHLORIDE 0.4 MG/1
1 CAPSULE ORAL
Qty: 30 | Refills: 0
Start: 2022-09-17 | End: 2022-10-16

## 2022-09-17 RX ORDER — RIVAROXABAN 15 MG-20MG
1 KIT ORAL
Qty: 30 | Refills: 0
Start: 2022-09-17 | End: 2022-10-16

## 2022-09-17 RX ORDER — ATORVASTATIN CALCIUM 80 MG/1
1 TABLET, FILM COATED ORAL
Qty: 0 | Refills: 0 | DISCHARGE

## 2022-09-17 RX ADMIN — Medication 20 MILLIGRAM(S): at 06:12

## 2022-09-17 RX ADMIN — Medication 650 MILLIGRAM(S): at 01:00

## 2022-09-17 RX ADMIN — PANTOPRAZOLE SODIUM 40 MILLIGRAM(S): 20 TABLET, DELAYED RELEASE ORAL at 06:12

## 2022-09-17 RX ADMIN — Medication 650 MILLIGRAM(S): at 06:12

## 2022-09-17 RX ADMIN — Medication 25 MILLIGRAM(S): at 06:12

## 2022-09-17 NOTE — PROGRESS NOTE ADULT - SUBJECTIVE AND OBJECTIVE BOX
CTU Attending Progress Daily Note     17 Sep 2022 09:24    Procedure:               Mitral Clip                                   POD#      3             Patient seen as post-op critical care follow-up    HPI:    See preop testing chart H&P    Interval event for past 24 hr:  MIGEL MOYA  82y had Afib RVR    Current Complains:  MIGEL MOYA has no new complaints    REVIEW OF SYSTEMS:  CONSTITUTIONAL:  [-] weakness, [-] fevers, [-] chills  EYES/ENT: [-] visual changes, [-] vertigo, [-] throat pain   NECK: [-] pain, [-] stiffness  RESPIRATORY: [-] cough, [-] wheezing, [-] hemoptysis, [-] shortness of breath  CARDIOVASCULAR: [-] chest pain, [-] palpitations, [-] orthopnea  GASTROINTESTINAL:    [-]abdominal pain, [-] nausea, [-] vomiting, [-] hematemesis, [-] diarrhea, [-] constipation, [-] melena, [-] hematochezia.  GENITOURINARY: [-] dysuria, [-] frequency, [-] hematuria  NEUROLOGICAL: [-] numbness, [-] weakness  SKIN: [-] itching, [-] burning, [-] rashes, [-] lesions   All other review of systems is negative unless indicated above.    [  ] Unable to assess ROS because :    OBJECTIVE:  ICU Vital Signs Last 24 Hrs  T(C): 36.9 (17 Sep 2022 04:00), Max: 37.1 (16 Sep 2022 12:00)  T(F): 98.4 (17 Sep 2022 04:00), Max: 98.8 (16 Sep 2022 12:00)  HR: 84 (17 Sep 2022 06:00) (70 - 131)  BP: 111/73 (17 Sep 2022 06:00) (80/56 - 129/80)  BP(mean): 87 (17 Sep 2022 06:00) (63 - 96)  ABP: --  ABP(mean): --  RR: 19 (17 Sep 2022 06:00) (15 - 33)  SpO2: 96% (17 Sep 2022 06:00) (92% - 98%)    O2 Parameters below as of 17 Sep 2022 06:00  Patient On (Oxygen Delivery Method): room air            I&O's Summary    16 Sep 2022 07:01  -  17 Sep 2022 07:00  --------------------------------------------------------  IN: 1430 mL / OUT: 4100 mL / NET: -2670 mL      PHYSICAL EXAM:  General: WN/WD NAD    HEENT:     [+] NCAT  [+] EOMI  [-] Conjuctival edema   [-] Icterus   [-] Thrush   [-] ETT  [-] NGT/OGT    Neck:         [+] FROM   [-] JVD     [-] Nodes     [-] Masses    [+] Mid-line trachea    [-] Tracheostomy    Chest:           [-] SubQ emphysema    Lungs:          [+] CTA   [-] Rhonchi   [-] Rales    [-] Wheezing    [-] Decreased BS    [-] Dullness R L    Cardiac:       [+] S1 [+] S2    [+] RRR   [-] Irregular   [-] S3   [-] S4    [-] Murmurs    [-] Rub    Abdomen:    [+] BS    [+] Soft    [+] Non-tender     [-] Distended    [-] Organomegaly  [-] PEG    Extremities:   [-] Cyanosis U/L   [-] Clubbing  U/L  [+] LE Edema   [+] Capillary refill    [+] Pulses     Neuro:        [+] Awake   [+]  Alert   [-] Confused   [-] Lethargic   [-] Sedated   [-] Generalized Weakness    Skin:        [-] Rashes    [-] Erythema   [+] Normal incisions   [+] IV sites intact   [-] Sacral decubitus              HOSPITAL MEDICATIONS:  MEDICATIONS  (STANDING):  acetaminophen     Tablet .. 650 milliGRAM(s) Oral every 6 hours  aspirin enteric coated 81 milliGRAM(s) Oral daily  atorvastatin 20 milliGRAM(s) Oral at bedtime  dextrose 50% Injectable 50 milliLiter(s) IV Push every 15 minutes  dextrose 50% Injectable 25 milliLiter(s) IV Push every 15 minutes  influenza  Vaccine (HIGH DOSE) 0.7 milliLiter(s) IntraMuscular once  metoprolol tartrate 25 milliGRAM(s) Oral two times a day  pantoprazole    Tablet 40 milliGRAM(s) Oral before breakfast  polyethylene glycol 3350 17 Gram(s) Oral daily  senna 2 Tablet(s) Oral at bedtime  tamsulosin 0.4 milliGRAM(s) Oral at bedtime  torsemide 20 milliGRAM(s) Oral daily    MEDICATIONS  (PRN):  acetaminophen     Tablet .. 650 milliGRAM(s) Oral every 6 hours PRN Mild Pain (1 - 3)  oxyCODONE    IR 5 milliGRAM(s) Oral every 4 hours PRN Moderate Pain (4 - 6)  oxyCODONE    IR 10 milliGRAM(s) Oral every 4 hours PRN Severe Pain (7 - 10)      LABS:                          10.8   5.06  )-----------( 184      ( 17 Sep 2022 03:59 )             34.7     09-17    141  |  105  |  20  ----------------------------<  114<H>  4.1   |  30  |  0.9    Ca    8.5      17 Sep 2022 03:59  Mg     1.6     09-17    TPro  5.9<L>  /  Alb  3.0<L>  /  TBili  0.7  /  DBili  x   /  AST  18  /  ALT  10  /  AlkPhos  67  09-17    PT/INR - ( 17 Sep 2022 08:10 )   PT: See Note;   INR: See Note         PTT - ( 16 Sep 2022 02:30 )  PTT:41.8 sec        RADIOLOGY:    < from: Xray Chest 1 View- PORTABLE-Routine (09.16.22 @ 06:49) >    Findings:    Support devices: None.    Cardiac/mediastinum/hilum: Cardiomegaly unchanged    Lung parenchyma/Pleura: Bilateral opacities and probably minimal pleural   effusion unchanged. No area.    Skeleton/soft tissues: Unremarkable.    Impression:    Bilateral opacities and probably minimal pleural effusion unchanged. No   area.    < end of copied text >      ECG:  Reviewed and interpreted by me: SR on tele  QTC:    Assessment:  SP Mitral clip  Afib  Coagulopathy    PAST MEDICAL & SURGICAL HISTORY:  HTN (hypertension)      High cholesterol      GERD (gastroesophageal reflux disease)      Atrial fibrillation      CAD (coronary artery disease)  s/p PCI      JONAH (iron deficiency anemia)      BPH (benign prostatic hyperplasia)      H/O CHF      S/P coronary angioplasty  s/p pci          PLAN:  Neuro: Pain control  Pulm: Encourage coughing, deep breathing and use of incentive spirometry. Wean off supplemental oxygen as able. Daily CXR.   Cardio: Monitor telemetry/alarms. Continue cardiac meds  GI: Tolerating diet. Continue stool softeners. Continue GI prophylaxis  Renal: monitor urine output, supplement electrolytes as needed  Vasc: DVT prophylaxis, elevated INR (on xarelto for Afib)  Heme: Monitor H/H. follow up INR today  ID: Off antibiotics. Stable.  Endocrine: Monitor finger stick blood sugar and control hyperglycemia with insulin  Physical Therapy: OOB/ambulate        Discussed with Cardiothoracic Team at AM rounds.

## 2022-09-17 NOTE — PROGRESS NOTE ADULT - SUBJECTIVE AND OBJECTIVE BOX
OPERATIVE PROCEDURE(s): Mitral Clip            POD #3          82yMale  SURGEON(s): YONI Hall  SUBJECTIVE ASSESSMENT:  Patient has no complaints at this time.    Vital Signs Last 24 Hrs  T(F): 98.4 (17 Sep 2022 04:00), Max: 98.8 (16 Sep 2022 12:00)  HR: 84 (17 Sep 2022 06:00) (70 - 131)  BP: 111/73 (17 Sep 2022 06:00) (80/56 - 129/80)  BP(mean): 87 (17 Sep 2022 06:00) (63 - 96)  RR: 19 (17 Sep 2022 06:00) (15 - 33)  SpO2: 96% (17 Sep 2022 06:00) (92% - 98%) RA      I&O's Detail    16 Sep 2022 07:01  -  17 Sep 2022 07:00  --------------------------------------------------------  IN:    Oral Fluid: 1430 mL  Total IN: 1430 mL    OUT:    Voided (mL): 4100 mL  Total OUT: 4100 mL        Net:   I&O's Detail    15 Sep 2022 07:01  -  16 Sep 2022 07:00  --------------------------------------------------------  Total NET: -4270 mL      16 Sep 2022 07:01  -  17 Sep 2022 07:00  --------------------------------------------------------  Total NET: -2670 mL      Physical Exam:  General: NAD; A&Ox3  Cardiac: S1/S2, RRR, no murmur, no rubs  Lungs: unlabored respirations, CTA b/l, no wheeze, no rales, no crackles  Abdomen: Soft/NT/ND; positive bowel sounds x 4  Incisions: Incisions clean/dry/intact  Extremities: No edema b/l lower extremities; good capillary refill; no cyanosis; palpable 1+ pedal pulses b/l    LABS:                        10.8<L>  5.06  )-----------( 184      ( 17 Sep 2022 03:59 )             34.7<L>                        11.5<L>  6.21  )-----------( 178      ( 16 Sep 2022 02:30 )             36.1<L>    09-17    141  |  105  |  20  ----------------------------<  114<H>  4.1   |  30  |  0.9  09-16    141  |  104  |  14  ----------------------------<  118<H>  3.9   |  27  |  1.1    Ca    8.5      17 Sep 2022 03:59  Mg     1.6     09-17    TPro  5.9<L> [6.0 - 8.0]  /  Alb  3.0<L> [3.5 - 5.2]  /  TBili  0.7 [0.2 - 1.2]  /  DBili  x   /  AST  18 [0 - 41]  /  ALT  10 [0 - 41]  /  AlkPhos  67 [30 - 115]  09-17    PT/INR - ( 17 Sep 2022 08:10 )   PT: ;   INR: See Note         PT/INR - ( 16 Sep 2022 02:30 )   PT: ;   INR: 3.69 ratio         PTT - ( 16 Sep 2022 02:30 )  PTT:41.8 sec    MEDICATIONS  (STANDING):  acetaminophen     Tablet .. 650 milliGRAM(s) Oral every 6 hours  aspirin enteric coated 81 milliGRAM(s) Oral daily  atorvastatin 20 milliGRAM(s) Oral at bedtime  dextrose 50% Injectable 50 milliLiter(s) IV Push every 15 minutes  dextrose 50% Injectable 25 milliLiter(s) IV Push every 15 minutes  influenza  Vaccine (HIGH DOSE) 0.7 milliLiter(s) IntraMuscular once  metoprolol tartrate 25 milliGRAM(s) Oral two times a day  pantoprazole    Tablet 40 milliGRAM(s) Oral before breakfast  polyethylene glycol 3350 17 Gram(s) Oral daily  senna 2 Tablet(s) Oral at bedtime  tamsulosin 0.4 milliGRAM(s) Oral at bedtime  torsemide 20 milliGRAM(s) Oral daily    MEDICATIONS  (PRN):  acetaminophen     Tablet .. 650 milliGRAM(s) Oral every 6 hours PRN Mild Pain (1 - 3)  oxyCODONE    IR 5 milliGRAM(s) Oral every 4 hours PRN Moderate Pain (4 - 6)  oxyCODONE    IR 10 milliGRAM(s) Oral every 4 hours PRN Severe Pain (7 - 10)      Allergies:  No Known Allergies      Ambulation/Activity Status: Ambulates several times daily with assistance.    Assessment/Plan:  82y Male status-post Mitral Clip            POD #3    - Case and plan discussed with CTU Intensivist and CT Surgeon - Dr. Gaffney/Fredy/Rafael  - Continue CTU supportive care    - Continue DVT/GI prophylaxis  - Incentive Spirometry 10 times an hour  - Continue to advance physical activity as tolerated and continue PT/OT as directed  1. D/C Home today

## 2022-09-20 ENCOUNTER — NON-APPOINTMENT (OUTPATIENT)
Age: 82
End: 2022-09-20

## 2022-09-22 ENCOUNTER — APPOINTMENT (OUTPATIENT)
Dept: CARDIOLOGY | Facility: CLINIC | Age: 82
End: 2022-09-22

## 2022-09-22 DIAGNOSIS — I50.9 HEART FAILURE, UNSPECIFIED: ICD-10-CM

## 2022-09-22 DIAGNOSIS — E78.00 PURE HYPERCHOLESTEROLEMIA, UNSPECIFIED: ICD-10-CM

## 2022-09-22 DIAGNOSIS — Z79.01 LONG TERM (CURRENT) USE OF ANTICOAGULANTS: ICD-10-CM

## 2022-09-22 DIAGNOSIS — D50.9 IRON DEFICIENCY ANEMIA, UNSPECIFIED: ICD-10-CM

## 2022-09-22 DIAGNOSIS — I34.0 NONRHEUMATIC MITRAL (VALVE) INSUFFICIENCY: ICD-10-CM

## 2022-09-22 DIAGNOSIS — I42.8 OTHER CARDIOMYOPATHIES: ICD-10-CM

## 2022-09-22 DIAGNOSIS — I31.3 PERICARDIAL EFFUSION (NONINFLAMMATORY): ICD-10-CM

## 2022-09-22 DIAGNOSIS — I25.10 ATHEROSCLEROTIC HEART DISEASE OF NATIVE CORONARY ARTERY WITHOUT ANGINA PECTORIS: ICD-10-CM

## 2022-09-22 DIAGNOSIS — N40.0 BENIGN PROSTATIC HYPERPLASIA WITHOUT LOWER URINARY TRACT SYMPTOMS: ICD-10-CM

## 2022-09-22 DIAGNOSIS — I11.0 HYPERTENSIVE HEART DISEASE WITH HEART FAILURE: ICD-10-CM

## 2022-09-22 DIAGNOSIS — K21.9 GASTRO-ESOPHAGEAL REFLUX DISEASE WITHOUT ESOPHAGITIS: ICD-10-CM

## 2022-09-22 DIAGNOSIS — I45.10 UNSPECIFIED RIGHT BUNDLE-BRANCH BLOCK: ICD-10-CM

## 2022-09-22 DIAGNOSIS — R94.31 ABNORMAL ELECTROCARDIOGRAM [ECG] [EKG]: ICD-10-CM

## 2022-09-22 DIAGNOSIS — Z87.891 PERSONAL HISTORY OF NICOTINE DEPENDENCE: ICD-10-CM

## 2022-09-22 DIAGNOSIS — Z95.5 PRESENCE OF CORONARY ANGIOPLASTY IMPLANT AND GRAFT: ICD-10-CM

## 2022-09-22 DIAGNOSIS — I48.0 PAROXYSMAL ATRIAL FIBRILLATION: ICD-10-CM

## 2022-09-22 DIAGNOSIS — R79.1 ABNORMAL COAGULATION PROFILE: ICD-10-CM

## 2022-09-26 ENCOUNTER — NON-APPOINTMENT (OUTPATIENT)
Age: 82
End: 2022-09-26

## 2022-09-29 ENCOUNTER — APPOINTMENT (OUTPATIENT)
Dept: CARDIOLOGY | Facility: CLINIC | Age: 82
End: 2022-09-29

## 2022-09-29 VITALS
RESPIRATION RATE: 12 BRPM | OXYGEN SATURATION: 98 % | SYSTOLIC BLOOD PRESSURE: 111 MMHG | HEIGHT: 72 IN | HEART RATE: 87 BPM | DIASTOLIC BLOOD PRESSURE: 78 MMHG | TEMPERATURE: 97.8 F | WEIGHT: 177 LBS | BODY MASS INDEX: 23.98 KG/M2

## 2022-09-29 PROCEDURE — 99214 OFFICE O/P EST MOD 30 MIN: CPT | Mod: 24

## 2022-09-29 RX ORDER — FLUTICASONE FUROATE AND VILANTEROL TRIFENATATE 100; 25 UG/1; UG/1
100-25 POWDER RESPIRATORY (INHALATION)
Qty: 60 | Refills: 3 | Status: DISCONTINUED | COMMUNITY
Start: 2021-10-13 | End: 2022-09-29

## 2022-09-29 RX ORDER — LOSARTAN POTASSIUM 25 MG/1
25 TABLET, FILM COATED ORAL
Qty: 90 | Refills: 1 | Status: DISCONTINUED | COMMUNITY
End: 2022-09-29

## 2022-09-29 RX ORDER — FUROSEMIDE 20 MG/1
20 TABLET ORAL TWICE DAILY
Qty: 180 | Refills: 1 | Status: DISCONTINUED | COMMUNITY
Start: 2021-10-31 | End: 2022-09-29

## 2022-09-29 RX ORDER — METOPROLOL SUCCINATE 100 MG/1
100 TABLET, EXTENDED RELEASE ORAL
Qty: 90 | Refills: 1 | Status: DISCONTINUED | COMMUNITY
Start: 2020-02-27 | End: 2022-09-29

## 2022-09-29 RX ORDER — ATORVASTATIN CALCIUM 20 MG/1
20 TABLET, FILM COATED ORAL
Qty: 90 | Refills: 1 | Status: DISCONTINUED | COMMUNITY
Start: 2020-04-15 | End: 2022-09-29

## 2022-09-29 RX ORDER — CLOPIDOGREL BISULFATE 75 MG/1
75 TABLET, FILM COATED ORAL DAILY
Qty: 90 | Refills: 1 | Status: DISCONTINUED | COMMUNITY
Start: 2021-12-30 | End: 2022-09-29

## 2022-09-29 RX ORDER — RANITIDINE HYDROCHLORIDE 150 MG/1
150 TABLET, FILM COATED ORAL DAILY
Refills: 0 | Status: DISCONTINUED | COMMUNITY
End: 2022-09-29

## 2022-09-29 RX ORDER — ALFUZOSIN HYDROCHLORIDE 10 MG/1
10 TABLET, EXTENDED RELEASE ORAL DAILY
Qty: 90 | Refills: 3 | Status: DISCONTINUED | COMMUNITY
Start: 2022-02-28 | End: 2022-09-29

## 2022-09-29 RX ORDER — APIXABAN 5 MG/1
5 TABLET, FILM COATED ORAL
Qty: 180 | Refills: 1 | Status: DISCONTINUED | COMMUNITY
Start: 2021-07-02 | End: 2022-09-29

## 2022-09-29 NOTE — END OF VISIT
[FreeTextEntry3] : Seen / examined and above reviewed.\par Dong well post-MitraClip.\par Breathing better.\par LE edema much improved.  Lost about 30-lbs.\par BP controlled.\par \par - Decrease Tordemide 20mg q24.\par - Cont Xarelto\par - Follow-up / studies 1-month.

## 2022-09-29 NOTE — REASON FOR VISIT
[Symptom and Test Evaluation] : symptom and test evaluation [Structural Heart and Valve Disease] : structural heart and valve disease [FreeTextEntry1] : Mr. MIGEL MOYA 82 year M with HTN, hyperlipidemia, A-fib in April 2021. Prior GIB s/p EGD with cauterization and PCI to the proximal LAD. Symptoms include SOB and Fatigue. NYHA class II . Echo revealed moderate to severe mitral regurgitation. On 09/14/22, he presented for elective mitral valve \par repair.  Post-operativley the patient had an uncomplicated course and was discharged home in stable condition on POD# 2. \par He arrives today for his fist visit. States he feels much improved since the procedure. Denies any complaints. \par

## 2022-09-29 NOTE — ASSESSMENT
[FreeTextEntry1] : Plan:\par F/U with Structural  heart for Echo, EKG, Labs one month\par NYHA class I\par Euvolemic on exam, continue Torsemide 20mg po qd\par Xarelto 10 mg po qd until Echo. IF pericardial effusion is stable or decreased, will reevaluate Xarelto to therapeutic dose.

## 2022-10-01 RX ORDER — POTASSIUM CHLORIDE 20 MEQ
1 PACKET (EA) ORAL
Qty: 30 | Refills: 0
Start: 2022-10-01 | End: 2022-10-30

## 2022-10-06 ENCOUNTER — APPOINTMENT (OUTPATIENT)
Dept: ORTHOPEDIC SURGERY | Facility: CLINIC | Age: 82
End: 2022-10-06

## 2022-10-06 PROCEDURE — 20610 DRAIN/INJ JOINT/BURSA W/O US: CPT | Mod: 50

## 2022-10-06 PROCEDURE — 99213 OFFICE O/P EST LOW 20 MIN: CPT | Mod: 25

## 2022-10-06 NOTE — HISTORY OF PRESENT ILLNESS
[de-identified] :  Patient is a 82-year-old male who reports office for subsequent re-evaluation of his bilateral knee pain/OA.  He is here to see bilateral knee Synvisc-One gel injections.

## 2022-10-06 NOTE — PROCEDURE
[Large Joint Injection] : Large joint injection [Bilateral] : bilaterally of the [Knee] : knee [Alcohol] : alcohol [Synvisc] : Synvisc [#1] : series #1 [] : Patient tolerated procedure well [Call if redness, pain or fever occur] : call if redness, pain or fever occur [Apply ice for 15min out of every hour for the next 12-24 hours as tolerated] : apply ice for 15 minutes out of every hour for the next 12-24 hours as tolerated

## 2022-10-06 NOTE — IMAGING
[de-identified] :   Examination of the bilateral knees is as follows:  No effusion noted.  No erythema or ecchymosis.  Knee flexion from 0-110 degrees.  Light touch intact throughout.  Nonantalgic gait.

## 2022-10-06 NOTE — DISCUSSION/SUMMARY
[de-identified] :   With the patient's approval, the bilateral knee Synvisc-One injection was performed in the office today.  See the attached procedure note for further details.  Explained to the patient that the full effect of the medication may take up to 6 weeks for it to kick in.\par \par The patient was advised to rest/ice the area and can alternate with warm compresses.  Instructed not to do any strenuous activity that would further aggravate their symptoms.\par \par Patient will follow-up in 5-6 months for further evaluation.  All of the patient's questions/concerns were answered in detail.  \par \par The patient was seen under the supervision of Dr. Toney.\par \par

## 2022-10-13 ENCOUNTER — APPOINTMENT (OUTPATIENT)
Dept: CARDIOTHORACIC SURGERY | Facility: CLINIC | Age: 82
End: 2022-10-13

## 2022-10-13 ENCOUNTER — APPOINTMENT (OUTPATIENT)
Dept: CARDIOLOGY | Facility: CLINIC | Age: 82
End: 2022-10-13

## 2022-10-13 VITALS
DIASTOLIC BLOOD PRESSURE: 63 MMHG | HEIGHT: 72 IN | WEIGHT: 175.8 LBS | SYSTOLIC BLOOD PRESSURE: 105 MMHG | OXYGEN SATURATION: 98 % | TEMPERATURE: 97.9 F | RESPIRATION RATE: 14 BRPM | HEART RATE: 73 BPM | BODY MASS INDEX: 23.81 KG/M2

## 2022-10-13 PROCEDURE — 93306 TTE W/DOPPLER COMPLETE: CPT

## 2022-10-13 PROCEDURE — 99024 POSTOP FOLLOW-UP VISIT: CPT

## 2022-10-13 PROCEDURE — 93000 ELECTROCARDIOGRAM COMPLETE: CPT

## 2022-10-14 ENCOUNTER — APPOINTMENT (OUTPATIENT)
Age: 82
End: 2022-10-14

## 2022-10-14 VITALS
SYSTOLIC BLOOD PRESSURE: 140 MMHG | HEIGHT: 72 IN | OXYGEN SATURATION: 99 % | DIASTOLIC BLOOD PRESSURE: 70 MMHG | RESPIRATION RATE: 14 BRPM | WEIGHT: 175.8 LBS | HEART RATE: 67 BPM | BODY MASS INDEX: 23.81 KG/M2

## 2022-10-14 PROCEDURE — 99214 OFFICE O/P EST MOD 30 MIN: CPT

## 2022-10-14 NOTE — REVIEW OF SYSTEMS
[Chest Tightness] : no chest tightness [Dyspnea] : no dyspnea [SOB on Exertion] : no sob on exertion [Negative] : Endocrine [TextBox_44] : see HPI

## 2022-10-14 NOTE — PHYSICAL EXAM
[No Acute Distress] : no acute distress [Normal Oropharynx] : normal oropharynx [Normal Appearance] : normal appearance [No Neck Mass] : no neck mass [Normal Rate/Rhythm] : normal rate/rhythm [Normal S1, S2] : normal s1, s2 [No Murmurs] : no murmurs [No Resp Distress] : no resp distress [Clear to Auscultation Bilaterally] : clear to auscultation bilaterally [2+ Pitting] : 2+ pitting [TextBox_105] : edema almost resolved

## 2022-10-14 NOTE — PHYSICAL EXAM
[Sclera] : the sclera and conjunctiva were normal [Neck Appearance] : the appearance of the neck was normal [Respiration, Rhythm And Depth] : normal respiratory rhythm and effort [Heart Rate And Rhythm] : heart rate was normal and rhythm regular [Examination Of The Chest] : the chest was normal in appearance [Bowel Sounds] : normal bowel sounds [Abdomen Soft] : soft [Involuntary Movements] : no involuntary movements were seen [Skin Color & Pigmentation] : normal skin color and pigmentation [No Focal Deficits] : no focal deficits [Oriented To Time, Place, And Person] : oriented to person, place, and time [Impaired Insight] : insight and judgment were intact

## 2022-10-21 NOTE — REASON FOR VISIT
[Symptom and Test Evaluation] : symptom and test evaluation [Structural Heart and Valve Disease] : structural heart and valve disease [Follow-Up: _____] : a [unfilled] follow-up visit [FreeTextEntry1] : \par

## 2022-10-21 NOTE — HISTORY OF PRESENT ILLNESS
[FreeTextEntry1] : Mr. MIGEL MOYA 82 year M with HTN, hyperlipidemia, A-fib in April 2021. Prior GIB s/p EGD with cauterization and PCI to the proximal LAD. Symptoms include SOB and Fatigue. NYHA class II . Echo revealed moderate to severe mitral regurgitation. On 09/14/22, he presented for elective mitral valve \par repair.  Post-Operatively the patient had an uncomplicated course and was discharged home in stable condition on POD# 2. He arrives today for 30 day visit. States he feels much improved since the procedure. Denies any complaints. \par \par \par Their healthcare team includes the following\par PMD: Elina\par Cardio: Dutch\par Pulmonary: Migdalia\par \par 6 minute walk: 600 feet in 2 minutes\par

## 2022-10-21 NOTE — ASSESSMENT
[FreeTextEntry1] : Mr. MIGEL MOYA is a 82 year M that arrives today for a post op visit. Patient is status post mitraclip on 9/14/2022 via TF approach. Patient arrives to the office for the 30 day visit. On arrival patient denies fever, chills nausea, and vomiting. Denies SOB or palpitation. Patient states that they feel overall improvement post procedure \par \par Pt lives home no aid\par NYHA class I\par Euvolemic on exam\par Echo done - pending result\par Afib but rate controlled in office HR 70s - on Xarelto \par \par \par Plan:\par F/U with Structural  heart for Echo, EKG, Labs in one year\par F/U with Dr. Judd for continued medical management \par \par

## 2022-11-10 ENCOUNTER — INPATIENT (INPATIENT)
Facility: HOSPITAL | Age: 82
LOS: 5 days | Discharge: NURSING, REHAB OR LONG-TERM CARE | End: 2022-11-16
Attending: INTERNAL MEDICINE | Admitting: INTERNAL MEDICINE

## 2022-11-10 VITALS
RESPIRATION RATE: 20 BRPM | HEIGHT: 72 IN | TEMPERATURE: 98 F | WEIGHT: 175.05 LBS | OXYGEN SATURATION: 100 % | HEART RATE: 67 BPM | DIASTOLIC BLOOD PRESSURE: 51 MMHG | SYSTOLIC BLOOD PRESSURE: 101 MMHG

## 2022-11-10 DIAGNOSIS — Z98.61 CORONARY ANGIOPLASTY STATUS: Chronic | ICD-10-CM

## 2022-11-10 LAB
ALBUMIN SERPL ELPH-MCNC: 3.5 G/DL — SIGNIFICANT CHANGE UP (ref 3.5–5.2)
ALP SERPL-CCNC: 54 U/L — SIGNIFICANT CHANGE UP (ref 30–115)
ALT FLD-CCNC: 38 U/L — SIGNIFICANT CHANGE UP (ref 0–41)
ANION GAP SERPL CALC-SCNC: 10 MMOL/L — SIGNIFICANT CHANGE UP (ref 7–14)
ANISOCYTOSIS BLD QL: SIGNIFICANT CHANGE UP
APTT BLD: 31.6 SEC — SIGNIFICANT CHANGE UP (ref 27–39.2)
AST SERPL-CCNC: 22 U/L — SIGNIFICANT CHANGE UP (ref 0–41)
BASOPHILS # BLD AUTO: 0 K/UL — SIGNIFICANT CHANGE UP (ref 0–0.2)
BASOPHILS NFR BLD AUTO: 0 % — SIGNIFICANT CHANGE UP (ref 0–1)
BILIRUB SERPL-MCNC: 1.1 MG/DL — SIGNIFICANT CHANGE UP (ref 0.2–1.2)
BLD GP AB SCN SERPL QL: SIGNIFICANT CHANGE UP
BUN SERPL-MCNC: 18 MG/DL — SIGNIFICANT CHANGE UP (ref 10–20)
CALCIUM SERPL-MCNC: 8.7 MG/DL — SIGNIFICANT CHANGE UP (ref 8.4–10.5)
CHLORIDE SERPL-SCNC: 98 MMOL/L — SIGNIFICANT CHANGE UP (ref 98–110)
CO2 SERPL-SCNC: 30 MMOL/L — SIGNIFICANT CHANGE UP (ref 17–32)
CREAT SERPL-MCNC: 1 MG/DL — SIGNIFICANT CHANGE UP (ref 0.7–1.5)
EGFR: 75 ML/MIN/1.73M2 — SIGNIFICANT CHANGE UP
EOSINOPHIL # BLD AUTO: 0 K/UL — SIGNIFICANT CHANGE UP (ref 0–0.7)
EOSINOPHIL NFR BLD AUTO: 0 % — SIGNIFICANT CHANGE UP (ref 0–8)
GIANT PLATELETS BLD QL SMEAR: PRESENT — SIGNIFICANT CHANGE UP
GLUCOSE SERPL-MCNC: 124 MG/DL — HIGH (ref 70–99)
HCT VFR BLD CALC: 14 % — LOW (ref 42–52)
HGB BLD-MCNC: 4.1 G/DL — CRITICAL LOW (ref 14–18)
HYPOCHROMIA BLD QL: SIGNIFICANT CHANGE UP
INR BLD: 1.66 RATIO — HIGH (ref 0.65–1.3)
LYMPHOCYTES # BLD AUTO: 1.15 K/UL — LOW (ref 1.2–3.4)
LYMPHOCYTES # BLD AUTO: 19.7 % — LOW (ref 20.5–51.1)
MAGNESIUM SERPL-MCNC: 2.2 MG/DL — SIGNIFICANT CHANGE UP (ref 1.8–2.4)
MANUAL SMEAR VERIFICATION: SIGNIFICANT CHANGE UP
MCHC RBC-ENTMCNC: 21 PG — LOW (ref 27–31)
MCHC RBC-ENTMCNC: 29.3 G/DL — LOW (ref 32–37)
MCV RBC AUTO: 71.8 FL — LOW (ref 80–94)
MICROCYTES BLD QL: SIGNIFICANT CHANGE UP
MONOCYTES # BLD AUTO: 0.52 K/UL — SIGNIFICANT CHANGE UP (ref 0.1–0.6)
MONOCYTES NFR BLD AUTO: 8.9 % — SIGNIFICANT CHANGE UP (ref 1.7–9.3)
NEUTROPHILS # BLD AUTO: 4.18 K/UL — SIGNIFICANT CHANGE UP (ref 1.4–6.5)
NEUTROPHILS NFR BLD AUTO: 71.4 % — SIGNIFICANT CHANGE UP (ref 42.2–75.2)
NRBC # BLD: 2 /100 — HIGH (ref 0–0)
NRBC # BLD: SIGNIFICANT CHANGE UP /100 WBCS (ref 0–0)
NT-PROBNP SERPL-SCNC: 1002 PG/ML — HIGH (ref 0–300)
OVALOCYTES BLD QL SMEAR: SLIGHT — SIGNIFICANT CHANGE UP
PLAT MORPH BLD: NORMAL — SIGNIFICANT CHANGE UP
PLATELET # BLD AUTO: 366 K/UL — SIGNIFICANT CHANGE UP (ref 130–400)
POIKILOCYTOSIS BLD QL AUTO: SIGNIFICANT CHANGE UP
POLYCHROMASIA BLD QL SMEAR: SIGNIFICANT CHANGE UP
POTASSIUM SERPL-MCNC: 3.8 MMOL/L — SIGNIFICANT CHANGE UP (ref 3.5–5)
POTASSIUM SERPL-SCNC: 3.8 MMOL/L — SIGNIFICANT CHANGE UP (ref 3.5–5)
PROT SERPL-MCNC: 6.7 G/DL — SIGNIFICANT CHANGE UP (ref 6–8)
PROTHROM AB SERPL-ACNC: 19.2 SEC — HIGH (ref 9.95–12.87)
RBC # BLD: 1.95 M/UL — LOW (ref 4.7–6.1)
RBC # FLD: 23.6 % — HIGH (ref 11.5–14.5)
RBC BLD AUTO: ABNORMAL
SARS-COV-2 RNA SPEC QL NAA+PROBE: SIGNIFICANT CHANGE UP
SODIUM SERPL-SCNC: 138 MMOL/L — SIGNIFICANT CHANGE UP (ref 135–146)
TROPONIN T SERPL-MCNC: 0.03 NG/ML — CRITICAL HIGH
WBC # BLD: 5.86 K/UL — SIGNIFICANT CHANGE UP (ref 4.8–10.8)
WBC # FLD AUTO: 5.86 K/UL — SIGNIFICANT CHANGE UP (ref 4.8–10.8)

## 2022-11-10 PROCEDURE — 93010 ELECTROCARDIOGRAM REPORT: CPT

## 2022-11-10 PROCEDURE — 99285 EMERGENCY DEPT VISIT HI MDM: CPT | Mod: FS

## 2022-11-10 PROCEDURE — 71045 X-RAY EXAM CHEST 1 VIEW: CPT | Mod: 26

## 2022-11-10 RX ORDER — INFLUENZA VIRUS VACCINE 15; 15; 15; 15 UG/.5ML; UG/.5ML; UG/.5ML; UG/.5ML
0.7 SUSPENSION INTRAMUSCULAR ONCE
Refills: 0 | Status: DISCONTINUED | OUTPATIENT
Start: 2022-11-10 | End: 2022-11-16

## 2022-11-10 NOTE — ED PROVIDER NOTE - NS ED ROS FT
Constitutional: (-) fever  Eyes/ENT: (-) blurry vision, (-) epistaxis  Cardiovascular: (-) chest pain, (-) syncope  Respiratory: (+) sob, (-) cough  Gastrointestinal: (-) vomiting, (-) diarrhea  Musculoskeletal: (-) neck pain, (-) back pain, (-) joint pain  Integumentary: (+) pale, (-) rash, (-) edema  Neurological: (-) headache, (-) altered mental status  Allergic/Immunologic: (-) pruritus

## 2022-11-10 NOTE — PATIENT PROFILE ADULT - FALL HARM RISK - HARM RISK INTERVENTIONS

## 2022-11-10 NOTE — ED PROVIDER NOTE - PHYSICAL EXAMINATION
CONST: NAD  EYES: Sclera and conjunctiva pale.   ENT: No nasal discharge. Oropharynx normal appearing, no erythema or exudates. No abscess or swelling. Uvula midline.   NECK: Non-tender, no meningeal signs. normal ROM. supple   CARD: S1 S2; No jvd  RESP: Equal BS B/L, No wheezes, rhonchi or rales. No distress  GI: Soft, non-tender, non-distended. no cva tenderness. normal BS  MS: Normal ROM in all extremities. pulses 2 +. no calf tenderness  SKIN: Pale warm skin. Good turgor  NEURO: A&Ox3, No focal deficits. Strength 5/5 with no sensory deficits.

## 2022-11-10 NOTE — PATIENT PROFILE ADULT - FUNCTIONAL ASSESSMENT - BASIC MOBILITY 6.
3-calculated by average/Not able to assess (calculate score using [facility] averaging method)

## 2022-11-10 NOTE — PATIENT PROFILE ADULT - WAS YOUR LAST COVID-19 VACCINE GREATER THAN OR EQUAL TO TWO MONTHS AGO?
S: 39 y/o female presented to the [facility] ED with SI and hallucinations.    B: Hx schizophrenia.  Pt reporting increased auditory hallucinations after running out of her Abilify 2 weeks ago and has been unable to get a refill.  Pt reported some of the voices are hostile, menacing and telling her to kill herself.  Pt is worried she may not be able to keep herself safe at home.  Hx of a suicide attempt via overdose. Denied HI and substance abuse but admits to occasional marijuana.     A: Voluntary  No medical concerns.  COVID has been ordered. Denied sx.  Drug screen positive for THC.    R: 0157 [facility]  Placed in queue at 0159.  Unit notified at 0200 and are requesting report at 0230.  ED notified at 0202.    
No

## 2022-11-10 NOTE — ED PROVIDER NOTE - OBJECTIVE STATEMENT
82-year-old male past medical history hypertension, hyperlipidemia, A. fib, GIB, MVP status post clipping 9/14 presents for evaluation of shortness of breath. Endorses shortness of breath x6 weeks, aggravated with exertion, relieved at rest.  Associated pale skin.  Denies chest pain, abdominal pain, hematuria, bloody bowel movements.

## 2022-11-10 NOTE — ED PROVIDER NOTE - CLINICAL SUMMARY MEDICAL DECISION MAKING FREE TEXT BOX
82-year-old male presents to the ED for worsening shortness of breath.  History of hypertension, A. fib with prior GI bleed.  Recent history of mitral regurg with a clip placed in September 2022.  History of iron deficiency anemia as well.  Physical exam revealed pale conjunctive a.  Diastolic murmur.  Lung sounds clear.  2+ pitting lower edema.  Additionally patient denies any rectal bleeding and notes brown-yellow stool.  We reviewed labs noted to have anemia at 4.1 down from 10.8.  Mild elevated troponin 0.03 consistent with demand ischemia from anemia. ED Chest X-Ray reviewed by me which did not reveal a ptx, infiltrate, or effusion. 1 unit of PRBC given.  Admitted

## 2022-11-11 LAB
A1C WITH ESTIMATED AVERAGE GLUCOSE RESULT: 5.5 % — SIGNIFICANT CHANGE UP (ref 4–5.6)
ALBUMIN SERPL ELPH-MCNC: 3.1 G/DL — LOW (ref 3.5–5.2)
ALP SERPL-CCNC: 53 U/L — SIGNIFICANT CHANGE UP (ref 30–115)
ALT FLD-CCNC: 33 U/L — SIGNIFICANT CHANGE UP (ref 0–41)
ANION GAP SERPL CALC-SCNC: 10 MMOL/L — SIGNIFICANT CHANGE UP (ref 7–14)
AST SERPL-CCNC: 18 U/L — SIGNIFICANT CHANGE UP (ref 0–41)
BASOPHILS # BLD AUTO: 0.02 K/UL — SIGNIFICANT CHANGE UP (ref 0–0.2)
BASOPHILS NFR BLD AUTO: 0.3 % — SIGNIFICANT CHANGE UP (ref 0–1)
BILIRUB SERPL-MCNC: 1.7 MG/DL — HIGH (ref 0.2–1.2)
BUN SERPL-MCNC: 17 MG/DL — SIGNIFICANT CHANGE UP (ref 10–20)
CALCIUM SERPL-MCNC: 8.5 MG/DL — SIGNIFICANT CHANGE UP (ref 8.4–10.4)
CHLORIDE SERPL-SCNC: 98 MMOL/L — SIGNIFICANT CHANGE UP (ref 98–110)
CHOLEST SERPL-MCNC: 69 MG/DL — SIGNIFICANT CHANGE UP
CO2 SERPL-SCNC: 30 MMOL/L — SIGNIFICANT CHANGE UP (ref 17–32)
CREAT SERPL-MCNC: 1.2 MG/DL — SIGNIFICANT CHANGE UP (ref 0.7–1.5)
DIR ANTIGLOB POLYSPECIFIC INTERPRETATION: SIGNIFICANT CHANGE UP
EGFR: 60 ML/MIN/1.73M2 — SIGNIFICANT CHANGE UP
EOSINOPHIL # BLD AUTO: 0.03 K/UL — SIGNIFICANT CHANGE UP (ref 0–0.7)
EOSINOPHIL NFR BLD AUTO: 0.5 % — SIGNIFICANT CHANGE UP (ref 0–8)
ESTIMATED AVERAGE GLUCOSE: 111 MG/DL — SIGNIFICANT CHANGE UP (ref 68–114)
FERRITIN SERPL-MCNC: 13 NG/ML — LOW (ref 30–400)
FOLATE SERPL-MCNC: 17.3 NG/ML — SIGNIFICANT CHANGE UP
GLUCOSE SERPL-MCNC: 98 MG/DL — SIGNIFICANT CHANGE UP (ref 70–99)
HAPTOGLOB SERPL-MCNC: 24 MG/DL — LOW (ref 34–200)
HCT VFR BLD CALC: 17 % — LOW (ref 42–52)
HCT VFR BLD CALC: 22.2 % — LOW (ref 42–52)
HCT VFR BLD CALC: 23.9 % — LOW (ref 42–52)
HDLC SERPL-MCNC: 26 MG/DL — LOW
HGB BLD-MCNC: 5.2 G/DL — CRITICAL LOW (ref 14–18)
HGB BLD-MCNC: 7.3 G/DL — LOW (ref 14–18)
HGB BLD-MCNC: 7.7 G/DL — LOW (ref 14–18)
IMM GRANULOCYTES NFR BLD AUTO: 0.5 % — HIGH (ref 0.1–0.3)
IRON SATN MFR SERPL: 11 % — LOW (ref 15–50)
IRON SATN MFR SERPL: 31 UG/DL — LOW (ref 35–150)
LDH SERPL L TO P-CCNC: 166 U/L — SIGNIFICANT CHANGE UP (ref 50–242)
LIPID PNL WITH DIRECT LDL SERPL: 31 MG/DL — SIGNIFICANT CHANGE UP
LYMPHOCYTES # BLD AUTO: 1.16 K/UL — LOW (ref 1.2–3.4)
LYMPHOCYTES # BLD AUTO: 19.9 % — LOW (ref 20.5–51.1)
MCHC RBC-ENTMCNC: 23.7 PG — LOW (ref 27–31)
MCHC RBC-ENTMCNC: 23.8 PG — LOW (ref 27–31)
MCHC RBC-ENTMCNC: 24.7 PG — LOW (ref 27–31)
MCHC RBC-ENTMCNC: 30.6 G/DL — LOW (ref 32–37)
MCHC RBC-ENTMCNC: 32.2 G/DL — SIGNIFICANT CHANGE UP (ref 32–37)
MCHC RBC-ENTMCNC: 32.9 G/DL — SIGNIFICANT CHANGE UP (ref 32–37)
MCV RBC AUTO: 74 FL — LOW (ref 80–94)
MCV RBC AUTO: 75.3 FL — LOW (ref 80–94)
MCV RBC AUTO: 77.6 FL — LOW (ref 80–94)
MONOCYTES # BLD AUTO: 0.82 K/UL — HIGH (ref 0.1–0.6)
MONOCYTES NFR BLD AUTO: 14 % — HIGH (ref 1.7–9.3)
NEUTROPHILS # BLD AUTO: 3.78 K/UL — SIGNIFICANT CHANGE UP (ref 1.4–6.5)
NEUTROPHILS NFR BLD AUTO: 64.8 % — SIGNIFICANT CHANGE UP (ref 42.2–75.2)
NON HDL CHOLESTEROL: 43 MG/DL — SIGNIFICANT CHANGE UP
NRBC # BLD: 2 /100 WBCS — HIGH (ref 0–0)
NRBC # BLD: 3 /100 WBCS — HIGH (ref 0–0)
NRBC # BLD: 4 /100 WBCS — HIGH (ref 0–0)
PLATELET # BLD AUTO: 310 K/UL — SIGNIFICANT CHANGE UP (ref 130–400)
PLATELET # BLD AUTO: 354 K/UL — SIGNIFICANT CHANGE UP (ref 130–400)
PLATELET # BLD AUTO: 369 K/UL — SIGNIFICANT CHANGE UP (ref 130–400)
POTASSIUM SERPL-MCNC: 3.3 MMOL/L — LOW (ref 3.5–5)
POTASSIUM SERPL-SCNC: 3.3 MMOL/L — LOW (ref 3.5–5)
PROT SERPL-MCNC: 6.1 G/DL — SIGNIFICANT CHANGE UP (ref 6–8)
RBC # BLD: 2.19 M/UL — LOW (ref 4.7–6.1)
RBC # BLD: 2.19 M/UL — LOW (ref 4.7–6.1)
RBC # BLD: 2.95 M/UL — LOW (ref 4.7–6.1)
RBC # BLD: 3.23 M/UL — LOW (ref 4.7–6.1)
RBC # FLD: 25.1 % — HIGH (ref 11.5–14.5)
RBC # FLD: 25.8 % — HIGH (ref 11.5–14.5)
RBC # FLD: 27.3 % — HIGH (ref 11.5–14.5)
RETICS #: 29 K/UL — SIGNIFICANT CHANGE UP (ref 25–125)
RETICS/RBC NFR: 1.3 % — SIGNIFICANT CHANGE UP (ref 0.5–1.5)
SODIUM SERPL-SCNC: 138 MMOL/L — SIGNIFICANT CHANGE UP (ref 135–146)
TIBC SERPL-MCNC: 273 UG/DL — SIGNIFICANT CHANGE UP (ref 220–430)
TRIGL SERPL-MCNC: 58 MG/DL — SIGNIFICANT CHANGE UP
TROPONIN T SERPL-MCNC: 0.02 NG/ML — HIGH
UIBC SERPL-MCNC: 242 UG/DL — SIGNIFICANT CHANGE UP (ref 110–370)
VIT B12 SERPL-MCNC: 1080 PG/ML — SIGNIFICANT CHANGE UP (ref 232–1245)
WBC # BLD: 5.84 K/UL — SIGNIFICANT CHANGE UP (ref 4.8–10.8)
WBC # BLD: 7.78 K/UL — SIGNIFICANT CHANGE UP (ref 4.8–10.8)
WBC # BLD: 9.28 K/UL — SIGNIFICANT CHANGE UP (ref 4.8–10.8)
WBC # FLD AUTO: 5.84 K/UL — SIGNIFICANT CHANGE UP (ref 4.8–10.8)
WBC # FLD AUTO: 7.78 K/UL — SIGNIFICANT CHANGE UP (ref 4.8–10.8)
WBC # FLD AUTO: 9.28 K/UL — SIGNIFICANT CHANGE UP (ref 4.8–10.8)

## 2022-11-11 PROCEDURE — 99233 SBSQ HOSP IP/OBS HIGH 50: CPT | Mod: 25

## 2022-11-11 PROCEDURE — 99223 1ST HOSP IP/OBS HIGH 75: CPT

## 2022-11-11 RX ORDER — ACETAMINOPHEN 500 MG
650 TABLET ORAL EVERY 6 HOURS
Refills: 0 | Status: DISCONTINUED | OUTPATIENT
Start: 2022-11-11 | End: 2022-11-16

## 2022-11-11 RX ORDER — IRON SUCROSE 20 MG/ML
200 INJECTION, SOLUTION INTRAVENOUS EVERY 24 HOURS
Refills: 0 | Status: COMPLETED | OUTPATIENT
Start: 2022-11-11 | End: 2022-11-15

## 2022-11-11 RX ORDER — FUROSEMIDE 40 MG
20 TABLET ORAL ONCE
Refills: 0 | Status: COMPLETED | OUTPATIENT
Start: 2022-11-11 | End: 2022-11-11

## 2022-11-11 RX ORDER — PANTOPRAZOLE SODIUM 20 MG/1
40 TABLET, DELAYED RELEASE ORAL
Refills: 0 | Status: DISCONTINUED | OUTPATIENT
Start: 2022-11-11 | End: 2022-11-16

## 2022-11-11 RX ORDER — FUROSEMIDE 40 MG
40 TABLET ORAL ONCE
Refills: 0 | Status: COMPLETED | OUTPATIENT
Start: 2022-11-11 | End: 2022-11-11

## 2022-11-11 RX ORDER — ATORVASTATIN CALCIUM 80 MG/1
20 TABLET, FILM COATED ORAL AT BEDTIME
Refills: 0 | Status: DISCONTINUED | OUTPATIENT
Start: 2022-11-11 | End: 2022-11-16

## 2022-11-11 RX ORDER — TAMSULOSIN HYDROCHLORIDE 0.4 MG/1
0.4 CAPSULE ORAL AT BEDTIME
Refills: 0 | Status: DISCONTINUED | OUTPATIENT
Start: 2022-11-11 | End: 2022-11-16

## 2022-11-11 RX ORDER — ASPIRIN/CALCIUM CARB/MAGNESIUM 324 MG
81 TABLET ORAL DAILY
Refills: 0 | Status: DISCONTINUED | OUTPATIENT
Start: 2022-11-11 | End: 2022-11-16

## 2022-11-11 RX ORDER — LANOLIN ALCOHOL/MO/W.PET/CERES
3 CREAM (GRAM) TOPICAL AT BEDTIME
Refills: 0 | Status: DISCONTINUED | OUTPATIENT
Start: 2022-11-11 | End: 2022-11-16

## 2022-11-11 RX ORDER — PANTOPRAZOLE SODIUM 20 MG/1
40 TABLET, DELAYED RELEASE ORAL ONCE
Refills: 0 | Status: COMPLETED | OUTPATIENT
Start: 2022-11-11 | End: 2022-11-11

## 2022-11-11 RX ADMIN — IRON SUCROSE 110 MILLIGRAM(S): 20 INJECTION, SOLUTION INTRAVENOUS at 11:28

## 2022-11-11 RX ADMIN — Medication 40 MILLIGRAM(S): at 03:55

## 2022-11-11 RX ADMIN — ATORVASTATIN CALCIUM 20 MILLIGRAM(S): 80 TABLET, FILM COATED ORAL at 22:13

## 2022-11-11 RX ADMIN — Medication 20 MILLIGRAM(S): at 10:19

## 2022-11-11 RX ADMIN — Medication 20 MILLIGRAM(S): at 05:36

## 2022-11-11 RX ADMIN — PANTOPRAZOLE SODIUM 40 MILLIGRAM(S): 20 TABLET, DELAYED RELEASE ORAL at 05:41

## 2022-11-11 RX ADMIN — Medication 81 MILLIGRAM(S): at 12:27

## 2022-11-11 RX ADMIN — Medication 20 MILLIGRAM(S): at 08:38

## 2022-11-11 RX ADMIN — TAMSULOSIN HYDROCHLORIDE 0.4 MILLIGRAM(S): 0.4 CAPSULE ORAL at 22:13

## 2022-11-11 RX ADMIN — PANTOPRAZOLE SODIUM 40 MILLIGRAM(S): 20 TABLET, DELAYED RELEASE ORAL at 17:12

## 2022-11-11 NOTE — PROGRESS NOTE ADULT - ASSESSMENT
82 y.o male patient with PMH of CAD s/p PCI, atrial fibrillation, HLD, HTN and severe MR s/o mitral clip on 9/22 presenting for fatigue, lethargy and shortness of breath that is episodic in nature. In the ED, patient was found to be severely anemic (4.1 from 10.8 in 9/22). He was ordered 2 packed RBCs.    Microcytic Anemia  CAD / HTN   Chronic A-Fib  Severe MR s/p Mitral clip on 9/22             PLAN:    ·	 82 y.o male patient with PMH of CAD s/p PCI, atrial fibrillation, HLD, HTN and severe MR s/o mitral clip on 9/22 presenting for fatigue, lethargy and shortness of breath that is episodic in nature. In the ED, patient was found to be severely anemic (4.1 from 10.8 in 9/22). He was ordered 2 packed RBCs.    Microcytic Anemia  CAD / HTN   A-Fib with RVR  Severe MR s/p Mitral clip on 9/22  NSTEMI type 2             PLAN:    ·	Brown stool on rectal exam by GI  ·	Transfuse him to keep Hb >8  ·	Monitor H/H. Give Lasix 20 mg ivp after each unit of blood.   ·	Cont to hold Xarelto  ·	GI eval noted. EGD and possible colonoscopy next week.   ·	Cont IV Protonix  ·	Retic count is 1.3. Serum iron is 33 and percent sat is 11. Start him on Venofer 200 mg iv daily x 5 doses.   ·	HR is controlled.   ·	ECHO  ·	Cont his other home meds    Progress Note Handoff    Pending (specify):  Consults_________, Tests________, Test Results_______, Other__EGD/Colonoscopy next week_______  Family discussion:  Disposition: Home___/SNF___/Other________/Unknown at this time________    Bert Vaughn MD  Spectra: [id number]

## 2022-11-11 NOTE — H&P ADULT - ATTENDING COMMENTS
Patient seen and examined at bedside independently of the residents. I read the resident's note and agree with the plan with the additions and corrections as noted below.    REVIEW OF SYSTEMS:  CONSTITUTIONAL: No weakness, fevers or chills  EYES/ENT: No visual changes;  No vertigo or throat pain   NECK: No pain or stiffness  RESPIRATORY: No cough, wheezing, hemoptysis; SOB.   CARDIOVASCULAR: No chest pain or palpitations  GASTROINTESTINAL: No abdominal or epigastric pain. No nausea, vomiting, or hematemesis; No diarrhea or constipation. No melena or hematochezia.  GENITOURINARY: No dysuria, frequency or hematuria  NEUROLOGICAL: No numbness or weakness  MSK: No pain. No weakness.   SKIN: No itching, rashes.     PMH: CAD s/p PCI to LAD (2021), Atrial fibrillation, Severe MR s/p mitral clip (sep 2022), HTN, HLD, Chronic microcytic anemia and erosive gastritis    FHx: Reviewed. Not relevant.     Physical Exam:  GEN: No acute distress. A & O x 3.   Head: Atraumatic, Normocephalic.   Eye: PEERLA. No sclera icterus. EOMI.   ENT: Normal oropharynx, no thyromegaly.   LUNGS: Clear to auscultation bilaterally. No wheeze/rales/crackles.   HEART: Normal. S1/S2 present. RRR. No murmur/gallops.   ABD: Soft, non-tender, non-distended. Bowel sounds present.   EXT: 1+ pitting edema b/l LE.   Integumentary: No rash, No petechia.   NEURO: CN III-XII intact. Strength: 5/5 b/l ULE. Sensory intact b/l ULE.     Vital Signs Last 24 Hrs  T(C): 35.6 (10 Nov 2022 22:16), Max: 36.6 (10 Nov 2022 15:36)  T(F): 96.1 (10 Nov 2022 22:16), Max: 97.8 (10 Nov 2022 15:36)  HR: 85 (10 Nov 2022 22:16) (67 - 85)  BP: 95/67 (10 Nov 2022 22:16) (95/67 - 102/59)  BP(mean): 70 (10 Nov 2022 21:03) (70 - 70)  RR: 18 (10 Nov 2022 22:16) (17 - 20)  SpO2: 97% (10 Nov 2022 21:03) (97% - 100%)    Parameters below as of 10 Nov 2022 21:03  Patient On (Oxygen Delivery Method): room air      Please see the above notes for Labs and radiology.     Assessment and Plan:     81 yo M with hx of CAD s/p PCI to LAD (2021), Atrial fibrillation, Severe MR s/p mitral clip (sep 2022), HTN, HLD, Chronic microcytic anemia and erosive gastritis presents to ED for worsening SOB.     Acute on Chronic anemia/Symptomatic anemia  - Hb 4.1. Baseline Hb ~ 10.   - Patient denied melena/hematochezia. Last BM 2 days ago --> no evidence of active GIB currently.  - s/p 3u PRBC   - monitor CBC and transfuse if Hb < 7.   - PPI BID and sucralfate   - check hemolysis panel   - clear liquid diet   - GI consult.     Hx of A.fib - hold AC.     CAD s/p PCI   - c/w ASA for now.     HTN/HLD - Hold anti-HTN med for now.     DVT ppx: SCD  GI ppx: PPI  Diet: clear liquid diet   Activity: as tolerated.     Date seen by the attendin2022. Patient seen and examined at bedside independently of the residents. I read the resident's note and agree with the plan with the additions and corrections as noted below.    REVIEW OF SYSTEMS:  CONSTITUTIONAL: No weakness, fevers or chills  EYES/ENT: No visual changes;  No vertigo or throat pain   NECK: No pain or stiffness  RESPIRATORY: No cough, wheezing, hemoptysis; SOB.   CARDIOVASCULAR: No chest pain or palpitations  GASTROINTESTINAL: No abdominal or epigastric pain. No nausea, vomiting, or hematemesis; No diarrhea or constipation. No melena or hematochezia.  GENITOURINARY: No dysuria, frequency or hematuria  NEUROLOGICAL: No numbness or weakness  MSK: No pain. No weakness.   SKIN: No itching, rashes.     PMH: CAD s/p PCI to LAD (2021), Atrial fibrillation, Severe MR s/p mitral clip (sep 2022), HTN, HLD, Chronic microcytic anemia and erosive gastritis    FHx: Reviewed. Not relevant.     Physical Exam:  GEN: No acute distress. A & O x 3.   Head: Atraumatic, Normocephalic.   Eye: PEERLA. No sclera icterus. EOMI.   ENT: Normal oropharynx, no thyromegaly.   LUNGS: Clear to auscultation bilaterally. No wheeze/rales/crackles.   HEART: Normal. S1/S2 present. RRR. No murmur/gallops.   ABD: Soft, non-tender, non-distended. Bowel sounds present.   EXT: 1+ pitting edema b/l LE.   Integumentary: No rash, No petechia.   NEURO: CN III-XII intact. Strength: 5/5 b/l ULE. Sensory intact b/l ULE.     Vital Signs Last 24 Hrs  T(C): 35.6 (10 Nov 2022 22:16), Max: 36.6 (10 Nov 2022 15:36)  T(F): 96.1 (10 Nov 2022 22:16), Max: 97.8 (10 Nov 2022 15:36)  HR: 85 (10 Nov 2022 22:16) (67 - 85)  BP: 95/67 (10 Nov 2022 22:16) (95/67 - 102/59)  BP(mean): 70 (10 Nov 2022 21:03) (70 - 70)  RR: 18 (10 Nov 2022 22:16) (17 - 20)  SpO2: 97% (10 Nov 2022 21:03) (97% - 100%)    Parameters below as of 10 Nov 2022 21:03  Patient On (Oxygen Delivery Method): room air      Please see the above notes for Labs and radiology.     Assessment and Plan:     83 yo M with hx of CAD s/p PCI to LAD (2021), Atrial fibrillation, Severe MR s/p mitral clip (sep 2022), HTN, HLD, Chronic microcytic anemia and erosive gastritis presents to ED for worsening SOB.     Acute on Chronic anemia/Symptomatic anemia  - Hb 4.1. Baseline Hb ~ 10.   - Patient denied melena/hematochezia. Last BM 2 days ago --> no evidence of active GIB currently.  - s/p 3u PRBC   - monitor CBC and transfuse if Hb < 7.   - PPI BID and sucralfate   - check Fe studies, B12, folate and hemolysis panel   - clear liquid diet   - GI consult.     Hx of A.fib - hold AC.     CAD s/p PCI   - c/w ASA for now.     HTN/HLD - Hold anti-HTN med for now.     DVT ppx: SCD  GI ppx: PPI  Diet: clear liquid diet   Activity: as tolerated.     Date seen by the attendin2022.

## 2022-11-11 NOTE — H&P ADULT - ASSESSMENT
82 year old man with PMHx of  CAD s/p PCI, atrial fibrillation, HLD, HTN and severe MR s/o mitral clip on 9/22 presenting with severe anemia    #Severe Microcytic Anemia  #History of errosive gastritis  #Recent Mitral Clip due to MR  - Patient with lethargy and fatigue  - Hb of 4.1 (from 10.8 in 9/22)  - Patient ordered 2 pRBC in ED, will order a 3rd one, lasix ordered after 1st unit  - No signs of acute bleeding as per history, vitals and PE  - Iron profile, Ferritin ordered  - Some case reports show that mitral clips can cause hemolysis  - Hemolytic workup ordered: LDH, haptoglobin, retic count, peripheral smear, UA to check for hemoglobinuria  - GI CS  - If lab work is in favor of hemolysis, please consult CT surgery for further management on clip  - 2 x large bore IVs, active type and screen  - PPI BID  - hold xarelto for now    #CAD s/p PCI  - c.w aspirin for now    #paroxysmal Afib  - currently in NSR  - not on rate control and HR 85   - Hold xarelto    #HLD  - c/w atorvastatin    #HTN  - BP on the lower side  - keep off bp meds for now    Diet: DASH  Activity: as tolerated  DVT ppx: SCD for now  GI ppx: ppi bid  Dispo: tele

## 2022-11-11 NOTE — H&P ADULT - NSHPPHYSICALEXAM_GEN_ALL_CORE
GA: alert oriented x3  Heart: normal s1 s2  Lungs; clear air sounds  Abdomen: soft non tender  LE +2 edema

## 2022-11-11 NOTE — CONSULT NOTE ADULT - ASSESSMENT
82 y.o male patient with PMH of CAD s/p PCI, atrial fibrillation on xarelto last dose 11/10 AM, HLD, HTN and severe MR s/o mitral clip on 9/22 presenting for fatigue, lethargy and shortness of breath that is episodic in nature. Patient describes the shortness of breath to be less severe than pre-mitral clipping. Patient denies any dark stools, hematochezia, hematemesis or hematuria No chest pain, fever or chills. No abdominal pain, nausea or vomiting. No urinary symptoms.    #)Acute on chronic iron deficiency anemia  -hemodynamically stable   -Xarelto last dose 11/10 AM  -DANIELA brown stool     Recs:   will plan for EGD with or without colonoscopy next week need to hold xarelto for 2 days  82 y.o male patient with PMH of CAD s/p PCI, atrial fibrillation on xarelto last dose 11/10 AM, HLD, HTN and severe MR s/o mitral clip on 9/22 presenting for fatigue, lethargy and shortness of breath that is episodic in nature. Patient describes the shortness of breath to be less severe than pre-mitral clipping. Patient denies any dark stools, hematochezia, hematemesis or hematuria No chest pain, fever or chills. No abdominal pain, nausea or vomiting. No urinary symptoms.    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 Patient describes the shortness of breath to be less severe than pre-mitral clipping. Patient denies any dark stools, hematochezia, hematemesis or hematuria No chest pain, fever or chills. No abdominal pain, nausea or vomiting. No urinary symptoms.    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 Alvarez's office

## 2022-11-11 NOTE — CONSULT NOTE ADULT - SUBJECTIVE AND OBJECTIVE BOX
Gastroenterology Consultation:    Patient is a 82y old  Male who presents with a chief complaint of Fatigue and lethargy (11 Nov 2022 07:13)      Admitted on: 11-10-22  HPI:  82 y.o male patient with PMH of CAD s/p PCI, atrial fibrillation on xarelto last dose 11/10 AM, HLD, HTN and severe MR s/o mitral clip on 9/22 presenting for fatigue, lethargy and shortness of breath that is episodic in nature. Patient describes the shortness of breath to be less severe than pre-mitral clipping. Patient denies any dark stools, hematochezia, hematemesis or hematuria No chest pain, fever or chills. No abdominal pain, nausea or vomiting. No urinary symptoms.   In the ED, patient was found to be severely anemic (4.1 from 10.8 in 9/22). He was ordered 2 packed RBCs. EKG shows NSR with PACs. Trop 0.02 and BNP 1K. (11 Nov 2022 00:50)      Prior EGD: < from: EGD (04.27.22 @ 09:45) >  Impressions:    Grade 2 esophagitis in the gastroesophageal junction compatible with  non-erosive esophagitis.    Hiatal Hernia in the gastroesophageal junction.   Erosions in the stomach body compatible with erosive gastritis. (Biopsy).    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     < end of copied text >    Prior Colonoscopy: as per patient in 2021 reported normal       PAST MEDICAL & SURGICAL HISTORY:  HTN (hypertension)      High cholesterol      GERD (gastroesophageal reflux disease)      Atrial fibrillation      CAD (coronary artery disease)  s/p PCI      JONAH (iron deficiency anemia)      BPH (benign prostatic hyperplasia)      H/O CHF      S/P coronary angioplasty  s/p pci          FAMILY HISTORY:  FH: heart disease (Mother)        Social History:  Tobacco: N  Alcohol: N  Drugs: N    Home Medications:  aspirin 81 mg oral delayed release tablet: 1 tab(s) orally once a day (11 Nov 2022 00:29)    MEDICATIONS  (STANDING):  aspirin enteric coated 81 milliGRAM(s) Oral daily  atorvastatin 20 milliGRAM(s) Oral at bedtime  influenza  Vaccine (HIGH DOSE) 0.7 milliLiter(s) IntraMuscular once  iron sucrose IVPB 200 milliGRAM(s) IV Intermittent every 24 hours  pantoprazole  Injectable 40 milliGRAM(s) IV Push two times a day  tamsulosin 0.4 milliGRAM(s) Oral at bedtime  torsemide 20 milliGRAM(s) Oral daily    MEDICATIONS  (PRN):  acetaminophen     Tablet .. 650 milliGRAM(s) Oral every 6 hours PRN Temp greater or equal to 38C (100.4F), Mild Pain (1 - 3)  furosemide   Injectable 20 milliGRAM(s) IV Push once PRN with next blood bag  melatonin 3 milliGRAM(s) Oral at bedtime PRN Insomnia      Allergies  No Known Allergies      Review of Systems:   Constitutional:  No Fever, No Chills  ENT/Mouth:  No Hearing Changes,  No Difficulty Swallowing  Eyes:  No Eye Pain, No Vision Changes  Cardiovascular:  No Chest Pain, No Palpitations  Respiratory:  No Cough, No Dyspnea  Gastrointestinal:  As described in HPI  Musculoskeletal:  No Joint Swelling, No Back Pain  Skin:  No Skin Lesions, No Jaundice  Neuro:  No Syncope, No Dizziness  Heme/Lymph:  No Bruising, No Bleeding.          Physical Examination:  T(C): 36.7 (11-11-22 @ 04:37), Max: 36.7 (11-11-22 @ 04:37)  HR: 80 (11-11-22 @ 04:37) (67 - 85)  BP: 89/60 (11-11-22 @ 04:37) (89/60 - 102/59)  RR: 18 (11-11-22 @ 04:37) (17 - 20)  SpO2: 97% (11-10-22 @ 21:03) (97% - 100%)  Height (cm): 182.9 (11-10-22 @ 22:16)  Weight (kg): 80 (11-10-22 @ 22:16)    11-10-22 @ 07:01  -  11-11-22 @ 07:00  --------------------------------------------------------  IN: 0 mL / OUT: 1350 mL / NET: -1350 mL        Constitutional: No acute distress.  Eyes:. Conjunctivae are clear, Sclera is non-icteric.  Ears Nose and Throat: The external ears are normal appearing,  Oral mucosa is pink and moist.  Respiratory:  No signs of respiratory distress. Lung sounds are clear bilaterally.  Cardiovascular:  S1 S2, Regular rate and rhythm.  GI: Abdomen is soft, symmetric, and non-tender without distention. There are no visible lesions or scars. Bowel sounds are present and normoactive in all four quadrants. No masses, hepatomegaly, or splenomegaly are noted.   Neuro: No Tremor, No involuntary movements  Skin: No rashes, No Jaundice.          Data:                        5.2    5.84  )-----------( 369      ( 11 Nov 2022 04:43 )             17.0     Hgb Trend:  5.2  11-11-22 @ 04:43  4.1  11-10-22 @ 19:04        11-11    138  |  98  |  17  ----------------------------<  98  3.3<L>   |  30  |  1.2    Ca    8.5      11 Nov 2022 04:43  Mg     2.2     11-10    TPro  6.1  /  Alb  3.1<L>  /  TBili  1.7<H>  /  DBili  x   /  AST  18  /  ALT  33  /  AlkPhos  53  11-11    Liver panel trend:  TBili 1.7   /   AST 18   /   ALT 33   /   AlkP 53   /   Tptn 6.1   /   Alb 3.1    /   DBili --      11-11  TBili 1.1   /   AST 22   /   ALT 38   /   AlkP 54   /   Tptn 6.7   /   Alb 3.5    /   DBili --      11-10      PT/INR - ( 10 Nov 2022 17:40 )   PT: 19.20 sec;   INR: 1.66 ratio         PTT - ( 10 Nov 2022 17:40 )  PTT:31.6 sec        Radiology:

## 2022-11-11 NOTE — PROGRESS NOTE ADULT - SUBJECTIVE AND OBJECTIVE BOX
GRIFFIN MIGEL  82y Male    CHIEF COMPLAINT:    Patient is a 82y old  Male who presents with a chief complaint of Fatigue and lethargy (2022 05:48)      INTERVAL HPI/OVERNIGHT EVENTS:    Patient seen and examined.    ROS: All other systems are negative.    Vital Signs:    T(F): 98.1 (22 @ 04:37), Max: 98.1 (22 @ 04:37)  HR: 80 (22 @ 04:37) (67 - 85)  BP: 89/60 (22 @ 04:37) (89/60 - 102/59)  RR: 18 (22 @ 04:37) (17 - 20)  SpO2: 97% (11-10-22 @ 21:03) (97% - 100%)  I&O's Summary    10 Nov 2022 07:01  -  2022 07:00  --------------------------------------------------------  IN: 0 mL / OUT: 1350 mL / NET: -1350 mL      Daily Height in cm: 182.88 (10 Nov 2022 22:16)    Daily Weight in k.6 (2022 04:37)  CAPILLARY BLOOD GLUCOSE          PHYSICAL EXAM:    GENERAL:  NAD  SKIN: No rashes or lesions  HENT: Atraumatic. Normocephalic. PERRL. Moist membranes.  NECK: Supple, No JVD. No lymphadenopathy.  PULMONARY: CTA B/L. No wheezing. No rales  CVS: Normal S1, S2. Rate and Rhythm are regular. No murmurs.  ABDOMEN/GI: Soft, Nontender, Nondistended; BS present  EXTREMITIES: Peripheral pulses intact. No edema B/L LE.  NEUROLOGIC:  No motor or sensory deficit.  PSYCH: Alert & oriented x 3    Consultant(s) Notes Reviewed:  [x ] YES  [ ] NO  Care Discussed with Consultants/Other Providers [ x] YES  [ ] NO    EKG reviewed  Telemetry reviewed    LABS:                        5.2    5.84  )-----------( 369      ( 2022 04:43 )             17.0     11-10    138  |  98  |  18  ----------------------------<  124<H>  3.8   |  30  |  1.0    Ca    8.7      10 Nov 2022 17:40  Mg     2.2     11-10    TPro  6.7  /  Alb  3.5  /  TBili  1.1  /  DBili  x   /  AST  22  /  ALT  38  /  AlkPhos  54  11-10    PT/INR - ( 10 Nov 2022 17:40 )   PT: 19.20 sec;   INR: 1.66 ratio         PTT - ( 10 Nov 2022 17:40 )  PTT:31.6 sec  Serum Pro-Brain Natriuretic Peptide: 1002 pg/mL (11-10-22 @ 17:40)    Trop 0.02, CKMB --, CK --, 22 @ 00:05  Trop 0.03, CKMB --, CK --, 11-10-22 @ 17:40        RADIOLOGY & ADDITIONAL TESTS:      Imaging or report Personally Reviewed:  [ ] YES  [ ] NO    Medications:  Standing  aspirin enteric coated 81 milliGRAM(s) Oral daily  atorvastatin 20 milliGRAM(s) Oral at bedtime  influenza  Vaccine (HIGH DOSE) 0.7 milliLiter(s) IntraMuscular once  pantoprazole  Injectable 40 milliGRAM(s) IV Push two times a day  tamsulosin 0.4 milliGRAM(s) Oral at bedtime  torsemide 20 milliGRAM(s) Oral daily    PRN Meds  acetaminophen     Tablet .. 650 milliGRAM(s) Oral every 6 hours PRN  melatonin 3 milliGRAM(s) Oral at bedtime PRN      Case discussed with resident    Care discussed with pt/family           GRIFFIN MIGEL  82y Male    CHIEF COMPLAINT:    Patient is a 82y old  Male who presents with a chief complaint of Fatigue and lethargy (2022 05:48)      INTERVAL HPI/OVERNIGHT EVENTS:    Patient seen and examined. Denies any melena or rectal bleeding. No abdominal pain. No N/V. C/O feeling weak and fatigued.       ROS: All other systems are negative.    Vital Signs:    T(F): 98.1 (22 @ 04:37), Max: 98.1 (22 @ 04:37)  HR: 80 (22 @ 04:37) (67 - 85)  BP: 89/60 (22 @ 04:37) (89/60 - 102/59)  RR: 18 (22 @ 04:37) (17 - 20)  SpO2: 97% (11-10-22 @ 21:03) (97% - 100%)  I&O's Summary    10 Nov 2022 07:01  -  2022 07:00  --------------------------------------------------------  IN: 0 mL / OUT: 1350 mL / NET: -1350 mL      Daily Height in cm: 182.88 (10 Nov 2022 22:16)    Daily Weight in k.6 (2022 04:37)  CAPILLARY BLOOD GLUCOSE          PHYSICAL EXAM:    GENERAL:  NAD  SKIN: No rashes or lesions  HENT: Atraumatic. Normocephalic. PERRL. Moist membranes. Pale conjunctivae  NECK: Supple, No JVD. No lymphadenopathy.  PULMONARY: CTA B/L. No wheezing. No rales  CVS: Normal S1, S2. Rate and Rhythm are regular.   ABDOMEN/GI: Soft, Nontender, Nondistended; BS present  EXTREMITIES: Peripheral pulses intact. Trace edema B/L LE.  NEUROLOGIC:  No motor or sensory deficit.  PSYCH: Alert & oriented x 3    Consultant(s) Notes Reviewed:  [x ] YES  [ ] NO  Care Discussed with Consultants/Other Providers [ x] YES  [ ] NO    EKG reviewed  Telemetry reviewed    LABS:                        5.2    5.84  )-----------( 369      ( 2022 04:43 )             17.0   Hemoglobin: 5.2 g/dL ( @ 04:43)  Hemoglobin: 4.1 g/dL (11-10 @ 19:04)    11-10    138  |  98  |  18  ----------------------------<  124<H>  3.8   |  30  |  1.0    Ca    8.7      10 Nov 2022 17:40  Mg     2.2     11-10    TPro  6.7  /  Alb  3.5  /  TBili  1.1  /  DBili  x   /  AST  22  /  ALT  38  /  AlkPhos  54  11-10    PT/INR - ( 10 Nov 2022 17:40 )   PT: 19.20 sec;   INR: 1.66 ratio         PTT - ( 10 Nov 2022 17:40 )  PTT:31.6 sec  Serum Pro-Brain Natriuretic Peptide: 1002 pg/mL (11-10-22 @ 17:40)    Trop 0.02, CKMB --, CK --, 22 @ 00:05  Trop 0.03, CKMB --, CK --, 11-10-22 @ 17:40        RADIOLOGY & ADDITIONAL TESTS:      Imaging or report Personally Reviewed:  [ ] YES  [ ] NO    Medications:  Standing  aspirin enteric coated 81 milliGRAM(s) Oral daily  atorvastatin 20 milliGRAM(s) Oral at bedtime  influenza  Vaccine (HIGH DOSE) 0.7 milliLiter(s) IntraMuscular once  pantoprazole  Injectable 40 milliGRAM(s) IV Push two times a day  tamsulosin 0.4 milliGRAM(s) Oral at bedtime  torsemide 20 milliGRAM(s) Oral daily    PRN Meds  acetaminophen     Tablet .. 650 milliGRAM(s) Oral every 6 hours PRN  melatonin 3 milliGRAM(s) Oral at bedtime PRN      Case discussed with resident    Care discussed with pt/family

## 2022-11-11 NOTE — PROGRESS NOTE ADULT - SUBJECTIVE AND OBJECTIVE BOX
MIGEL MOYA 82y Male  MRN#: [id number]   Hospital Day: 1d    HPI:  82 y.o male patient with PMH of CAD s/p PCI, atrial fibrillation, HLD, HTN and severe MR s/o mitral clip on 9/22 presenting for fatigue, lethargy and shortness of breath that is episodic in nature. Patient describes the shortness of breath to be less severe than pre-mitral clipping. Patient denies any dark stools, hematochezia, hematemesis or hematuria No chest pain, fever or chills. No abdominal pain, nausea or vomiting. No urinary symptoms.   In the ED, patient was found to be severely anemic (4.1 from 10.8 in 9/22). He was ordered 2 packed RBCs. EKG shows NSR with PACs. Trop 0.02 and BNP 1K. (11 Nov 2022 00:50)      SUBJECTIVE  Patient is a 82y old Male who presents with a chief complaint of Fatigue and lethargy (11 Nov 2022 00:50)  Currently admitted to medicine with the primary diagnosis of Anemia      INTERVAL HPI AND OVERNIGHT EVENTS:  Patient was examined and seen at bedside. This morning he is resting comfortably in bed and reports no issues or overnight events.    REVIEW OF SYMPTOMS:  CONSTITUTIONAL: No weakness, fevers or chills; No headaches  EYES: No visual changes, eye pain, or discharge  ENT: No vertigo; No ear pain or change in hearing; No sore throat or difficulty swallowing  NECK: No pain or stiffness  RESPIRATORY: No cough, wheezing, or hemoptysis; No shortness of breath  CARDIOVASCULAR: No chest pain or palpitations  GASTROINTESTINAL: No abdominal or epigastric pain; No nausea, vomiting, or hematemesis; No diarrhea or constipation; No melena or hematochezia  GENITOURINARY: No dysuria, frequency or hematuria  MUSCULOSKELETAL: No joint pain, no muscle pain, no weakness  NEUROLOGICAL: No numbness or weakness  SKIN: No itching or rashes    OBJECTIVE  PAST MEDICAL & SURGICAL HISTORY  HTN (hypertension)    High cholesterol    GERD (gastroesophageal reflux disease)    Atrial fibrillation    CAD (coronary artery disease)  s/p PCI    JONAH (iron deficiency anemia)    BPH (benign prostatic hyperplasia)    H/O CHF    S/P coronary angioplasty  s/p pci      ALLERGIES:  No Known Allergies    MEDICATIONS:  STANDING MEDICATIONS  aspirin enteric coated 81 milliGRAM(s) Oral daily  atorvastatin 20 milliGRAM(s) Oral at bedtime  influenza  Vaccine (HIGH DOSE) 0.7 milliLiter(s) IntraMuscular once  pantoprazole  Injectable 40 milliGRAM(s) IV Push two times a day  tamsulosin 0.4 milliGRAM(s) Oral at bedtime  torsemide 20 milliGRAM(s) Oral daily    PRN MEDICATIONS  acetaminophen     Tablet .. 650 milliGRAM(s) Oral every 6 hours PRN  melatonin 3 milliGRAM(s) Oral at bedtime PRN      VITAL SIGNS: Last 24 Hours  T(C): 36.7 (11 Nov 2022 04:37), Max: 36.7 (11 Nov 2022 04:37)  T(F): 98.1 (11 Nov 2022 04:37), Max: 98.1 (11 Nov 2022 04:37)  HR: 80 (11 Nov 2022 04:37) (67 - 85)  BP: 89/60 (11 Nov 2022 04:37) (89/60 - 102/59)  BP(mean): 70 (10 Nov 2022 21:03) (70 - 70)  RR: 18 (11 Nov 2022 04:37) (17 - 20)  SpO2: 97% (10 Nov 2022 21:03) (97% - 100%)    LABS:                        4.1    5.86  )-----------( 366      ( 10 Nov 2022 19:04 )             14.0     11-10    138  |  98  |  18  ----------------------------<  124<H>  3.8   |  30  |  1.0    Ca    8.7      10 Nov 2022 17:40  Mg     2.2     11-10    TPro  6.7  /  Alb  3.5  /  TBili  1.1  /  DBili  x   /  AST  22  /  ALT  38  /  AlkPhos  54  11-10    PT/INR - ( 10 Nov 2022 17:40 )   PT: 19.20 sec;   INR: 1.66 ratio         PTT - ( 10 Nov 2022 17:40 )  PTT:31.6 sec      Troponin T, Serum: 0.02 ng/mL *H* (11-11-22 @ 00:05)  Troponin T, Serum: 0.03 ng/mL *HH* (11-10-22 @ 17:40)      CARDIAC MARKERS ( 11 Nov 2022 00:05 )  x     / 0.02 ng/mL / x     / x     / x      CARDIAC MARKERS ( 10 Nov 2022 17:40 )  x     / 0.03 ng/mL / x     / x     / x          RADIOLOGY:      PHYSICAL EXAM:  CONSTITUTIONAL: No acute distress, well-developed, well-groomed, AAOx3  HEAD: Atraumatic, normocephalic  EYES: EOM intact, PERRLA, conjunctiva and sclera clear  ENT: Supple, no masses, no thyromegaly, no bruits, no JVD; moist mucous membranes  PULMONARY: Clear to auscultation bilaterally; no wheezes, rales, or rhonchi  CARDIOVASCULAR: Regular rate and rhythm; no murmurs, rubs, or gallops  GASTROINTESTINAL: Soft, non-tender, non-distended; bowel sounds present  MUSCULOSKELETAL: 2+ peripheral pulses; no clubbing, no cyanosis, no edema  NEUROLOGY: non-focal  SKIN: No rashes or lesions; warm and dry    ASSESSMENT & PLAN  82 year old man with PMHx of  CAD s/p PCI, atrial fibrillation, HLD, HTN and severe MR s/o mitral clip on 9/22 presenting with severe anemia    #Severe Microcytic Anemia  #History of erosive gastritis  #Recent Mitral Clip due to MR  - Hb of 4.1 (from 10.8 in 9/22)  - s/p 3 PRBC  - No signs of acute bleeding   - Iron profile, Ferritin ordered  - Hemolytic workup ordered: LDH, haptoglobin, retic count, peripheral smear, UA to check for hemoglobinuria  - GI CS placed  - If lab work is in favor of hemolysis, please consult CT surgery for further management on clip  - keep 2 x large bore IVs, active type and screen  - PPI BID  - Hold Xarelto for now    #CAD s/p PCI  - c.w aspirin for now    #Paroxysmal Afib  - currently in NSR  - not on rate control and HR 85   - Hold xarelto    #HLD  - c/w atorvastatin    #HTN  - BP on the lower side  - keep off bp meds for now    Diet: DASH  Activity: as tolerated  DVT ppx: SCD for now  GI ppx: ppi bid   MIGEL MOYA 82y Male  MRN#: [id number]   Hospital Day: 1d    HPI:  82 y.o male patient with PMH of CAD s/p PCI, atrial fibrillation, HLD, HTN and severe MR s/o mitral clip on 9/22 presenting for fatigue, lethargy and shortness of breath that is episodic in nature. Patient describes the shortness of breath to be less severe than pre-mitral clipping. Patient denies any dark stools, hematochezia, hematemesis or hematuria No chest pain, fever or chills. No abdominal pain, nausea or vomiting. No urinary symptoms.   In the ED, patient was found to be severely anemic (4.1 from 10.8 in 9/22). He was ordered 2 packed RBCs. EKG shows NSR with PACs. Trop 0.02 and BNP 1K. (11 Nov 2022 00:50)      SUBJECTIVE  Patient is a 82y old Male who presents with a chief complaint of Fatigue and lethargy (11 Nov 2022 00:50)  Currently admitted to medicine with the primary diagnosis of Anemia      INTERVAL HPI AND OVERNIGHT EVENTS:  Patient was examined and seen at bedside. This morning he is resting comfortably in bed and reports no issues or overnight events.    REVIEW OF SYMPTOMS:  CONSTITUTIONAL: No weakness, fevers or chills; No headaches  EYES: No visual changes, eye pain, or discharge  ENT: No vertigo; No ear pain or change in hearing; No sore throat or difficulty swallowing  NECK: No pain or stiffness  RESPIRATORY: No cough, wheezing, or hemoptysis; No shortness of breath  CARDIOVASCULAR: No chest pain or palpitations  GASTROINTESTINAL: No abdominal or epigastric pain; No nausea, vomiting, or hematemesis; No diarrhea or constipation; No melena or hematochezia  GENITOURINARY: No dysuria, frequency or hematuria  MUSCULOSKELETAL: No joint pain, no muscle pain, no weakness  NEUROLOGICAL: No numbness or weakness  SKIN: No itching or rashes    OBJECTIVE  PAST MEDICAL & SURGICAL HISTORY  HTN (hypertension)    High cholesterol    GERD (gastroesophageal reflux disease)    Atrial fibrillation    CAD (coronary artery disease)  s/p PCI    JONAH (iron deficiency anemia)    BPH (benign prostatic hyperplasia)    H/O CHF    S/P coronary angioplasty  s/p pci      ALLERGIES:  No Known Allergies    MEDICATIONS:  STANDING MEDICATIONS  aspirin enteric coated 81 milliGRAM(s) Oral daily  atorvastatin 20 milliGRAM(s) Oral at bedtime  influenza  Vaccine (HIGH DOSE) 0.7 milliLiter(s) IntraMuscular once  pantoprazole  Injectable 40 milliGRAM(s) IV Push two times a day  tamsulosin 0.4 milliGRAM(s) Oral at bedtime  torsemide 20 milliGRAM(s) Oral daily    PRN MEDICATIONS  acetaminophen     Tablet .. 650 milliGRAM(s) Oral every 6 hours PRN  melatonin 3 milliGRAM(s) Oral at bedtime PRN      VITAL SIGNS: Last 24 Hours  T(C): 36.7 (11 Nov 2022 04:37), Max: 36.7 (11 Nov 2022 04:37)  T(F): 98.1 (11 Nov 2022 04:37), Max: 98.1 (11 Nov 2022 04:37)  HR: 80 (11 Nov 2022 04:37) (67 - 85)  BP: 89/60 (11 Nov 2022 04:37) (89/60 - 102/59)  BP(mean): 70 (10 Nov 2022 21:03) (70 - 70)  RR: 18 (11 Nov 2022 04:37) (17 - 20)  SpO2: 97% (10 Nov 2022 21:03) (97% - 100%)    LABS:                        4.1    5.86  )-----------( 366      ( 10 Nov 2022 19:04 )             14.0     11-10    138  |  98  |  18  ----------------------------<  124<H>  3.8   |  30  |  1.0    Ca    8.7      10 Nov 2022 17:40  Mg     2.2     11-10    TPro  6.7  /  Alb  3.5  /  TBili  1.1  /  DBili  x   /  AST  22  /  ALT  38  /  AlkPhos  54  11-10    PT/INR - ( 10 Nov 2022 17:40 )   PT: 19.20 sec;   INR: 1.66 ratio         PTT - ( 10 Nov 2022 17:40 )  PTT:31.6 sec      Troponin T, Serum: 0.02 ng/mL *H* (11-11-22 @ 00:05)  Troponin T, Serum: 0.03 ng/mL *HH* (11-10-22 @ 17:40)      CARDIAC MARKERS ( 11 Nov 2022 00:05 )  x     / 0.02 ng/mL / x     / x     / x      CARDIAC MARKERS ( 10 Nov 2022 17:40 )  x     / 0.03 ng/mL / x     / x     / x          RADIOLOGY:      PHYSICAL EXAM:  CONSTITUTIONAL: No acute distress, well-developed, well-groomed, AAOx3  HEAD: Atraumatic, normocephalic  EYES: EOM intact, PERRLA, conjunctiva and sclera clear  ENT: Supple, no masses, no thyromegaly, no bruits, no JVD; moist mucous membranes  PULMONARY: Clear to auscultation bilaterally; no wheezes, rales, or rhonchi  CARDIOVASCULAR: Regular rate and rhythm; no murmurs, rubs, or gallops  GASTROINTESTINAL: Soft, non-tender, non-distended; bowel sounds present  MUSCULOSKELETAL: 2+ peripheral pulses; bilat pitting edema   NEUROLOGY: non-focal  SKIN: No rashes or lesions; warm and dry    ASSESSMENT & PLAN  82 year old man with PMHx of  CAD s/p PCI, atrial fibrillation, HLD, HTN and severe MR s/o mitral clip on 9/22 presenting with severe anemia    #Severe Microcytic Anemia  #History of erosive gastritis  #Recent Mitral Clip due to MR  - Hb of 4.1 (from 10.8 in 9/22)  - s/p 3 PRBC with Lasix 20 mg with each    - No signs of acute bleeding   - Iron profile -> Iron deficency  - Keep 2 x large bore IVs, active type and screen  - PPI BID  - Hold Xarelto for now  - Hemolytic workup ordered: LDH, haptoglobin, retic count, peripheral smear, UA to check for hemoglobinuria  - As per GI: Plan for endo/colono next week, will need to hold Xarelto 2 days before    #CAD s/p PCI  - c.w aspirin for now    #Paroxysmal Afib  - Currently in NSR  - Not on rate control and HR 85   - Hold xarelto    #HLD  - c/w atorvastatin    #HTN  - BP on the lower side  - Keep off bp meds for now    Diet: DASH  Activity: as tolerated  DVT ppx: SCD for now  GI ppx: PPI BID   MIGEL MOYA 82y Male  MRN#: [id number]   Hospital Day: 1d    HPI:  82 y.o male patient with PMH of CAD s/p PCI, atrial fibrillation, HLD, HTN and severe MR s/o mitral clip on 9/22 presenting for fatigue, lethargy and shortness of breath that is episodic in nature. Patient describes the shortness of breath to be less severe than pre-mitral clipping. Patient denies any dark stools, hematochezia, hematemesis or hematuria No chest pain, fever or chills. No abdominal pain, nausea or vomiting. No urinary symptoms.   In the ED, patient was found to be severely anemic (4.1 from 10.8 in 9/22). He was ordered 2 packed RBCs. EKG shows NSR with PACs. Trop 0.02 and BNP 1K. (11 Nov 2022 00:50)      SUBJECTIVE  Patient is a 82y old Male who presents with a chief complaint of Fatigue and lethargy (11 Nov 2022 00:50)  Currently admitted to medicine with the primary diagnosis of Anemia      INTERVAL HPI AND OVERNIGHT EVENTS:  Patient was examined and seen at bedside. This morning he is resting comfortably in bed and reports no issues or overnight events.    REVIEW OF SYMPTOMS:  CONSTITUTIONAL: No weakness, fevers or chills; No headaches  EYES: No visual changes, eye pain, or discharge  ENT: No vertigo; No ear pain or change in hearing; No sore throat or difficulty swallowing  NECK: No pain or stiffness  RESPIRATORY: No cough, wheezing, or hemoptysis; No shortness of breath  CARDIOVASCULAR: No chest pain or palpitations  GASTROINTESTINAL: No abdominal or epigastric pain; No nausea, vomiting, or hematemesis; No diarrhea or constipation; No melena or hematochezia  GENITOURINARY: No dysuria, frequency or hematuria  MUSCULOSKELETAL: No joint pain, no muscle pain, no weakness  NEUROLOGICAL: No numbness or weakness  SKIN: No itching or rashes    OBJECTIVE  PAST MEDICAL & SURGICAL HISTORY  HTN (hypertension)    High cholesterol    GERD (gastroesophageal reflux disease)    Atrial fibrillation    CAD (coronary artery disease)  s/p PCI    JONAH (iron deficiency anemia)    BPH (benign prostatic hyperplasia)    H/O CHF    S/P coronary angioplasty  s/p pci      ALLERGIES:  No Known Allergies    MEDICATIONS:  STANDING MEDICATIONS  aspirin enteric coated 81 milliGRAM(s) Oral daily  atorvastatin 20 milliGRAM(s) Oral at bedtime  influenza  Vaccine (HIGH DOSE) 0.7 milliLiter(s) IntraMuscular once  pantoprazole  Injectable 40 milliGRAM(s) IV Push two times a day  tamsulosin 0.4 milliGRAM(s) Oral at bedtime  torsemide 20 milliGRAM(s) Oral daily    PRN MEDICATIONS  acetaminophen     Tablet .. 650 milliGRAM(s) Oral every 6 hours PRN  melatonin 3 milliGRAM(s) Oral at bedtime PRN      VITAL SIGNS: Last 24 Hours  T(C): 36.7 (11 Nov 2022 04:37), Max: 36.7 (11 Nov 2022 04:37)  T(F): 98.1 (11 Nov 2022 04:37), Max: 98.1 (11 Nov 2022 04:37)  HR: 80 (11 Nov 2022 04:37) (67 - 85)  BP: 89/60 (11 Nov 2022 04:37) (89/60 - 102/59)  BP(mean): 70 (10 Nov 2022 21:03) (70 - 70)  RR: 18 (11 Nov 2022 04:37) (17 - 20)  SpO2: 97% (10 Nov 2022 21:03) (97% - 100%)    LABS:                        4.1    5.86  )-----------( 366      ( 10 Nov 2022 19:04 )             14.0     11-10    138  |  98  |  18  ----------------------------<  124<H>  3.8   |  30  |  1.0    Ca    8.7      10 Nov 2022 17:40  Mg     2.2     11-10    TPro  6.7  /  Alb  3.5  /  TBili  1.1  /  DBili  x   /  AST  22  /  ALT  38  /  AlkPhos  54  11-10    PT/INR - ( 10 Nov 2022 17:40 )   PT: 19.20 sec;   INR: 1.66 ratio         PTT - ( 10 Nov 2022 17:40 )  PTT:31.6 sec      Troponin T, Serum: 0.02 ng/mL *H* (11-11-22 @ 00:05)  Troponin T, Serum: 0.03 ng/mL *HH* (11-10-22 @ 17:40)      CARDIAC MARKERS ( 11 Nov 2022 00:05 )  x     / 0.02 ng/mL / x     / x     / x      CARDIAC MARKERS ( 10 Nov 2022 17:40 )  x     / 0.03 ng/mL / x     / x     / x          RADIOLOGY:      PHYSICAL EXAM:  CONSTITUTIONAL: No acute distress, well-developed, well-groomed, AAOx3  HEAD: Atraumatic, normocephalic  EYES: EOM intact, PERRLA, conjunctiva and sclera clear  ENT: Supple, no masses, no thyromegaly, no bruits, no JVD; moist mucous membranes  PULMONARY: Clear to auscultation bilaterally; no wheezes, rales, or rhonchi  CARDIOVASCULAR: Regular rate and rhythm; no murmurs, rubs, or gallops  GASTROINTESTINAL: Soft, non-tender, non-distended; bowel sounds present  MUSCULOSKELETAL: 2+ peripheral pulses; bilat pitting edema   NEUROLOGY: non-focal  SKIN: No rashes or lesions; warm and dry    ASSESSMENT & PLAN  82 year old man with PMHx of  CAD s/p PCI, atrial fibrillation, HLD, HTN and severe MR s/o mitral clip on 9/22 presenting with severe anemia    #Severe Microcytic Anemia  #History of erosive gastritis  #Recent Mitral Clip due to MR  - Hb of 4.1 (from 10.8 in 9/22)  - s/p 3 PRBC with Lasix 20 mg IVP after each unit of blood  - No signs of acute bleeding   - Iron profile -> Iron deficency -> Start him on Venofer 200 mg iv daily x 5 doses.  - Keep 2 x large bore IVs, active type and screen  - PPI BID  - Hold Xarelto for now  - Hemolytic workup ordered: LDH, haptoglobin, retic count, peripheral smear, UA to check for hemoglobinuria  - As per GI: Plan for endo/colono next week, will need to hold Xarelto 2 days before  - Keep hg > 8 give Lasix 20mg with each unit     #CAD s/p PCI  - c.w aspirin for now    #Paroxysmal Afib  - Currently in NSR  - Not on rate control and HR 85   - Hold xarelto    #HLD  - c/w atorvastatin    #HTN  - BP on the lower side  - Keep off bp meds for now    Diet: DASH  Activity: as tolerated  DVT ppx: SCD for now  GI ppx: PPI BID

## 2022-11-11 NOTE — CONSULT NOTE ADULT - ATTENDING COMMENTS
81 yo M on Xarelto recurrent significant anemia without overt GI bleeding. 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 Given profound anemia, would benefit from EGD for eval and control of bleeding since he is on Xarelto which needs to be held for 48 hours in his case prior to endoscopic intervention. PPI is recommended. please resuscitate adequately and trend the hgb

## 2022-11-11 NOTE — H&P ADULT - NSHPLABSRESULTS_GEN_ALL_CORE
Complete Blood Count + Automated Diff (11.10.22 @ 19:04)   WBC Count: 5.86 K/uL   RBC Count: 1.95 M/uL   Hemoglobin: 4.1: TYPE: HYPERCRITICAL RESULT   TESTS: HGB   DATE/TIME CALLED: _11/10/2022 19:21 EST   CALLED TO: _LIDIA RILEY   READ BACK (2 Patient Identifiers)(Y/N): _Y   READ BACK VALUES (Y/N): _Y   LICENSED STAFF AVAILABLE FOR IMMEDIATE TREATMENT (Y/N): _Y   RAPID RESPONSETEAM NOTIFIED (Y/N): _N   RRT CALLED TO:_NA   CALLED BY: _DDS   This result has been called to LIDIA RILEY by Lulú Hall on 11 10 2022 at   1921, and has been read back.   This result has been called to LIDIA RILEY by Lulú Hall on 11 10 2022 at   1921, and has been read back. g/dL   Hematocrit: 14.0: This result has been called to LIDIA RILEY by Lulú Hall on 11 10 2022 at   1921, and has been read back. %   Mean Cell Volume: 71.8 fL   Mean Cell Hemoglobin: 21.0 pg   Mean Cell Hemoglobin Conc: 29.3 g/dL   Red Cell Distrib Width: 23.6 %   Platelet Count - Automated: 366 K/uL   Auto Neutrophil #: 4.18 K/uL   Auto Lymphocyte #: 1.15 K/uL Comprehensive Metabolic Panel (11.10.22 @ 17:40)   Sodium, Serum: 138 mmol/L   Potassium, Serum: 3.8 mmol/L   Chloride, Serum: 98 mmol/L   Carbon Dioxide, Serum: 30 mmol/L   Anion Gap, Serum: 10 mmol/L   Blood Urea Nitrogen, Serum: 18 mg/dL   Creatinine, Serum: 1.0 mg/dL   Glucose, Serum: 124 mg/dL   Calcium, Total Serum: 8.7 mg/dL   Protein Total, Serum: 6.7 g/dL   Albumin, Serum: 3.5 g/dL   Bilirubin Total, Serum: 1.1 mg/dL   Alkaline Phosphatase, Serum: 54 U/L   Aspartate Aminotransferase (AST/SGOT): 22 U/L   Alanine Aminotransferase (ALT/SGPT): 38 U/L   eGFR: 75: The estimated glomerular filtration rate (eGFR) is calculated using the   2021 CKD-EPI creatinine equation, which does not have a coefficient for   race and is validated in individuals 18 years of age and older (N Engl J   Med 2021; 385:5597-8103). Creatinine-based eGFR may be inaccurate in   various situations including but not limited to extremes of muscle mass,   altered dietary protein intake, or medications that affect renal tubular   creatinine secretion. mL/min/1.73m2 Troponin T, Serum: 0.03: TYPE:(C=Critical, N=Notification, A=Abnormal) C < from: 12 Lead ECG (11.10.22 @ 15:45) >      Diagnosis Line Sinus rhythm with premature atrial complexes  Right bundle branch block  Abnormal ECG    < end of copied text >

## 2022-11-11 NOTE — H&P ADULT - HISTORY OF PRESENT ILLNESS
82 y.o male patient with PMH of CAD s/p PCI, atrial fibrillation, HLD, HTN and severe MR s/o mitral clip on 9/22 presenting for fatigue, lethargy and shortness of breath that is episodic in nature. Patient describes the shortness of breath to be less severe than pre-mitral clipping. Patient denies any dark stools, hematochezia, hematemesis or hematuria No chest pain, fever or chills. No abdominal pain, nausea or vomiting. No urinary symptoms.   In the ED, patient was found to be severely anemic (4.1 from 10.8 in 9/22). He was ordered 2 packed RBCs. EKG shows NSR with PACs. Trop 0.02 and BNP 1K.

## 2022-11-12 LAB
ALBUMIN SERPL ELPH-MCNC: 3 G/DL — LOW (ref 3.5–5.2)
ALP SERPL-CCNC: 54 U/L — SIGNIFICANT CHANGE UP (ref 30–115)
ALT FLD-CCNC: 29 U/L — SIGNIFICANT CHANGE UP (ref 0–41)
ANION GAP SERPL CALC-SCNC: 11 MMOL/L — SIGNIFICANT CHANGE UP (ref 7–14)
AST SERPL-CCNC: 20 U/L — SIGNIFICANT CHANGE UP (ref 0–41)
BILIRUB SERPL-MCNC: 2.5 MG/DL — HIGH (ref 0.2–1.2)
BLD GP AB SCN SERPL QL: SIGNIFICANT CHANGE UP
BUN SERPL-MCNC: 10 MG/DL — SIGNIFICANT CHANGE UP (ref 10–20)
CALCIUM SERPL-MCNC: 8.3 MG/DL — LOW (ref 8.4–10.5)
CHLORIDE SERPL-SCNC: 97 MMOL/L — LOW (ref 98–110)
CO2 SERPL-SCNC: 32 MMOL/L — SIGNIFICANT CHANGE UP (ref 17–32)
CREAT SERPL-MCNC: 1 MG/DL — SIGNIFICANT CHANGE UP (ref 0.7–1.5)
EGFR: 75 ML/MIN/1.73M2 — SIGNIFICANT CHANGE UP
GLUCOSE BLDC GLUCOMTR-MCNC: 134 MG/DL — HIGH (ref 70–99)
GLUCOSE SERPL-MCNC: 90 MG/DL — SIGNIFICANT CHANGE UP (ref 70–99)
HCT VFR BLD CALC: 23.8 % — LOW (ref 42–52)
HCT VFR BLD CALC: 27.7 % — LOW (ref 42–52)
HGB BLD-MCNC: 7.5 G/DL — LOW (ref 14–18)
HGB BLD-MCNC: 8.6 G/DL — LOW (ref 14–18)
MAGNESIUM SERPL-MCNC: 1.4 MG/DL — LOW (ref 1.8–2.4)
MCHC RBC-ENTMCNC: 23.8 PG — LOW (ref 27–31)
MCHC RBC-ENTMCNC: 24.6 PG — LOW (ref 27–31)
MCHC RBC-ENTMCNC: 31 G/DL — LOW (ref 32–37)
MCHC RBC-ENTMCNC: 31.5 G/DL — LOW (ref 32–37)
MCV RBC AUTO: 76.7 FL — LOW (ref 80–94)
MCV RBC AUTO: 78 FL — LOW (ref 80–94)
NRBC # BLD: 4 /100 WBCS — HIGH (ref 0–0)
NRBC # BLD: 6 /100 WBCS — HIGH (ref 0–0)
PLATELET # BLD AUTO: 323 K/UL — SIGNIFICANT CHANGE UP (ref 130–400)
PLATELET # BLD AUTO: 384 K/UL — SIGNIFICANT CHANGE UP (ref 130–400)
POTASSIUM SERPL-MCNC: 3 MMOL/L — LOW (ref 3.5–5)
POTASSIUM SERPL-SCNC: 3 MMOL/L — LOW (ref 3.5–5)
PROT SERPL-MCNC: 6.1 G/DL — SIGNIFICANT CHANGE UP (ref 6–8)
RBC # BLD: 3.05 M/UL — LOW (ref 4.7–6.1)
RBC # BLD: 3.61 M/UL — LOW (ref 4.7–6.1)
RBC # FLD: 25.7 % — HIGH (ref 11.5–14.5)
RBC # FLD: 25.8 % — HIGH (ref 11.5–14.5)
SODIUM SERPL-SCNC: 140 MMOL/L — SIGNIFICANT CHANGE UP (ref 135–146)
WBC # BLD: 13.02 K/UL — HIGH (ref 4.8–10.8)
WBC # BLD: 9.54 K/UL — SIGNIFICANT CHANGE UP (ref 4.8–10.8)
WBC # FLD AUTO: 13.02 K/UL — HIGH (ref 4.8–10.8)
WBC # FLD AUTO: 9.54 K/UL — SIGNIFICANT CHANGE UP (ref 4.8–10.8)

## 2022-11-12 PROCEDURE — 99233 SBSQ HOSP IP/OBS HIGH 50: CPT

## 2022-11-12 RX ORDER — DIGOXIN 250 MCG
250 TABLET ORAL ONCE
Refills: 0 | Status: COMPLETED | OUTPATIENT
Start: 2022-11-12 | End: 2022-11-12

## 2022-11-12 RX ORDER — METOPROLOL TARTRATE 50 MG
5 TABLET ORAL ONCE
Refills: 0 | Status: DISCONTINUED | OUTPATIENT
Start: 2022-11-12 | End: 2022-11-12

## 2022-11-12 RX ORDER — METOPROLOL TARTRATE 50 MG
25 TABLET ORAL
Refills: 0 | Status: DISCONTINUED | OUTPATIENT
Start: 2022-11-12 | End: 2022-11-12

## 2022-11-12 RX ORDER — LATANOPROST 0.05 MG/ML
1 SOLUTION/ DROPS OPHTHALMIC; TOPICAL AT BEDTIME
Refills: 0 | Status: DISCONTINUED | OUTPATIENT
Start: 2022-11-12 | End: 2022-11-16

## 2022-11-12 RX ORDER — DIGOXIN 250 MCG
250 TABLET ORAL ONCE
Refills: 0 | Status: DISCONTINUED | OUTPATIENT
Start: 2022-11-12 | End: 2022-11-12

## 2022-11-12 RX ORDER — DIGOXIN 250 MCG
125 TABLET ORAL DAILY
Refills: 0 | Status: DISCONTINUED | OUTPATIENT
Start: 2022-11-12 | End: 2022-11-12

## 2022-11-12 RX ORDER — DIGOXIN 250 MCG
125 TABLET ORAL ONCE
Refills: 0 | Status: DISCONTINUED | OUTPATIENT
Start: 2022-11-12 | End: 2022-11-12

## 2022-11-12 RX ADMIN — IRON SUCROSE 110 MILLIGRAM(S): 20 INJECTION, SOLUTION INTRAVENOUS at 10:48

## 2022-11-12 RX ADMIN — LATANOPROST 1 DROP(S): 0.05 SOLUTION/ DROPS OPHTHALMIC; TOPICAL at 23:09

## 2022-11-12 RX ADMIN — Medication 250 MICROGRAM(S): at 12:15

## 2022-11-12 RX ADMIN — PANTOPRAZOLE SODIUM 40 MILLIGRAM(S): 20 TABLET, DELAYED RELEASE ORAL at 17:36

## 2022-11-12 RX ADMIN — TAMSULOSIN HYDROCHLORIDE 0.4 MILLIGRAM(S): 0.4 CAPSULE ORAL at 21:54

## 2022-11-12 RX ADMIN — Medication 81 MILLIGRAM(S): at 12:15

## 2022-11-12 RX ADMIN — Medication 20 MILLIGRAM(S): at 05:35

## 2022-11-12 RX ADMIN — ATORVASTATIN CALCIUM 20 MILLIGRAM(S): 80 TABLET, FILM COATED ORAL at 21:54

## 2022-11-12 RX ADMIN — PANTOPRAZOLE SODIUM 40 MILLIGRAM(S): 20 TABLET, DELAYED RELEASE ORAL at 05:36

## 2022-11-12 NOTE — PROGRESS NOTE ADULT - ASSESSMENT
82 year old man with PMHx of  CAD s/p PCI, atrial fibrillation, HLD, HTN and severe MR s/o mitral clip on 9/22 presenting with severe anemia.     #Severe Iron deficiency anemia 2/2 chronic blood loss    #History of erosive gastritis  #Recent Mitral Clip due to MR  - c/w Venofer 200 mg iv daily x 5 doses   - active type and screen  - PPI BID  - Hold Xarelto for now  - Plan for endo/colon next week     #CAD s/p PCI  - c.w aspirin/statin      #Paroxysmal Afib  #Chronic systolic CHF   - Hold Xarelto  - c/w Torsemide     #HLD  - c/w atorvastatin    dvt ppx     Pending: EGD/colonoscopy next week   Plan of care d/w patient   Dispo: Home

## 2022-11-12 NOTE — PROGRESS NOTE ADULT - SUBJECTIVE AND OBJECTIVE BOX
Pt seen and examined at bedside. No SOB, CP.         VITAL SIGNS (Last 24 hrs):  T(C): 36.7 (22 @ 10:44), Max: 36.8 (22 @ 05:04)  HR: 78 (22 @ 10:44) (66 - 120)  BP: 92/54 (22 @ 10:44) (90/53 - 131/59)  RR: 18 (22 @ 10:44) (16 - 18)  SpO2: 98% (22 @ 10:44) (98% - 98%)  Wt(kg): --  Daily     Daily Weight in k.5 (2022 05:04)    I&O's Summary    2022 07:01  -  2022 07:00  --------------------------------------------------------  IN: 1082 mL / OUT: 4475 mL / NET: -3393 mL    2022 07:01  -  2022 12:48  --------------------------------------------------------  IN: 286 mL / OUT: 900 mL / NET: -614 mL        PHYSICAL EXAM:  GENERAL: NAD   HEAD:  Atraumatic, Normocephalic  EYES: , conjunctiva and sclera clear  NECK: Supple, No JVD  CHEST/LUNG: Clear to auscultation bilaterally; No wheeze  HEART: Regular rate and rhythm; No murmurs, rubs, or gallops  ABDOMEN: Soft, Nontender, Nondistended; Bowel sounds present  EXTREMITIES:  2+ Peripheral Pulses, No clubbing, cyanosis, or edema  PSYCH: AAOx3  NEUROLOGY: non-focal  SKIN: No rashes or lesions    Labs Reviewed  Spoke to patient in regards to abnormal labs.    CBC Full  -  ( 2022 05:38 )  WBC Count : 9.54 K/uL  Hemoglobin : 7.5 g/dL  Hematocrit : 23.8 %  Platelet Count - Automated : 323 K/uL  Mean Cell Volume : 78.0 fL  Mean Cell Hemoglobin : 24.6 pg  Mean Cell Hemoglobin Concentration : 31.5 g/dL  Auto Neutrophil # : x  Auto Lymphocyte # : x  Auto Monocyte # : x  Auto Eosinophil # : x  Auto Basophil # : x  Auto Neutrophil % : x  Auto Lymphocyte % : x  Auto Monocyte % : x  Auto Eosinophil % : x  Auto Basophil % : x    BMP:    11-12 @ 05:38    Blood Urea Nitrogen - 10  Calcium - 8.3  Carbond Dioxide - 32  Chloride - 97  Creatinine - 1.0  Glucose - 90  Potassium - 3.0  Sodium - 140         PT/INR - ( 10 Nov 2022 17:40 )   PT: 19.20 sec;   INR: 1.66 ratio         PTT - ( 10 Nov 2022 17:40 )  PTT:31.6 sec       MEDICATIONS  (STANDING):  aspirin enteric coated 81 milliGRAM(s) Oral daily  atorvastatin 20 milliGRAM(s) Oral at bedtime  furosemide   Injectable 20 milliGRAM(s) IV Push once  influenza  Vaccine (HIGH DOSE) 0.7 milliLiter(s) IntraMuscular once  iron sucrose IVPB 200 milliGRAM(s) IV Intermittent every 24 hours  pantoprazole  Injectable 40 milliGRAM(s) IV Push two times a day  tamsulosin 0.4 milliGRAM(s) Oral at bedtime  torsemide 20 milliGRAM(s) Oral daily    MEDICATIONS  (PRN):  acetaminophen     Tablet .. 650 milliGRAM(s) Oral every 6 hours PRN Temp greater or equal to 38C (100.4F), Mild Pain (1 - 3)  melatonin 3 milliGRAM(s) Oral at bedtime PRN Insomnia

## 2022-11-13 LAB
ANION GAP SERPL CALC-SCNC: 12 MMOL/L — SIGNIFICANT CHANGE UP (ref 7–14)
BUN SERPL-MCNC: 12 MG/DL — SIGNIFICANT CHANGE UP (ref 10–20)
CALCIUM SERPL-MCNC: 8.1 MG/DL — LOW (ref 8.4–10.5)
CHLORIDE SERPL-SCNC: 95 MMOL/L — LOW (ref 98–110)
CO2 SERPL-SCNC: 27 MMOL/L — SIGNIFICANT CHANGE UP (ref 17–32)
CREAT SERPL-MCNC: 0.9 MG/DL — SIGNIFICANT CHANGE UP (ref 0.7–1.5)
DIGOXIN SERPL-MCNC: 0.5 NG/ML — LOW (ref 0.8–2)
EGFR: 85 ML/MIN/1.73M2 — SIGNIFICANT CHANGE UP
GLUCOSE SERPL-MCNC: 87 MG/DL — SIGNIFICANT CHANGE UP (ref 70–99)
HCT VFR BLD CALC: 25.2 % — LOW (ref 42–52)
HGB BLD-MCNC: 8 G/DL — LOW (ref 14–18)
MAGNESIUM SERPL-MCNC: 1.8 MG/DL — SIGNIFICANT CHANGE UP (ref 1.8–2.4)
MCHC RBC-ENTMCNC: 24.2 PG — LOW (ref 27–31)
MCHC RBC-ENTMCNC: 31.7 G/DL — LOW (ref 32–37)
MCV RBC AUTO: 76.1 FL — LOW (ref 80–94)
NRBC # BLD: 2 /100 WBCS — HIGH (ref 0–0)
PLATELET # BLD AUTO: 321 K/UL — SIGNIFICANT CHANGE UP (ref 130–400)
POTASSIUM SERPL-MCNC: 3.9 MMOL/L — SIGNIFICANT CHANGE UP (ref 3.5–5)
POTASSIUM SERPL-SCNC: 3.9 MMOL/L — SIGNIFICANT CHANGE UP (ref 3.5–5)
RBC # BLD: 3.31 M/UL — LOW (ref 4.7–6.1)
RBC # FLD: 27.7 % — HIGH (ref 11.5–14.5)
SODIUM SERPL-SCNC: 134 MMOL/L — LOW (ref 135–146)
WBC # BLD: 14.67 K/UL — HIGH (ref 4.8–10.8)
WBC # FLD AUTO: 14.67 K/UL — HIGH (ref 4.8–10.8)

## 2022-11-13 PROCEDURE — 99233 SBSQ HOSP IP/OBS HIGH 50: CPT

## 2022-11-13 RX ORDER — POTASSIUM CHLORIDE 20 MEQ
40 PACKET (EA) ORAL ONCE
Refills: 0 | Status: COMPLETED | OUTPATIENT
Start: 2022-11-13 | End: 2022-11-13

## 2022-11-13 RX ORDER — DIGOXIN 250 MCG
250 TABLET ORAL DAILY
Refills: 0 | Status: DISCONTINUED | OUTPATIENT
Start: 2022-11-13 | End: 2022-11-16

## 2022-11-13 RX ORDER — MAGNESIUM SULFATE 500 MG/ML
2 VIAL (ML) INJECTION ONCE
Refills: 0 | Status: COMPLETED | OUTPATIENT
Start: 2022-11-13 | End: 2022-11-13

## 2022-11-13 RX ORDER — DIGOXIN 250 MCG
250 TABLET ORAL DAILY
Refills: 0 | Status: DISCONTINUED | OUTPATIENT
Start: 2022-11-13 | End: 2022-11-13

## 2022-11-13 RX ORDER — DIGOXIN 250 MCG
250 TABLET ORAL EVERY 8 HOURS
Refills: 0 | Status: DISCONTINUED | OUTPATIENT
Start: 2022-11-13 | End: 2022-11-13

## 2022-11-13 RX ORDER — METOPROLOL TARTRATE 50 MG
12.5 TABLET ORAL ONCE
Refills: 0 | Status: DISCONTINUED | OUTPATIENT
Start: 2022-11-13 | End: 2022-11-13

## 2022-11-13 RX ORDER — METOPROLOL TARTRATE 50 MG
12.5 TABLET ORAL DAILY
Refills: 0 | Status: DISCONTINUED | OUTPATIENT
Start: 2022-11-13 | End: 2022-11-13

## 2022-11-13 RX ADMIN — PANTOPRAZOLE SODIUM 40 MILLIGRAM(S): 20 TABLET, DELAYED RELEASE ORAL at 17:36

## 2022-11-13 RX ADMIN — Medication 81 MILLIGRAM(S): at 11:19

## 2022-11-13 RX ADMIN — Medication 12.5 MILLIGRAM(S): at 11:20

## 2022-11-13 RX ADMIN — LATANOPROST 1 DROP(S): 0.05 SOLUTION/ DROPS OPHTHALMIC; TOPICAL at 22:35

## 2022-11-13 RX ADMIN — Medication 20 MILLIGRAM(S): at 05:57

## 2022-11-13 RX ADMIN — Medication 250 MICROGRAM(S): at 22:35

## 2022-11-13 RX ADMIN — Medication 25 GRAM(S): at 02:25

## 2022-11-13 RX ADMIN — Medication 40 MILLIEQUIVALENT(S): at 02:35

## 2022-11-13 RX ADMIN — ATORVASTATIN CALCIUM 20 MILLIGRAM(S): 80 TABLET, FILM COATED ORAL at 22:34

## 2022-11-13 RX ADMIN — IRON SUCROSE 110 MILLIGRAM(S): 20 INJECTION, SOLUTION INTRAVENOUS at 11:20

## 2022-11-13 RX ADMIN — PANTOPRAZOLE SODIUM 40 MILLIGRAM(S): 20 TABLET, DELAYED RELEASE ORAL at 05:57

## 2022-11-13 RX ADMIN — TAMSULOSIN HYDROCHLORIDE 0.4 MILLIGRAM(S): 0.4 CAPSULE ORAL at 22:35

## 2022-11-13 NOTE — PROGRESS NOTE ADULT - SUBJECTIVE AND OBJECTIVE BOX
Pt seen and examined at bedside.         VITAL SIGNS (Last 24 hrs):  T(C): 36.7 (22 @ 04:28), Max: 36.7 (22 @ 10:44)  HR: 72 (22 @ 04:28) (72 - 130)  BP: 103/61 (22 @ 04:28) (92/54 - 139/72)  RR: 18 (22 @ 20:12) (18 - 18)  SpO2: 98% (22 @ 10:44) (98% - 98%)  Wt(kg): --  Daily     Daily Weight in k (2022 04:28)    I&O's Summary    2022 07:01  -  2022 07:00  --------------------------------------------------------  IN: 1712 mL / OUT: 1500 mL / NET: 212 mL    2022 07:01  -  2022 09:48  --------------------------------------------------------  IN: 0 mL / OUT: 700 mL / NET: -700 mL        PHYSICAL EXAM:  GENERAL: NAD   HEAD:  Atraumatic, Normocephalic  EYES:  conjunctiva and sclera clear  NECK: Supple, No JVD  CHEST/LUNG: Clear to auscultation bilaterally; No wheeze  HEART: Regular rate and rhythm; No murmurs, rubs, or gallops  ABDOMEN: Soft, Nontender, Nondistended; Bowel sounds present  EXTREMITIES:  2+ Peripheral Pulses, No clubbing, cyanosis, or edema  PSYCH: AAOx3  NEUROLOGY: non-focal  SKIN: No rashes or lesions    Labs Reviewed       CBC Full  -  ( 2022 08:15 )  WBC Count : 14.67 K/uL  Hemoglobin : 8.0 g/dL  Hematocrit : 25.2 %  Platelet Count - Automated : 321 K/uL  Mean Cell Volume : 76.1 fL  Mean Cell Hemoglobin : 24.2 pg  Mean Cell Hemoglobin Concentration : 31.7 g/dL  Auto Neutrophil # : x  Auto Lymphocyte # : x  Auto Monocyte # : x  Auto Eosinophil # : x  Auto Basophil # : x  Auto Neutrophil % : x  Auto Lymphocyte % : x  Auto Monocyte % : x  Auto Eosinophil % : x  Auto Basophil % : x    BMP:    11-13 @ 08:15    Blood Urea Nitrogen - 12  Calcium - 8.1  Carbon Dioxide - 27  Chloride - 95  Creatinine - 0.9  Glucose - 87  Potassium - 3.9  Sodium - 134         PT/INR - ( 10 Nov 2022 17:40 )   PT: 19.20 sec;   INR: 1.66 ratio         PTT - ( 10 Nov 2022 17:40 )  PTT:31.6 sec             MEDICATIONS  (STANDING):  aspirin enteric coated 81 milliGRAM(s) Oral daily  atorvastatin 20 milliGRAM(s) Oral at bedtime  furosemide   Injectable 20 milliGRAM(s) IV Push once  influenza  Vaccine (HIGH DOSE) 0.7 milliLiter(s) IntraMuscular once  iron sucrose IVPB 200 milliGRAM(s) IV Intermittent every 24 hours  latanoprost 0.005% Ophthalmic Solution 1 Drop(s) Both EYES at bedtime  pantoprazole  Injectable 40 milliGRAM(s) IV Push two times a day  tamsulosin 0.4 milliGRAM(s) Oral at bedtime  torsemide 20 milliGRAM(s) Oral daily    MEDICATIONS  (PRN):  acetaminophen     Tablet .. 650 milliGRAM(s) Oral every 6 hours PRN Temp greater or equal to 38C (100.4F), Mild Pain (1 - 3)  melatonin 3 milliGRAM(s) Oral at bedtime PRN Insomnia

## 2022-11-13 NOTE — PROGRESS NOTE ADULT - ASSESSMENT
82 year old man with PMHx of  CAD s/p PCI, atrial fibrillation, HLD, HTN and severe MR s/o mitral clip on 9/22 presenting with severe anemia.     #Severe Iron deficiency anemia 2/2 chronic blood loss    #History of erosive gastritis  #Recent Mitral Clip due to MR  - c/w Venofer 200 mg iv daily x 5 doses   - active type and screen  - PPI BID  - Hold Xarelto for now  - Plan for EGD - spoke with GI fellow, no plan for colonoscopy, only EGD     #CAD s/p PCI  - c.w aspirin/statin      #Paroxysmal Afib  #Chronic systolic CHF   - Hold Xarelto  - c/w Torsemide     #HLD  - c/w atorvastatin    dvt ppx     Pending: EGD next week   Plan of care d/w patient   Dispo: Home  82 year old man with PMHx of  CAD s/p PCI, atrial fibrillation, HLD, HTN and severe MR s/o mitral clip on 9/22 presenting with severe anemia.     #Severe Iron deficiency anemia 2/2 chronic blood loss    #History of erosive gastritis  #Recent Mitral Clip due to MR  - c/w Venofer 200 mg iv daily x 5 doses   - active type and screen  - PPI BID  - Hold Xarelto for now  - Plan for EGD - spoke with GI fellow, no plan for colonoscopy, only EGD     #Leukocytosis - afebrile, asymptomatic   - possibly reactive to JONAH   - monitor for now     #CAD s/p PCI  - c.w aspirin/statin      #Paroxysmal Afib  #Chronic systolic CHF   - Hold Xarelto  - c/w Torsemide     #HLD  - c/w atorvastatin    dvt ppx     Pending: EGD next week   Plan of care d/w patient   Dispo: Home  82 year old man with PMHx of  CAD s/p PCI, atrial fibrillation, HLD, HTN and severe MR s/o mitral clip on 9/22 presenting with severe anemia.     #Severe Iron deficiency anemia 2/2 chronic blood loss    #History of erosive gastritis  #Recent Mitral Clip due to MR  - c/w Venofer 200 mg iv daily x 5 doses   - active type and screen  - PPI BID  - Hold Xarelto for now  - Plan for EGD tomorrow - NPO after MN   - Hold Torsemide while NPO, resume Tuesday     #Leukocytosis - afebrile, asymptomatic   - possibly reactive to JONAH   - monitor for now     #CAD s/p PCI  - c.w aspirin/statin      #Paroxysmal Afib with RVR   #Chronic systolic CHF   - Start amiodarone gtt   - Hold Xarelto  - Hold Torsemide while NPO, resume Tuesday     #HLD  - c/w atorvastatin    dvt ppx     Pending: EGD tomorrow   Plan of care d/w patient   Dispo: Home  82 year old man with PMHx of  CAD s/p PCI, atrial fibrillation, HLD, HTN and severe MR s/o mitral clip on 9/22 presenting with severe anemia.     #Severe Iron deficiency anemia 2/2 chronic blood loss    #History of erosive gastritis  #Recent Mitral Clip due to MR  - c/w Venofer 200 mg iv daily x 5 doses   - active type and screen  - PPI BID  - Hold Xarelto for now  - Plan for EGD tomorrow - NPO after MN   - Hold Torsemide while NPO, resume Tuesday     #Leukocytosis - afebrile, asymptomatic   - possibly reactive to JONAH   - monitor for now     #CAD s/p PCI  - c.w aspirin/statin      #Paroxysmal Afib with RVR   #Chronic systolic CHF   - Start amiodarone gtt, BP borderline therefore avoiding BB, CCB   - Hold Xarelto  - Hold Torsemide while NPO, resume Tuesday     #HLD  - c/w atorvastatin    dvt ppx     Pending: EGD tomorrow   Plan of care d/w patient   Dispo: Home

## 2022-11-13 NOTE — CHART NOTE - NSCHARTNOTEFT_GEN_A_CORE
We are following the patient for anemia with h/o AVM   no active bleeding   hemodynamically stable   came in with hb of 4 s/p 4 units current Hb 8   off xarelto since 11/10  will plan for EGD tomorrow   NPO after midnight  after EGD needs capsule as an OP with or without colonoscopy (needs to follow up with Dr. Alvarez)

## 2022-11-13 NOTE — PHYSICAL THERAPY INITIAL EVALUATION ADULT - GAIT DISTANCE, PT EVAL
30 ft x 1, pt unable to amb further 2* HR up to the 150s, RN informed, HR to 109 after few minutes seated rest

## 2022-11-13 NOTE — PHYSICAL THERAPY INITIAL EVALUATION ADULT - PATIENT PROFILE REVIEW, REHAB EVAL
Chart reviewed, spoke with LEE ANN Sullivan about pt's HR/Afib, as per RN, pt has been asymptomatic, RN gave meds, and is ok to work with PT as tolerated/yes

## 2022-11-14 ENCOUNTER — TRANSCRIPTION ENCOUNTER (OUTPATIENT)
Age: 82
End: 2022-11-14

## 2022-11-14 LAB
ANION GAP SERPL CALC-SCNC: 5 MMOL/L — LOW (ref 7–14)
ANION GAP SERPL CALC-SCNC: 7 MMOL/L — SIGNIFICANT CHANGE UP (ref 7–14)
ANION GAP SERPL CALC-SCNC: 7 MMOL/L — SIGNIFICANT CHANGE UP (ref 7–14)
APTT BLD: 32.1 SEC — SIGNIFICANT CHANGE UP (ref 27–39.2)
BASOPHILS # BLD AUTO: 0.06 K/UL — SIGNIFICANT CHANGE UP (ref 0–0.2)
BASOPHILS NFR BLD AUTO: 0.7 % — SIGNIFICANT CHANGE UP (ref 0–1)
BUN SERPL-MCNC: 11 MG/DL — SIGNIFICANT CHANGE UP (ref 10–20)
BUN SERPL-MCNC: 12 MG/DL — SIGNIFICANT CHANGE UP (ref 10–20)
BUN SERPL-MCNC: 9 MG/DL — LOW (ref 10–20)
CALCIUM SERPL-MCNC: 8 MG/DL — LOW (ref 8.4–10.5)
CALCIUM SERPL-MCNC: 8.2 MG/DL — LOW (ref 8.4–10.5)
CALCIUM SERPL-MCNC: 8.2 MG/DL — LOW (ref 8.4–10.5)
CHLORIDE SERPL-SCNC: 103 MMOL/L — SIGNIFICANT CHANGE UP (ref 98–110)
CHLORIDE SERPL-SCNC: 103 MMOL/L — SIGNIFICANT CHANGE UP (ref 98–110)
CHLORIDE SERPL-SCNC: 98 MMOL/L — SIGNIFICANT CHANGE UP (ref 98–110)
CO2 SERPL-SCNC: 29 MMOL/L — SIGNIFICANT CHANGE UP (ref 17–32)
CO2 SERPL-SCNC: 33 MMOL/L — HIGH (ref 17–32)
CO2 SERPL-SCNC: 34 MMOL/L — HIGH (ref 17–32)
CREAT SERPL-MCNC: 0.7 MG/DL — SIGNIFICANT CHANGE UP (ref 0.7–1.5)
CREAT SERPL-MCNC: 0.8 MG/DL — SIGNIFICANT CHANGE UP (ref 0.7–1.5)
CREAT SERPL-MCNC: 0.8 MG/DL — SIGNIFICANT CHANGE UP (ref 0.7–1.5)
EGFR: 88 ML/MIN/1.73M2 — SIGNIFICANT CHANGE UP
EGFR: 88 ML/MIN/1.73M2 — SIGNIFICANT CHANGE UP
EGFR: 92 ML/MIN/1.73M2 — SIGNIFICANT CHANGE UP
EOSINOPHIL # BLD AUTO: 0.3 K/UL — SIGNIFICANT CHANGE UP (ref 0–0.7)
EOSINOPHIL NFR BLD AUTO: 3.7 % — SIGNIFICANT CHANGE UP (ref 0–8)
GLUCOSE SERPL-MCNC: 92 MG/DL — SIGNIFICANT CHANGE UP (ref 70–99)
GLUCOSE SERPL-MCNC: 94 MG/DL — SIGNIFICANT CHANGE UP (ref 70–99)
GLUCOSE SERPL-MCNC: 95 MG/DL — SIGNIFICANT CHANGE UP (ref 70–99)
HCT VFR BLD CALC: 24.2 % — LOW (ref 42–52)
HCT VFR BLD CALC: 29.3 % — LOW (ref 42–52)
HGB BLD-MCNC: 7.2 G/DL — LOW (ref 14–18)
HGB BLD-MCNC: 8.6 G/DL — LOW (ref 14–18)
IMM GRANULOCYTES NFR BLD AUTO: 2.1 % — HIGH (ref 0.1–0.3)
INR BLD: 1.38 RATIO — HIGH (ref 0.65–1.3)
LYMPHOCYTES # BLD AUTO: 0.9 K/UL — LOW (ref 1.2–3.4)
LYMPHOCYTES # BLD AUTO: 11.1 % — LOW (ref 20.5–51.1)
MAGNESIUM SERPL-MCNC: 1.6 MG/DL — LOW (ref 1.8–2.4)
MAGNESIUM SERPL-MCNC: 1.9 MG/DL — SIGNIFICANT CHANGE UP (ref 1.8–2.4)
MCHC RBC-ENTMCNC: 23.9 PG — LOW (ref 27–31)
MCHC RBC-ENTMCNC: 24.2 PG — LOW (ref 27–31)
MCHC RBC-ENTMCNC: 29.4 G/DL — LOW (ref 32–37)
MCHC RBC-ENTMCNC: 29.8 G/DL — LOW (ref 32–37)
MCV RBC AUTO: 80.4 FL — SIGNIFICANT CHANGE UP (ref 80–94)
MCV RBC AUTO: 82.5 FL — SIGNIFICANT CHANGE UP (ref 80–94)
MONOCYTES # BLD AUTO: 1.14 K/UL — HIGH (ref 0.1–0.6)
MONOCYTES NFR BLD AUTO: 14.1 % — HIGH (ref 1.7–9.3)
NEUTROPHILS # BLD AUTO: 5.51 K/UL — SIGNIFICANT CHANGE UP (ref 1.4–6.5)
NEUTROPHILS NFR BLD AUTO: 68.3 % — SIGNIFICANT CHANGE UP (ref 42.2–75.2)
NRBC # BLD: 1 /100 WBCS — HIGH (ref 0–0)
NRBC # BLD: 2 /100 WBCS — HIGH (ref 0–0)
PLATELET # BLD AUTO: 262 K/UL — SIGNIFICANT CHANGE UP (ref 130–400)
PLATELET # BLD AUTO: 287 K/UL — SIGNIFICANT CHANGE UP (ref 130–400)
POTASSIUM SERPL-MCNC: 2.9 MMOL/L — LOW (ref 3.5–5)
POTASSIUM SERPL-MCNC: 3.6 MMOL/L — SIGNIFICANT CHANGE UP (ref 3.5–5)
POTASSIUM SERPL-MCNC: 4 MMOL/L — SIGNIFICANT CHANGE UP (ref 3.5–5)
POTASSIUM SERPL-SCNC: 2.9 MMOL/L — LOW (ref 3.5–5)
POTASSIUM SERPL-SCNC: 3.6 MMOL/L — SIGNIFICANT CHANGE UP (ref 3.5–5)
POTASSIUM SERPL-SCNC: 4 MMOL/L — SIGNIFICANT CHANGE UP (ref 3.5–5)
PROTHROM AB SERPL-ACNC: 15.9 SEC — HIGH (ref 9.95–12.87)
RBC # BLD: 3.01 M/UL — LOW (ref 4.7–6.1)
RBC # BLD: 3.55 M/UL — LOW (ref 4.7–6.1)
RBC # FLD: 27.9 % — HIGH (ref 11.5–14.5)
RBC # FLD: 29.7 % — HIGH (ref 11.5–14.5)
SODIUM SERPL-SCNC: 138 MMOL/L — SIGNIFICANT CHANGE UP (ref 135–146)
SODIUM SERPL-SCNC: 139 MMOL/L — SIGNIFICANT CHANGE UP (ref 135–146)
SODIUM SERPL-SCNC: 142 MMOL/L — SIGNIFICANT CHANGE UP (ref 135–146)
WBC # BLD: 11.16 K/UL — HIGH (ref 4.8–10.8)
WBC # BLD: 8.08 K/UL — SIGNIFICANT CHANGE UP (ref 4.8–10.8)
WBC # FLD AUTO: 11.16 K/UL — HIGH (ref 4.8–10.8)
WBC # FLD AUTO: 8.08 K/UL — SIGNIFICANT CHANGE UP (ref 4.8–10.8)

## 2022-11-14 PROCEDURE — 99233 SBSQ HOSP IP/OBS HIGH 50: CPT

## 2022-11-14 PROCEDURE — 43255 EGD CONTROL BLEEDING ANY: CPT

## 2022-11-14 RX ORDER — POLYETHYLENE GLYCOL 3350 17 G/17G
17 POWDER, FOR SOLUTION ORAL DAILY
Refills: 0 | Status: DISCONTINUED | OUTPATIENT
Start: 2022-11-14 | End: 2022-11-16

## 2022-11-14 RX ORDER — POTASSIUM CHLORIDE 20 MEQ
40 PACKET (EA) ORAL ONCE
Refills: 0 | Status: COMPLETED | OUTPATIENT
Start: 2022-11-14 | End: 2022-11-14

## 2022-11-14 RX ORDER — MAGNESIUM SULFATE 500 MG/ML
2 VIAL (ML) INJECTION ONCE
Refills: 0 | Status: COMPLETED | OUTPATIENT
Start: 2022-11-14 | End: 2022-11-14

## 2022-11-14 RX ORDER — POTASSIUM CHLORIDE 20 MEQ
40 PACKET (EA) ORAL ONCE
Refills: 0 | Status: DISCONTINUED | OUTPATIENT
Start: 2022-11-14 | End: 2022-11-14

## 2022-11-14 RX ORDER — POTASSIUM CHLORIDE 20 MEQ
20 PACKET (EA) ORAL
Refills: 0 | Status: COMPLETED | OUTPATIENT
Start: 2022-11-14 | End: 2022-11-14

## 2022-11-14 RX ADMIN — ATORVASTATIN CALCIUM 20 MILLIGRAM(S): 80 TABLET, FILM COATED ORAL at 21:53

## 2022-11-14 RX ADMIN — Medication 250 MICROGRAM(S): at 05:04

## 2022-11-14 RX ADMIN — Medication 50 MILLIEQUIVALENT(S): at 12:20

## 2022-11-14 RX ADMIN — Medication 40 MILLIEQUIVALENT(S): at 10:14

## 2022-11-14 RX ADMIN — PANTOPRAZOLE SODIUM 40 MILLIGRAM(S): 20 TABLET, DELAYED RELEASE ORAL at 05:04

## 2022-11-14 RX ADMIN — PANTOPRAZOLE SODIUM 40 MILLIGRAM(S): 20 TABLET, DELAYED RELEASE ORAL at 17:53

## 2022-11-14 RX ADMIN — Medication 25 GRAM(S): at 09:17

## 2022-11-14 RX ADMIN — TAMSULOSIN HYDROCHLORIDE 0.4 MILLIGRAM(S): 0.4 CAPSULE ORAL at 21:54

## 2022-11-14 RX ADMIN — LATANOPROST 1 DROP(S): 0.05 SOLUTION/ DROPS OPHTHALMIC; TOPICAL at 21:54

## 2022-11-14 RX ADMIN — IRON SUCROSE 110 MILLIGRAM(S): 20 INJECTION, SOLUTION INTRAVENOUS at 08:41

## 2022-11-14 RX ADMIN — Medication 50 MILLIEQUIVALENT(S): at 09:42

## 2022-11-14 RX ADMIN — Medication 81 MILLIGRAM(S): at 12:15

## 2022-11-14 NOTE — CHART NOTE - NSCHARTNOTEFT_GEN_A_CORE
EGD impression:     Mucosa	Normal mucosa was noted in the whole esophagus.  Diffuse continuous erythema of the mucosa with no bleeding was noted in the stomach. These findings are compatible with non-erosive gastritis.  A single small non-bleeding angioectasia was seen in the second part of the duodenum. Hemostasis was performed using Argon Plasma Coagulation.    Plan:   PPI BID     Full Liquid  diet today,   advance to regular diet tomorrow    VCE as outpatient     Resume AC tomorrow EGD impression:     Mucosa	Normal mucosa was noted in the whole esophagus.  Diffuse continuous erythema of the mucosa with no bleeding was noted in the stomach. These findings are compatible with non-erosive gastritis.  A single small non-bleeding angioectasia was seen in the second part of the duodenum. Hemostasis was performed using Argon Plasma Coagulation.  Two superficial non-bleeding clean based ulcers were found in the distal bulb    Plan:   PPI BID     Full Liquid  diet today,   advance to regular diet tomorrow    VCE as outpatient     Resume AC tomorrow

## 2022-11-14 NOTE — PRE-ANESTHESIA EVALUATION ADULT - NSANTHPMHFT_GEN_ALL_CORE
Chart reviewed, Med/GI evaluation seen. pt interviewed and examined. Labs, EKG, ROSARIO report seen.  Last H/H 7.2/24.2 and pt had one unit of PRBC. K 4.0

## 2022-11-14 NOTE — PROGRESS NOTE ADULT - ASSESSMENT
Pt scheduled for lab appt on 11/19/21   82 y.o male patient with PMH of CAD s/p PCI, atrial fibrillation, HLD, HTN and severe MR s/o mitral clip on 9/22 presenting for fatigue, lethargy and shortness of breath that is episodic in nature. In the ED, patient was found to be severely anemic (4.1 from 10.8 in 9/22). He was ordered 2 packed RBCs.    Microcytic Anemia  CAD / HTN   A-Fib with RVR  Severe MR s/p Mitral clip on 9/22  NSTEMI type 2             PLAN:    ·	Hb today is 7.2. Will transfuse one unit of PRBC'S  ·	Scheduled for EGD today  ·	Brown stool on rectal exam by GI  ·	Transfuse him to keep Hb >8  ·	Monitor H/H. Give Lasix 20 mg ivp after each unit of blood.   ·	Cont to hold Xarelto  ·	Cont IV Protonix  ·	Retic count is 1.3. Serum iron is 33 and percent sat is 11. Cont Venofer 200 mg iv daily x 5 doses.   ·	HR is controlled.   ·	ECHO on 9/15 showed EF is 43%. Mitral valve clip is stable.   ·	Cont his other home meds    Progress Note Handoff    Pending (specify):  Consults_________, Tests________, Test Results_______, Other__EGD today_______  Family discussion:  Disposition: Home___/SNF___/Other________/Unknown at this time________    Bert Vaughn MD  Spectra: [id number]

## 2022-11-14 NOTE — PROGRESS NOTE ADULT - SUBJECTIVE AND OBJECTIVE BOX
MIGEL MOYA  82y Male    CHIEF COMPLAINT:    Patient is a 82y old  Male who presents with a chief complaint of Fatigue and lethargy (2022 09:48)      INTERVAL HPI/OVERNIGHT EVENTS:    Patient seen and examined. Denies any melena or rectal bleeding. No abdominal pain. EGD today    ROS: All other systems are negative.    Vital Signs:    T(F): 97 (22 @ 15:39), Max: 99.9 (22 @ 05:45)  HR: 85 (22 @ 15:39) (80 - 95)  BP: 106/64 (22 @ 15:39) (90/60 - 106/64)  RR: 18 (22 @ 15:39) (17 - 18)  SpO2: 96% (22 @ 15:16) (96% - 96%)  I&O's Summary    2022 07:01  -  2022 07:00  --------------------------------------------------------  IN: 820 mL / OUT: 1975 mL / NET: -1155 mL    2022 07:01  -  2022 16:19  --------------------------------------------------------H  IN: 289 mL / OUT: 200 mL / NET: 89 mL      Daily Height in cm: 182.88 (2022 15:39)    Daily Weight in k (2022 05:45)  CAPILLARY BLOOD GLUCOSE          PHYSICAL EXAM:    GENERAL:  NAD  SKIN: No rashes or lesions  HENT: Atraumatic. Normocephalic. PERRL. Moist membranes.  NECK: Supple, No JVD. No lymphadenopathy.  PULMONARY: CTA B/L. No wheezing. No rales  CVS: Normal S1, S2. Rate and Rhythm are regular. No murmurs.  ABDOMEN/GI: Soft, Nontender, Nondistended; BS present  EXTREMITIES: Peripheral pulses intact. No edema B/L LE.  NEUROLOGIC:  No motor or sensory deficit.  PSYCH: Alert & oriented x 3    Consultant(s) Notes Reviewed:  [x ] YES  [ ] NO  Care Discussed with Consultants/Other Providers [ x] YES  [ ] NO    EKG reviewed  Telemetry reviewed    LABS:                        7.2    11.16 )-----------( 287      ( 2022 05:46 )             24.2   Hemoglobin: 7.2 g/dL ( @ 05:46)  Hemoglobin: 8.0 g/dL ( @ 08:15)  Hemoglobin: 8.6 g/dL ( @ 12:38)  Hemoglobin: 7.5 g/dL ( @ 05:38)  Hemoglobin: 7.7 g/dL ( @ 23:03)  Hemoglobin: 7.3 g/dL ( @ 18:32)  Hemoglobin: 5.2 g/dL ( @ 04:43)  Hemoglobin: 4.1 g/dL (11-10 @ 19:04)        142  |  103  |  11  ----------------------------<  92  4.0   |  34<H>  |  0.8    Ca    8.2<L>      2022 11:34  Mg     1.6           PT/INR - ( 2022 05:46 )   PT: 15.90 sec;   INR: 1.38 ratio         PTT - ( 2022 05:46 )  PTT:32.1 sec  Serum Pro-Brain Natriuretic Peptide: 1002 pg/mL (11-10-22 @ 17:40)          RADIOLOGY & ADDITIONAL TESTS:      Imaging or report Personally Reviewed:  [ ] YES  [ ] NO    Medications:  Standing  aspirin enteric coated 81 milliGRAM(s) Oral daily  atorvastatin 20 milliGRAM(s) Oral at bedtime  digoxin     Tablet 250 MICROGram(s) Oral daily  influenza  Vaccine (HIGH DOSE) 0.7 milliLiter(s) IntraMuscular once  iron sucrose IVPB 200 milliGRAM(s) IV Intermittent every 24 hours  latanoprost 0.005% Ophthalmic Solution 1 Drop(s) Both EYES at bedtime  pantoprazole  Injectable 40 milliGRAM(s) IV Push two times a day  potassium chloride  20 mEq/100 mL IVPB 20 milliEquivalent(s) IV Intermittent every 2 hours  tamsulosin 0.4 milliGRAM(s) Oral at bedtime    PRN Meds  acetaminophen     Tablet .. 650 milliGRAM(s) Oral every 6 hours PRN  melatonin 3 milliGRAM(s) Oral at bedtime PRN  polyethylene glycol 3350 17 Gram(s) Oral daily PRN      Case discussed with resident    Care discussed with pt/family

## 2022-11-15 ENCOUNTER — TRANSCRIPTION ENCOUNTER (OUTPATIENT)
Age: 82
End: 2022-11-15

## 2022-11-15 LAB
ANION GAP SERPL CALC-SCNC: 6 MMOL/L — LOW (ref 7–14)
BASOPHILS # BLD AUTO: 0.04 K/UL — SIGNIFICANT CHANGE UP (ref 0–0.2)
BASOPHILS NFR BLD AUTO: 0.5 % — SIGNIFICANT CHANGE UP (ref 0–1)
BLD GP AB SCN SERPL QL: SIGNIFICANT CHANGE UP
BUN SERPL-MCNC: 9 MG/DL — LOW (ref 10–20)
CALCIUM SERPL-MCNC: 8.2 MG/DL — LOW (ref 8.4–10.4)
CHLORIDE SERPL-SCNC: 102 MMOL/L — SIGNIFICANT CHANGE UP (ref 98–110)
CO2 SERPL-SCNC: 30 MMOL/L — SIGNIFICANT CHANGE UP (ref 17–32)
CREAT SERPL-MCNC: 0.7 MG/DL — SIGNIFICANT CHANGE UP (ref 0.7–1.5)
EGFR: 92 ML/MIN/1.73M2 — SIGNIFICANT CHANGE UP
EOSINOPHIL # BLD AUTO: 0.3 K/UL — SIGNIFICANT CHANGE UP (ref 0–0.7)
EOSINOPHIL NFR BLD AUTO: 4 % — SIGNIFICANT CHANGE UP (ref 0–8)
GLUCOSE SERPL-MCNC: 72 MG/DL — SIGNIFICANT CHANGE UP (ref 70–99)
HCT VFR BLD CALC: 28.2 % — LOW (ref 42–52)
HGB BLD-MCNC: 8.2 G/DL — LOW (ref 14–18)
IMM GRANULOCYTES NFR BLD AUTO: 1.2 % — HIGH (ref 0.1–0.3)
LYMPHOCYTES # BLD AUTO: 0.95 K/UL — LOW (ref 1.2–3.4)
LYMPHOCYTES # BLD AUTO: 12.7 % — LOW (ref 20.5–51.1)
MAGNESIUM SERPL-MCNC: 1.8 MG/DL — SIGNIFICANT CHANGE UP (ref 1.8–2.4)
MCHC RBC-ENTMCNC: 24.3 PG — LOW (ref 27–31)
MCHC RBC-ENTMCNC: 29.1 G/DL — LOW (ref 32–37)
MCV RBC AUTO: 83.4 FL — SIGNIFICANT CHANGE UP (ref 80–94)
MONOCYTES # BLD AUTO: 0.93 K/UL — HIGH (ref 0.1–0.6)
MONOCYTES NFR BLD AUTO: 12.4 % — HIGH (ref 1.7–9.3)
NEUTROPHILS # BLD AUTO: 5.17 K/UL — SIGNIFICANT CHANGE UP (ref 1.4–6.5)
NEUTROPHILS NFR BLD AUTO: 69.2 % — SIGNIFICANT CHANGE UP (ref 42.2–75.2)
NRBC # BLD: 0 /100 WBCS — SIGNIFICANT CHANGE UP (ref 0–0)
PLATELET # BLD AUTO: 255 K/UL — SIGNIFICANT CHANGE UP (ref 130–400)
POTASSIUM SERPL-MCNC: 3.7 MMOL/L — SIGNIFICANT CHANGE UP (ref 3.5–5)
POTASSIUM SERPL-SCNC: 3.7 MMOL/L — SIGNIFICANT CHANGE UP (ref 3.5–5)
RBC # BLD: 3.38 M/UL — LOW (ref 4.7–6.1)
RBC # FLD: 28.6 % — HIGH (ref 11.5–14.5)
SARS-COV-2 RNA SPEC QL NAA+PROBE: SIGNIFICANT CHANGE UP
SODIUM SERPL-SCNC: 138 MMOL/L — SIGNIFICANT CHANGE UP (ref 135–146)
WBC # BLD: 7.48 K/UL — SIGNIFICANT CHANGE UP (ref 4.8–10.8)
WBC # FLD AUTO: 7.48 K/UL — SIGNIFICANT CHANGE UP (ref 4.8–10.8)

## 2022-11-15 PROCEDURE — 99239 HOSP IP/OBS DSCHRG MGMT >30: CPT

## 2022-11-15 RX ORDER — DIGOXIN 250 MCG
1 TABLET ORAL
Qty: 0 | Refills: 0 | DISCHARGE
Start: 2022-11-15

## 2022-11-15 RX ORDER — PANTOPRAZOLE SODIUM 20 MG/1
1 TABLET, DELAYED RELEASE ORAL
Qty: 60 | Refills: 2
Start: 2022-11-15 | End: 2023-02-12

## 2022-11-15 RX ORDER — RIVAROXABAN 15 MG-20MG
10 KIT ORAL DAILY
Refills: 0 | Status: DISCONTINUED | OUTPATIENT
Start: 2022-11-15 | End: 2022-11-16

## 2022-11-15 RX ADMIN — PANTOPRAZOLE SODIUM 40 MILLIGRAM(S): 20 TABLET, DELAYED RELEASE ORAL at 05:27

## 2022-11-15 RX ADMIN — ATORVASTATIN CALCIUM 20 MILLIGRAM(S): 80 TABLET, FILM COATED ORAL at 23:00

## 2022-11-15 RX ADMIN — LATANOPROST 1 DROP(S): 0.05 SOLUTION/ DROPS OPHTHALMIC; TOPICAL at 23:00

## 2022-11-15 RX ADMIN — IRON SUCROSE 110 MILLIGRAM(S): 20 INJECTION, SOLUTION INTRAVENOUS at 09:01

## 2022-11-15 RX ADMIN — Medication 250 MICROGRAM(S): at 05:27

## 2022-11-15 RX ADMIN — TAMSULOSIN HYDROCHLORIDE 0.4 MILLIGRAM(S): 0.4 CAPSULE ORAL at 23:00

## 2022-11-15 RX ADMIN — PANTOPRAZOLE SODIUM 40 MILLIGRAM(S): 20 TABLET, DELAYED RELEASE ORAL at 18:09

## 2022-11-15 RX ADMIN — Medication 81 MILLIGRAM(S): at 12:59

## 2022-11-15 NOTE — DISCHARGE NOTE NURSING/CASE MANAGEMENT/SOCIAL WORK - PATIENT PORTAL LINK FT
You can access the FollowMyHealth Patient Portal offered by [facility] by registering at the following website: [email or web address]. By joining [facility]’s FollowMyHealth portal, you will also be able to view your health information using other applications (apps) compatible with our system.

## 2022-11-15 NOTE — DISCHARGE NOTE PROVIDER - CARE PROVIDER_API CALL
Derrick Alvarez (DO)  Gastroenterology  [address] [place] [id number]  Phone: [phone]  Fax: [phone]  Follow Up Time: 2 weeks

## 2022-11-15 NOTE — DISCHARGE NOTE PROVIDER - EXTENDED VTE YES NO FOR MLM ENOXAPARIN
2017    Ju Ulloa  [facility] [facility] [address]    Dear Ju:    [facility] wants to ensure your discharge home is safe and you or your loved ones have had all of your questions answered regarding your care after you leave the hospital.    Below is a list of resources and contact information should you have any questions regarding your hospital stay, follow-up instructions, or active medical symptoms.    Questions or Concerns Regarding… Contact   Medical Questions Related to Your Discharge  (7 days a week, 8am-5pm) Contact a Nurse Care Coordinator   [phone]   Medical Questions Not Related to Your Discharge  (24 hours a day / 7 days a week)  Contact the Nurse Health Line   [phone]    Medications or Discharge Instructions Refer to your discharge packet   or contact your [facility] Primary Care Provider   [phone]   Follow-up Appointment(s) Schedule your appointment via [employer]   or contact Scheduling [phone]   Billing Review your statement via [employer]  or contact Billing [phone]   Medical Records Review your records via [employer]   or contact Medical Records [phone]     You may receive a telephone call within two days of discharge. This call is to make certain you understand your discharge instructions and have the opportunity to have any questions answered. You can also easily access your medical information, test results and upcoming appointments via the [employer] free online health management tool. You can learn more and sign up at [employer]. For assistance setting up your [employer] account, please call [phone].    Once again, we want to ensure your discharge home is safe and that you have a clear understanding of any next steps in your care. If you have any questions or concerns, please do not hesitate to contact us, we are here for you. Thank you for choosing [facility] for your healthcare needs.    Sincerely,    Your [facility] Healthcare Team  ,

## 2022-11-15 NOTE — PROGRESS NOTE ADULT - SUBJECTIVE AND OBJECTIVE BOX
MIGEL MOYA  82y Male    CHIEF COMPLAINT:    Patient is a 82y old  Male who presents with a chief complaint of Fatigue and lethargy (2022 16:19)      INTERVAL HPI/OVERNIGHT EVENTS:    Patient seen and examined.    ROS: All other systems are negative.    Vital Signs:    T(F): 98.6 (11-15-22 @ 04:04), Max: 98.6 (11-15-22 @ 04:04)  HR: 85 (11-15-22 @ 04:04) (82 - 94)  BP: 119/56 (11-15-22 @ 04:04) (89/53 - 119/56)  RR: 18 (11-15-22 @ 04:04) (18 - 18)  SpO2: 97% (22 @ 17:11) (96% - 98%)  I&O's Summary    2022 07:01  -  15 Nov 2022 07:00  --------------------------------------------------------  IN: 609 mL / OUT: 1610 mL / NET: -1001 mL      Daily Height in cm: 182.88 (2022 15:39)    Daily Weight in k.7 (15 Nov 2022 04:04)  CAPILLARY BLOOD GLUCOSE          PHYSICAL EXAM:    GENERAL:  NAD  SKIN: No rashes or lesions  HENT: Atraumatic. Normocephalic. PERRL. Moist membranes.  NECK: Supple, No JVD. No lymphadenopathy.  PULMONARY: CTA B/L. No wheezing. No rales  CVS: Normal S1, S2. Rate and Rhythm are regular. No murmurs.  ABDOMEN/GI: Soft, Nontender, Nondistended; BS present  EXTREMITIES: Peripheral pulses intact. No edema B/L LE.  NEUROLOGIC:  No motor or sensory deficit.  PSYCH: Alert & oriented x 3    Consultant(s) Notes Reviewed:  [x ] YES  [ ] NO  Care Discussed with Consultants/Other Providers [ x] YES  [ ] NO    EKG reviewed  Telemetry reviewed    LABS:                        8.6    8.08  )-----------( 262      ( 2022 20:57 )             29.3     11-14    139  |  103  |  9<L>  ----------------------------<  95  3.6   |  29  |  0.7    Ca    8.2<L>      2022 20:57  Mg     1.9           PT/INR - ( 2022 05:46 )   PT: 15.90 sec;   INR: 1.38 ratio         PTT - ( 2022 05:46 )  PTT:32.1 sec  Serum Pro-Brain Natriuretic Peptide: 1002 pg/mL (11-10-22 @ 17:40)          RADIOLOGY & ADDITIONAL TESTS:      Imaging or report Personally Reviewed:  [ ] YES  [ ] NO    Medications:  Standing  aspirin enteric coated 81 milliGRAM(s) Oral daily  atorvastatin 20 milliGRAM(s) Oral at bedtime  digoxin     Tablet 250 MICROGram(s) Oral daily  influenza  Vaccine (HIGH DOSE) 0.7 milliLiter(s) IntraMuscular once  iron sucrose IVPB 200 milliGRAM(s) IV Intermittent every 24 hours  latanoprost 0.005% Ophthalmic Solution 1 Drop(s) Both EYES at bedtime  pantoprazole  Injectable 40 milliGRAM(s) IV Push two times a day  tamsulosin 0.4 milliGRAM(s) Oral at bedtime    PRN Meds  acetaminophen     Tablet .. 650 milliGRAM(s) Oral every 6 hours PRN  melatonin 3 milliGRAM(s) Oral at bedtime PRN  polyethylene glycol 3350 17 Gram(s) Oral daily PRN      Case discussed with resident    Care discussed with pt/family           MIGEL MOYA  82y Male    CHIEF COMPLAINT:    Patient is a 82y old  Male who presents with a chief complaint of Fatigue and lethargy (2022 16:19)      INTERVAL HPI/OVERNIGHT EVENTS:    Patient seen and examined. No abdominal pain. No N/V. No melena. No rectal bleeding    ROS: All other systems are negative.    Vital Signs:    T(F): 98.6 (11-15-22 @ 04:04), Max: 98.6 (11-15-22 @ 04:04)  HR: 85 (11-15-22 @ 04:04) (82 - 94)  BP: 119/56 (11-15-22 @ 04:04) (89/53 - 119/56)  RR: 18 (11-15-22 @ 04:04) (18 - 18)  SpO2: 97% (22 @ 17:11) (96% - 98%)  I&O's Summary    2022 07:01  -  15 Nov 2022 07:00  --------------------------------------------------------  IN: 609 mL / OUT: 1610 mL / NET: -1001 mL      Daily Height in cm: 182.88 (2022 15:39)    Daily Weight in k.7 (15 Nov 2022 04:04)  CAPILLARY BLOOD GLUCOSE          PHYSICAL EXAM:    GENERAL:  NAD  SKIN: No rashes or lesions  HENT: Atraumatic. Normocephalic. PERRL. Moist membranes.  NECK: Supple, No JVD. No lymphadenopathy.  PULMONARY: CTA B/L. No wheezing. No rales  CVS: Normal S1, S2. Rate and Rhythm are regular. No murmurs.  ABDOMEN/GI: Soft, Nontender, Nondistended; BS present  EXTREMITIES: Peripheral pulses intact. No edema B/L LE.  NEUROLOGIC:  No motor or sensory deficit.  PSYCH: Alert & oriented x 3    Consultant(s) Notes Reviewed:  [x ] YES  [ ] NO  Care Discussed with Consultants/Other Providers [ x] YES  [ ] NO    EKG reviewed  Telemetry reviewed    LABS:                        8.6    8.08  )-----------( 262      ( 2022 20:57 )             29.3   Hemoglobin: 8.2 g/dL (11-15 @ 05:06)  Hemoglobin: 8.6 g/dL ( @ 20:57)  Hemoglobin: 7.2 g/dL ( @ 05:46)  Hemoglobin: 8.0 g/dL ( @ 08:15)  Hemoglobin: 8.6 g/dL ( @ 12:38)  Hemoglobin: 7.5 g/dL ( @ 05:38)  Hemoglobin: 7.7 g/dL ( @ 23:03)  Hemoglobin: 7.3 g/dL ( @ 18:32)  Hemoglobin: 5.2 g/dL ( @ 04:43)  Hemoglobin: 4.1 g/dL (11-10 @ 19:04)        139  |  103  |  9<L>  ----------------------------<  95  3.6   |  29  |  0.7    Ca    8.2<L>      2022 20:57  Mg     1.9           PT/INR - ( 2022 05:46 )   PT: 15.90 sec;   INR: 1.38 ratio         PTT - ( 2022 05:46 )  PTT:32.1 sec  Serum Pro-Brain Natriuretic Peptide: 1002 pg/mL (11-10-22 @ 17:40)          RADIOLOGY & ADDITIONAL TESTS:      Imaging or report Personally Reviewed:  [ ] YES  [ ] NO    Medications:  Standing  aspirin enteric coated 81 milliGRAM(s) Oral daily  atorvastatin 20 milliGRAM(s) Oral at bedtime  digoxin     Tablet 250 MICROGram(s) Oral daily  influenza  Vaccine (HIGH DOSE) 0.7 milliLiter(s) IntraMuscular once  iron sucrose IVPB 200 milliGRAM(s) IV Intermittent every 24 hours  latanoprost 0.005% Ophthalmic Solution 1 Drop(s) Both EYES at bedtime  pantoprazole  Injectable 40 milliGRAM(s) IV Push two times a day  tamsulosin 0.4 milliGRAM(s) Oral at bedtime    PRN Meds  acetaminophen     Tablet .. 650 milliGRAM(s) Oral every 6 hours PRN  melatonin 3 milliGRAM(s) Oral at bedtime PRN  polyethylene glycol 3350 17 Gram(s) Oral daily PRN      Case discussed with resident    Care discussed with pt/family

## 2022-11-15 NOTE — DISCHARGE NOTE PROVIDER - NSDCCPCAREPLAN_GEN_ALL_CORE_FT
PRINCIPAL DISCHARGE DIAGNOSIS  Diagnosis: Anemia  Assessment and Plan of Treatment: You were admitted with low hemoglobin. You took 3 transfusions. Endoscopy was done and showed no active bleeding. You need to follow up as outpatient with your DrCharlie to do a video capsule endoscopy and colonoscopy.

## 2022-11-15 NOTE — DISCHARGE NOTE PROVIDER - HOSPITAL COURSE
82 y.o male patient with PMH of CAD s/p PCI, atrial fibrillation, HLD, HTN and severe MR s/o mitral clip on 9/22 presenting for fatigue, lethargy and shortness of breath that is episodic in nature. In the ED, patient was found to be severely anemic (4.1 from 10.8 in 9/22). He was ordered 2 packed RBCs.    Microcytic Anemia  CAD / HTN   A-Fib with RVR  Severe MR s/p Mitral clip on 9/22  NSTEMI type 2             PLAN:    Hb today is 7.2. Will transfuse one unit of PRBC'S  Scheduled for EGD today  Brown stool on rectal exam by GI  Transfuse him to keep Hb >8  Monitor H/H. Give Lasix 20 mg ivp after each unit of blood.   Cont to hold Xarelto  Cont IV Protonix  Retic count is 1.3. Serum iron is 33 and percent sat is 11. Cont Venofer 200 mg iv daily x 5 doses.   HR is controlled.   ECHO on 9/15 showed EF is 43%. Mitral valve clip is stable.   Cont his other home meds   82 y.o male patient with PMH of CAD s/p PCI, atrial fibrillation, HLD, HTN and severe MR s/o mitral clip on 9/22 presenting for fatigue, lethargy and shortness of breath that is episodic in nature. In the ED, patient was found to be severely anemic (4.1 from 10.8 in 9/22). He was ordered 2 packed RBCs. Patient was admitted for acute on chronic microcytic anemia. GI consulted and EGD was done.   EGD 11/14/2022:   Mucosa	Normal mucosa was noted in the whole esophagus.  Diffuse continuous erythema of the mucosa with no bleeding was noted in the stomach. These findings are compatible with non-erosive gastritis.  A single small non-bleeding angioectasia was seen in the second part of the duodenum. Hemostasis was performed using Argon Plasma Coagulation.  Two superficial non-bleeding clean based ulcers were found in the distal bulb  GI recommended to continue with PPI BID, continue AC, and f/u as OP for VCE with colonoscopy.   Patient was also found to be in RVR, probably due to anemia, he was admitted to [facility] for monitoring.   HR is controlled now. He received total of 3 pRBC transfusions. He also received IV iron for 5 days    Patient was seen and examined today at bedside.   Patient is stable and ready for discharge.

## 2022-11-15 NOTE — DISCHARGE NOTE NURSING/CASE MANAGEMENT/SOCIAL WORK - NSDCPEFALRISK_GEN_ALL_CORE
For information on Fall & Injury Prevention, visit: [email or web address] OR  [email or web address] OR  https://www.cdc.gov/steadi/patient.html

## 2022-11-15 NOTE — PROGRESS NOTE ADULT - ASSESSMENT
82 y.o male patient with PMH of CAD s/p PCI, atrial fibrillation, HLD, HTN and severe MR s/o mitral clip on 9/22 presenting for fatigue, lethargy and shortness of breath that is episodic in nature. In the ED, patient was found to be severely anemic (4.1 from 10.8 in 9/22). He was ordered 2 packed RBCs.    Microcytic Anemia  CAD / HTN   A-Fib with RVR  Severe MR s/p Mitral clip on 9/22  NSTEMI type 2             PLAN:    ·	S/P EGD on 11/14. Non erosive gastritis. A single small non-bleeding angioectasia was seen in the second part of the duodenum. Two superficial non-bleeding clean based ulcers were found in the distal bulb. GI recommended VCE as an out pt. Cont Protonix 40 mg po q 12h  ·	Resume A/C from today as per GI  ·	Advance to regular diet from today  ·	Brown stool on rectal exam by GI  ·	Retic count is 1.3. Serum iron is 33 and percent sat is 11. On Venofer 200 mg iv daily x 5 doses.   ·	HR is controlled.   ·	ECHO on 9/15 showed EF is 43%. Mitral valve clip is stable.   ·	Cont his other home meds  ·	D/C planning    * Med rec reviewed.  82 y.o male patient with PMH of CAD s/p PCI, atrial fibrillation, HLD, HTN and severe MR s/o mitral clip on 9/22 presenting for fatigue, lethargy and shortness of breath that is episodic in nature. In the ED, patient was found to be severely anemic (4.1 from 10.8 in 9/22). He was ordered 2 packed RBCs.    Microcytic Anemia likely due to GIB  CAD / HTN   A-Fib with RVR  Severe MR s/p Mitral clip on 9/22  NSTEMI type 2             PLAN:    ·	H/H is stable  ·	S/P EGD on 11/14. Non erosive gastritis. A single small non-bleeding angioectasia was seen in the second part of the duodenum. Two superficial non-bleeding clean based ulcers were found in the distal bulb. GI recommended VCE as an out pt. Cont Protonix 40 mg po q 12h  ·	Resume A/C from today as per GI  ·	Advance to regular diet from today  ·	Brown stool on rectal exam by GI  ·	Retic count is 1.3. Serum iron is 33 and percent sat is 11. On Venofer 200 mg iv daily x 5 doses.   ·	HR is controlled.   ·	ECHO on 9/15 showed EF is 43%. Mitral valve clip is stable.   ·	Cont his other home meds  ·	D/C home    * Med rec reviewed. F/U with GI as an out pt for VCE. Plan of care d/w the pt. Time spent 38 minutes.

## 2022-11-15 NOTE — DISCHARGE NOTE PROVIDER - NSDCMRMEDTOKEN_GEN_ALL_CORE_FT
aspirin 81 mg oral delayed release tablet: 1 tab(s) orally once a day  atorvastatin 20 mg oral tablet: 1 tab(s) orally once a day (at bedtime)  digoxin 250 mcg (0.25 mg) oral tablet: 1 tab(s) orally once a day  pantoprazole 40 mg oral delayed release tablet: 1 tab(s) orally 2 times a day   rivaroxaban 10 mg oral tablet: 1 tab(s) orally once a day  tamsulosin 0.4 mg oral capsule: 1 cap(s) orally once a day (at bedtime)  torsemide 20 mg oral tablet: 1 tab(s) orally once a day

## 2022-11-16 VITALS
TEMPERATURE: 97 F | DIASTOLIC BLOOD PRESSURE: 57 MMHG | RESPIRATION RATE: 18 BRPM | HEART RATE: 92 BPM | SYSTOLIC BLOOD PRESSURE: 97 MMHG

## 2022-11-16 LAB
ANION GAP SERPL CALC-SCNC: 8 MMOL/L — SIGNIFICANT CHANGE UP (ref 7–14)
BASOPHILS # BLD AUTO: 0.03 K/UL — SIGNIFICANT CHANGE UP (ref 0–0.2)
BASOPHILS NFR BLD AUTO: 0.4 % — SIGNIFICANT CHANGE UP (ref 0–1)
BUN SERPL-MCNC: 11 MG/DL — SIGNIFICANT CHANGE UP (ref 10–20)
CALCIUM SERPL-MCNC: 8.2 MG/DL — LOW (ref 8.4–10.4)
CHLORIDE SERPL-SCNC: 102 MMOL/L — SIGNIFICANT CHANGE UP (ref 98–110)
CO2 SERPL-SCNC: 29 MMOL/L — SIGNIFICANT CHANGE UP (ref 17–32)
CREAT SERPL-MCNC: 0.8 MG/DL — SIGNIFICANT CHANGE UP (ref 0.7–1.5)
EGFR: 88 ML/MIN/1.73M2 — SIGNIFICANT CHANGE UP
EOSINOPHIL # BLD AUTO: 0.18 K/UL — SIGNIFICANT CHANGE UP (ref 0–0.7)
EOSINOPHIL NFR BLD AUTO: 2.3 % — SIGNIFICANT CHANGE UP (ref 0–8)
GLUCOSE SERPL-MCNC: 93 MG/DL — SIGNIFICANT CHANGE UP (ref 70–99)
HCT VFR BLD CALC: 28.5 % — LOW (ref 42–52)
HGB BLD-MCNC: 8.4 G/DL — LOW (ref 14–18)
IMM GRANULOCYTES NFR BLD AUTO: 1 % — HIGH (ref 0.1–0.3)
LYMPHOCYTES # BLD AUTO: 1.04 K/UL — LOW (ref 1.2–3.4)
LYMPHOCYTES # BLD AUTO: 13.5 % — LOW (ref 20.5–51.1)
MCHC RBC-ENTMCNC: 24.6 PG — LOW (ref 27–31)
MCHC RBC-ENTMCNC: 29.5 G/DL — LOW (ref 32–37)
MCV RBC AUTO: 83.6 FL — SIGNIFICANT CHANGE UP (ref 80–94)
MONOCYTES # BLD AUTO: 0.74 K/UL — HIGH (ref 0.1–0.6)
MONOCYTES NFR BLD AUTO: 9.6 % — HIGH (ref 1.7–9.3)
NEUTROPHILS # BLD AUTO: 5.62 K/UL — SIGNIFICANT CHANGE UP (ref 1.4–6.5)
NEUTROPHILS NFR BLD AUTO: 73.2 % — SIGNIFICANT CHANGE UP (ref 42.2–75.2)
NRBC # BLD: 0 /100 WBCS — SIGNIFICANT CHANGE UP (ref 0–0)
PLATELET # BLD AUTO: 224 K/UL — SIGNIFICANT CHANGE UP (ref 130–400)
POTASSIUM SERPL-MCNC: 3.9 MMOL/L — SIGNIFICANT CHANGE UP (ref 3.5–5)
POTASSIUM SERPL-SCNC: 3.9 MMOL/L — SIGNIFICANT CHANGE UP (ref 3.5–5)
RBC # BLD: 3.41 M/UL — LOW (ref 4.7–6.1)
RBC # FLD: 28.5 % — HIGH (ref 11.5–14.5)
SODIUM SERPL-SCNC: 139 MMOL/L — SIGNIFICANT CHANGE UP (ref 135–146)
WBC # BLD: 7.69 K/UL — SIGNIFICANT CHANGE UP (ref 4.8–10.8)
WBC # FLD AUTO: 7.69 K/UL — SIGNIFICANT CHANGE UP (ref 4.8–10.8)

## 2022-11-16 PROCEDURE — 99239 HOSP IP/OBS DSCHRG MGMT >30: CPT

## 2022-11-16 RX ADMIN — Medication 81 MILLIGRAM(S): at 12:32

## 2022-11-16 RX ADMIN — PANTOPRAZOLE SODIUM 40 MILLIGRAM(S): 20 TABLET, DELAYED RELEASE ORAL at 05:32

## 2022-11-16 RX ADMIN — RIVAROXABAN 10 MILLIGRAM(S): KIT at 12:32

## 2022-11-16 RX ADMIN — Medication 250 MICROGRAM(S): at 05:31

## 2022-11-16 NOTE — PROGRESS NOTE ADULT - REASON FOR ADMISSION
Fatigue and lethargy

## 2022-11-16 NOTE — PROGRESS NOTE ADULT - ASSESSMENT
82 y.o male patient with PMH of CAD s/p PCI, atrial fibrillation, HLD, HTN and severe MR s/o mitral clip on 9/22 presenting for fatigue, lethargy and shortness of breath that is episodic in nature. In the ED, patient was found to be severely anemic (4.1 from 10.8 in 9/22). He was ordered 2 packed RBCs.      PLAN    #Microcytic Anemia likely due to GIB  - H/H is stable  - S/P EGD on 11/14. Non erosive gastritis. A single small non-bleeding angioectasia was seen in the second part of the duodenum. Two superficial non-bleeding clean based ulcers were  found in the distal bulb. GI recommended VCE as an out pt. Cont Protonix 40 mg po q 12h  - Resumed A/C since yesterday as per GI  - Advance to regular diet since yesterday  - Brown stool on rectal exam by GI  - Retic count is 1.3. Serum iron is 33 and percent sat is 11. On Venofer 200 mg iv daily x 5 doses.   - f/u GI outpatient for VCE     #CAD / HTN   #A-Fib with RVR  #Severe MR s/p Mitral clip on 9/22  #NSTEMI type 2  - HR is controlled.   - ECHO on 9/15 showed EF is 43%. Mitral valve clip is stable.   - Cont his other home meds  - D/C home pending placement     #DVT PPx: Xarelto  #GI PPx: Pantoprazole IVP BID   #Diet: Regular   #Activity: As tolerated  #Pending: Placement to [facility]  #Dispo: TBD

## 2022-11-16 NOTE — PROGRESS NOTE ADULT - ASSESSMENT
82 y.o male patient with PMH of CAD s/p PCI, atrial fibrillation, HLD, HTN and severe MR s/o mitral clip on 9/22 presenting for fatigue, lethargy and shortness of breath that is episodic in nature. In the ED, patient was found to be severely anemic (4.1 from 10.8 in 9/22). He was ordered 2 packed RBCs.    Microcytic Anemia likely due to GIB  CAD / HTN   A-Fib with RVR  Severe MR s/p Mitral clip on 9/22  NSTEMI type 2             PLAN:    ·	Pt now wants to go to SNF  ·	H/H remains stable  ·	S/P EGD on 11/14. Non erosive gastritis. A single small non-bleeding angioectasia was seen in the second part of the duodenum. Two superficial non-bleeding clean based ulcers were found in the distal bulb. GI recommended VCE as an out pt. Cont Protonix 40 mg po q 12h  ·	Resume A/C from today as per GI  ·	On regular diet.   ·	Brown stool on rectal exam by GI  ·	Retic count is 1.3. Serum iron is 33 and percent sat is 11. On Venofer 200 mg iv daily x 5 doses.   ·	HR is controlled.   ·	ECHO on 9/15 showed EF is 43%. Mitral valve clip is stable.   ·	Cont his other home meds  ·	 for d/c planning to SNF    * Med rec reviewed. F/U with GI as an out pt for VCE. Plan of care d/w the pt. Time spent 38 minutes.

## 2022-11-16 NOTE — PROGRESS NOTE ADULT - PROVIDER SPECIALTY LIST ADULT
Hospitalist
Internal Medicine
Hospitalist
Hospitalist
Internal Medicine
Internal Medicine
Hospitalist
Internal Medicine

## 2022-11-16 NOTE — PROGRESS NOTE ADULT - SUBJECTIVE AND OBJECTIVE BOX
MIGEL MOYA 82y Male  MRN#: [id number]   CODE STATUS:__Full______    Hospital Day: 6d    Patient is currently admitted with the primary diagnosis of symptomatic anemia.    SUBJECTIVE:    Patient seen and examined at bedside this am. Patient is stable this morning and has no new complaints.     OBJECTIVE:  PAST MEDICAL & SURGICAL HISTORY:  HTN (hypertension)    High cholesterol    GERD (gastroesophageal reflux disease)    Atrial fibrillation    CAD (coronary artery disease)  s/p PCI    JONAH (iron deficiency anemia)    BPH (benign prostatic hyperplasia)    H/O CHF    S/P coronary angioplasty  s/p pci                                          ALLERGIES:  No Known Allergies                                       HOME MEDICATIONS:  Home Medications:  aspirin 81 mg oral delayed release tablet: 1 tab(s) orally once a day (11 Nov 2022 00:29)  digoxin 250 mcg (0.25 mg) oral tablet: 1 tab(s) orally once a day (15 Nov 2022 07:16)                           MEDICATIONS:  STANDING MEDICATIONS  aspirin enteric coated 81 milliGRAM(s) Oral daily  atorvastatin 20 milliGRAM(s) Oral at bedtime  digoxin     Tablet 250 MICROGram(s) Oral daily  influenza  Vaccine (HIGH DOSE) 0.7 milliLiter(s) IntraMuscular once  latanoprost 0.005% Ophthalmic Solution 1 Drop(s) Both EYES at bedtime  pantoprazole  Injectable 40 milliGRAM(s) IV Push two times a day  rivaroxaban 10 milliGRAM(s) Oral daily  tamsulosin 0.4 milliGRAM(s) Oral at bedtime    PRN MEDICATIONS  acetaminophen     Tablet .. 650 milliGRAM(s) Oral every 6 hours PRN  melatonin 3 milliGRAM(s) Oral at bedtime PRN  polyethylene glycol 3350 17 Gram(s) Oral daily PRN      VITAL SIGNS: Last 24 Hours  T(C): 36.9 (16 Nov 2022 04:42), Max: 36.9 (16 Nov 2022 04:42)  T(F): 98.5 (16 Nov 2022 04:42), Max: 98.5 (16 Nov 2022 04:42)  HR: 96 (16 Nov 2022 04:42) (67 - 96)  BP: 102/61 (16 Nov 2022 04:42) (94/52 - 105/51)  BP(mean): --  RR: 18 (16 Nov 2022 04:42) (18 - 18)  SpO2: 96% (15 Nov 2022 20:50) (96% - 97%)      11-15-22 @ 07:01  -  11-16-22 @ 07:00  --------------------------------------------------------  IN: 660 mL / OUT: 1000 mL / NET: -340 mL    LABS:                        8.4    7.69  )-----------( 224      ( 16 Nov 2022 05:41 )             28.5     11-16    139  |  102  |  11  ----------------------------<  93  3.9   |  29  |  0.8    Ca    8.2<L>      16 Nov 2022 05:41  Mg     1.8     11-15    PHYSICAL EXAM:  General: Resting comfortably in no acute painful distress  HEENT: Normocephalic, atraumatic  LUNGS: Clear to auscultation bilaterally, no wheezes, rales, rhonchi  HEART: S1S2 present, regular rate and rhythm, no murmurs, rubs, gallops  ABDOMEN: Soft, nontender, nondistended  EXT: No edema

## 2022-11-16 NOTE — PROGRESS NOTE ADULT - SUBJECTIVE AND OBJECTIVE BOX
GRIFFINMIGEL  82y Male    CHIEF COMPLAINT:    Patient is a 82y old  Male who presents with a chief complaint of Fatigue and lethargy (16 Nov 2022 08:46)      INTERVAL HPI/OVERNIGHT EVENTS:    Patient seen and examined. No new complaint. Wants to go to SNF.     ROS: All other systems are negative.    Vital Signs:    T(F): 98.5 (11-16-22 @ 04:42), Max: 98.5 (11-16-22 @ 04:42)  HR: 96 (11-16-22 @ 04:42) (67 - 96)  BP: 102/61 (11-16-22 @ 04:42) (94/52 - 105/51)  RR: 18 (11-16-22 @ 04:42) (18 - 18)  SpO2: 96% (11-15-22 @ 20:50) (96% - 97%)  I&O's Summary    15 Nov 2022 07:01  -  16 Nov 2022 07:00  --------------------------------------------------------  IN: 660 mL / OUT: 1000 mL / NET: -340 mL    16 Nov 2022 07:01  -  16 Nov 2022 11:41  --------------------------------------------------------  IN: 240 mL / OUT: 200 mL / NET: 40 mL      Daily     Daily   CAPILLARY BLOOD GLUCOSE          PHYSICAL EXAM:    GENERAL:  NAD  SKIN: No rashes or lesions  HENT: Atraumatic. Normocephalic. PERRL. Moist membranes.  NECK: Supple, No JVD. No lymphadenopathy.  PULMONARY: CTA B/L. No wheezing. No rales  CVS: Normal S1, S2. Rate and Rhythm are regular. No murmurs.  ABDOMEN/GI: Soft, Nontender, Nondistended; BS present  EXTREMITIES: Peripheral pulses intact. No edema B/L LE.  NEUROLOGIC:  No motor or sensory deficit.  PSYCH: Alert & oriented x 3    Consultant(s) Notes Reviewed:  [x ] YES  [ ] NO  Care Discussed with Consultants/Other Providers [ x] YES  [ ] NO    EKG reviewed  Telemetry reviewed    LABS:                        8.4    7.69  )-----------( 224      ( 16 Nov 2022 05:41 )             28.5     11-16    139  |  102  |  11  ----------------------------<  93  3.9   |  29  |  0.8    Ca    8.2<L>      16 Nov 2022 05:41  Mg     1.8     11-15        Serum Pro-Brain Natriuretic Peptide: 1002 pg/mL (11-10-22 @ 17:40)          RADIOLOGY & ADDITIONAL TESTS:      Imaging or report Personally Reviewed:  [ ] YES  [ ] NO    Medications:  Standing  aspirin enteric coated 81 milliGRAM(s) Oral daily  atorvastatin 20 milliGRAM(s) Oral at bedtime  digoxin     Tablet 250 MICROGram(s) Oral daily  influenza  Vaccine (HIGH DOSE) 0.7 milliLiter(s) IntraMuscular once  latanoprost 0.005% Ophthalmic Solution 1 Drop(s) Both EYES at bedtime  pantoprazole  Injectable 40 milliGRAM(s) IV Push two times a day  rivaroxaban 10 milliGRAM(s) Oral daily  tamsulosin 0.4 milliGRAM(s) Oral at bedtime    PRN Meds  acetaminophen     Tablet .. 650 milliGRAM(s) Oral every 6 hours PRN  melatonin 3 milliGRAM(s) Oral at bedtime PRN  polyethylene glycol 3350 17 Gram(s) Oral daily PRN      Case discussed with resident    Care discussed with pt/family

## 2022-11-22 ENCOUNTER — INPATIENT (INPATIENT)
Facility: HOSPITAL | Age: 82
LOS: 5 days | Discharge: HOME | End: 2022-11-28
Attending: STUDENT IN AN ORGANIZED HEALTH CARE EDUCATION/TRAINING PROGRAM | Admitting: STUDENT IN AN ORGANIZED HEALTH CARE EDUCATION/TRAINING PROGRAM

## 2022-11-22 VITALS
TEMPERATURE: 98 F | DIASTOLIC BLOOD PRESSURE: 56 MMHG | OXYGEN SATURATION: 100 % | WEIGHT: 175.93 LBS | HEART RATE: 92 BPM | HEIGHT: 72 IN | RESPIRATION RATE: 20 BRPM | SYSTOLIC BLOOD PRESSURE: 107 MMHG

## 2022-11-22 DIAGNOSIS — Z98.61 CORONARY ANGIOPLASTY STATUS: Chronic | ICD-10-CM

## 2022-11-22 LAB
ALBUMIN SERPL ELPH-MCNC: 2.8 G/DL — LOW (ref 3.5–5.2)
ALP SERPL-CCNC: 77 U/L — SIGNIFICANT CHANGE UP (ref 30–115)
ALT FLD-CCNC: 30 U/L — SIGNIFICANT CHANGE UP (ref 0–41)
ANION GAP SERPL CALC-SCNC: 8 MMOL/L — SIGNIFICANT CHANGE UP (ref 7–14)
APTT BLD: 36.2 SEC — SIGNIFICANT CHANGE UP (ref 27–39.2)
AST SERPL-CCNC: 43 U/L — HIGH (ref 0–41)
BASOPHILS # BLD AUTO: 0.03 K/UL — SIGNIFICANT CHANGE UP (ref 0–0.2)
BASOPHILS NFR BLD AUTO: 0.4 % — SIGNIFICANT CHANGE UP (ref 0–1)
BILIRUB SERPL-MCNC: 0.6 MG/DL — SIGNIFICANT CHANGE UP (ref 0.2–1.2)
BLD GP AB SCN SERPL QL: SIGNIFICANT CHANGE UP
BUN SERPL-MCNC: 19 MG/DL — SIGNIFICANT CHANGE UP (ref 10–20)
CALCIUM SERPL-MCNC: 8.4 MG/DL — SIGNIFICANT CHANGE UP (ref 8.4–10.5)
CHLORIDE SERPL-SCNC: 101 MMOL/L — SIGNIFICANT CHANGE UP (ref 98–110)
CO2 SERPL-SCNC: 28 MMOL/L — SIGNIFICANT CHANGE UP (ref 17–32)
CREAT SERPL-MCNC: 0.9 MG/DL — SIGNIFICANT CHANGE UP (ref 0.7–1.5)
EGFR: 85 ML/MIN/1.73M2 — SIGNIFICANT CHANGE UP
EOSINOPHIL # BLD AUTO: 0.04 K/UL — SIGNIFICANT CHANGE UP (ref 0–0.7)
EOSINOPHIL NFR BLD AUTO: 0.6 % — SIGNIFICANT CHANGE UP (ref 0–8)
GLUCOSE SERPL-MCNC: 131 MG/DL — HIGH (ref 70–99)
HCT VFR BLD CALC: 21.1 % — LOW (ref 42–52)
HGB BLD-MCNC: 6.4 G/DL — CRITICAL LOW (ref 14–18)
IMM GRANULOCYTES NFR BLD AUTO: 0.8 % — HIGH (ref 0.1–0.3)
INR BLD: 1.97 RATIO — HIGH (ref 0.65–1.3)
IRON SATN MFR SERPL: 10 % — LOW (ref 15–50)
IRON SATN MFR SERPL: 26 UG/DL — LOW (ref 35–150)
LYMPHOCYTES # BLD AUTO: 1.02 K/UL — LOW (ref 1.2–3.4)
LYMPHOCYTES # BLD AUTO: 14.1 % — LOW (ref 20.5–51.1)
MAGNESIUM SERPL-MCNC: 1.4 MG/DL — LOW (ref 1.8–2.4)
MCHC RBC-ENTMCNC: 26.6 PG — LOW (ref 27–31)
MCHC RBC-ENTMCNC: 30.3 G/DL — LOW (ref 32–37)
MCV RBC AUTO: 87.6 FL — SIGNIFICANT CHANGE UP (ref 80–94)
MONOCYTES # BLD AUTO: 0.75 K/UL — HIGH (ref 0.1–0.6)
MONOCYTES NFR BLD AUTO: 10.4 % — HIGH (ref 1.7–9.3)
NEUTROPHILS # BLD AUTO: 5.33 K/UL — SIGNIFICANT CHANGE UP (ref 1.4–6.5)
NEUTROPHILS NFR BLD AUTO: 73.7 % — SIGNIFICANT CHANGE UP (ref 42.2–75.2)
NRBC # BLD: 0 /100 WBCS — SIGNIFICANT CHANGE UP (ref 0–0)
PLATELET # BLD AUTO: 224 K/UL — SIGNIFICANT CHANGE UP (ref 130–400)
POTASSIUM SERPL-MCNC: 4.2 MMOL/L — SIGNIFICANT CHANGE UP (ref 3.5–5)
POTASSIUM SERPL-SCNC: 4.2 MMOL/L — SIGNIFICANT CHANGE UP (ref 3.5–5)
PROT SERPL-MCNC: 6.2 G/DL — SIGNIFICANT CHANGE UP (ref 6–8)
PROTHROM AB SERPL-ACNC: 22.9 SEC — HIGH (ref 9.95–12.87)
RBC # BLD: 2.41 M/UL — LOW (ref 4.7–6.1)
RBC # FLD: 26.5 % — HIGH (ref 11.5–14.5)
SODIUM SERPL-SCNC: 137 MMOL/L — SIGNIFICANT CHANGE UP (ref 135–146)
TIBC SERPL-MCNC: 271 UG/DL — SIGNIFICANT CHANGE UP (ref 220–430)
UIBC SERPL-MCNC: 245 UG/DL — SIGNIFICANT CHANGE UP (ref 110–370)
WBC # BLD: 7.23 K/UL — SIGNIFICANT CHANGE UP (ref 4.8–10.8)
WBC # FLD AUTO: 7.23 K/UL — SIGNIFICANT CHANGE UP (ref 4.8–10.8)

## 2022-11-22 PROCEDURE — 99285 EMERGENCY DEPT VISIT HI MDM: CPT | Mod: FS

## 2022-11-22 RX ORDER — MAGNESIUM SULFATE 500 MG/ML
2 VIAL (ML) INJECTION ONCE
Refills: 0 | Status: COMPLETED | OUTPATIENT
Start: 2022-11-22 | End: 2022-11-22

## 2022-11-22 RX ORDER — PANTOPRAZOLE SODIUM 20 MG/1
40 TABLET, DELAYED RELEASE ORAL ONCE
Refills: 0 | Status: COMPLETED | OUTPATIENT
Start: 2022-11-22 | End: 2022-11-22

## 2022-11-22 RX ADMIN — PANTOPRAZOLE SODIUM 40 MILLIGRAM(S): 20 TABLET, DELAYED RELEASE ORAL at 22:45

## 2022-11-22 NOTE — ED PROVIDER NOTE - PHYSICAL EXAMINATION
VITAL SIGNS: I have reviewed nursing notes and confirm.  CONSTITUTIONAL: 83 yo M laying on stretcher; in no acute distress.  SKIN: Skin exam is warm and dry, no acute rash.  HEAD: Normocephalic; atraumatic.  EYES: PERRL, EOM intact; conjunctiva and sclera clear.  ENT: No nasal discharge; airway clear.   CARD: S1, S2 normal; no murmurs, gallops, or rubs. Regular rate and rhythm.  RESP: No wheezes, rales or rhonchi. Speaking in full sentences.   ABD: Normal bowel sounds; soft; non-distended; non-tender; No rebound or guarding. No CVA tenderness.  EXT: Normal ROM. No clubbing, cyanosis or edema.  NEURO: Alert, oriented. Grossly unremarkable. No focal deficits.

## 2022-11-22 NOTE — ED PROVIDER NOTE - ATTENDING APP SHARED VISIT CONTRIBUTION OF CARE
82 y.o male patient with PMH of CAD s/p PCI, atrial fibrillation on rivaroxaban, HLD, HTN and severe MR s/o mitral clip on 9/22, recent admission for GI bleed p/w anemia on outpatient labs. endorses dark stool today however denies other medical complaints.     vss, nontoxic, well appearing, pink conj, anicteric, MMM, neck supple, CTAB, RRR, equal radial pulses bilat, abd soft/nt/nd, dark brown stool, no BRBPR, no cva tend. no calves tend, no edema, no fnd. no rashes.

## 2022-11-22 NOTE — ED PROVIDER NOTE - CARE PLAN
Assessment and plan of treatment:	concern for recurrent GI bleed  labs, imaging, type and screen.  PPI  transfuse as indicated.  likely admit   Principal Discharge DX:	Anemia  Assessment and plan of treatment:	concern for recurrent GI bleed  labs, imaging, type and screen.  PPI  transfuse as indicated.  likely admit   1 Principal Discharge DX:	Anemia  Assessment and plan of treatment:	concern for recurrent GI bleed  labs, imaging, type and screen.  PPI  transfuse as indicated.  likely admit  Secondary Diagnosis:	GIB (gastrointestinal bleeding)  Secondary Diagnosis:	Hypomagnesemia

## 2022-11-22 NOTE — ED PROVIDER NOTE - CLINICAL SUMMARY MEDICAL DECISION MAKING FREE TEXT BOX
Authored by Dr. Isaura Price: s/o to me by Dr. Nelson - 82M from [facility] pmh sig for AF on xarelto, chf on asa, recent GIB w/EGD 11/14/22 showing gastritis, angioectasia & ulcers of duodenum, referred from NH for anemia requiring transfusion, pt reporting dark stools, s/o to me pending labs as resulted, transfused 2U prbcs, hypomag repleted, admitted for inpatient GI consult & further mgmt

## 2022-11-22 NOTE — ED PROVIDER NOTE - OBJECTIVE STATEMENT
82-year-old male with past medical history of HTN, HLD, CAD status post PCI, A. fib on Xarelto, severe MR status post mitral clip, iron deficiency anemia and GI bleed presents to the ED for evaluation of low hemoglobin.  Patient had routine blood work and hemoglobin was noted to be 7 which prompted facility to send patient to ED.  Patient does note stool was slightly darker 2 days ago but has since normalized.  Patient denies other complaints.

## 2022-11-22 NOTE — ED PROVIDER NOTE - PLAN OF CARE
concern for recurrent GI bleed  labs, imaging, type and screen.  PPI  transfuse as indicated.  likely admit

## 2022-11-22 NOTE — ED ADULT NURSE NOTE - NSIMPLEMENTINTERV_GEN_ALL_ED
Implemented All Fall with Harm Risk Interventions:  [place] to call system. Call bell, personal items and telephone within reach. Instruct patient to call for assistance. Room bathroom lighting operational. Non-slip footwear when patient is off stretcher. Physically safe environment: no spills, clutter or unnecessary equipment. Stretcher in lowest position, wheels locked, appropriate side rails in place. Provide visual cue, wrist band, yellow gown, etc. Monitor gait and stability. Monitor for mental status changes and reorient to person, place, and time. Review medications for side effects contributing to fall risk. Reinforce activity limits and safety measures with patient and family. Provide visual clues: red socks.

## 2022-11-22 NOTE — ED ADULT NURSE NOTE - BREATHING, MLM
[FreeTextEntry1] : Endosee hysteroscopy and embx performed without complications or difficulties\par \par tissue to pathology\par \par TCA treatment to left labia minora genital wart\par \par follow up gyn as needed   \par \par awaiting final pathology report
Spontaneous, unlabored and symmetrical

## 2022-11-23 LAB
FERRITIN SERPL-MCNC: 65 NG/ML — SIGNIFICANT CHANGE UP (ref 30–400)
GLUCOSE BLDC GLUCOMTR-MCNC: 111 MG/DL — HIGH (ref 70–99)
GLUCOSE BLDC GLUCOMTR-MCNC: 116 MG/DL — HIGH (ref 70–99)
GLUCOSE BLDC GLUCOMTR-MCNC: 99 MG/DL — SIGNIFICANT CHANGE UP (ref 70–99)
HCT VFR BLD CALC: 25.3 % — LOW (ref 42–52)
HGB BLD-MCNC: 8 G/DL — LOW (ref 14–18)
MCHC RBC-ENTMCNC: 26.6 PG — LOW (ref 27–31)
MCHC RBC-ENTMCNC: 31.6 G/DL — LOW (ref 32–37)
MCV RBC AUTO: 84.1 FL — SIGNIFICANT CHANGE UP (ref 80–94)
NRBC # BLD: 0 /100 WBCS — SIGNIFICANT CHANGE UP (ref 0–0)
PLATELET # BLD AUTO: 213 K/UL — SIGNIFICANT CHANGE UP (ref 130–400)
RBC # BLD: 3.01 M/UL — LOW (ref 4.7–6.1)
RBC # FLD: 22.6 % — HIGH (ref 11.5–14.5)
SARS-COV-2 RNA SPEC QL NAA+PROBE: SIGNIFICANT CHANGE UP
WBC # BLD: 7.86 K/UL — SIGNIFICANT CHANGE UP (ref 4.8–10.8)
WBC # FLD AUTO: 7.86 K/UL — SIGNIFICANT CHANGE UP (ref 4.8–10.8)

## 2022-11-23 PROCEDURE — 99222 1ST HOSP IP/OBS MODERATE 55: CPT

## 2022-11-23 PROCEDURE — 93010 ELECTROCARDIOGRAM REPORT: CPT

## 2022-11-23 PROCEDURE — 99221 1ST HOSP IP/OBS SF/LOW 40: CPT

## 2022-11-23 PROCEDURE — 99223 1ST HOSP IP/OBS HIGH 75: CPT

## 2022-11-23 RX ORDER — ASPIRIN/CALCIUM CARB/MAGNESIUM 324 MG
81 TABLET ORAL DAILY
Refills: 0 | Status: DISCONTINUED | OUTPATIENT
Start: 2022-11-23 | End: 2022-11-23

## 2022-11-23 RX ORDER — IRON SUCROSE 20 MG/ML
200 INJECTION, SOLUTION INTRAVENOUS EVERY 24 HOURS
Refills: 0 | Status: COMPLETED | OUTPATIENT
Start: 2022-11-23 | End: 2022-11-24

## 2022-11-23 RX ORDER — TAMSULOSIN HYDROCHLORIDE 0.4 MG/1
0.4 CAPSULE ORAL AT BEDTIME
Refills: 0 | Status: DISCONTINUED | OUTPATIENT
Start: 2022-11-23 | End: 2022-11-28

## 2022-11-23 RX ORDER — DIGOXIN 250 MCG
250 TABLET ORAL DAILY
Refills: 0 | Status: DISCONTINUED | OUTPATIENT
Start: 2022-11-23 | End: 2022-11-28

## 2022-11-23 RX ORDER — PANTOPRAZOLE SODIUM 20 MG/1
40 TABLET, DELAYED RELEASE ORAL
Refills: 0 | Status: DISCONTINUED | OUTPATIENT
Start: 2022-11-23 | End: 2022-11-28

## 2022-11-23 RX ORDER — RIVAROXABAN 15 MG-20MG
10 KIT ORAL
Refills: 0 | Status: DISCONTINUED | OUTPATIENT
Start: 2022-11-23 | End: 2022-11-23

## 2022-11-23 RX ORDER — ACETAMINOPHEN 500 MG
650 TABLET ORAL EVERY 6 HOURS
Refills: 0 | Status: DISCONTINUED | OUTPATIENT
Start: 2022-11-23 | End: 2022-11-28

## 2022-11-23 RX ORDER — ATORVASTATIN CALCIUM 80 MG/1
20 TABLET, FILM COATED ORAL AT BEDTIME
Refills: 0 | Status: DISCONTINUED | OUTPATIENT
Start: 2022-11-23 | End: 2022-11-28

## 2022-11-23 RX ADMIN — PANTOPRAZOLE SODIUM 40 MILLIGRAM(S): 20 TABLET, DELAYED RELEASE ORAL at 17:22

## 2022-11-23 RX ADMIN — PANTOPRAZOLE SODIUM 40 MILLIGRAM(S): 20 TABLET, DELAYED RELEASE ORAL at 06:31

## 2022-11-23 RX ADMIN — Medication 20 MILLIGRAM(S): at 06:30

## 2022-11-23 RX ADMIN — IRON SUCROSE 110 MILLIGRAM(S): 20 INJECTION, SOLUTION INTRAVENOUS at 11:42

## 2022-11-23 RX ADMIN — Medication 250 MICROGRAM(S): at 06:30

## 2022-11-23 RX ADMIN — TAMSULOSIN HYDROCHLORIDE 0.4 MILLIGRAM(S): 0.4 CAPSULE ORAL at 20:48

## 2022-11-23 RX ADMIN — ATORVASTATIN CALCIUM 20 MILLIGRAM(S): 80 TABLET, FILM COATED ORAL at 20:48

## 2022-11-23 RX ADMIN — Medication 25 GRAM(S): at 03:27

## 2022-11-23 NOTE — H&P ADULT - HISTORY OF PRESENT ILLNESS
82 y.o male patient with PMH of CAD s/p PCI, atrial fibrillation, HLD, HTN and severe MR s/p mitral clip on 9/22 presenting from [facility] after routine blood work showed patient had a hemoglobin less that 7.0. Patient was recently admitted for symptomatic anemia c/b Afib with RVR and was discharged 11/15/2022 s/p 3U PRBC and 5 infusions of IV iron. Patient states that this time he is completely asymptomatic. He knows because last time patient would get short of breath, light headed, and feel like his heart was racing. This time however he does not have any of those same symptoms. Patient states that today he noticed that his stool was darker than normal. Other than that patient has not had any bloody bowel movements, hematemesis or diarrhea. Patient states that he has had no obvious signs of bleeding. Patient denies dizziness, palpitations shortness of breath, etc.     In the ER patient was afebrile, HR: 92 BP: 102/56, SpO2 100% on RA.     Labs were notable for Hgb 6.4, Mag 1.4 82 y.o male patient with PMH of CAD s/p PCI, atrial fibrillation, HLD, HTN and severe MR s/p mitral clip on 9/22 presenting from [facility] after routine blood work showed patient had a hemoglobin less that 7.0. Patient was recently admitted for symptomatic anemia c/b Afib with RVR and was discharged 11/15/2022 s/p 3U PRBC and 5 infusions of IV iron. Patient states that this time he is completely asymptomatic. He knows because last time patient would get short of breath, light headed, and feel like his heart was racing. This time however he does not have any of those same symptoms. Patient states that today he noticed that his stool was darker than normal. Other than that patient has not had any bloody bowel movements, hematemesis or diarrhea. Patient states that he has had no obvious signs of bleeding. Patient denies dizziness, palpitations shortness of breath, etc.     In the ER patient was afebrile, HR: 92 BP: 102/56, SpO2 100% on RA.     Labs were notable for Hgb 6.4, Mag 1.4. Patient currently receiving 1U PRBC

## 2022-11-23 NOTE — H&P ADULT - ATTENDING COMMENTS
***My note supersedes any discrepancies that may be above in the resident's note***    82 y.o male patient with PMH of CAD s/p PCI, atrial fibrillation, HLD, HTN and severe MR s/p mitral clip on 9/22 presenting from [facility] after routine blood work showed patient had a hemoglobin less that 7.0, admitted for severe anemia and r/o GI bleed.    #Severe Normocytic Anemia  #R/o GI bleed   - unsure etiology  - previous admission earlier this month for symptomatic anemia,  that required 3u of pRBCs. s/p EGD:          Erythema in the stomach compatible with non-erosive gastritis.         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 (Hemostasis).  - currently asymptomatic, HDS and satting well on RA  - Hgb 6.4 on admission  - s/p 2u of pRBCs on this admission  - protonix IV bid  - active type and screen   - serial CBC, transfuse for hgb <7  - maintain large bore peripheral IVs  - hold AC  - GI following for EGD and colonoscopy to evaluate source of bleeding      #CAD  #HTN  #Paroxysmal Afib  #Mitral regurg s/p clip   - Patient BP under control  - No chest pain  - HR normal  - EKG  - C/w home meds  - Hold xeralto until r/o GI bleed    #Hypomagnesia   - Mag 1.4 on admission  - s/p 2 g of mag  - Daily mag serum  - Replete mag if necessary    #Misc  - DVT Prophylaxis: Not indicated at this time due to bleeding  - GI Prophylaxis: Protonix 40mg BID  - Diet: NPO  - Activity: Regular   - IV Fluids: NA  - Code Status: Full Code        #Progress Note Handoff  Pending (specify): acute blood loss work up  Family discussion: updated at bedside  Disposition:  Unknown at this time________

## 2022-11-23 NOTE — CONSULT NOTE ADULT - SUBJECTIVE AND OBJECTIVE BOX
Hematology Consult Note    HPI:  82 y.o male patient with PMH of CAD s/p PCI, atrial fibrillation, HLD, HTN and severe MR s/p mitral clip on 9/22 presenting from [facility] after routine blood work showed patient had a hemoglobin less that 7.0. Patient was recently admitted for symptomatic anemia c/b Afib with RVR and was discharged 11/15/2022 s/p 3U PRBC and 5 infusions of IV iron. Patient states that this time he is completely asymptomatic. He knows because last time patient would get short of breath, light headed, and feel like his heart was racing. This time however he does not have any of those same symptoms. Patient states that today he noticed that his stool was darker than normal. Other than that patient has not had any bloody bowel movements, hematemesis or diarrhea. Patient states that he has had no obvious signs of bleeding. Patient denies dizziness, palpitations shortness of breath, etc.     In the ER patient was afebrile, HR: 92 BP: 102/56, SpO2 100% on RA.     Labs were notable for Hgb 6.4, Mag 1.4. Patient currently receiving 1U PRBC (23 Nov 2022 01:53)      Allergies    No Known Allergies    Intolerances        MEDICATIONS  (STANDING):  atorvastatin 20 milliGRAM(s) Oral at bedtime  digoxin     Tablet 250 MICROGram(s) Oral daily  pantoprazole    Tablet 40 milliGRAM(s) Oral two times a day  tamsulosin 0.4 milliGRAM(s) Oral at bedtime  torsemide 20 milliGRAM(s) Oral daily    MEDICATIONS  (PRN):  acetaminophen     Tablet .. 650 milliGRAM(s) Oral every 6 hours PRN Temp greater or equal to 38C (100.4F), Mild Pain (1 - 3)      PAST MEDICAL & SURGICAL HISTORY:  HTN (hypertension)      High cholesterol      GERD (gastroesophageal reflux disease)      Atrial fibrillation      CAD (coronary artery disease)  s/p PCI      JONAH (iron deficiency anemia)      BPH (benign prostatic hyperplasia)      H/O CHF      S/P coronary angioplasty  s/p pci          FAMILY HISTORY:  FH: heart disease (Mother)        SOCIAL HISTORY: No EtOH, no tobacco    REVIEW OF SYSTEMS:    CONSTITUTIONAL: No weakness, fevers or chills  EYES/ENT: No visual changes;  No vertigo or throat pain   NECK: No pain or stiffness  RESPIRATORY: No cough, wheezing, hemoptysis; No shortness of breath  CARDIOVASCULAR: No chest pain or palpitations  GASTROINTESTINAL: No abdominal or epigastric pain. No nausea, vomiting, or hematemesis; No diarrhea or constipation. No melena or hematochezia.  GENITOURINARY: No dysuria, frequency or hematuria  NEUROLOGICAL: No numbness or weakness  SKIN: No itching, burning, rashes, or lesions   All other review of systems is negative unless indicated above.    Height (cm): 182.9 (11-22 @ 21:11)  Weight (kg): 79.8 (11-22 @ 21:11)  BMI (kg/m2): 23.9 (11-22 @ 21:11)  BSA (m2): 2.02 (11-22 @ 21:11)    T(F): 98.6 (11-23-22 @ 06:15), Max: 98.6 (11-23-22 @ 06:15)  HR: 90 (11-23-22 @ 06:15)  BP: 116/60 (11-23-22 @ 06:15)  RR: 18 (11-23-22 @ 06:15)  SpO2: 99% (11-23-22 @ 06:15)  Wt(kg): --    GENERAL: NAD, well-developed  HEAD:  Atraumatic, Normocephalic  EYES: EOMI, PERRLA, conjunctiva and sclera clear  NECK: Supple, No JVD  CHEST/LUNG: Clear to auscultation bilaterally; No wheeze  HEART: Regular rate and rhythm; No murmurs, rubs, or gallops  ABDOMEN: Soft, Nontender, Nondistended; Bowel sounds present  EXTREMITIES:  2+ Peripheral Pulses, No clubbing, cyanosis, or edema  NEUROLOGY: non-focal  SKIN: No rashes or lesions                          6.4    7.23  )-----------( 224      ( 22 Nov 2022 22:38 )             21.1       11-22    137  |  101  |  19  ----------------------------<  131<H>  4.2   |  28  |  0.9    Ca    8.4      22 Nov 2022 22:38  Mg     1.4     11-22    TPro  6.2  /  Alb  2.8<L>  /  TBili  0.6  /  DBili  x   /  AST  43<H>  /  ALT  30  /  AlkPhos  77  11-22      Magnesium, Serum: 1.4 mg/dL (11-22 @ 22:38)

## 2022-11-23 NOTE — H&P ADULT - NSHPPHYSICALEXAM_GEN_ALL_CORE
PHYSICAL EXAM:  GENERAL: NAD, well-groomed, well-developed  HEAD:  Atraumatic, Normocephalic  EYES: EOMI, PERRLA, conjunctiva and sclera clear  ENMT: Moist mucous membranes  NECK: No JVD  HEART: Abnormal rhythm, tachycardic  RESPIRATORY: CTA B/L, No W/R/R  ABDOMEN: Soft, Nontender, Nondistended; Bowel sounds present  NEUROLOGY: A&Ox3, nonfocal, moving all extremities  EXTREMITIES:  2+ Peripheral Pulses, No lower extremity edema  SKIN: warm, dry, normal color, no rash or abnormal lesions

## 2022-11-23 NOTE — ED ADULT NURSE REASSESSMENT NOTE - NS ED NURSE REASSESS COMMENT FT1
patient received 1 unit of RPBC. Patient reassessed at 15 min, tolerated well with no signs of transfusion reaction noted. Patient vs documented.  Will continue to monitor.

## 2022-11-23 NOTE — PATIENT PROFILE ADULT - FALL HARM RISK - HARM RISK INTERVENTIONS

## 2022-11-23 NOTE — CONSULT NOTE ADULT - ATTENDING COMMENTS
seen/ examined w/ hemonc team; note reviewed; case discussed;     GI : strongly recommend to do not just colonoscopy, but to repeat egd as well while inpatient in view of severe acute anemia indicating bleeding  transfuse prbc  obtain iron tibc ferritin  do not recommend to infuse venofer at this point as the trend of hg/hct indicates acute active bleed and infusing iron is futile until the source of bleed is identified and bleeding is under control
I edited the note

## 2022-11-23 NOTE — H&P ADULT - ASSESSMENT
82 y.o male patient with PMH of CAD s/p PCI, atrial fibrillation, HLD, HTN and severe MR s/p mitral clip on 9/22 presenting from [facility] after routine blood work showed patient had a hemoglobin less that 7.0, admitted for severe anemia and r/o GI bleed.    #Severe Anemia  #R/o GI bleed  82 y.o male patient with PMH of CAD s/p PCI, atrial fibrillation, HLD, HTN and severe MR s/p mitral clip on 9/22 presenting from [facility] after routine blood work showed patient had a hemoglobin less that 7.0, admitted for severe anemia and r/o GI bleed.    #Severe Anemia  #R/o GI bleed   - Hgb 6.4  - Patient noticed dark stools   - Multiple admissions for severe anemia    Plan:   82 y.o male patient with PMH of CAD s/p PCI, atrial fibrillation, HLD, HTN and severe MR s/p mitral clip on 9/22 presenting from [facility] after routine blood work showed patient had a hemoglobin less that 7.0, admitted for severe anemia and r/o GI bleed.    #Severe Anemia  #R/o GI bleed   - Hgb 6.4  - Patient noticed dark stools   - Multiple admissions for severe anemia      Plan:  - GI C 82 y.o male patient with PMH of CAD s/p PCI, atrial fibrillation, HLD, HTN and severe MR s/p mitral clip on 9/22 presenting from [facility] after routine blood work showed patient had a hemoglobin less that 7.0, admitted for severe anemia and r/o GI bleed.    #Severe Anemia  #R/o GI bleed   - Hgb 6.4 on admission, previously admitted 11/11/22 for Hgb 4.1  - Patient noticed dark stools   - Multiple admissions for severe anemia  - s/p 1U PRBC     Plan:  - GI Consult  - Hematology consult (for IV Iron)  - F/U AM CBC   - Active type and screen  - Hold AC    #CAD  #HTN  #Paroxysmal Afib  #Mitral regurg s/p clip   - Patient BP under control  - No chest pain  - HR normal    Plan:  - EKG  - C/w home meds  - Hold xeralto until r/o GI bleed    #Hypomagnesia   -      82 y.o male patient with PMH of CAD s/p PCI, atrial fibrillation, HLD, HTN and severe MR s/p mitral clip on 9/22 presenting from [facility] after routine blood work showed patient had a hemoglobin less that 7.0, admitted for severe anemia and r/o GI bleed.    #Severe Anemia  #R/o GI bleed   - Hgb 6.4 on admission, previously admitted 11/11/22 for Hgb 4.1  - Patient noticed dark stools   - Multiple admissions for severe anemia  - s/p 1U PRBC     Plan:  - GI Consult  - Hematology consult (for IV Iron)  - F/U AM CBC   - Active type and screen  - Hold AC    #CAD  #HTN  #Paroxysmal Afib  #Mitral regurg s/p clip   - Patient BP under control  - No chest pain  - HR normal    Plan:  - EKG  - C/w home meds  - Hold xeralto until r/o GI bleed    #Hypomagnesia   - Mag 1.4 on admission  - s/p 2 g of mag    Plan:  - Daily mag serum  - Replete mag if necessary    #Misc  - DVT Prophylaxis: Not indicated at this time due to bleeding  - GI Prophylaxis: Protonix 40mg BID  - Diet: NPO  - Activity: Regular   - IV Fluids: NA  - Code Status: Full Code      Dispo: Acute

## 2022-11-23 NOTE — CONSULT NOTE ADULT - ASSESSMENT
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  Recently admitted 2 weeks ago for symptomatic anemia c/b Afib with RVR had EGD found small non-bleeding angioectasia in the 2nd part of duodenum underwent hemostasis and was discharged 11/16/2022 s/p total 3U PRBC, 5 infusions of IV iron to follow up OP for VCE with colonoscopy.  Pt reports darker stools noted today but denies any symptoms of anemia which he had on prior admission including sob, palpitations, and dizziness.   Here found to have Hgb 6.4 (8.4 on discharge) and given 1 unit of blood. GI consulted for recurrent anemia. Last dose Xarelto ____.    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   Recently admitted 2 weeks ago for symptomatic anemia c/b Afib with RVR had EGD found small non-bleeding angioectasia in the 2nd part of duodenum underwent hemostasis and was discharged 11/16/2022 s/p total 3U PRBC, 5 infusions of IV iron to follow up OP for VCE with colonoscopy.  Patient here asymptomatic, denies any symptoms of anemia which he had on prior admission including sob, palpitations, and dizziness. Denies hematochezia, melena, hematemesis. Last BM 2 days ago was normal dark brown but hard a/w constipation and strain.   Here found to have Hgb 6.4 (8.4 on discharge) and given 2 units of blood in ED. GI consulted for recurrent anemia. Last dose Xarelto yesterday AM.    Impression:  #Iron deficiency anemia likely 2/2 gastrointestinal blood loss  #Hx duodenal angioectasia  #Hx peptic ulcer disease  - DANIELA + dark red blood (no melena)  - more likely lower GI bleed given maroon stools, however can be upper given known history of duodenal ulcers and angioectasia with recent intervention  - lower GIB differentials include: internal hemorrhoids i/s/o constipation, diverticulosis, polyps, angiodysplasia, less likely IBD, ischemic bowel, infectious   - upper GIB differentials include: known duodenal angioectasia, known duodenal ulcers    Recommendations:   - npo  - hold Xarelto (last dose 11/22)  - avoid NSAID use  - IV Protonix drip  - Monitor Hemoglobin (keep >7.5 given hx CAD)  - Monitor vital signs and hemodynamics  - Maintain active T&S and 2 large bore IVs  - Plan EGD and colonoscopy to evaluate source of bleeding      THIS IS AN INCOMPLETE NOTE PENDING DISCUSSION WITH ATTENDING 82 y.o male patient with PMH of duodenal angioectasia, PUD, CAD s/p PCI, 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   Recently admitted 2 weeks ago for symptomatic anemia c/b Afib with RVR had EGD found small non-bleeding angioectasia in the 2nd part of duodenum underwent hemostasis and was discharged 11/16/2022 s/p total 3U PRBC, 5 infusions of IV iron to follow up OP for VCE with colonoscopy.  Patient here asymptomatic, denies any symptoms of anemia which he had on prior admission including sob, palpitations, and dizziness. Denies hematochezia, melena, hematemesis. Last BM 2 days ago was normal dark brown but hard a/w constipation and strain.   Here found to have Hgb 6.4 (8.4 on discharge) and given 2 units of blood in ED. DANIELA + dark red blood. GI consulted for acute anemia. Last dose Xarelto yesterday AM.    Impression:  #Acute iron deficiency anemia likely 2/2 gastrointestinal blood loss  #Hx duodenal angioectasia  #Hx peptic ulcer disease  - DANIELA + dark red blood (no melena)  - more likely lower GI bleed given maroon stools, however can be upper given known history of duodenal ulcers and angioectasia with recent intervention  - lower GIB differentials include: internal hemorrhoids i/s/o constipation, diverticulosis, polyps, angiodysplasia, less likely IBD, ischemic bowel, infectious   - upper GIB differentials include: known duodenal angioectasia, known duodenal ulcers    Recommendations:   - Plan EGD + colonoscopy on Friday to evaluate source of bleeding ; NPO after midnight day prior; prep with Golytely 4L starting 4pm and finish by midnight and Dulcolax 20mg at 10pm  - IV PPI BID   - continue to hold Xarelto (last dose 11/22)   - avoid NSAID use  - Monitor Hemoglobin (keep >7.5 given hx CAD)  - Monitor vital signs and hemodynamics  - Maintain active T&S and 2 large bore IVs  - Case discussed with attending 82 y.o male patient with PMH of duodenal angioectasia, PUD, CAD s/p PCI, 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   Recently admitted 2 weeks ago for symptomatic anemia c/b Afib with RVR had EGD found small non-bleeding angioectasia in the 2nd part of duodenum underwent hemostasis and was discharged 11/16/2022 s/p total 3U PRBC, 5 infusions of IV iron to follow up OP for VCE with colonoscopy.  Denies hematochezia, melena, hematemesis. Last BM 2 days ago was  dark brown but hard a/w constipation and strain.   Here found to have Hgb 6.4 (8.4 on discharge) and given 2 units of blood in ED. GI consulted for acute on chronic anemia.   DANIELA + dark red blood.   Last dose Xarelto yesterday AM.    Impression:  #Acute iron deficiency anemia r/o GI bleed secondary to AVM versus malignancy versus hemorrhoids versus  others  #Hx duodenal angioectasia  #Hx peptic ulcer disease  - DANIELA + dark red blood (no melena)      Recommendations:   - Plan EGD + colonoscopy on Friday to evaluate source of bleeding ; NPO after midnight day prior; prep with Golytely 4L starting 4pm and finish by midnight and Dulcolax 20mg at 10pm  - IV PPI BID   - continue to hold Xarelto (last dose 11/22)  if ok by primary team  - avoid NSAID use  - Monitor Hemoglobin (keep >7.5 given hx CAD)  - Monitor vital signs and hemodynamics  - Maintain active T&S and 2 large bore IVs  - Cardiology risk stratification  82 y.o male patient with PMH of duodenal angioectasia, PUD, CAD s/p PCI, 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   Recently admitted 2 weeks ago for symptomatic anemia c/b Afib with RVR had EGD found small non-bleeding angioectasia in the 2nd part of duodenum underwent hemostasis and was discharged 11/16/2022 s/p total 3U PRBC, 5 infusions of IV iron to follow up OP for VCE with colonoscopy.  Denies hematochezia, melena, hematemesis. Last BM 2 days ago was  dark brown but hard a/w constipation and strain.   Here found to have Hgb 6.4 (8.4 on discharge) and given 2 units of blood in ED. GI consulted for acute on chronic anemia.   DANIELA + dark red blood.   Last dose Xarelto yesterday AM.    Impression:  #Acute iron deficiency anemia r/o GI bleed secondary to AVM / PUD / gastritis versus malignancy versus hemorrhoids versus  others  #Hx duodenal angioectasia  #Hx peptic ulcer disease  - DANIELA + dark red blood (no melena)      Recommendations:   - Plan EGD + colonoscopy on Friday to evaluate source of bleeding ; NPO after midnight day prior; prep with Golytely 4L starting 4pm and finish by midnight and Dulcolax 20mg at 10pm  - IV PPI BID   - continue to hold Xarelto (last dose 11/22)  if ok by primary team  - avoid NSAID use  - Monitor Hemoglobin (keep >7.5 given hx CAD)  - Monitor vital signs and hemodynamics  - Maintain active T&S and 2 large bore IVs  - Cardiology risk stratification

## 2022-11-23 NOTE — PATIENT PROFILE ADULT - NSPROPOAURINARYCATHETER_GEN_A_NUR
Writer spoke with pt and states that he was already updated on the results. No questions at this time.   no

## 2022-11-23 NOTE — CONSULT NOTE ADULT - SUBJECTIVE AND OBJECTIVE BOX
Patient is a 82y old  Male who presents with a chief complaint of Anemia, r/o recurrent GI bleed (23 Nov 2022 01:53)      HPI:  82 y.o male patient with PMH of CAD s/p PCI, atrial fibrillation, HLD, HTN and severe MR s/p mitral clip on 9/22 presenting from [facility] after routine blood work showed patient had a hemoglobin less that 7.0. Patient was recently admitted for symptomatic anemia c/b Afib with RVR and was discharged 11/15/2022 s/p 3U PRBC and 5 infusions of IV iron. Patient states that this time he is completely asymptomatic. He knows because last time patient would get short of breath, light headed, and feel like his heart was racing. This time however he does not have any of those same symptoms. Patient states that today he noticed that his stool was darker than normal. Other than that patient has not had any bloody bowel movements, hematemesis or diarrhea. Patient states that he has had no obvious signs of bleeding. Patient denies dizziness, palpitations shortness of breath, etc.     In the ER patient was afebrile, HR: 92 BP: 102/56, SpO2 100% on RA.     Labs were notable for Hgb 6.4, Mag 1.4. Patient currently receiving 1U PRBC (23 Nov 2022 01:53)          PAST MEDICAL & SURGICAL HISTORY:  HTN (hypertension)      High cholesterol      GERD (gastroesophageal reflux disease)      Atrial fibrillation      CAD (coronary artery disease)  s/p PCI      JONAH (iron deficiency anemia)      BPH (benign prostatic hyperplasia)      H/O CHF      S/P coronary angioplasty  s/p pci          Home Medications:  aspirin 81 mg oral delayed release tablet: 1 tab(s) orally once a day (11 Nov 2022 00:29)  digoxin 250 mcg (0.25 mg) oral tablet: 1 tab(s) orally once a day (15 Nov 2022 07:16)      MEDICATIONS  (STANDING):  atorvastatin 20 milliGRAM(s) Oral at bedtime  digoxin     Tablet 250 MICROGram(s) Oral daily  pantoprazole    Tablet 40 milliGRAM(s) Oral two times a day  tamsulosin 0.4 milliGRAM(s) Oral at bedtime  torsemide 20 milliGRAM(s) Oral daily    MEDICATIONS  (PRN):  acetaminophen     Tablet .. 650 milliGRAM(s) Oral every 6 hours PRN Temp greater or equal to 38C (100.4F), Mild Pain (1 - 3)      Allergies    No Known Allergies    Intolerances        FAMILY HISTORY:  FH: heart disease (Mother)        SOCIAL    REVIEW OF SYSTEMS  General: mild fatigue   Skin: no new changes   Ophtalmologic: no new visual changes   Respiratory: no new shortness of breath   Cardiovascular: no new chest pain   Gastrointestinal: as per H&P   Genitourinary: no new dysuria   Neurological: no new weakness   otherwise as described above     Vital Signs Last 24 Hrs  T(C): 37 (23 Nov 2022 06:15), Max: 37 (23 Nov 2022 06:15)  T(F): 98.6 (23 Nov 2022 06:15), Max: 98.6 (23 Nov 2022 06:15)  HR: 90 (23 Nov 2022 06:15) (78 - 99)  BP: 116/60 (23 Nov 2022 06:15) (102/52 - 134/70)  BP(mean): 81 (23 Nov 2022 06:15) (79 - 84)  RR: 18 (23 Nov 2022 06:15) (16 - 20)  SpO2: 99% (23 Nov 2022 06:15) (96% - 100%)    Parameters below as of 23 Nov 2022 06:15  Patient On (Oxygen Delivery Method): room air        GENERAL:  no distress  SKIN: no new changes   HEENT:  NC/AT,  anicteric  CHEST:   no new increased effort, breath sounds clear  HEART:  Regular rhythm  ABDOMEN:  Soft, non-tender  EXTREMITIES:  no new cyanosis  PSYCHIATRIC: normal affect       CBC Full  -  ( 22 Nov 2022 22:38 )  WBC Count : 7.23 K/uL  RBC Count : 2.41 M/uL  Hemoglobin : 6.4 g/dL  Hematocrit : 21.1 %  Platelet Count - Automated : 224 K/uL  Mean Cell Volume : 87.6 fL  Mean Cell Hemoglobin : 26.6 pg  Mean Cell Hemoglobin Concentration : 30.3 g/dL  Auto Neutrophil # : 5.33 K/uL  Auto Lymphocyte # : 1.02 K/uL  Auto Monocyte # : 0.75 K/uL  Auto Eosinophil # : 0.04 K/uL  Auto Basophil # : 0.03 K/uL  Auto Neutrophil % : 73.7 %  Auto Lymphocyte % : 14.1 %  Auto Monocyte % : 10.4 %  Auto Eosinophil % : 0.6 %  Auto Basophil % : 0.4 %      Hemoglobin: 6.4 g/dL (11-22-22 @ 22:38)  Bilirubin Total, Serum: 0.6 mg/dL (11-22-22 @ 22:38)  Aspartate Aminotransferase (AST/SGOT): 43 U/L (11-22-22 @ 22:38)  Alanine Aminotransferase (ALT/SGPT): 30 U/L (11-22-22 @ 22:38)  Alkaline Phosphatase, Serum: 77 U/L (11-22-22 @ 22:38)  INR: 1.97 ratio (11-22-22 @ 22:38)      PT/INR - ( 22 Nov 2022 22:38 )   PT: 22.90 sec;   INR: 1.97 ratio         PTT - ( 22 Nov 2022 22:38 )  PTT:36.2 sec    11-22    137  |  101  |  19  ----------------------------<  131<H>  4.2   |  28  |  0.9    Ca    8.4      22 Nov 2022 22:38  Mg     1.4     11-22    TPro  6.2  /  Alb  2.8<L>  /  TBili  0.6  /  DBili  x   /  AST  43<H>  /  ALT  30  /  AlkPhos  77  11-22              RADIOLOGY      x      Home Medications:  aspirin 81 mg oral delayed release tablet: 1 tab(s) orally once a day  atorvastatin 20 mg oral tablet: 1 tab(s) orally once a day (at bedtime)  digoxin 250 mcg (0.25 mg) oral tablet: 1 tab(s) orally once a day  pantoprazole 40 mg oral delayed release tablet: 1 tab(s) orally 2 times a day   rivaroxaban 10 mg oral tablet: 1 tab(s) orally once a day  tamsulosin 0.4 mg oral capsule: 1 cap(s) orally once a day (at bedtime)  torsemide 20 mg oral tablet: 1 tab(s) orally once a day    EGD 11/14/2022:   Mucosa	Normal mucosa was noted in the whole esophagus.  Diffuse continuous erythema of the mucosa with no bleeding was noted in the stomach. These findings are compatible with non-erosive gastritis.  A single small non-bleeding angioectasia was seen in the second part of the duodenum. Hemostasis was performed using Argon Plasma Coagulation.  Two superficial non-bleeding clean based ulcers were found in the distal bulb    No significant weight loss, dysphagia, night sweats, fatigue, hematemesis, hematochezia, melena.   No new medications. No recent travel. No sick contacts.       Smoker/drinker:      Patient is a 82y old  Male who presents with a chief complaint of Anemia, r/o recurrent GI bleed (23 Nov 2022 01:53)      HPI:  82 y.o male patient with PMH of CAD s/p PCI, atrial fibrillation, HLD, HTN and severe MR s/p mitral clip on 9/22 presenting from [facility] after routine blood work showed patient had a hemoglobin less that 7.0. Patient was recently admitted for symptomatic anemia c/b Afib with RVR and was discharged 11/15/2022 s/p 3U PRBC and 5 infusions of IV iron. Patient states that this time he is completely asymptomatic. He knows because last time patient would get short of breath, light headed, and feel like his heart was racing. This time however he does not have any of those same symptoms. Patient states that today he noticed that his stool was darker than normal. Other than that patient has not had any bloody bowel movements, hematemesis or diarrhea. Patient states that he has had no obvious signs of bleeding. Patient denies dizziness, palpitations shortness of breath, etc.     In the ER patient was afebrile, HR: 92 BP: 102/56, SpO2 100% on RA.     Labs were notable for Hgb 6.4, Mag 1.4. Patient currently receiving 1U PRBC (23 Nov 2022 01:53)          PAST MEDICAL & SURGICAL HISTORY:  HTN (hypertension)      High cholesterol      GERD (gastroesophageal reflux disease)      Atrial fibrillation      CAD (coronary artery disease)  s/p PCI      JONAH (iron deficiency anemia)      BPH (benign prostatic hyperplasia)      H/O CHF      S/P coronary angioplasty  s/p pci          Home Medications:  aspirin 81 mg oral delayed release tablet: 1 tab(s) orally once a day (11 Nov 2022 00:29)  digoxin 250 mcg (0.25 mg) oral tablet: 1 tab(s) orally once a day (15 Nov 2022 07:16)      MEDICATIONS  (STANDING):  atorvastatin 20 milliGRAM(s) Oral at bedtime  digoxin     Tablet 250 MICROGram(s) Oral daily  pantoprazole    Tablet 40 milliGRAM(s) Oral two times a day  tamsulosin 0.4 milliGRAM(s) Oral at bedtime  torsemide 20 milliGRAM(s) Oral daily    MEDICATIONS  (PRN):  acetaminophen     Tablet .. 650 milliGRAM(s) Oral every 6 hours PRN Temp greater or equal to 38C (100.4F), Mild Pain (1 - 3)      Allergies    No Known Allergies    Intolerances        FAMILY HISTORY:  FH: heart disease (Mother)        SOCIAL    REVIEW OF SYSTEMS  General: mild fatigue   Skin: no new changes   Ophtalmologic: no new visual changes   Respiratory: no new shortness of breath   Cardiovascular: no new chest pain   Gastrointestinal: as per H&P   Genitourinary: no new dysuria   Neurological: no new weakness   otherwise as described above     Vital Signs Last 24 Hrs  T(C): 37 (23 Nov 2022 06:15), Max: 37 (23 Nov 2022 06:15)  T(F): 98.6 (23 Nov 2022 06:15), Max: 98.6 (23 Nov 2022 06:15)  HR: 90 (23 Nov 2022 06:15) (78 - 99)  BP: 116/60 (23 Nov 2022 06:15) (102/52 - 134/70)  BP(mean): 81 (23 Nov 2022 06:15) (79 - 84)  RR: 18 (23 Nov 2022 06:15) (16 - 20)  SpO2: 99% (23 Nov 2022 06:15) (96% - 100%)    Parameters below as of 23 Nov 2022 06:15  Patient On (Oxygen Delivery Method): room air        GENERAL:  no distress  SKIN: no new changes   HEENT:  NC/AT,  anicteric  CHEST:   no new increased effort, breath sounds clear  HEART:  Regular rhythm  ABDOMEN:  Soft, non-tender  EXTREMITIES:  no new cyanosis  PSYCHIATRIC: normal affect       CBC Full  -  ( 22 Nov 2022 22:38 )  WBC Count : 7.23 K/uL  RBC Count : 2.41 M/uL  Hemoglobin : 6.4 g/dL  Hematocrit : 21.1 %  Platelet Count - Automated : 224 K/uL  Mean Cell Volume : 87.6 fL  Mean Cell Hemoglobin : 26.6 pg  Mean Cell Hemoglobin Concentration : 30.3 g/dL  Auto Neutrophil # : 5.33 K/uL  Auto Lymphocyte # : 1.02 K/uL  Auto Monocyte # : 0.75 K/uL  Auto Eosinophil # : 0.04 K/uL  Auto Basophil # : 0.03 K/uL  Auto Neutrophil % : 73.7 %  Auto Lymphocyte % : 14.1 %  Auto Monocyte % : 10.4 %  Auto Eosinophil % : 0.6 %  Auto Basophil % : 0.4 %      Hemoglobin: 6.4 g/dL (11-22-22 @ 22:38)  Bilirubin Total, Serum: 0.6 mg/dL (11-22-22 @ 22:38)  Aspartate Aminotransferase (AST/SGOT): 43 U/L (11-22-22 @ 22:38)  Alanine Aminotransferase (ALT/SGPT): 30 U/L (11-22-22 @ 22:38)  Alkaline Phosphatase, Serum: 77 U/L (11-22-22 @ 22:38)  INR: 1.97 ratio (11-22-22 @ 22:38)      PT/INR - ( 22 Nov 2022 22:38 )   PT: 22.90 sec;   INR: 1.97 ratio         PTT - ( 22 Nov 2022 22:38 )  PTT:36.2 sec    11-22    137  |  101  |  19  ----------------------------<  131<H>  4.2   |  28  |  0.9    Ca    8.4      22 Nov 2022 22:38  Mg     1.4     11-22    TPro  6.2  /  Alb  2.8<L>  /  TBili  0.6  /  DBili  x   /  AST  43<H>  /  ALT  30  /  AlkPhos  77  11-22              RADIOLOGY      x      Home Medications:  aspirin 81 mg oral delayed release tablet: 1 tab(s) orally once a day  atorvastatin 20 mg oral tablet: 1 tab(s) orally once a day (at bedtime)  digoxin 250 mcg (0.25 mg) oral tablet: 1 tab(s) orally once a day  pantoprazole 40 mg oral delayed release tablet: 1 tab(s) orally 2 times a day   rivaroxaban 10 mg oral tablet: 1 tab(s) orally once a day  tamsulosin 0.4 mg oral capsule: 1 cap(s) orally once a day (at bedtime)  torsemide 20 mg oral tablet: 1 tab(s) orally once a day    EGD 11/14/2022:   Mucosa	Normal mucosa was noted in the whole esophagus.  Diffuse continuous erythema of the mucosa with no bleeding was noted in the stomach. These findings are compatible with non-erosive gastritis.  A single small non-bleeding angioectasia was seen in the second part of the duodenum. Hemostasis was performed using Argon Plasma Coagulation.  Two superficial non-bleeding clean based ulcers were found in the distal bulb    Colonoscopy:  reports 3 years ago here, reports study was normal no polyps    Smoker/drinker:   - former smoker (quit 50 years ago), denies ETOH use    Family history:  - no gastric or colonic malignancy     DANIELA: + dark red blood Patient is a 82y old  Male who presents with a chief complaint of Anemia, r/o recurrent GI bleed (23 Nov 2022 01:53)      HPI:  82 y.o male patient with PMH of CAD s/p PCI, atrial fibrillation, HLD, HTN and severe MR s/p mitral clip on 9/22 presenting from [facility] after routine blood work showed patient had a hemoglobin less that 7.0. Patient was recently admitted for symptomatic anemia c/b Afib with RVR and was discharged 11/15/2022 s/p 3U PRBC and 5 infusions of IV iron. Patient states that this time he is completely asymptomatic. He knows because last time patient would get short of breath, light headed, and feel like his heart was racing. This time however he does not have any of those same symptoms. Patient states that today he noticed that his stool was darker than normal. Other than that patient has not had any bloody bowel movements, hematemesis or diarrhea. Patient states that he has had no obvious signs of bleeding. Patient denies dizziness, palpitations shortness of breath, etc.     In the ER patient was afebrile, HR: 92 BP: 102/56, SpO2 100% on RA.     Labs were notable for Hgb 6.4, Mag 1.4. Patient currently receiving 1U PRBC (23 Nov 2022 01:53)          PAST MEDICAL & SURGICAL HISTORY:  HTN (hypertension)      High cholesterol      GERD (gastroesophageal reflux disease)      Atrial fibrillation      CAD (coronary artery disease)  s/p PCI      JONAH (iron deficiency anemia)      BPH (benign prostatic hyperplasia)      H/O CHF      S/P coronary angioplasty  s/p pci          Home Medications:  aspirin 81 mg oral delayed release tablet: 1 tab(s) orally once a day (11 Nov 2022 00:29)  digoxin 250 mcg (0.25 mg) oral tablet: 1 tab(s) orally once a day (15 Nov 2022 07:16)      MEDICATIONS  (STANDING):  atorvastatin 20 milliGRAM(s) Oral at bedtime  digoxin     Tablet 250 MICROGram(s) Oral daily  pantoprazole    Tablet 40 milliGRAM(s) Oral two times a day  tamsulosin 0.4 milliGRAM(s) Oral at bedtime  torsemide 20 milliGRAM(s) Oral daily    MEDICATIONS  (PRN):  acetaminophen     Tablet .. 650 milliGRAM(s) Oral every 6 hours PRN Temp greater or equal to 38C (100.4F), Mild Pain (1 - 3)      Allergies    No Known Allergies    Intolerances        FAMILY HISTORY:  FH: heart disease (Mother)      Social History:  Tobacco: N  Alcohol: N  Drugs: N      REVIEW OF SYSTEMS  General: mild fatigue   Constitutional:  No Fever, No Chills  ENT/Mouth:  No Hearing Changes,  No Difficulty Swallowing  Eyes:  No Eye Pain, No new  Vision Changes  Cardiovascular:  No Chest Pain, No Palpitations now   Respiratory:  No Cough, No Dyspnea now   Gastrointestinal:  As described in HPI  Musculoskeletal:  No Joint Swelling  Skin:  No Skin Lesions, No Jaundice  Heme/Lymph:  No Bruising, No Bleeding.      Vital Signs Last 24 Hrs  T(C): 37 (23 Nov 2022 06:15), Max: 37 (23 Nov 2022 06:15)  T(F): 98.6 (23 Nov 2022 06:15), Max: 98.6 (23 Nov 2022 06:15)  HR: 90 (23 Nov 2022 06:15) (78 - 99)  BP: 116/60 (23 Nov 2022 06:15) (102/52 - 134/70)  BP(mean): 81 (23 Nov 2022 06:15) (79 - 84)  RR: 18 (23 Nov 2022 06:15) (16 - 20)  SpO2: 99% (23 Nov 2022 06:15) (96% - 100%)    Parameters below as of 23 Nov 2022 06:15  Patient On (Oxygen Delivery Method): room air    Physical Exam:    Constitutional: No acute distress.  Eyes:. Conjunctivae are clear, Sclera is non-icteric.  Ears Nose and Throat: The external ears are normal appearing,  Oral mucosa is pink and moist.  Respiratory:  No signs of respiratory distress. Lung sounds are clear bilaterally.  Cardiovascular:  S1 S2, Regular rate and rhythm.  GI: Abdomen is soft, symmetric, and non-tender without distention.  Bowel sounds are present and normoactive in all four quadrants.   Neuro: No Tremor, No involuntary movements  Skin: No rashes, No Jaundice.    CBC Full  -  ( 22 Nov 2022 22:38 )  WBC Count : 7.23 K/uL  RBC Count : 2.41 M/uL  Hemoglobin : 6.4 g/dL  Hematocrit : 21.1 %  Platelet Count - Automated : 224 K/uL  Mean Cell Volume : 87.6 fL  Mean Cell Hemoglobin : 26.6 pg  Mean Cell Hemoglobin Concentration : 30.3 g/dL  Auto Neutrophil # : 5.33 K/uL  Auto Lymphocyte # : 1.02 K/uL  Auto Monocyte # : 0.75 K/uL  Auto Eosinophil # : 0.04 K/uL  Auto Basophil # : 0.03 K/uL  Auto Neutrophil % : 73.7 %  Auto Lymphocyte % : 14.1 %  Auto Monocyte % : 10.4 %  Auto Eosinophil % : 0.6 %  Auto Basophil % : 0.4 %      Hemoglobin: 6.4 g/dL (11-22-22 @ 22:38)  Bilirubin Total, Serum: 0.6 mg/dL (11-22-22 @ 22:38)  Aspartate Aminotransferase (AST/SGOT): 43 U/L (11-22-22 @ 22:38)  Alanine Aminotransferase (ALT/SGPT): 30 U/L (11-22-22 @ 22:38)  Alkaline Phosphatase, Serum: 77 U/L (11-22-22 @ 22:38)  INR: 1.97 ratio (11-22-22 @ 22:38)      PT/INR - ( 22 Nov 2022 22:38 )   PT: 22.90 sec;   INR: 1.97 ratio         PTT - ( 22 Nov 2022 22:38 )  PTT:36.2 sec    11-22    137  |  101  |  19  ----------------------------<  131<H>  4.2   |  28  |  0.9    Ca    8.4      22 Nov 2022 22:38  Mg     1.4     11-22    TPro  6.2  /  Alb  2.8<L>  /  TBili  0.6  /  DBili  x   /  AST  43<H>  /  ALT  30  /  AlkPhos  77  11-22              RADIOLOGY      x      Home Medications:  aspirin 81 mg oral delayed release tablet: 1 tab(s) orally once a day  atorvastatin 20 mg oral tablet: 1 tab(s) orally once a day (at bedtime)  digoxin 250 mcg (0.25 mg) oral tablet: 1 tab(s) orally once a day  pantoprazole 40 mg oral delayed release tablet: 1 tab(s) orally 2 times a day   rivaroxaban 10 mg oral tablet: 1 tab(s) orally once a day  tamsulosin 0.4 mg oral capsule: 1 cap(s) orally once a day (at bedtime)  torsemide 20 mg oral tablet: 1 tab(s) orally once a day    EGD 11/14/2022:   Mucosa	Normal mucosa was noted in the whole esophagus.  Diffuse continuous erythema of the mucosa with no bleeding was noted in the stomach. These findings are compatible with non-erosive gastritis.  A single small non-bleeding angioectasia was seen in the second part of the duodenum. Hemostasis was performed using Argon Plasma Coagulation.  Two superficial non-bleeding clean based ulcers were found in the distal bulb    Colonoscopy:  reports 3 years ago here, reports study was normal no polyps    Smoker/drinker:   - former smoker (quit 50 years ago), denies ETOH use    Family history:  - no gastric or colonic malignancy     DANIELA: + dark red blood

## 2022-11-23 NOTE — H&P ADULT - MLM HIDDEN
[Dear  ___] : Dear  [unfilled], [Consult Letter:] : I had the pleasure of evaluating your patient, [unfilled]. [Please see my note below.] : Please see my note below. [Consult Closing:] : Thank you very much for allowing me to participate in the care of this patient.  If you have any questions, please do not hesitate to contact me. [Sincerely,] : Sincerely, [FreeTextEntry2] : MARK SALINAS  [FreeTextEntry3] : Gorge Jefferson MD\par [facility]\par [facility]\par [address]\[place] [address]\par Tel [phone]\par Fax [phone] \par \par For same day and next day orthopedic appointments contact:\par [email or web address] |[phone] Appointments available nights and weekends!  \par \par [facility]\par Visit us at [email or web address]-[email or web address]\par   yes

## 2022-11-23 NOTE — CONSULT NOTE ADULT - ASSESSMENT
82 y.o male patient with PMH of CAD s/p PCI, atrial fibrillation, HLD, HTN and severe MR s/p mitral clip on 9/22 presenting from [facility] after routine blood work showed patient had a hemoglobin less that 7.0, admitted for severe anemia and r/o GI bleed.    # Iron deficiency anemia likely secondary to GI loss  - Hgb 6.4 on admission, previously admitted 11/11/22 for Hgb 4.1  - s/p 2U PRBC in ER  - He was found to be Fe deficient on last admission (Ferritin 12 and % sat 10) 11/10/22  - on previous admission he received IV iron x 5 and 3U PRBCs  - S/P EGD on 11/14. Non erosive gastritis. A single small non-bleeding angioectasia was seen in the second part of the duodenum. Two superficial non-bleeding clean based ulcers were  found in the distal bulb. GI recommended VCE as an out pt. And PPI    Recommendations:    - check Retic count, LDH, Hapto, Coomb's Ab. No need for Celiac Dz work up in setting of obvious ulcers on EGD  - Patient would need GI work up- colonoscopy (inpatient vs outpatient?)  - Primary team should re-evaluate need for AC with the patient and his cardiologist (risk vs benefit analysis)  - Give IV venofer 200 mg daily x 2 (already received 2U of blood)  - we will set him up at [facility] for iron infusions    For any questions call [phone] or text via MS teams

## 2022-11-23 NOTE — H&P ADULT - NSHPLABSRESULTS_GEN_ALL_CORE
Complete Blood Count + Automated Diff (11.22.22 @ 22:38)    WBC Count: 7.23 K/uL    RBC Count: 2.41 M/uL    Hemoglobin: 6.4: This result has been called to DR WAGNER by Cornelio Lal on 11 22 2022 at  2348, and has been read back. g/dL    Hematocrit: 21.1 %    Mean Cell Volume: 87.6 fL    Mean Cell Hemoglobin: 26.6 pg    Mean Cell Hemoglobin Conc: 30.3 g/dL    Red Cell Distrib Width: 26.5 %    Platelet Count - Automated: 224 K/uL    Auto Neutrophil #: 5.33 K/uL    Auto Lymphocyte #: 1.02 K/uL    Auto Monocyte #: 0.75 K/uL    Auto Eosinophil #: 0.04 K/uL    Auto Basophil #: 0.03 K/uL    Auto Neutrophil %: 73.7: Differential percentages must be correlated with absolute numbers for  clinical significance. %    Auto Lymphocyte %: 14.1 %    Auto Monocyte %: 10.4 %    Auto Eosinophil %: 0.6 %    Auto Basophil %: 0.4 %    Auto Immature Granulocyte %: 0.8: (Includes meta, myelo and promyelocytes). Mild elevations in immature  granulocytes may be seen with many inflammatory processes and pregnancy;  clinical correlation suggested. %    Nucleated RBC: 0 /100 WBCs    Comprehensive Metabolic Panel (11.22.22 @ 22:38)    Sodium, Serum: 137 mmol/L    Potassium, Serum: 4.2: Slighty Hemolyzed use with Caution mmol/L    Chloride, Serum: 101 mmol/L    Carbon Dioxide, Serum: 28 mmol/L    Anion Gap, Serum: 8 mmol/L    Blood Urea Nitrogen, Serum: 19 mg/dL    Creatinine, Serum: 0.9 mg/dL    Glucose, Serum: 131 mg/dL    Calcium, Total Serum: 8.4 mg/dL    Protein Total, Serum: 6.2 g/dL    Albumin, Serum: 2.8 g/dL    Bilirubin Total, Serum: 0.6 mg/dL    Alkaline Phosphatase, Serum: 77 U/L    Aspartate Aminotransferase (AST/SGOT): 43: Hemolyzed. Interpret with caution U/L    Alanine Aminotransferase (ALT/SGPT): 30 U/L    eGFR: 85: The estimated glomerular filtration rate (eGFR) is calculated using the  2021 CKD-EPI creatinine equation, which does not have a coefficient for  race and is validated in individuals 18 years of age and older (N Engl J  Med 2021; 385:8539-3811). Creatinine-based eGFR may be inaccurate in  various situations including but not limited to extremes of muscle mass,  altered dietary protein intake, or medications that affect renal tubular  creatinine secretion. mL/min/1.73m2    Magnesium, Serum (11.22.22 @ 22:38)    Magnesium, Serum: 1.4 mg/dL

## 2022-11-24 LAB
ALBUMIN SERPL ELPH-MCNC: 2.9 G/DL — LOW (ref 3.5–5.2)
ALP SERPL-CCNC: 71 U/L — SIGNIFICANT CHANGE UP (ref 30–115)
ALT FLD-CCNC: 24 U/L — SIGNIFICANT CHANGE UP (ref 0–41)
ANION GAP SERPL CALC-SCNC: 13 MMOL/L — SIGNIFICANT CHANGE UP (ref 7–14)
AST SERPL-CCNC: 35 U/L — SIGNIFICANT CHANGE UP (ref 0–41)
BASOPHILS # BLD AUTO: 0.05 K/UL — SIGNIFICANT CHANGE UP (ref 0–0.2)
BASOPHILS NFR BLD AUTO: 0.7 % — SIGNIFICANT CHANGE UP (ref 0–1)
BILIRUB SERPL-MCNC: 1.3 MG/DL — HIGH (ref 0.2–1.2)
BUN SERPL-MCNC: 9 MG/DL — LOW (ref 10–20)
CALCIUM SERPL-MCNC: 8.6 MG/DL — SIGNIFICANT CHANGE UP (ref 8.4–10.5)
CHLORIDE SERPL-SCNC: 103 MMOL/L — SIGNIFICANT CHANGE UP (ref 98–110)
CO2 SERPL-SCNC: 25 MMOL/L — SIGNIFICANT CHANGE UP (ref 17–32)
CREAT SERPL-MCNC: 0.8 MG/DL — SIGNIFICANT CHANGE UP (ref 0.7–1.5)
EGFR: 88 ML/MIN/1.73M2 — SIGNIFICANT CHANGE UP
EOSINOPHIL # BLD AUTO: 0.14 K/UL — SIGNIFICANT CHANGE UP (ref 0–0.7)
EOSINOPHIL NFR BLD AUTO: 2.1 % — SIGNIFICANT CHANGE UP (ref 0–8)
GLUCOSE BLDC GLUCOMTR-MCNC: 102 MG/DL — HIGH (ref 70–99)
GLUCOSE BLDC GLUCOMTR-MCNC: 104 MG/DL — HIGH (ref 70–99)
GLUCOSE SERPL-MCNC: 121 MG/DL — HIGH (ref 70–99)
HCT VFR BLD CALC: 28.3 % — LOW (ref 42–52)
HCT VFR BLD CALC: 29.1 % — LOW (ref 42–52)
HGB BLD-MCNC: 8.5 G/DL — LOW (ref 14–18)
HGB BLD-MCNC: 8.8 G/DL — LOW (ref 14–18)
IMM GRANULOCYTES NFR BLD AUTO: 0.7 % — HIGH (ref 0.1–0.3)
INR BLD: 1.26 RATIO — SIGNIFICANT CHANGE UP (ref 0.65–1.3)
LYMPHOCYTES # BLD AUTO: 0.73 K/UL — LOW (ref 1.2–3.4)
LYMPHOCYTES # BLD AUTO: 10.8 % — LOW (ref 20.5–51.1)
MAGNESIUM SERPL-MCNC: 1.7 MG/DL — LOW (ref 1.8–2.4)
MCHC RBC-ENTMCNC: 25.5 PG — LOW (ref 27–31)
MCHC RBC-ENTMCNC: 25.9 PG — LOW (ref 27–31)
MCHC RBC-ENTMCNC: 30 G/DL — LOW (ref 32–37)
MCHC RBC-ENTMCNC: 30.2 G/DL — LOW (ref 32–37)
MCV RBC AUTO: 85 FL — SIGNIFICANT CHANGE UP (ref 80–94)
MCV RBC AUTO: 85.6 FL — SIGNIFICANT CHANGE UP (ref 80–94)
MONOCYTES # BLD AUTO: 0.78 K/UL — HIGH (ref 0.1–0.6)
MONOCYTES NFR BLD AUTO: 11.5 % — HIGH (ref 1.7–9.3)
NEUTROPHILS # BLD AUTO: 5.03 K/UL — SIGNIFICANT CHANGE UP (ref 1.4–6.5)
NEUTROPHILS NFR BLD AUTO: 74.2 % — SIGNIFICANT CHANGE UP (ref 42.2–75.2)
NRBC # BLD: 0 /100 WBCS — SIGNIFICANT CHANGE UP (ref 0–0)
NRBC # BLD: 0 /100 WBCS — SIGNIFICANT CHANGE UP (ref 0–0)
PLATELET # BLD AUTO: 261 K/UL — SIGNIFICANT CHANGE UP (ref 130–400)
PLATELET # BLD AUTO: 284 K/UL — SIGNIFICANT CHANGE UP (ref 130–400)
POTASSIUM SERPL-MCNC: 3.5 MMOL/L — SIGNIFICANT CHANGE UP (ref 3.5–5)
POTASSIUM SERPL-SCNC: 3.5 MMOL/L — SIGNIFICANT CHANGE UP (ref 3.5–5)
PROT SERPL-MCNC: 5.8 G/DL — LOW (ref 6–8)
PROTHROM AB SERPL-ACNC: 14.5 SEC — HIGH (ref 9.95–12.87)
RBC # BLD: 3.33 M/UL — LOW (ref 4.7–6.1)
RBC # BLD: 3.4 M/UL — LOW (ref 4.7–6.1)
RBC # FLD: 22.9 % — HIGH (ref 11.5–14.5)
RBC # FLD: 22.9 % — HIGH (ref 11.5–14.5)
SODIUM SERPL-SCNC: 141 MMOL/L — SIGNIFICANT CHANGE UP (ref 135–146)
WBC # BLD: 6.78 K/UL — SIGNIFICANT CHANGE UP (ref 4.8–10.8)
WBC # BLD: 7.19 K/UL — SIGNIFICANT CHANGE UP (ref 4.8–10.8)
WBC # FLD AUTO: 6.78 K/UL — SIGNIFICANT CHANGE UP (ref 4.8–10.8)
WBC # FLD AUTO: 7.19 K/UL — SIGNIFICANT CHANGE UP (ref 4.8–10.8)

## 2022-11-24 PROCEDURE — 99232 SBSQ HOSP IP/OBS MODERATE 35: CPT

## 2022-11-24 RX ORDER — SOD SULF/SODIUM/NAHCO3/KCL/PEG
4000 SOLUTION, RECONSTITUTED, ORAL ORAL ONCE
Refills: 0 | Status: COMPLETED | OUTPATIENT
Start: 2022-11-24 | End: 2022-11-24

## 2022-11-24 RX ADMIN — PANTOPRAZOLE SODIUM 40 MILLIGRAM(S): 20 TABLET, DELAYED RELEASE ORAL at 05:48

## 2022-11-24 RX ADMIN — ATORVASTATIN CALCIUM 20 MILLIGRAM(S): 80 TABLET, FILM COATED ORAL at 21:13

## 2022-11-24 RX ADMIN — Medication 4000 MILLILITER(S): at 15:24

## 2022-11-24 RX ADMIN — Medication 20 MILLIGRAM(S): at 21:17

## 2022-11-24 RX ADMIN — TAMSULOSIN HYDROCHLORIDE 0.4 MILLIGRAM(S): 0.4 CAPSULE ORAL at 21:13

## 2022-11-24 RX ADMIN — IRON SUCROSE 110 MILLIGRAM(S): 20 INJECTION, SOLUTION INTRAVENOUS at 11:32

## 2022-11-24 RX ADMIN — Medication 250 MICROGRAM(S): at 05:47

## 2022-11-24 RX ADMIN — PANTOPRAZOLE SODIUM 40 MILLIGRAM(S): 20 TABLET, DELAYED RELEASE ORAL at 17:57

## 2022-11-25 ENCOUNTER — RESULT REVIEW (OUTPATIENT)
Age: 82
End: 2022-11-25

## 2022-11-25 ENCOUNTER — TRANSCRIPTION ENCOUNTER (OUTPATIENT)
Age: 82
End: 2022-11-25

## 2022-11-25 LAB
ALBUMIN SERPL ELPH-MCNC: 3.3 G/DL — LOW (ref 3.5–5.2)
ALP SERPL-CCNC: 80 U/L — SIGNIFICANT CHANGE UP (ref 30–115)
ALT FLD-CCNC: 28 U/L — SIGNIFICANT CHANGE UP (ref 0–41)
ANION GAP SERPL CALC-SCNC: 8 MMOL/L — SIGNIFICANT CHANGE UP (ref 7–14)
AST SERPL-CCNC: 37 U/L — SIGNIFICANT CHANGE UP (ref 0–41)
BASOPHILS # BLD AUTO: 0.05 K/UL — SIGNIFICANT CHANGE UP (ref 0–0.2)
BASOPHILS NFR BLD AUTO: 0.8 % — SIGNIFICANT CHANGE UP (ref 0–1)
BILIRUB SERPL-MCNC: 1.6 MG/DL — HIGH (ref 0.2–1.2)
BLD GP AB SCN SERPL QL: SIGNIFICANT CHANGE UP
BUN SERPL-MCNC: 5 MG/DL — LOW (ref 10–20)
CALCIUM SERPL-MCNC: 9.1 MG/DL — SIGNIFICANT CHANGE UP (ref 8.4–10.4)
CHLORIDE SERPL-SCNC: 100 MMOL/L — SIGNIFICANT CHANGE UP (ref 98–110)
CO2 SERPL-SCNC: 33 MMOL/L — HIGH (ref 17–32)
CREAT SERPL-MCNC: 0.8 MG/DL — SIGNIFICANT CHANGE UP (ref 0.7–1.5)
EGFR: 88 ML/MIN/1.73M2 — SIGNIFICANT CHANGE UP
EOSINOPHIL # BLD AUTO: 0.08 K/UL — SIGNIFICANT CHANGE UP (ref 0–0.7)
EOSINOPHIL NFR BLD AUTO: 1.3 % — SIGNIFICANT CHANGE UP (ref 0–8)
GLUCOSE SERPL-MCNC: 90 MG/DL — SIGNIFICANT CHANGE UP (ref 70–99)
HCT VFR BLD CALC: 30.5 % — LOW (ref 42–52)
HGB BLD-MCNC: 9.2 G/DL — LOW (ref 14–18)
IMM GRANULOCYTES NFR BLD AUTO: 0.8 % — HIGH (ref 0.1–0.3)
LYMPHOCYTES # BLD AUTO: 0.94 K/UL — LOW (ref 1.2–3.4)
LYMPHOCYTES # BLD AUTO: 14.8 % — LOW (ref 20.5–51.1)
MAGNESIUM SERPL-MCNC: 1.6 MG/DL — LOW (ref 1.8–2.4)
MCHC RBC-ENTMCNC: 25.5 PG — LOW (ref 27–31)
MCHC RBC-ENTMCNC: 30.2 G/DL — LOW (ref 32–37)
MCV RBC AUTO: 84.5 FL — SIGNIFICANT CHANGE UP (ref 80–94)
MONOCYTES # BLD AUTO: 0.94 K/UL — HIGH (ref 0.1–0.6)
MONOCYTES NFR BLD AUTO: 14.8 % — HIGH (ref 1.7–9.3)
NEUTROPHILS # BLD AUTO: 4.28 K/UL — SIGNIFICANT CHANGE UP (ref 1.4–6.5)
NEUTROPHILS NFR BLD AUTO: 67.5 % — SIGNIFICANT CHANGE UP (ref 42.2–75.2)
NRBC # BLD: 0 /100 WBCS — SIGNIFICANT CHANGE UP (ref 0–0)
PLATELET # BLD AUTO: 318 K/UL — SIGNIFICANT CHANGE UP (ref 130–400)
POTASSIUM SERPL-MCNC: 3.4 MMOL/L — LOW (ref 3.5–5)
POTASSIUM SERPL-SCNC: 3.4 MMOL/L — LOW (ref 3.5–5)
PROT SERPL-MCNC: 6.7 G/DL — SIGNIFICANT CHANGE UP (ref 6–8)
RBC # BLD: 3.61 M/UL — LOW (ref 4.7–6.1)
RBC # FLD: 23.4 % — HIGH (ref 11.5–14.5)
SODIUM SERPL-SCNC: 141 MMOL/L — SIGNIFICANT CHANGE UP (ref 135–146)
WBC # BLD: 6.34 K/UL — SIGNIFICANT CHANGE UP (ref 4.8–10.8)
WBC # FLD AUTO: 6.34 K/UL — SIGNIFICANT CHANGE UP (ref 4.8–10.8)

## 2022-11-25 PROCEDURE — 88312 SPECIAL STAINS GROUP 1: CPT | Mod: 26

## 2022-11-25 PROCEDURE — 99232 SBSQ HOSP IP/OBS MODERATE 35: CPT

## 2022-11-25 PROCEDURE — 88305 TISSUE EXAM BY PATHOLOGIST: CPT | Mod: 26

## 2022-11-25 PROCEDURE — 43239 EGD BIOPSY SINGLE/MULTIPLE: CPT | Mod: XS

## 2022-11-25 PROCEDURE — 45382 COLONOSCOPY W/CONTROL BLEED: CPT

## 2022-11-25 RX ADMIN — Medication 250 MICROGRAM(S): at 05:58

## 2022-11-25 RX ADMIN — ATORVASTATIN CALCIUM 20 MILLIGRAM(S): 80 TABLET, FILM COATED ORAL at 20:38

## 2022-11-25 RX ADMIN — PANTOPRAZOLE SODIUM 40 MILLIGRAM(S): 20 TABLET, DELAYED RELEASE ORAL at 18:59

## 2022-11-25 RX ADMIN — Medication 20 MILLIGRAM(S): at 05:58

## 2022-11-25 RX ADMIN — PANTOPRAZOLE SODIUM 40 MILLIGRAM(S): 20 TABLET, DELAYED RELEASE ORAL at 05:59

## 2022-11-25 RX ADMIN — TAMSULOSIN HYDROCHLORIDE 0.4 MILLIGRAM(S): 0.4 CAPSULE ORAL at 20:38

## 2022-11-25 NOTE — PRE-OP CHECKLIST - ISOLATION PRECAUTIONS
Outpatient Progress Note      CHIEF COMPLAINT:    Chief Complaint   Patient presents with   • Diabetes        HISTORY OF PRESENT ILLNESS:  The patient is a 65 year old female who presents for follow up on chronic medical problems. Onset of all these medical problems was gradual.    Patient has a history of Diabetes for many years. Symptoms described as numbness/tingling. Symptoms occurring daily.  Symptoms aggravated by nothing. Symptoms alleviated by Gabapentin. Severity described as mild. Associated symptoms include none. Patient is currently taking Metformin/Glipizide/Gabapentin/Lisinopril/ASA/Lipitor for their Diabetes. Compliance is reported as 100%. No side effects noted to medication. Checking blood sugars at home never. Last foot exam done 6/2018 which was normal. Last Eye exam done never. Last Pneumovax 3/2017. Last Prevnar never. Last Influenza vaccine 9/2017.    Patient has a history of Hypertension for many years. Symptoms described as none. Symptoms occurring never.  Symptoms aggravated by nothing. Symptoms alleviated by nothing. Severity described as mild. Associated symptoms include none. Patient is currently taking Lisinopril/HCTZ/Amlodipine/Furosemide for their Hypertension. Compliance is reported as 100%. No side effects noted to medication.    Patient has a history of Hyperlipidemia for many years. Symptoms described as none. Symptoms occurring never. Symptoms aggravated by nothing. Symptoms alleviated by nothing. Severity described as moderate. Associated symptoms include none. Patient is currently taking Lipitor for their Hyperlipidemia. Compliance is reported as 100%. No side effects noted to medication.    The patient is a 65 year old female who presents with complaints of COPD. Started years ago. Onset was gradual. Symptoms described as coughing/wheezing. Severity reported as moderate. Symptoms occurring intermittently. Aggravated by nothing. Alleviated by Albuterol. Associated symptoms 
include none.    The patient is a 65 year old female who presents with complaints of GERD. Started years ago. Onset was gradual. Symptoms described as epigastric burning non-radiating. Severity reported as moderate. Symptoms occurring intermittently. Aggravated by nothing. Alleviated by Pepcid. Associated symptoms include none.    The patient is a 65 year old female who presents with complaints of Hypothyroidism.  Started 2018.  Onset was gradual.  Symptoms described as none.  Severity reported as mild.  Symptoms occurring never. Aggravated by nothing.  Alleviated by nothing.  Associated symptoms include none.     Past Medical History:   Past Medical History:   Diagnosis Date   • Benign essential HTN 3/27/2017   • Benign essential hypertension 9/25/2002   • Chronic hepatitis C without hepatic coma (CMS/HCC) 11/7/2000   • COPD, mild (CMS/HCC)    • Depressive disorder 3/27/2017   • GERD without esophagitis    • Mixed hyperlipidemia 12/2001   • Tobacco use    • Type 2 diabetes mellitus without complication, without long-term current use of insulin (CMS/HCC) 3/1/2013       Past Surgical History:   Past Surgical History:   Procedure Laterality Date   • Colonoscopy diagnostic  11/13/2003    Hyperplastic Polyps x2 (10 Year F/U Recommended) - Dr. Will Mccarty   • Endometrial ablation thermal  4/4/03    Dr. Raf Mcgowan   • Inj epidural cervical thoracic  1/11/06    1st.C-NELLA       0% relief   • Inj epidural cervical thoracic  1/25/06    2nd.C-NELLA       0% relief   • Inj paravert facet anes  2/8/06    C 3-6 Facet MBB   1st.   • Ligate fallopian tube  1976    Tubal Ligation   • Neck/chest procedure unlisted  7/10/06    ACDF C5-6   • Past surgical history  1/9/98    CT guided liver biopsy   • Remove tonsils/adenoids,<13 y/o  1955    T and A, under 12 yrs   • Shldr arthroscop,part acromioplas  11/15/06    left with distal clavicle excision and partial debridement. Dr Moon [facility] [place].   • Sling oper stres incontinence  5/8/02     
Raf Mcgowan   TVT       Current Medications:    Current Outpatient Prescriptions   Medication Sig Dispense Refill   • furosemide (LASIX) 40 MG tablet Take 2 tablets by mouth 2 times daily. 180 tablet 0   • lisinopril-hydroCHLOROthiazide (PRINZIDE,ZESTORETIC) 20-12.5 MG per tablet Take 1 tablet by mouth daily. 90 tablet 0   • amLODIPine (NORVASC) 10 MG tablet Take 1 tablet by mouth daily. 90 tablet 0   • famotidine (PEPCID) 20 MG tablet Take 1 tablet by mouth 2 times daily. 180 tablet 0   • atorvastatin (LIPITOR) 20 MG tablet Take 1 tablet by mouth daily. 90 tablet 0   • glipiZIDE (GLIPIZIDE XL) 5 MG 24 hr tablet Take 1 tablet by mouth daily. 90 tablet 0   • metformin (GLUCOPHAGE-XR) 500 MG 24 hr tablet Take 4 tablets by mouth daily (with breakfast). 360 tablet 0   • levothyroxine (SYNTHROID, LEVOTHROID) 50 MCG tablet Take 1 tablet by mouth daily. 90 tablet 0   • famotidine (PEPCID) 20 MG tablet Take 1 tablet by mouth 2 times daily. 180 tablet 0   • atorvastatin (LIPITOR) 20 MG tablet Take 1 tablet by mouth daily. 90 tablet 0   • amLODIPine (NORVASC) 10 MG tablet Take 1 tablet by mouth daily. 90 tablet 0   • gabapentin (NEURONTIN) 400 MG capsule Take 1 capsule by mouth 3 times daily. 270 capsule 0   • aspirin 81 MG tablet Take 1 tablet by mouth daily. 90 tablet 3   • albuterol 108 (90 Base) MCG/ACT inhaler Inhale 2 puffs into the lungs every 4 hours as needed for Shortness of Breath or Wheezing.     • Skin Protectants, Misc. (EUCERIN) cream Apply to Skin 3 times daily 454 g 12     No current facility-administered medications for this visit.        Allergies:    ALLERGIES:   Allergen Reactions   • Penicillins RASH     rash   • Sulfa Antibiotics RASH     rash   • Adhesive RASH     tape;  gets red   • Opioid Analgesics GI UPSET     GI upset   • Salicylates GI UPSET     GI upset       SOCIAL HISTORY:  Social History   Substance Use Topics   • Smoking status: Former Smoker     Packs/day: 1.00     Years: 50.00     Types: 
Cigarettes     Quit date: 3/1/2018   • Smokeless tobacco: Never Used   • Alcohol use 1.0 oz/week      Comment: hx. of alcoholism, recovered 8/98/  Very Seldom         Drug Use:    No                 Comment: last used IV drugs ~1982      FAMILY HISTORY:  Family History   Problem Relation Age of Onset   • Diabetes Father    • Alcohol Abuse Father         alcoholic cirrrhosis   • Hypertension Father    • Hypertension Mother    • Heart disease Mother    • Diabetes Sister         IDDM with kidney problems   • Hypertension Sister         3 sisters all with hypertension   • Hypertension Brother         3 brothers all with hypertension       REVIEW OF SYSTEMS:   CONSTITUTIONAL:  No Fevers/Chills/Weight changes  EYES: No Visual changes  HEAD/EARS/NOSE/THROAT/MOUTH:  No Headache  NECK: No Swelling/Masses  RESPIRATORY:  No Sputum/Hemoptysis/Shortness of breath/Wheezing/Snoring, Positive for Cough  CARDIOVASCULAR:  No Chest pain/Dyspnea on exertion/Paroxysmal nocturnal dyspnea/Orthopnea/Edema/Syncope/Palpitations/Diaphoresis/Lightheadedness  GASTROINTESTINAL:  No Abdominal pain/Nausea/Vomiting/Diarrhea/Constipation/Melena/Bright red blood per rectum, Positive for Gastroespohageal reflux disorder  GENITOURINARY: No Dysuria/Frequency/Urgency/Hematuria  INTEGUMENTARY:  No Rashes/Pruritus  ENDOCRINE:  No Heat or Cold intolerance/Hair or Nail changes  HEMATOLOGIC/LYMPHATIC: No Enlarged lymph nodes  MUSCULOSKELETAL:  No Joint pain/Joint swelling  NEUROLOGICAL:  No Weakness, Positive for Numbness/Tingling  PSYCHIATRIC:  No Depression/Anxiety/Panic attacks    PHYSICAL EXAM:   Visit Vitals  /60   Pulse 96   Resp 16   Ht 5' 9.5\" (1.765 m)   Wt 97 kg   LMP 04/04/2003   SpO2 96%   BMI 31.13 kg/m²       CONSTITUTIONAL: No acute distress, Non-toxic appearing  EYES: Pupils equal, round, reactive to light and accommodation, Extraocular movements intact, Conjunctiva are clear/pink- no signs of inflammation, Lids are normal, No scleral 
icterus  HEAD, EARS, NOSE, MOUTH, THROAT: Normocephalic/Atraumatic, Oral mucosa, Salivary glands, Hard/Soft palates, Tongue, Tonsils, and Posterior pharynx are normal with no sign of Exudate/Erythema/Petechiae/Post-nasal drip, Moist mucous membranes, Good dentition, Lips/Gums normal with no sign swelling/infection/bleeding  NECK: Supple, Normal range of motion, Overall normal appearance, No masses/swelling, Trachea midline, Thyroid normal size and consistency- no thyromegaly or nodules/lesions appreciated  RESPIRATORY: Clear to auscultation bilaterally, Good air entry, Symmetrical expansion with respiration. No accessory muscle use/retractions, No Wheezes/Rhonchi  CARDIOVASCULAR: Regular rate and rhythm, Normal S1/S2, No S3/S4, No Murmurs/Rubs/Gallops. No lower extremity edema or varicosities. No Jugular venous distention. No bruit heard in bilateral carotid arteries- normal upstroke and amplitude bilaterally. Femoral, Posterior tibial and Dorsalis pedis pulses 2+ bilaterally  GASTROINTESTINAL: Scaphoid abdomen. Soft, Non-tender, Non-distended, Bowel sounds present, No Hepatomegaly/Splenomegaly, No Masses or Hernias  LYMPHATIC: No Lymphadenopathy in Neck/Axillae  MUSCULOSKELETAL: Gait and Station normal, No Clubbing/Cyanosis/Edema, Capillary Refill<2 seconds  SKIN: Warm, Dry, Inspection normal with no Rashes/Lesions/Ulcers  NEURO: Cranial nerves II-XII grossly intact, Strength 5/5 bilaterally, Sensation grossly intact by touch/proprioception, Reflexes 2+ bilaterally,   PSYCH: Alert and Oriented x 3. Able to articulate well with normal speech/language, rate, volume and coherence. Recent and remote memory intact. The patient's mood and affect are described as not anxious or depressed. Associations are intact. Judgment and insight are appropriate concerning matters relevant to self     DATA:   Labs:   Hemoglobin A1C (%)   Date Value   06/05/2018 9.3 (H)     Sodium (mmol/L)   Date Value   06/05/2018 142     Potassium 
(mmol/L)   Date Value   06/05/2018 3.6     Chloride (mmol/L)   Date Value   06/05/2018 101     CO2 (mmol/L)   Date Value   10/11/2006 32     Carbon Dioxide (mmol/L)   Date Value   06/05/2018 30     BUN (mg/dL)   Date Value   06/05/2018 22 (H)     Creatinine (mg/dL)   Date Value   06/05/2018 1.19 (H)     Glucose (mg/dL)   Date Value   06/05/2018 217 (H)     MICROALBUMIN, UA (TTL) (mg/dL)   Date Value   06/05/2018 1.89     CREATININE, URINE (TOTAL) (mg/dL)   Date Value   06/05/2018 149.00     MICROALBUMIN/CREATININE (mg/g)   Date Value   06/05/2018 12.7     CHOLESTEROL (mg/dL)   Date Value   06/05/2018 139     HDL (mg/dL)   Date Value   06/05/2018 48 (L)     CALCULATED LDL (mg/dL)   Date Value   06/05/2018 68     TRIGLYCERIDE (mg/dL)   Date Value   06/05/2018 113       ASSESSMENT AND PLAN:   (E11.9) Type 2 diabetes mellitus without complication, without long-term current use of insulin  Comment: Uncontrolled- Continue Metformin/Glipizide/Gabapentin/Lisinopril/ASA/Lipitor. A1C today- Next A1C due 12/2018. Obtain Urine Microalbumin/Lipids/CMP 2/2018. Ophtho due now. Foot exam 6/2018 normal- Next foot exam due 6/2019. Prevnar at 65, Pneumovax at 66. Recommended Influenza Annually- Next due Fall 2018. Advised daily foot checks and dietary counseling provided. F/U 3 months. The indication, risks, benefits, potential side effects, and drug interactions of medication(s) were reviewed with the patient. Alternative treatment options discussed and reviewed with the patient. Medication instructions and consequences of not taking the medications were discussed with the patient. Patient expressed understanding and he/she agreed to the plan.      (I10) Benign essential HTN  Comment: Stable- Continue Lisinopril/HCTZ/Amlodipine/Furosemide. Obtain labs 12/2018. F/U 3 months. The indication, risks, benefits, potential side effects, and drug interactions of medication(s) were reviewed with the patient. Alternative treatment options 
discussed and reviewed with the patient. Medication instructions and consequences of not taking the medications were discussed with the patient. Patient expressed understanding and he/she agreed to the plan. Limit sodium intake to 2400 mg per day. Recommended a diet with a high intake of vegetables, fruits, and whole grains. Advised and at least 150 minutes of moderate to vigorous aerobic physical activity per week and weight train two to three times per week. If smoking- stop. If consuming alcohol- alcohol consumption should be limited to no more than two drinks per day for men and one drink per day for women. Measure BP at home at least a couple times per week and keep journal.     (E78.2) Mixed hyperlipidemia  Comment: Stable- Continue Lipitor- Obtain labs 12/2018. F/U 3 months. The indication, risks, benefits, potential side effects, and drug interactions of medication(s) were reviewed with the patient. Alternative treatment options discussed and reviewed with the patient. Medication instructions and consequences of not taking the medications were discussed with the patient. Patient expressed understanding and he/she agreed to the plan.    (J44.9) COPD, mild (CMS/HCC)  Plan: Stable. Continue Albuterol PRN. F/U 3 months. The indication, risks, benefits, potential side effects, and drug interactions of medication(s) were reviewed with the patient. Alternative treatment options discussed and reviewed with the patient. Medication instructions and consequences of not taking the medications were discussed with the patient. Patient expressed understanding and he/she agreed to the plan.     (K21.9) GERD without esophagitis  Plan: Stable. Continue Pepcid. F/U 3 months. The indication, risks, benefits, potential side effects, and drug interactions of medication(s) were reviewed with the patient. Alternative treatment options discussed and reviewed with the patient. Medication instructions and consequences of not taking 
the medications were discussed with the patient. Patient expressed understanding and he/she agreed to the plan. Reviewed lifestyle changes: avoid NSAIDs, spicy foods, mint, chocolate, smoking, alcohol and lying flat for 2 hours after eating.     (E03.9) Acquired hypothyroidism  Plan: Uncontrolled- As ordered. The indication, risks, benefits, potential side effects, and drug interactions of medication(s) were reviewed with the patient.  Alternative treatment options discussed and reviewed with the patient.  Medication instructions and consequences of not taking the medications were discussed with the patient.  Patient expressed understanding and he/she agreed to the plan.     Donald Warner MD   
none
none

## 2022-11-25 NOTE — CHART NOTE - NSCHARTNOTEFT_GEN_A_CORE
EGD Findings:  Normal mucosa was noted in the whole esophagus.  Diffuse erythema of the mucosa was noted in the stomach. These findings are compatible with non-erosive gastritis. Multiple cold forceps biopsies were performed for histology.  A single non-bleeding 1 cm ulcer was found in the posterior bulb. No oozing, visible vessels or clots were noted during the exam.    Colonoscopy Findings:  Several non-bleeding diverticula with medium openings were seen in the sigmoid colon. Diverticulosis appeared to be of moderate severity.	  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 Hemostasis was performed using Argon Plasma Coagulation (APC). One Endoclip was placed to prevent bleeding.	  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 Hemostasis was performed using Argon Plasma Coagulation (APC).	  Internal hemorrhoids were noted.    PLAN:  Follow-up office visit in 2-3 weeks     Advance diet as tolerated    Await pathology    Protonix 40 mg po bid   Resume Anticoagulation tomorrow    Repeat EGD in two months
will plan for possible EGD/Colonoscopy in AM  keep on clear liquid diet today  NPO after mid night  order golytely and dulcolax prep  repeat INR

## 2022-11-26 LAB
ALBUMIN SERPL ELPH-MCNC: 3.3 G/DL — LOW (ref 3.5–5.2)
ALP SERPL-CCNC: 80 U/L — SIGNIFICANT CHANGE UP (ref 30–115)
ALT FLD-CCNC: 23 U/L — SIGNIFICANT CHANGE UP (ref 0–41)
ANION GAP SERPL CALC-SCNC: 13 MMOL/L — SIGNIFICANT CHANGE UP (ref 7–14)
AST SERPL-CCNC: 33 U/L — SIGNIFICANT CHANGE UP (ref 0–41)
BASOPHILS # BLD AUTO: 0.04 K/UL — SIGNIFICANT CHANGE UP (ref 0–0.2)
BASOPHILS NFR BLD AUTO: 0.2 % — SIGNIFICANT CHANGE UP (ref 0–1)
BILIRUB SERPL-MCNC: 1.4 MG/DL — HIGH (ref 0.2–1.2)
BUN SERPL-MCNC: 6 MG/DL — LOW (ref 10–20)
CALCIUM SERPL-MCNC: 8.4 MG/DL — SIGNIFICANT CHANGE UP (ref 8.4–10.4)
CHLORIDE SERPL-SCNC: 99 MMOL/L — SIGNIFICANT CHANGE UP (ref 98–110)
CO2 SERPL-SCNC: 28 MMOL/L — SIGNIFICANT CHANGE UP (ref 17–32)
CREAT SERPL-MCNC: 0.8 MG/DL — SIGNIFICANT CHANGE UP (ref 0.7–1.5)
EGFR: 88 ML/MIN/1.73M2 — SIGNIFICANT CHANGE UP
EOSINOPHIL # BLD AUTO: 0.8 K/UL — HIGH (ref 0–0.7)
EOSINOPHIL NFR BLD AUTO: 4.5 % — SIGNIFICANT CHANGE UP (ref 0–8)
GLUCOSE SERPL-MCNC: 119 MG/DL — HIGH (ref 70–99)
HCT VFR BLD CALC: 31.2 % — LOW (ref 42–52)
HGB BLD-MCNC: 9.7 G/DL — LOW (ref 14–18)
IMM GRANULOCYTES NFR BLD AUTO: 0.5 % — HIGH (ref 0.1–0.3)
LYMPHOCYTES # BLD AUTO: 0.5 K/UL — LOW (ref 1.2–3.4)
LYMPHOCYTES # BLD AUTO: 2.8 % — LOW (ref 20.5–51.1)
MAGNESIUM SERPL-MCNC: 1.3 MG/DL — LOW (ref 1.8–2.4)
MCHC RBC-ENTMCNC: 26.3 PG — LOW (ref 27–31)
MCHC RBC-ENTMCNC: 31.1 G/DL — LOW (ref 32–37)
MCV RBC AUTO: 84.6 FL — SIGNIFICANT CHANGE UP (ref 80–94)
MONOCYTES # BLD AUTO: 0.96 K/UL — HIGH (ref 0.1–0.6)
MONOCYTES NFR BLD AUTO: 5.4 % — SIGNIFICANT CHANGE UP (ref 1.7–9.3)
NEUTROPHILS # BLD AUTO: 15.25 K/UL — HIGH (ref 1.4–6.5)
NEUTROPHILS NFR BLD AUTO: 86.6 % — HIGH (ref 42.2–75.2)
NRBC # BLD: 0 /100 WBCS — SIGNIFICANT CHANGE UP (ref 0–0)
PLATELET # BLD AUTO: 355 K/UL — SIGNIFICANT CHANGE UP (ref 130–400)
POTASSIUM SERPL-MCNC: 3.1 MMOL/L — LOW (ref 3.5–5)
POTASSIUM SERPL-SCNC: 3.1 MMOL/L — LOW (ref 3.5–5)
PROT SERPL-MCNC: 6.6 G/DL — SIGNIFICANT CHANGE UP (ref 6–8)
RBC # BLD: 3.69 M/UL — LOW (ref 4.7–6.1)
RBC # FLD: 24.8 % — HIGH (ref 11.5–14.5)
SODIUM SERPL-SCNC: 140 MMOL/L — SIGNIFICANT CHANGE UP (ref 135–146)
WBC # BLD: 17.64 K/UL — HIGH (ref 4.8–10.8)
WBC # FLD AUTO: 17.64 K/UL — HIGH (ref 4.8–10.8)

## 2022-11-26 PROCEDURE — 99233 SBSQ HOSP IP/OBS HIGH 50: CPT

## 2022-11-26 PROCEDURE — 71045 X-RAY EXAM CHEST 1 VIEW: CPT | Mod: 26

## 2022-11-26 RX ORDER — RIVAROXABAN 15 MG-20MG
10 KIT ORAL
Refills: 0 | Status: DISCONTINUED | OUTPATIENT
Start: 2022-11-26 | End: 2022-11-28

## 2022-11-26 RX ORDER — MAGNESIUM SULFATE 500 MG/ML
2 VIAL (ML) INJECTION ONCE
Refills: 0 | Status: DISCONTINUED | OUTPATIENT
Start: 2022-11-26 | End: 2022-11-28

## 2022-11-26 RX ORDER — SODIUM CHLORIDE 9 MG/ML
500 INJECTION, SOLUTION INTRAVENOUS ONCE
Refills: 0 | Status: COMPLETED | OUTPATIENT
Start: 2022-11-26 | End: 2022-11-26

## 2022-11-26 RX ORDER — POTASSIUM CHLORIDE 20 MEQ
20 PACKET (EA) ORAL
Refills: 0 | Status: COMPLETED | OUTPATIENT
Start: 2022-11-26 | End: 2022-11-26

## 2022-11-26 RX ADMIN — Medication 20 MILLIGRAM(S): at 21:12

## 2022-11-26 RX ADMIN — TAMSULOSIN HYDROCHLORIDE 0.4 MILLIGRAM(S): 0.4 CAPSULE ORAL at 21:12

## 2022-11-26 RX ADMIN — Medication 20 MILLIGRAM(S): at 05:08

## 2022-11-26 RX ADMIN — PANTOPRAZOLE SODIUM 40 MILLIGRAM(S): 20 TABLET, DELAYED RELEASE ORAL at 18:55

## 2022-11-26 RX ADMIN — Medication 50 MILLIEQUIVALENT(S): at 21:11

## 2022-11-26 RX ADMIN — RIVAROXABAN 10 MILLIGRAM(S): KIT at 18:55

## 2022-11-26 RX ADMIN — Medication 250 MICROGRAM(S): at 05:08

## 2022-11-26 RX ADMIN — ATORVASTATIN CALCIUM 20 MILLIGRAM(S): 80 TABLET, FILM COATED ORAL at 21:12

## 2022-11-26 RX ADMIN — Medication 50 MILLIEQUIVALENT(S): at 14:02

## 2022-11-26 RX ADMIN — SODIUM CHLORIDE 100 MILLILITER(S): 9 INJECTION, SOLUTION INTRAVENOUS at 10:02

## 2022-11-26 RX ADMIN — PANTOPRAZOLE SODIUM 40 MILLIGRAM(S): 20 TABLET, DELAYED RELEASE ORAL at 05:08

## 2022-11-26 NOTE — PROGRESS NOTE ADULT - ATTENDING COMMENTS
***My note supersedes any discrepancies that may be above in the resident's note***    82 y.o male patient with PMH of CAD s/p PCI, atrial fibrillation, HLD, HTN and severe MR s/p mitral clip on 9/22 presenting from [facility] after routine blood work showed patient had a hemoglobin less that 7.0, admitted for severe anemia and r/o GI bleed.    #Severe Normocytic Anemia  #R/o GI bleed   - unsure etiology  - previous admission earlier this month for symptomatic anemia,  that required 3u of pRBCs. s/p EGD:          Erythema in the stomach compatible with non-erosive gastritis.         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 (Hemostasis).  - currently asymptomatic, HDS and satting well on RA  - Hgb 6.4 on admission  - s/p 2u of pRBCs on this admission-->8.0-->8.5  - protonix IV bid  - active type and screen   - serial CBC, transfuse for hgb <7  - maintain large bore peripheral IVs  - hold AC  - GI following for EGD and colonoscopy to evaluate source of bleeding(planned for 11/25)     CLD today, NPO at [facility]     Golytely prep this afternoon      #CAD  #HTN  #Paroxysmal Afib  #Mitral regurg s/p clip   - Patient BP under control  - No chest pain  - HR normal  - EKG  - C/w home meds  - Hold xeralto until r/o GI bleed    #Hypomagnesia   - Mag 1.4 on admission  - s/p 2 g of mag  - Daily mag serum  - Replete mag if necessary    #Misc  - DVT Prophylaxis: Not indicated at this time due to bleeding  - GI Prophylaxis: Protonix 40mg BID  - Diet: NPO  - Activity: Regular   - IV Fluids: NA  - Code Status: Full Code        #Progress Note Handoff  Pending (specify): EGD/[place] tomorrow 11/25, NPO at [facility]  Family discussion: updated at bedside  Disposition:  Unknown at this time________ .
***My note supersedes any discrepancies that may be above in the resident's note***    82 y.o male patient with PMH of CAD s/p PCI, atrial fibrillation, HLD, HTN and severe MR s/p mitral clip on 9/22 presenting from [facility] after routine blood work showed patient had a hemoglobin less that 7.0, admitted for severe anemia and r/o GI bleed.    #Severe Normocytic Anemia  #R/o GI bleed   - unsure etiology  - previous admission earlier this month for symptomatic anemia,  that required 3u of pRBCs. s/p EGD:          Erythema in the stomach compatible with non-erosive gastritis.         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 (Hemostasis).  - currently asymptomatic, HDS and satting well on RA  - Hgb 6.4 on admission  - s/p 2u of pRBCs on this admission-->8.0-->8.5-->8.8-->9.2  - protonix IV bid  - active type and screen   - serial CBC, transfuse for hgb <7  - maintain large bore peripheral IVs  - hold AC  - GI following for EGD and colonoscopy to evaluate source of bleeding(planned for 11/25)    #CAD  #HTN  #Paroxysmal Afib  #Mitral regurg s/p clip   - Patient BP under control  - No chest pain  - HR normal  - EKG  - C/w home meds  - Hold xeralto until r/o GI bleed    #Hypomagnesia   - Mag 1.4 on admission  - s/p 2 g of mag  - Daily mag serum  - Replete mag if necessary    #Misc  - DVT Prophylaxis: Not indicated at this time due to bleeding  - GI Prophylaxis: Protonix 40mg BID  - Diet: NPO  - Activity: Regular   - IV Fluids: NA  - Code Status: Full Code        #Progress Note Handoff  Pending (specify): EGD/[place] today  Family discussion: updated at bedside  Disposition:  Unknown at this time________ .
***My note supersedes any discrepancies that may be above in the resident's note***    82 y.o male patient with PMH of CAD s/p PCI, atrial fibrillation, HLD, HTN and severe MR s/p mitral clip on 9/22 presenting from [facility] after routine blood work showed patient had a hemoglobin less that 7.0, admitted for severe anemia and r/o GI bleed.    #Severe Normocytic Anemia  #acute blood loss anemia  #Lower GI bleeding  - unsure etiology, but s/p colonoscopy with many possible sources  - previous admission earlier this month for symptomatic anemia,  that required 3u of pRBCs. s/p EGD:          Erythema in the stomach compatible with non-erosive gastritis.         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 (Hemostasis).  - currently asymptomatic, HDS and satting well on RA  - Hgb 6.4 on admission  - s/p 2u of pRBCs on this admission-->8.0-->8.5-->8.8-->9.2  - protonix IV bid  - active type and screen   - serial CBC, transfuse for hgb <7  - maintain large bore peripheral IVs  - s/p colonoscopy with GI:  Several non-bleeding diverticula. 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 Internal hemorrhoids were noted.  - restarting AC today  - advance diet  - office follow up visit in 2-3 weeks    # SIRS  - hospitalization complicated by low grade fever( tmax 100.3F), tachycardia(100s), leukocytosis(18k)  - no clear source at this time  - fever work up initiated  - follow up on UA/Ucx, blood culture, procal, COVID, CXR  - monitor off abx for now  - trend serial CBC    #CAD  #HTN  #Paroxysmal Afib  #Mitral regurg s/p clip   - Patient BP under control  - No chest pain  - HR normal  - EKG  - C/w home meds  - Hold xeralto until r/o GI bleed    #Hypomagnesia   - Mag 1.4 on admission  - s/p 2 g of mag  - Daily mag serum  - Replete mag if necessary    #Misc  - DVT Prophylaxis: Not indicated at this time due to bleeding  - GI Prophylaxis: Protonix 40mg BID  - Activity: Regular   - IV Fluids: NA  - Code Status: Full Code        #Progress Note Handoff  Pending (specify): fever work up, monitor CBC while restarting AC  Family discussion: updated at bedside  Disposition:  Unknown at this time________ .

## 2022-11-27 LAB
ALBUMIN SERPL ELPH-MCNC: 3.4 G/DL — LOW (ref 3.5–5.2)
ALP SERPL-CCNC: 86 U/L — SIGNIFICANT CHANGE UP (ref 30–115)
ALT FLD-CCNC: 21 U/L — SIGNIFICANT CHANGE UP (ref 0–41)
ANION GAP SERPL CALC-SCNC: 10 MMOL/L — SIGNIFICANT CHANGE UP (ref 7–14)
AST SERPL-CCNC: 32 U/L — SIGNIFICANT CHANGE UP (ref 0–41)
BASOPHILS # BLD AUTO: 0.04 K/UL — SIGNIFICANT CHANGE UP (ref 0–0.2)
BASOPHILS NFR BLD AUTO: 0.5 % — SIGNIFICANT CHANGE UP (ref 0–1)
BILIRUB SERPL-MCNC: 1.7 MG/DL — HIGH (ref 0.2–1.2)
BUN SERPL-MCNC: 6 MG/DL — LOW (ref 10–20)
CALCIUM SERPL-MCNC: 8.9 MG/DL — SIGNIFICANT CHANGE UP (ref 8.4–10.5)
CHLORIDE SERPL-SCNC: 101 MMOL/L — SIGNIFICANT CHANGE UP (ref 98–110)
CO2 SERPL-SCNC: 31 MMOL/L — SIGNIFICANT CHANGE UP (ref 17–32)
CREAT SERPL-MCNC: 0.8 MG/DL — SIGNIFICANT CHANGE UP (ref 0.7–1.5)
EGFR: 88 ML/MIN/1.73M2 — SIGNIFICANT CHANGE UP
EOSINOPHIL # BLD AUTO: 0.11 K/UL — SIGNIFICANT CHANGE UP (ref 0–0.7)
EOSINOPHIL NFR BLD AUTO: 1.3 % — SIGNIFICANT CHANGE UP (ref 0–8)
GLUCOSE SERPL-MCNC: 103 MG/DL — HIGH (ref 70–99)
HCT VFR BLD CALC: 34.5 % — LOW (ref 42–52)
HGB BLD-MCNC: 10.1 G/DL — LOW (ref 14–18)
IMM GRANULOCYTES NFR BLD AUTO: 0.6 % — HIGH (ref 0.1–0.3)
LYMPHOCYTES # BLD AUTO: 1.04 K/UL — LOW (ref 1.2–3.4)
LYMPHOCYTES # BLD AUTO: 12.3 % — LOW (ref 20.5–51.1)
MAGNESIUM SERPL-MCNC: 1.5 MG/DL — LOW (ref 1.8–2.4)
MCHC RBC-ENTMCNC: 25.4 PG — LOW (ref 27–31)
MCHC RBC-ENTMCNC: 29.3 G/DL — LOW (ref 32–37)
MCV RBC AUTO: 86.9 FL — SIGNIFICANT CHANGE UP (ref 80–94)
MONOCYTES # BLD AUTO: 0.99 K/UL — HIGH (ref 0.1–0.6)
MONOCYTES NFR BLD AUTO: 11.7 % — HIGH (ref 1.7–9.3)
NEUTROPHILS # BLD AUTO: 6.23 K/UL — SIGNIFICANT CHANGE UP (ref 1.4–6.5)
NEUTROPHILS NFR BLD AUTO: 73.6 % — SIGNIFICANT CHANGE UP (ref 42.2–75.2)
NRBC # BLD: 0 /100 WBCS — SIGNIFICANT CHANGE UP (ref 0–0)
PLATELET # BLD AUTO: 363 K/UL — SIGNIFICANT CHANGE UP (ref 130–400)
POTASSIUM SERPL-MCNC: 3.6 MMOL/L — SIGNIFICANT CHANGE UP (ref 3.5–5)
POTASSIUM SERPL-SCNC: 3.6 MMOL/L — SIGNIFICANT CHANGE UP (ref 3.5–5)
PROCALCITONIN SERPL-MCNC: 0.25 NG/ML — HIGH (ref 0.02–0.1)
PROT SERPL-MCNC: 7 G/DL — SIGNIFICANT CHANGE UP (ref 6–8)
RBC # BLD: 3.97 M/UL — LOW (ref 4.7–6.1)
RBC # FLD: 24.3 % — HIGH (ref 11.5–14.5)
SODIUM SERPL-SCNC: 142 MMOL/L — SIGNIFICANT CHANGE UP (ref 135–146)
WBC # BLD: 8.46 K/UL — SIGNIFICANT CHANGE UP (ref 4.8–10.8)
WBC # FLD AUTO: 8.46 K/UL — SIGNIFICANT CHANGE UP (ref 4.8–10.8)

## 2022-11-27 PROCEDURE — 99232 SBSQ HOSP IP/OBS MODERATE 35: CPT

## 2022-11-27 RX ADMIN — TAMSULOSIN HYDROCHLORIDE 0.4 MILLIGRAM(S): 0.4 CAPSULE ORAL at 21:23

## 2022-11-27 RX ADMIN — PANTOPRAZOLE SODIUM 40 MILLIGRAM(S): 20 TABLET, DELAYED RELEASE ORAL at 18:02

## 2022-11-27 RX ADMIN — RIVAROXABAN 10 MILLIGRAM(S): KIT at 18:02

## 2022-11-27 RX ADMIN — ATORVASTATIN CALCIUM 20 MILLIGRAM(S): 80 TABLET, FILM COATED ORAL at 21:23

## 2022-11-27 RX ADMIN — Medication 20 MILLIGRAM(S): at 05:54

## 2022-11-27 RX ADMIN — PANTOPRAZOLE SODIUM 40 MILLIGRAM(S): 20 TABLET, DELAYED RELEASE ORAL at 05:53

## 2022-11-27 RX ADMIN — Medication 250 MICROGRAM(S): at 05:54

## 2022-11-27 NOTE — PHYSICAL THERAPY INITIAL EVALUATION ADULT - STRENGTHENING, PT EVAL
Patient Seen in: [facility] Emergency Department    History   Patient presents with:  Eval-P (psychiatric)    Stated Complaint: pt escorted by mariel ovalles for eval p  ran away and was outside for hours wi*    HPI    59-year-old female with a hist Date   • TONSILLECTOMY             Social History    Tobacco Use      Smoking status: Never Smoker      Smokeless tobacco: Never Used    Alcohol use: No    Drug use: No      Review of Systems    Positive for stated complaint: pt escorted by mariel ovalles within normal limits   ETHYL ALCOHOL - Normal   SALICYLATE, SERUM - Normal   ACETAMINOPHEN (TYLENOL), S - Normal   CBC WITH DIFFERENTIAL WITH PLATELET    Narrative: The following orders were created for panel order CBC WITH DIFFERENTIAL WITH PLATELET. Inc BLE strength to 5/5 by DC.

## 2022-11-27 NOTE — PROGRESS NOTE ADULT - ASSESSMENT
82 y.o male patient with PMH of CAD s/p PCI, atrial fibrillation, HLD, HTN and severe MR s/p mitral clip on 9/22 presenting from [facility] after routine blood work showed patient had a hemoglobin less that 7.0, admitted for severe anemia and r/o GI bleed.    #Severe Normocytic Anemia  #acute blood loss anemia  #Lower GI bleeding  - unsure etiology, but s/p colonoscopy with many possible sources  - previous admission earlier this month for symptomatic anemia,  that required 3u of pRBCs. s/p EGD:          Erythema in the stomach compatible with non-erosive gastritis.         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 (Hemostasis).  - currently asymptomatic, HDS and satting well on RA  - Hgb 6.4 on admission  - s/p 2u of pRBCs on this admission-->8.0-->8.5-->8.8-->9.2-->10  - protonix IV bid  - active type and screen   - serial CBC, transfuse for hgb <7  - maintain large bore peripheral IVs  - s/p colonoscopy with GI:  Several non-bleeding diverticula. 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 Internal hemorrhoids were noted.  - restarting AC today  - advance diet  - office follow up visit in 2-3 weeks    # SIRS  - hospitalization complicated by low grade fever( tmax 100.3F), tachycardia(100s), leukocytosis(18k)  - currently afebrile and HDS; leukocytosis resolved down to 8k  - no clear source at this time  - fever work up initiated  - follow up on UA/Ucx, blood culture, procal, COVID, CXR  - monitor off abx for now  - trend serial CBC    #CAD  #HTN  #Paroxysmal Afib  #Mitral regurg s/p clip   - Patient BP under control  - No chest pain  - HR normal  - EKG  - C/w home meds  - Hold xeralto until r/o GI bleed    #Hypomagnesia   - Mag 1.4 on admission  - s/p 2 g of mag  - Daily mag serum  - Replete mag if necessary    #Misc  - DVT Prophylaxis: Not indicated at this time due to bleeding  - GI Prophylaxis: Protonix 40mg BID  - Activity: Regular   - IV Fluids: NA  - Code Status: Full Code        #Progress Note Handoff  Pending (specify):monitor for fevers, await bed availability back to [facility]  Family discussion: updated at bedside  Disposition:  Return to [facility], per arrangement of CM
2 y.o male patient with PMH of CAD s/p PCI, atrial fibrillation, HLD, HTN and severe MR s/p mitral clip on 9/22 presenting from [facility] after routine blood work showed patient had a hemoglobin less that 7.0, admitted for severe anemia and r/o GI bleed.    #Severe Anemia  #R/o GI bleed   - Multiple admissions for severe anemia  - Last admission for symptomatic anemia s/p EGD on 11/14. Non erosive gastritis. A single small non-bleeding angioectasia was seen in the second part of the duodenum. Two superficial non-bleeding clean based ulcers were  found in the distal bulb. GI recommended VCE as an out pt. He was also give 3U PRBC and 5 infusions of IV iron.   - Hgb 6.4 on admission s/p 2 units PRBC  - Patient noticed dark stools   - GI Consult: Plan for EGD/Colonoscopy  - hold Xarelto (last dose 11/22), avoid NSAID use  - IV Protonix drip  - Monitor Hemoglobin (keep >7.5 given hx CAD), Active T/S  - Plan EGD and colonoscopy to evaluate source of bleeding  - Hematology consult (for IV Iron): check Retic count, LDH, Hapto, Coomb's Ab.  -Give IV venofer 200 mg daily x 2 (already received 2U of blood)  -Hematology will set him up at [facility] for iron infusions    #CAD  #HTN  #Paroxysmal Afib  #Mitral regurg s/p clip   - Patient BP under control  - No chest pain  - HR normal  - EKG  - C/w home meds  - Hold xeralto until r/o GI bleed    #Hypomagnesia   - Mag 1.4 on admission  - s/p 2 g of mag  - Replete mag if necessary    #Misc  - DVT Prophylaxis: Not indicated at this time due to bleeding  - GI Prophylaxis: Protonix 40mg BID  - Diet: NPO  - Activity: Regular   - IV Fluids: NA  - Code Status: Full Code      Dispo: Acute     
2 y.o male patient with PMH of CAD s/p PCI, atrial fibrillation, HLD, HTN and severe MR s/p mitral clip on 9/22 presenting from [facility] after routine blood work showed patient had a hemoglobin less that 7.0, admitted for severe anemia and r/o GI bleed.    #Severe Anemia  #R/o GI bleed   - Multiple admissions for severe anemia  - Last admission for symptomatic anemia s/p EGD on 11/14. Non erosive gastritis. A single small non-bleeding angioectasia was seen in the second part of the duodenum. Two superficial non-bleeding clean based ulcers were  found in the distal bulb. GI recommended VCE as an out pt. He was also given 3U PRBC and 5 infusions of IV iron on prior admission  - Hgb 6.4 on admission s/p 2 units PRBC  - Patient noticed dark stools   - GI Consult: Plan for EGD/Colonoscopy  - hold Xarelto (last dose 11/22), avoid NSAID use  - IV Protonix drip  - Monitor Hemoglobin (keep >7.5 given hx CAD), Active T/S  - Plan EGD and colonoscopy 11/25 to evaluate source of bleeding  - Hematology consult (for IV Iron): check Retic count, LDH, Hapto, Coomb's Ab.  -Give IV venofer 200 mg daily x 2 (already received 2U of blood)  -Hematology will set him up at [facility] for iron infusions    #CAD  #HTN  #Paroxysmal Afib  #Mitral regurg s/p clip   - Patient BP under control  - No chest pain  - HR normal  - EKG  - C/w home meds  - Hold xeralto until r/o GI bleed    #Hypomagnesia   - Mag 1.4 on admission  - s/p 2 g of mag  - Replete mag if necessary    #Misc  - DVT Prophylaxis: Not indicated at this time due to bleeding  - GI Prophylaxis: Protonix 40mg BID  - Diet: NPO  - Activity: Regular   - IV Fluids: NA  - Code Status: Full Code      Dispo: Acute   
82 y.o male patient with PMH of CAD s/p PCI, atrial fibrillation, HLD, HTN and severe MR s/p mitral clip on 9/22 presenting from [facility] after routine blood work showed patient had a hemoglobin less that 7.0, admitted for severe anemia and r/o GI bleed.    #Severe Anemia  #R/o GI bleed   - Multiple admissions for severe anemia  - Last admission for symptomatic anemia s/p EGD on 11/14. Non erosive gastritis. A single small non-bleeding angioectasia was seen in the second part of the duodenum. Two superficial non-bleeding clean based ulcers were  found in the distal bulb. GI recommended VCE as an out pt. He was also given 3U PRBC and 5 infusions of IV iron on prior admission  - Hgb 6.4 on admission s/p 2 units PRBC  - Patient noticed dark stools   - IV Protonix drip  - Monitor Hemoglobin (keep >7.5 given hx CAD), Active T/S  - s/p EGD and colonoscopy 11/25 to evaluate source of bleeding.  Normal mucosa was noted in the whole esophagus. Diffuse erythema of the mucosa was noted in the stomach. These findings are compatible with non-erosive gastritis. Multiple cold forceps biopsies were performed for histology. A single non-bleeding 1 cm ulcer was found in the posterior bulb. No oozing, visible vessels or clots were noted during the exam. Several non-bleeding diverticula with medium openings were seen in the sigmoid colon. Diverticulosis appeared to be of moderate severity. 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 Hemostasis was performed using Argon Plasma Coagulation (APC). One Endoclip was placed to prevent bleeding. 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 Hemostasis was performed using Argon Plasma Coagulation (APC). Internal hemorrhoids were noted.  - Restart anticoagulation  - S/p venofer 200 mg daily x 2 (already received 2U of blood)  -Hematology will set him up at [facility] for iron infusions    #Leukocytosis   -WBC 17.64 11/26  -S/p EGD/colonoscopy  -F/u Bcx, UA, UX, procal, CXr  -Monitor off abx     #CAD  #HTN  #Paroxysmal Afib  #Mitral regurg s/p clip   - Patient BP under control  - No chest pain  - HR normal  - EKG  - C/w home meds  - Hold xeralto until r/o GI bleed    #Hypomagnesia   - Mag 1.4 on admission  - s/p 2 g of mag  - Replete mag if necessary    #Misc  - DVT Prophylaxis: Not indicated at this time due to bleeding  - GI Prophylaxis: Protonix 40mg BID  - Diet: NPO  - Activity: Regular   - IV Fluids: NA  - Code Status: Full Code      Dispo: Acute 
82 y.o male patient with PMH of CAD s/p PCI, atrial fibrillation, HLD, HTN and severe MR s/p mitral clip on 9/22 presenting from [facility] after routine blood work showed patient had a hemoglobin less that 7.0, admitted for severe anemia and r/o GI bleed.    #Severe Anemia  #R/o GI bleed   - Multiple admissions for severe anemia  - Last admission for symptomatic anemia s/p EGD on 11/14. Non erosive gastritis. A single small non-bleeding angioectasia was seen in the second part of the duodenum. Two superficial non-bleeding clean based ulcers were  found in the distal bulb. GI recommended VCE as an out pt. He was also given 3U PRBC and 5 infusions of IV iron on prior admission  - Hgb 6.4 on admission s/p 2 units PRBC  - Patient noticed dark stools   - hold Xarelto (last dose 11/22), avoid NSAID use  - IV Protonix drip  - Monitor Hemoglobin (keep >7.5 given hx CAD), Active T/S  - Plan EGD and colonoscopy 11/25 to evaluate source of bleeding  - Hematology consult (for IV Iron): check Retic count, LDH, Hapto, Coomb's Ab.  - S/p venofer 200 mg daily x 2 (already received 2U of blood)  -Hematology will set him up at [facility] for iron infusions    #CAD  #HTN  #Paroxysmal Afib  #Mitral regurg s/p clip   - Patient BP under control  - No chest pain  - HR normal  - EKG  - C/w home meds  - Hold xeralto until r/o GI bleed    #Hypomagnesia   - Mag 1.4 on admission  - s/p 2 g of mag  - Replete mag if necessary    #Misc  - DVT Prophylaxis: Not indicated at this time due to bleeding  - GI Prophylaxis: Protonix 40mg BID  - Diet: NPO  - Activity: Regular   - IV Fluids: NA  - Code Status: Full Code      Dispo: Acute

## 2022-11-27 NOTE — PROGRESS NOTE ADULT - REASON FOR ADMISSION
Anemia, r/o recurrent GI bleed

## 2022-11-27 NOTE — PHYSICAL THERAPY INITIAL EVALUATION ADULT - PERTINENT HX OF CURRENT PROBLEM, REHAB EVAL
82 y.o male patient with PMH of CAD s/p PCI, atrial fibrillation, HLD, HTN and severe MR s/p mitral clip on 9/22 presenting from [facility] after routine blood work showed patient had a hemoglobin less that 7.0, admitted for severe anemia and r/o GI bleed.

## 2022-11-27 NOTE — PROGRESS NOTE ADULT - SUBJECTIVE AND OBJECTIVE BOX
Location: [address] ([address])  Patient Name: MIGEL MOYA  Age: 82y  Gender: Male    Past Medical and Surgical History:  HTN (hypertension)    High cholesterol    GERD (gastroesophageal reflux disease)    Atrial fibrillation    CAD (coronary artery disease)  s/p PCI    JONAH (iron deficiency anemia)    BPH (benign prostatic hyperplasia)    H/O CHF    S/P coronary angioplasty  s/p pci        Social History:  Social History:      Allergies:  No Known Allergies      Patient is a 82y old Male who presents with a chief complaint of Anemia, r/o recurrent GI bleed (24 Nov 2022 09:20)    Primary diagnosis of ANEMIA    ANEMIA; GIB (GASTROINTESTINAL BLEEDING); HYPOMAGNESEMIA        Progress Note  This morning patient was seen and examined at bedside.    Today is hospital day 2d.  Pt is resting comfortably in bed. He has no complaints today. He has been on a clear liquid diet in anticipation of EGD/colonoscopy today. He completed his bowel prep but is still having residual brown stool, will give enema x 2 before procedure.     Hospital Course      Overnight events  No overnight events     Vital Signs in the last 24 hours   Vitals Summary T(C): 36.7 (11-25-22 @ 05:23), Max: 36.7 (11-25-22 @ 05:23)  HR: 94 (11-25-22 @ 05:23) (91 - 94)  BP: 106/63 (11-25-22 @ 05:23) (106/63 - 145/66)  RR: 19 (11-25-22 @ 05:23) (18 - 20)  SpO2: --  Vent Data   Intake/ Output   11-24-22 @ 07:01  -  11-25-22 @ 07:00  --------------------------------------------------------  IN: 240 mL / OUT: 225 mL / NET: 15 mL          Physical Exam  GENERAL: NAD, well-groomed, well-developed  HEAD:  Atraumatic, Normocephalic  EYES: EOMI, PERRLA, conjunctiva and sclera clear  ENMT: Moist mucous membranes  NECK: No JVD  HEART: Abnormal rhythm, tachycardic  RESPIRATORY: CTA B/L, No W/R/R  ABDOMEN: Soft, Nontender, Nondistended; Bowel sounds present  NEUROLOGY: A&Ox3, nonfocal, moving all extremities  EXTREMITIES:  2+ Peripheral Pulses, No lower extremity edema  SKIN: warm, dry, normal color, no rash or abnormal lesions      Investigations   Laboratory Workup  - CBC:                        9.2    6.34  )-----------( 318      ( 25 Nov 2022 08:04 )             30.5     - Chemistry:  11-25    141  |  100  |  5<L>  ----------------------------<  90  3.4<L>   |  33<H>  |  0.8    Ca    9.1      25 Nov 2022 08:04  Mg     1.6     11-25    TPro  6.7  /  Alb  3.3<L>  /  TBili  1.6<H>  /  DBili  x   /  AST  37  /  ALT  28  /  AlkPhos  80  11-25    - Coagulation Studies:  PT/INR - ( 24 Nov 2022 22:04 )   PT: 14.50 sec;   INR: 1.26 ratio           - ABG:    - Cardiac Markers:        Microbiological Workup        Radiological Workup  *      Current Medications  Standing Medications  atorvastatin 20 milliGRAM(s) Oral at bedtime  bisacodyl 20 milliGRAM(s) Oral at bedtime  digoxin     Tablet 250 MICROGram(s) Oral daily  pantoprazole    Tablet 40 milliGRAM(s) Oral two times a day  tamsulosin 0.4 milliGRAM(s) Oral at bedtime  torsemide 20 milliGRAM(s) Oral daily    PRN Medications  acetaminophen     Tablet .. 650 milliGRAM(s) Oral every 6 hours PRN Temp greater or equal to 38C (100.4F), Mild Pain (1 - 3)    Singles Doses Administered  (ADM OVERRIDE) 1 each &lt;see task&gt; GiveOnce  iron sucrose IVPB 200 milliGRAM(s) IV Intermittent every 24 hours  magnesium sulfate  IVPB 2 Gram(s) IV Intermittent Once  pantoprazole  Injectable 40 milliGRAM(s) IV Push Once  polyethylene glycol/electrolyte Solution. 4000 milliLiter(s) Oral once            
  MIGEL MOYA  82y  Male      Patient is a 82y old  Male who presents with a chief complaint of Anemia, r/o recurrent GI bleed (26 Nov 2022 10:17)      INTERVAL HPI/OVERNIGHT EVENTS: no acute events overnight. no further fevers. no reports of melena or hematochezia      REVIEW OF SYSTEMS:  CONSTITUTIONAL: No fever, weight loss, or fatigue  RESPIRATORY: No cough, wheezing, chills or hemoptysis; No shortness of breath  CARDIOVASCULAR: No chest pain, palpitations, dizziness, or leg swelling  GENITOURINARY: No dysuria, frequency, hematuria, or incontinence  NEUROLOGICAL: No headaches, memory loss, loss of strength, numbness, or tremors  SKIN: No itching, burning, rashes, or lesions   MUSCULOSKELETAL: No joint pain or swelling; No muscle, back, or extremity pain  PSYCHIATRIC: No depression, anxiety, mood swings, or difficulty sleeping  All other review of systems negative    T(C): 36.7 (11-27-22 @ 05:37), Max: 36.7 (11-27-22 @ 05:37)  HR: 99 (11-27-22 @ 05:37) (86 - 99)  BP: 122/77 (11-27-22 @ 05:37) (92/55 - 122/77)  RR: 19 (11-27-22 @ 05:37) (18 - 19)  SpO2: --  Wt(kg): --Vital Signs Last 24 Hrs  T(C): 36.7 (27 Nov 2022 05:37), Max: 36.7 (27 Nov 2022 05:37)  T(F): 98 (27 Nov 2022 05:37), Max: 98 (27 Nov 2022 05:37)  HR: 99 (27 Nov 2022 05:37) (86 - 99)  BP: 122/77 (27 Nov 2022 05:37) (92/55 - 122/77)  BP(mean): --  RR: 19 (27 Nov 2022 05:37) (18 - 19)  SpO2: --            PHYSICAL EXAM:  GENERAL: elderly M, NAD, non toxic appearing  EYES: anicteric sclera, non injected conjunctiva, EOMI  PSYCH: no agitation, baseline mentation  NERVOUS SYSTEM:  Alert & Oriented X3, CN 2-12 grossly intact  PULMONARY: symmetrical chest rise, no accessory muscle use  CARDIOVASCULAR: non tachycardic  GI: Soft, Nontender, Nondistended; Bowel sounds present  EXTREMITIES:  No clubbing, cyanosis, or edema  SKIN: No rashes or lesions    Consultant(s) Notes Reviewed:  [x ] YES  [ ] NO    Discussed with Consultants/Other Providers [ x] YES     LABS                          10.1   8.46  )-----------( 363      ( 27 Nov 2022 07:21 )             34.5     11-27    142  |  101  |  6<L>  ----------------------------<  103<H>  3.6   |  31  |  0.8    Ca    8.9      27 Nov 2022 07:21  Mg     1.5     11-27    TPro  7.0  /  Alb  3.4<L>  /  TBili  1.7<H>  /  DBili  x   /  AST  32  /  ALT  21  /  AlkPhos  86  11-27    RADIOLOGY & ADDITIONAL TESTS:   Xray Chest 1 View- PORTABLE-Routine (Xray Chest 1 View- PORTABLE-Routine .) (11.26.22 @ 10:25) >  Impression:    Bilateral opacities. Stable cardiomegaly.    Imaging Personally Reviewed:  [ ] YES  [ ] NO    HEALTH ISSUES - PROBLEM Dx:    MEDICATIONS  (STANDING):  atorvastatin 20 milliGRAM(s) Oral at bedtime  bisacodyl 20 milliGRAM(s) Oral at bedtime  digoxin     Tablet 250 MICROGram(s) Oral daily  magnesium sulfate  IVPB 2 Gram(s) IV Intermittent once  pantoprazole    Tablet 40 milliGRAM(s) Oral two times a day  rivaroxaban 10 milliGRAM(s) Oral with dinner  tamsulosin 0.4 milliGRAM(s) Oral at bedtime  torsemide 20 milliGRAM(s) Oral daily    MEDICATIONS  (PRN):  acetaminophen     Tablet .. 650 milliGRAM(s) Oral every 6 hours PRN Temp greater or equal to 38C (100.4F), Mild Pain (1 - 3)      
Location: [address] ([address])  Patient Name: MIGEL MOYA  Age: 82y  Gender: Male    Past Medical and Surgical History:  HTN (hypertension)    High cholesterol    GERD (gastroesophageal reflux disease)    Atrial fibrillation    CAD (coronary artery disease)  s/p PCI    JONAH (iron deficiency anemia)    BPH (benign prostatic hyperplasia)    H/O CHF    S/P coronary angioplasty  s/p pci        Social History:  Social History:      Allergies:  No Known Allergies      Patient is a 82y old Male who presents with a chief complaint of Anemia, r/o recurrent GI bleed (25 Nov 2022 10:17)    Primary diagnosis of ANEMIA    ANEMIA; GIB (GASTROINTESTINAL BLEEDING); HYPOMAGNESEMIA        Progress Note  This morning patient was seen and examined at bedside.    Today is hospital day 3d.  Pt is resting comfortably in bed. He is s/p EGD and colonoscopy, he tolerated the procedure well. I discussed the findings with him at bedside. He has no complaints today. He is on a liquid diet and tolerating it well, will advance diet today. He is voiding regularly.     Hospital Course      Overnight events  No overnight events     Vital Signs in the last 24 hours   Vitals Summary T(C): 37.9 (11-26-22 @ 05:06), Max: 37.9 (11-26-22 @ 05:06)  HR: 109 (11-26-22 @ 05:06) (65 - 109)  BP: 106/57 (11-26-22 @ 05:06) (79/48 - 118/75)  RR: 18 (11-26-22 @ 05:06) (17 - 18)  SpO2: 96% (11-26-22 @ 05:06) (96% - 98%)  Vent Data   Intake/ Output   11-25-22 @ 07:01  -  11-26-22 @ 07:00  --------------------------------------------------------  IN: 0 mL / OUT: 500 mL / NET: -500 mL          Physical Exam  GENERAL: NAD, well-groomed, well-developed  HEAD:  Atraumatic, Normocephalic  EYES: EOMI, PERRLA, conjunctiva and sclera clear  ENMT: Moist mucous membranes  NECK: No JVD  HEART: Abnormal rhythm, tachycardic  RESPIRATORY: CTA B/L, No W/R/R  ABDOMEN: Soft, Nontender, Nondistended; Bowel sounds present  NEUROLOGY: A&Ox3, nonfocal, moving all extremities  EXTREMITIES:  2+ Peripheral Pulses, No lower extremity edema  SKIN: warm, dry, normal color, no rash or abnormal lesions      Investigations   Laboratory Workup  - CBC:                        9.7    17.64 )-----------( 355      ( 26 Nov 2022 08:12 )             31.2     - Chemistry:  11-26    140  |  99  |  6<L>  ----------------------------<  119<H>  3.1<L>   |  28  |  0.8    Ca    8.4      26 Nov 2022 08:12  Mg     1.3     11-26    TPro  6.6  /  Alb  3.3<L>  /  TBili  1.4<H>  /  DBili  x   /  AST  33  /  ALT  23  /  AlkPhos  80  11-26    - Coagulation Studies:  PT/INR - ( 24 Nov 2022 22:04 )   PT: 14.50 sec;   INR: 1.26 ratio           - ABG:    - Cardiac Markers:        Microbiological Workup        Radiological Workup  *      Current Medications  Standing Medications  atorvastatin 20 milliGRAM(s) Oral at bedtime  bisacodyl 20 milliGRAM(s) Oral at bedtime  digoxin     Tablet 250 MICROGram(s) Oral daily  magnesium sulfate  IVPB 2 Gram(s) IV Intermittent once  pantoprazole    Tablet 40 milliGRAM(s) Oral two times a day  tamsulosin 0.4 milliGRAM(s) Oral at bedtime  torsemide 20 milliGRAM(s) Oral daily    PRN Medications  acetaminophen     Tablet .. 650 milliGRAM(s) Oral every 6 hours PRN Temp greater or equal to 38C (100.4F), Mild Pain (1 - 3)    Singles Doses Administered  (ADM OVERRIDE) 1 each &lt;see task&gt; GiveOnce  iron sucrose IVPB 200 milliGRAM(s) IV Intermittent every 24 hours  lactated ringers Bolus 500 milliLiter(s) IV Bolus once  magnesium sulfate  IVPB 2 Gram(s) IV Intermittent Once  pantoprazole  Injectable 40 milliGRAM(s) IV Push Once  polyethylene glycol/electrolyte Solution. 4000 milliLiter(s) Oral once            
Location: [address] ([address])  Patient Name: MIGEL MOYA  Age: 82y  Gender: Male    Past Medical and Surgical History:  HTN (hypertension)    High cholesterol    GERD (gastroesophageal reflux disease)    Atrial fibrillation    CAD (coronary artery disease)  s/p PCI    JONAH (iron deficiency anemia)    BPH (benign prostatic hyperplasia)    H/O CHF    S/P coronary angioplasty  s/p pci        Social History:  Social History:      Allergies:  No Known Allergies      Patient is a 82y old Male who presents with a chief complaint of Anemia, r/o recurrent GI bleed (23 Nov 2022 08:38)    Primary diagnosis of ANEMIA    ANEMIA; GIB (GASTROINTESTINAL BLEEDING); HYPOMAGNESEMIA        Progress Note  This morning patient was seen and examined at bedside.    Today is hospital day .  Pt is doing well. He is resting comfortably. He has no complaints today.     Hospital Course      Overnight events  No overnight events     Vital Signs in the last 24 hours   Vitals Summary T(C): 37 (11-23-22 @ 06:15), Max: 37 (11-23-22 @ 06:15)  HR: 90 (11-23-22 @ 06:15) (78 - 99)  BP: 116/60 (11-23-22 @ 06:15) (102/52 - 134/70)  RR: 18 (11-23-22 @ 06:15) (16 - 20)  SpO2: 99% (11-23-22 @ 06:15) (96% - 100%)  Vent Data   Intake/ Output   11-22-22 @ 07:01  -  11-23-22 @ 07:00  --------------------------------------------------------  IN: 0 mL / OUT: 600 mL / NET: -600 mL          Physical Exam  Physical Exam: PHYSICAL EXAM:  GENERAL: NAD, well-groomed, well-developed  HEAD:  Atraumatic, Normocephalic  EYES: EOMI, PERRLA, conjunctiva and sclera clear  ENMT: Moist mucous membranes  NECK: No JVD  HEART: Abnormal rhythm, tachycardic  RESPIRATORY: CTA B/L, No W/R/R  ABDOMEN: Soft, Nontender, Nondistended; Bowel sounds present  NEUROLOGY: A&Ox3, nonfocal, moving all extremities  EXTREMITIES:  2+ Peripheral Pulses, No lower extremity edema  SKIN: warm, dry, normal color, no rash or abnormal lesions        Investigations   Laboratory Workup  - CBC:                        6.4    7.23  )-----------( 224      ( 22 Nov 2022 22:38 )             21.1     - Chemistry:  11-22    137  |  101  |  19  ----------------------------<  131<H>  4.2   |  28  |  0.9    Ca    8.4      22 Nov 2022 22:38  Mg     1.4     11-22    TPro  6.2  /  Alb  2.8<L>  /  TBili  0.6  /  DBili  x   /  AST  43<H>  /  ALT  30  /  AlkPhos  77  11-22    - Coagulation Studies:  PT/INR - ( 22 Nov 2022 22:38 )   PT: 22.90 sec;   INR: 1.97 ratio         PTT - ( 22 Nov 2022 22:38 )  PTT:36.2 sec  - ABG:    - Cardiac Markers:        Microbiological Workup        Radiological Workup  *      Current Medications  Standing Medications  atorvastatin 20 milliGRAM(s) Oral at bedtime  digoxin     Tablet 250 MICROGram(s) Oral daily  iron sucrose IVPB 200 milliGRAM(s) IV Intermittent every 24 hours  pantoprazole    Tablet 40 milliGRAM(s) Oral two times a day  tamsulosin 0.4 milliGRAM(s) Oral at bedtime  torsemide 20 milliGRAM(s) Oral daily    PRN Medications  acetaminophen     Tablet .. 650 milliGRAM(s) Oral every 6 hours PRN Temp greater or equal to 38C (100.4F), Mild Pain (1 - 3)    Singles Doses Administered  (ADM OVERRIDE) 1 each &lt;see task&gt; GiveOnce  magnesium sulfate  IVPB 2 Gram(s) IV Intermittent Once  pantoprazole  Injectable 40 milliGRAM(s) IV Push Once    
Location: [address] ([address])  Patient Name: MIGEL MOYA  Age: 82y  Gender: Male    Past Medical and Surgical History:  HTN (hypertension)    High cholesterol    GERD (gastroesophageal reflux disease)    Atrial fibrillation    CAD (coronary artery disease)  s/p PCI    JONAH (iron deficiency anemia)    BPH (benign prostatic hyperplasia)    H/O CHF    S/P coronary angioplasty  s/p pci        Social History:  Social History:      Allergies:  No Known Allergies      Patient is a 82y old Male who presents with a chief complaint of Anemia, r/o recurrent GI bleed (23 Nov 2022 11:38)    Primary diagnosis of ANEMIA    ANEMIA; GIB (GASTROINTESTINAL BLEEDING); HYPOMAGNESEMIA        Progress Note  This morning patient was seen and examined at bedside.    Today is hospital day 1d.  Patient is resting in bed comfortably. He has no complaints today. He is tolerating liquid diet well. He will undergo prep for colonoscopy/EGD colonoscopy tonight. Pt is aware and agreeable to plan     Hospital Course      Overnight events  No overnight events     Vital Signs in the last 24 hours   Vitals Summary T(C): 36.6 (11-24-22 @ 05:25), Max: 37 (11-23-22 @ 13:27)  HR: 82 (11-24-22 @ 05:25) (82 - 89)  BP: 96/57 (11-24-22 @ 05:25) (95/53 - 101/56)  RR: 18 (11-24-22 @ 05:25) (17 - 18)  SpO2: 98% (11-23-22 @ 20:07) (98% - 98%)  Vent Data   Intake/ Output       Physical Exam  GENERAL: NAD, well-groomed, well-developed  HEAD:  Atraumatic, Normocephalic  EYES: EOMI, PERRLA, conjunctiva and sclera clear  ENMT: Moist mucous membranes  NECK: No JVD  HEART: Abnormal rhythm, tachycardic  RESPIRATORY: CTA B/L, No W/R/R  ABDOMEN: Soft, Nontender, Nondistended; Bowel sounds present  NEUROLOGY: A&Ox3, nonfocal, moving all extremities  EXTREMITIES:  2+ Peripheral Pulses, No lower extremity edema  SKIN: warm, dry, normal color, no rash or abnormal lesions      Investigations   Laboratory Workup  - CBC:                        8.0    7.86  )-----------( 213      ( 23 Nov 2022 18:42 )             25.3     - Chemistry:  11-22    137  |  101  |  19  ----------------------------<  131<H>  4.2   |  28  |  0.9    Ca    8.4      22 Nov 2022 22:38  Mg     1.4     11-22    TPro  6.2  /  Alb  2.8<L>  /  TBili  0.6  /  DBili  x   /  AST  43<H>  /  ALT  30  /  AlkPhos  77  11-22    - Coagulation Studies:  PT/INR - ( 22 Nov 2022 22:38 )   PT: 22.90 sec;   INR: 1.97 ratio         PTT - ( 22 Nov 2022 22:38 )  PTT:36.2 sec  - ABG:    - Cardiac Markers:        Microbiological Workup        Radiological Workup  *      Current Medications  Standing Medications  atorvastatin 20 milliGRAM(s) Oral at bedtime  bisacodyl 20 milliGRAM(s) Oral at bedtime  digoxin     Tablet 250 MICROGram(s) Oral daily  iron sucrose IVPB 200 milliGRAM(s) IV Intermittent every 24 hours  pantoprazole    Tablet 40 milliGRAM(s) Oral two times a day  polyethylene glycol/electrolyte Solution. 4000 milliLiter(s) Oral once  tamsulosin 0.4 milliGRAM(s) Oral at bedtime  torsemide 20 milliGRAM(s) Oral daily    PRN Medications  acetaminophen     Tablet .. 650 milliGRAM(s) Oral every 6 hours PRN Temp greater or equal to 38C (100.4F), Mild Pain (1 - 3)    Singles Doses Administered  (ADM OVERRIDE) 1 each &lt;see task&gt; GiveOnce  magnesium sulfate  IVPB 2 Gram(s) IV Intermittent Once  pantoprazole  Injectable 40 milliGRAM(s) IV Push Once

## 2022-11-27 NOTE — PHYSICAL THERAPY INITIAL EVALUATION ADULT - GENERAL OBSERVATIONS, REHAB EVAL
Pt encountered supine in bed, A & O x 4 in NAD, no c/o pain and agreeable with PT. Pt is indep in bed mobility; supervision in transfer mobility and ambulation 200 ft without AD with decreased hermelinda; and ambulates with stooped posture. Pt negotiated 9 steps close supervision using 1HR(Lt up/Rt down) step-over pattern. Pt will benefit from skilled PT to increase strength and safety in ambulation.

## 2022-11-28 ENCOUNTER — TRANSCRIPTION ENCOUNTER (OUTPATIENT)
Age: 82
End: 2022-11-28

## 2022-11-28 VITALS — TEMPERATURE: 97 F

## 2022-11-28 LAB
ALBUMIN SERPL ELPH-MCNC: 2.9 G/DL — LOW (ref 3.5–5.2)
ALP SERPL-CCNC: 69 U/L — SIGNIFICANT CHANGE UP (ref 30–115)
ALT FLD-CCNC: 18 U/L — SIGNIFICANT CHANGE UP (ref 0–41)
ANION GAP SERPL CALC-SCNC: 10 MMOL/L — SIGNIFICANT CHANGE UP (ref 7–14)
AST SERPL-CCNC: 25 U/L — SIGNIFICANT CHANGE UP (ref 0–41)
BASOPHILS # BLD AUTO: 0.03 K/UL — SIGNIFICANT CHANGE UP (ref 0–0.2)
BASOPHILS NFR BLD AUTO: 0.4 % — SIGNIFICANT CHANGE UP (ref 0–1)
BILIRUB SERPL-MCNC: 1.1 MG/DL — SIGNIFICANT CHANGE UP (ref 0.2–1.2)
BUN SERPL-MCNC: 7 MG/DL — LOW (ref 10–20)
CALCIUM SERPL-MCNC: 8.1 MG/DL — LOW (ref 8.4–10.5)
CHLORIDE SERPL-SCNC: 97 MMOL/L — LOW (ref 98–110)
CO2 SERPL-SCNC: 29 MMOL/L — SIGNIFICANT CHANGE UP (ref 17–32)
CREAT SERPL-MCNC: 0.8 MG/DL — SIGNIFICANT CHANGE UP (ref 0.7–1.5)
EGFR: 88 ML/MIN/1.73M2 — SIGNIFICANT CHANGE UP
EOSINOPHIL # BLD AUTO: 0.17 K/UL — SIGNIFICANT CHANGE UP (ref 0–0.7)
EOSINOPHIL NFR BLD AUTO: 2.2 % — SIGNIFICANT CHANGE UP (ref 0–8)
GLUCOSE SERPL-MCNC: 95 MG/DL — SIGNIFICANT CHANGE UP (ref 70–99)
HCT VFR BLD CALC: 28.9 % — LOW (ref 42–52)
HGB BLD-MCNC: 8.8 G/DL — LOW (ref 14–18)
IMM GRANULOCYTES NFR BLD AUTO: 0.5 % — HIGH (ref 0.1–0.3)
LYMPHOCYTES # BLD AUTO: 1.58 K/UL — SIGNIFICANT CHANGE UP (ref 1.2–3.4)
LYMPHOCYTES # BLD AUTO: 20.8 % — SIGNIFICANT CHANGE UP (ref 20.5–51.1)
MAGNESIUM SERPL-MCNC: 1.4 MG/DL — LOW (ref 1.8–2.4)
MCHC RBC-ENTMCNC: 26.2 PG — LOW (ref 27–31)
MCHC RBC-ENTMCNC: 30.4 G/DL — LOW (ref 32–37)
MCV RBC AUTO: 86 FL — SIGNIFICANT CHANGE UP (ref 80–94)
MONOCYTES # BLD AUTO: 0.95 K/UL — HIGH (ref 0.1–0.6)
MONOCYTES NFR BLD AUTO: 12.5 % — HIGH (ref 1.7–9.3)
NEUTROPHILS # BLD AUTO: 4.81 K/UL — SIGNIFICANT CHANGE UP (ref 1.4–6.5)
NEUTROPHILS NFR BLD AUTO: 63.6 % — SIGNIFICANT CHANGE UP (ref 42.2–75.2)
NRBC # BLD: 0 /100 WBCS — SIGNIFICANT CHANGE UP (ref 0–0)
PLATELET # BLD AUTO: 383 K/UL — SIGNIFICANT CHANGE UP (ref 130–400)
POTASSIUM SERPL-MCNC: 3.5 MMOL/L — SIGNIFICANT CHANGE UP (ref 3.5–5)
POTASSIUM SERPL-SCNC: 3.5 MMOL/L — SIGNIFICANT CHANGE UP (ref 3.5–5)
PROT SERPL-MCNC: 5.9 G/DL — LOW (ref 6–8)
RBC # BLD: 3.36 M/UL — LOW (ref 4.7–6.1)
RBC # FLD: 24.8 % — HIGH (ref 11.5–14.5)
SARS-COV-2 RNA SPEC QL NAA+PROBE: SIGNIFICANT CHANGE UP
SODIUM SERPL-SCNC: 136 MMOL/L — SIGNIFICANT CHANGE UP (ref 135–146)
WBC # BLD: 7.58 K/UL — SIGNIFICANT CHANGE UP (ref 4.8–10.8)
WBC # FLD AUTO: 7.58 K/UL — SIGNIFICANT CHANGE UP (ref 4.8–10.8)

## 2022-11-28 PROCEDURE — 99239 HOSP IP/OBS DSCHRG MGMT >30: CPT

## 2022-11-28 RX ORDER — MAGNESIUM OXIDE 400 MG ORAL TABLET 241.3 MG
400 TABLET ORAL ONCE
Refills: 0 | Status: COMPLETED | OUTPATIENT
Start: 2022-11-28 | End: 2022-11-28

## 2022-11-28 RX ORDER — SODIUM CHLORIDE 9 MG/ML
500 INJECTION INTRAMUSCULAR; INTRAVENOUS; SUBCUTANEOUS ONCE
Refills: 0 | Status: COMPLETED | OUTPATIENT
Start: 2022-11-28 | End: 2022-11-28

## 2022-11-28 RX ORDER — MAGNESIUM OXIDE 400 MG ORAL TABLET 241.3 MG
400 TABLET ORAL
Refills: 0 | Status: DISCONTINUED | OUTPATIENT
Start: 2022-11-28 | End: 2022-11-28

## 2022-11-28 RX ORDER — PANTOPRAZOLE SODIUM 20 MG/1
1 TABLET, DELAYED RELEASE ORAL
Qty: 0 | Refills: 0 | DISCHARGE
Start: 2022-11-28

## 2022-11-28 RX ORDER — MAGNESIUM SULFATE 500 MG/ML
2 VIAL (ML) INJECTION
Refills: 0 | Status: DISCONTINUED | OUTPATIENT
Start: 2022-11-28 | End: 2022-11-28

## 2022-11-28 RX ADMIN — PANTOPRAZOLE SODIUM 40 MILLIGRAM(S): 20 TABLET, DELAYED RELEASE ORAL at 17:18

## 2022-11-28 RX ADMIN — SODIUM CHLORIDE 250 MILLILITER(S): 9 INJECTION INTRAMUSCULAR; INTRAVENOUS; SUBCUTANEOUS at 10:53

## 2022-11-28 RX ADMIN — Medication 250 MICROGRAM(S): at 05:18

## 2022-11-28 RX ADMIN — RIVAROXABAN 10 MILLIGRAM(S): KIT at 17:18

## 2022-11-28 RX ADMIN — PANTOPRAZOLE SODIUM 40 MILLIGRAM(S): 20 TABLET, DELAYED RELEASE ORAL at 05:18

## 2022-11-28 RX ADMIN — MAGNESIUM OXIDE 400 MG ORAL TABLET 400 MILLIGRAM(S): 241.3 TABLET ORAL at 17:18

## 2022-11-28 RX ADMIN — MAGNESIUM OXIDE 400 MG ORAL TABLET 400 MILLIGRAM(S): 241.3 TABLET ORAL at 16:26

## 2022-11-28 NOTE — DISCHARGE NOTE NURSING/CASE MANAGEMENT/SOCIAL WORK - PATIENT PORTAL LINK FT
You can access the FollowMyHealth Patient Portal offered by [facility] by registering at the following website: [email or web address]. By joining [facility]’s FollowMyHealth portal, you will also be able to view your health information using other applications (apps) compatible with our system.

## 2022-11-28 NOTE — DISCHARGE NOTE PROVIDER - CARE PROVIDER_API CALL
Altaf Castillo)  Internal Medicine  [facility] [place] [address]  Phone: [phone]  Fax: [phone]  Established Patient  Follow Up Time: 2 weeks    Herbie Guzman)  Gastroenterology; Internal Medicine  [address] [place] [address]  Phone: [phone]  Fax: [phone]  Follow Up Time: 2 weeks

## 2022-11-28 NOTE — DISCHARGE NOTE PROVIDER - PROVIDER TOKENS
PROVIDER:[TOKEN:[[id number]],FOLLOWUP:[2 weeks],ESTABLISHEDPATIENT:[T]],PROVIDER:[TOKEN:[[id number]],FOLLOWUP:[2 weeks]]

## 2022-11-28 NOTE — DISCHARGE NOTE PROVIDER - NSDCCPCAREPLAN_GEN_ALL_CORE_FT
PRINCIPAL DISCHARGE DIAGNOSIS  Diagnosis: Anemia  Assessment and Plan of Treatment: You were given blood transfusions to bring up your low blood count. GI performed endoscopic procedure to identify etiology of bleeding and noted multiple possible sources which required intervention to prevent further recurrent episodes. Your anticoaguation was restarted, diet advanced, and follow up appointment in 2-3 weeks requested.      SECONDARY DISCHARGE DIAGNOSES  Diagnosis: GIB (gastrointestinal bleeding)  Assessment and Plan of Treatment: You were given blood transfusions to bring up your low blood count. GI performed endoscopic procedure to identify etiology of bleeding and noted multiple possible sources which required intervention to prevent further recurrent episodes. Your anticoaguation was restarted, diet advanced, and follow up appointment in 2-3 weeks requested.    Diagnosis: Hypomagnesemia  Assessment and Plan of Treatment: you were repleted with additional magnesium

## 2022-11-28 NOTE — DISCHARGE NOTE PROVIDER - NSDCMRMEDTOKEN_GEN_ALL_CORE_FT
aspirin 81 mg oral delayed release tablet: 1 tab(s) orally once a day  atorvastatin 20 mg oral tablet: 1 tab(s) orally once a day (at bedtime)  bisacodyl 5 mg oral delayed release tablet: 4 tab(s) orally once a day (at bedtime)  digoxin 250 mcg (0.25 mg) oral tablet: 1 tab(s) orally once a day  pantoprazole 40 mg oral delayed release tablet: 1 tab(s) orally 2 times a day  rivaroxaban 10 mg oral tablet: 1 tab(s) orally once a day  tamsulosin 0.4 mg oral capsule: 1 cap(s) orally once a day (at bedtime)  torsemide 20 mg oral tablet: 1 tab(s) orally once a day

## 2022-11-28 NOTE — DISCHARGE NOTE PROVIDER - ATTENDING DISCHARGE PHYSICAL EXAMINATION:
GENERAL: NAD, well-groomed, well-developed  HEAD:  Atraumatic, Normocephalic  EYES: EOMI, PERRLA, conjunctiva and sclera clear  ENMT: Moist mucous membranes  NECK: No JVD  HEART: Abnormal rhythm, tachycardic  RESPIRATORY: CTA B/L, No W/R/R  ABDOMEN: Soft, Nontender, Nondistended; Bowel sounds present  NEUROLOGY: A&Ox3, nonfocal, moving all extremities  EXTREMITIES:  2+ Peripheral Pulses, No lower extremity edema  SKIN: warm, dry, normal color, no rash or abnormal lesions

## 2022-11-28 NOTE — DISCHARGE NOTE NURSING/CASE MANAGEMENT/SOCIAL WORK - NSDCPEFALRISK_GEN_ALL_CORE
For information on Fall & Injury Prevention, visit: [email or web address] OR  [email or web address] OR  https://www.cdc.gov/steadi/patient.html

## 2022-11-29 DIAGNOSIS — E78.5 HYPERLIPIDEMIA, UNSPECIFIED: ICD-10-CM

## 2022-11-29 DIAGNOSIS — I25.2 OLD MYOCARDIAL INFARCTION: ICD-10-CM

## 2022-11-29 DIAGNOSIS — K25.9 GASTRIC ULCER, UNSPECIFIED AS ACUTE OR CHRONIC, WITHOUT HEMORRHAGE OR PERFORATION: ICD-10-CM

## 2022-11-29 DIAGNOSIS — Z79.82 LONG TERM (CURRENT) USE OF ASPIRIN: ICD-10-CM

## 2022-11-29 DIAGNOSIS — Z87.891 PERSONAL HISTORY OF NICOTINE DEPENDENCE: ICD-10-CM

## 2022-11-29 DIAGNOSIS — K29.70 GASTRITIS, UNSPECIFIED, WITHOUT BLEEDING: ICD-10-CM

## 2022-11-29 DIAGNOSIS — I48.0 PAROXYSMAL ATRIAL FIBRILLATION: ICD-10-CM

## 2022-11-29 DIAGNOSIS — K26.9 DUODENAL ULCER, UNSPECIFIED AS ACUTE OR CHRONIC, WITHOUT HEMORRHAGE OR PERFORATION: ICD-10-CM

## 2022-11-29 DIAGNOSIS — K21.9 GASTRO-ESOPHAGEAL REFLUX DISEASE WITHOUT ESOPHAGITIS: ICD-10-CM

## 2022-11-29 DIAGNOSIS — I25.10 ATHEROSCLEROTIC HEART DISEASE OF NATIVE CORONARY ARTERY WITHOUT ANGINA PECTORIS: ICD-10-CM

## 2022-11-29 DIAGNOSIS — K31.819 ANGIODYSPLASIA OF STOMACH AND DUODENUM WITHOUT BLEEDING: ICD-10-CM

## 2022-11-29 DIAGNOSIS — I11.0 HYPERTENSIVE HEART DISEASE WITH HEART FAILURE: ICD-10-CM

## 2022-11-29 DIAGNOSIS — I21.A1 MYOCARDIAL INFARCTION TYPE 2: ICD-10-CM

## 2022-11-29 DIAGNOSIS — I50.22 CHRONIC SYSTOLIC (CONGESTIVE) HEART FAILURE: ICD-10-CM

## 2022-11-29 DIAGNOSIS — D50.0 IRON DEFICIENCY ANEMIA SECONDARY TO BLOOD LOSS (CHRONIC): ICD-10-CM

## 2022-11-29 LAB — SURGICAL PATHOLOGY STUDY: SIGNIFICANT CHANGE UP

## 2022-12-01 LAB
CULTURE RESULTS: SIGNIFICANT CHANGE UP
SPECIMEN SOURCE: SIGNIFICANT CHANGE UP

## 2022-12-02 DIAGNOSIS — R65.10 SYSTEMIC INFLAMMATORY RESPONSE SYNDROME (SIRS) OF NON-INFECTIOUS ORIGIN WITHOUT ACUTE ORGAN DYSFUNCTION: ICD-10-CM

## 2022-12-02 DIAGNOSIS — E78.5 HYPERLIPIDEMIA, UNSPECIFIED: ICD-10-CM

## 2022-12-02 DIAGNOSIS — K64.8 OTHER HEMORRHOIDS: ICD-10-CM

## 2022-12-02 DIAGNOSIS — Z98.61 CORONARY ANGIOPLASTY STATUS: ICD-10-CM

## 2022-12-02 DIAGNOSIS — D62 ACUTE POSTHEMORRHAGIC ANEMIA: ICD-10-CM

## 2022-12-02 DIAGNOSIS — Z87.891 PERSONAL HISTORY OF NICOTINE DEPENDENCE: ICD-10-CM

## 2022-12-02 DIAGNOSIS — E83.42 HYPOMAGNESEMIA: ICD-10-CM

## 2022-12-02 DIAGNOSIS — I25.10 ATHEROSCLEROTIC HEART DISEASE OF NATIVE CORONARY ARTERY WITHOUT ANGINA PECTORIS: ICD-10-CM

## 2022-12-02 DIAGNOSIS — K55.21 ANGIODYSPLASIA OF COLON WITH HEMORRHAGE: ICD-10-CM

## 2022-12-02 DIAGNOSIS — I34.0 NONRHEUMATIC MITRAL (VALVE) INSUFFICIENCY: ICD-10-CM

## 2022-12-02 DIAGNOSIS — K21.9 GASTRO-ESOPHAGEAL REFLUX DISEASE WITHOUT ESOPHAGITIS: ICD-10-CM

## 2022-12-02 DIAGNOSIS — N40.0 BENIGN PROSTATIC HYPERPLASIA WITHOUT LOWER URINARY TRACT SYMPTOMS: ICD-10-CM

## 2022-12-02 DIAGNOSIS — I10 ESSENTIAL (PRIMARY) HYPERTENSION: ICD-10-CM

## 2022-12-02 DIAGNOSIS — I48.0 PAROXYSMAL ATRIAL FIBRILLATION: ICD-10-CM

## 2022-12-02 DIAGNOSIS — Z20.822 CONTACT WITH AND (SUSPECTED) EXPOSURE TO COVID-19: ICD-10-CM

## 2022-12-02 DIAGNOSIS — K57.31 DIVERTICULOSIS OF LARGE INTESTINE WITHOUT PERFORATION OR ABSCESS WITH BLEEDING: ICD-10-CM

## 2022-12-02 DIAGNOSIS — Z79.82 LONG TERM (CURRENT) USE OF ASPIRIN: ICD-10-CM

## 2022-12-02 DIAGNOSIS — K26.4 CHRONIC OR UNSPECIFIED DUODENAL ULCER WITH HEMORRHAGE: ICD-10-CM

## 2022-12-02 DIAGNOSIS — K29.71 GASTRITIS, UNSPECIFIED, WITH BLEEDING: ICD-10-CM

## 2022-12-19 NOTE — PATIENT PROFILE ADULT - NSPROPTRIGHTSUPPORTPHONE_GEN_A_NUR
[phone] Bilateral Helical Rim Advancement Flap Text: Because of the tightness of the surrounding skin, the full-thickness nature of the defect with exposed cartilage, and to avoid deformity, a helical rim advancement flap was planed.  After prep and anesthesia, an incision was made in the anterior portion of the ear through the skin and the cartilage to the posterior perichondrium both superiorly and inferiorly within the scapha of the ear.  Inferiorly, this extended to the lobule where a Burow’s triangle was excised anteriorly.  The chondrocutaneous flaps were then  from the ear posteriorly at the level of the perichondrium to the postauricular sulcus.  A small rim of cartilage was also excised around the contour of the ear both superiorly and inferiorly, and after hemostasis obtained, the chondrocutaneous flaps were advanced and closed in a layered fashion

## 2023-01-16 ENCOUNTER — INPATIENT (INPATIENT)
Facility: HOSPITAL | Age: 83
LOS: 7 days | Discharge: HOME | End: 2023-01-24
Attending: INTERNAL MEDICINE | Admitting: INTERNAL MEDICINE
Payer: MEDICARE

## 2023-01-16 VITALS
SYSTOLIC BLOOD PRESSURE: 99 MMHG | RESPIRATION RATE: 18 BRPM | HEART RATE: 101 BPM | WEIGHT: 179.9 LBS | DIASTOLIC BLOOD PRESSURE: 61 MMHG | TEMPERATURE: 98 F | HEIGHT: 72 IN | OXYGEN SATURATION: 99 %

## 2023-01-16 DIAGNOSIS — Z98.61 CORONARY ANGIOPLASTY STATUS: Chronic | ICD-10-CM

## 2023-01-16 LAB
ALBUMIN SERPL ELPH-MCNC: 3.7 G/DL — SIGNIFICANT CHANGE UP (ref 3.5–5.2)
ALP SERPL-CCNC: 57 U/L — SIGNIFICANT CHANGE UP (ref 30–115)
ALT FLD-CCNC: 9 U/L — SIGNIFICANT CHANGE UP (ref 0–41)
ANION GAP SERPL CALC-SCNC: 8 MMOL/L — SIGNIFICANT CHANGE UP (ref 7–14)
ANISOCYTOSIS BLD QL: SIGNIFICANT CHANGE UP
APTT BLD: 33.7 SEC — SIGNIFICANT CHANGE UP (ref 27–39.2)
AST SERPL-CCNC: 19 U/L — SIGNIFICANT CHANGE UP (ref 0–41)
BASOPHILS # BLD AUTO: 0.05 K/UL — SIGNIFICANT CHANGE UP (ref 0–0.2)
BASOPHILS NFR BLD AUTO: 0.9 % — SIGNIFICANT CHANGE UP (ref 0–1)
BILIRUB SERPL-MCNC: 0.5 MG/DL — SIGNIFICANT CHANGE UP (ref 0.2–1.2)
BLD GP AB SCN SERPL QL: SIGNIFICANT CHANGE UP
BUN SERPL-MCNC: 13 MG/DL — SIGNIFICANT CHANGE UP (ref 10–20)
CALCIUM SERPL-MCNC: 8.6 MG/DL — SIGNIFICANT CHANGE UP (ref 8.4–10.4)
CHLORIDE SERPL-SCNC: 102 MMOL/L — SIGNIFICANT CHANGE UP (ref 98–110)
CO2 SERPL-SCNC: 32 MMOL/L — SIGNIFICANT CHANGE UP (ref 17–32)
CREAT SERPL-MCNC: 0.9 MG/DL — SIGNIFICANT CHANGE UP (ref 0.7–1.5)
DACRYOCYTES BLD QL SMEAR: SLIGHT — SIGNIFICANT CHANGE UP
EGFR: 85 ML/MIN/1.73M2 — SIGNIFICANT CHANGE UP
EOSINOPHIL # BLD AUTO: 0 K/UL — SIGNIFICANT CHANGE UP (ref 0–0.7)
EOSINOPHIL NFR BLD AUTO: 0 % — SIGNIFICANT CHANGE UP (ref 0–8)
FLUAV AG NPH QL: SIGNIFICANT CHANGE UP
FLUBV AG NPH QL: SIGNIFICANT CHANGE UP
GIANT PLATELETS BLD QL SMEAR: PRESENT — SIGNIFICANT CHANGE UP
GLUCOSE SERPL-MCNC: 106 MG/DL — HIGH (ref 70–99)
HCT VFR BLD CALC: 21.3 % — LOW (ref 42–52)
HGB BLD-MCNC: 5.6 G/DL — CRITICAL LOW (ref 14–18)
HYPOCHROMIA BLD QL: SIGNIFICANT CHANGE UP
INR BLD: 1.78 RATIO — HIGH (ref 0.65–1.3)
LYMPHOCYTES # BLD AUTO: 0.69 K/UL — LOW (ref 1.2–3.4)
LYMPHOCYTES # BLD AUTO: 11.3 % — LOW (ref 20.5–51.1)
MANUAL SMEAR VERIFICATION: SIGNIFICANT CHANGE UP
MCHC RBC-ENTMCNC: 17.4 PG — LOW (ref 27–31)
MCHC RBC-ENTMCNC: 26.3 G/DL — LOW (ref 32–37)
MCV RBC AUTO: 66.1 FL — LOW (ref 80–94)
MICROCYTES BLD QL: SLIGHT — SIGNIFICANT CHANGE UP
MONOCYTES # BLD AUTO: 0.37 K/UL — SIGNIFICANT CHANGE UP (ref 0.1–0.6)
MONOCYTES NFR BLD AUTO: 6.1 % — SIGNIFICANT CHANGE UP (ref 1.7–9.3)
NEUTROPHILS # BLD AUTO: 4.89 K/UL — SIGNIFICANT CHANGE UP (ref 1.4–6.5)
NEUTROPHILS NFR BLD AUTO: 80 % — HIGH (ref 42.2–75.2)
OVALOCYTES BLD QL SMEAR: SLIGHT — SIGNIFICANT CHANGE UP
PLAT MORPH BLD: NORMAL — SIGNIFICANT CHANGE UP
PLATELET # BLD AUTO: 368 K/UL — SIGNIFICANT CHANGE UP (ref 130–400)
POIKILOCYTOSIS BLD QL AUTO: SIGNIFICANT CHANGE UP
POLYCHROMASIA BLD QL SMEAR: SLIGHT — SIGNIFICANT CHANGE UP
POTASSIUM SERPL-MCNC: 3.4 MMOL/L — LOW (ref 3.5–5)
POTASSIUM SERPL-SCNC: 3.4 MMOL/L — LOW (ref 3.5–5)
PROT SERPL-MCNC: 6.8 G/DL — SIGNIFICANT CHANGE UP (ref 6–8)
PROTHROM AB SERPL-ACNC: 20.6 SEC — HIGH (ref 9.95–12.87)
RBC # BLD: 3.22 M/UL — LOW (ref 4.7–6.1)
RBC # FLD: 24.6 % — HIGH (ref 11.5–14.5)
RBC BLD AUTO: ABNORMAL
RSV RNA NPH QL NAA+NON-PROBE: SIGNIFICANT CHANGE UP
SARS-COV-2 RNA SPEC QL NAA+PROBE: SIGNIFICANT CHANGE UP
SCHISTOCYTES BLD QL AUTO: SLIGHT — SIGNIFICANT CHANGE UP
SODIUM SERPL-SCNC: 142 MMOL/L — SIGNIFICANT CHANGE UP (ref 135–146)
TARGETS BLD QL SMEAR: SIGNIFICANT CHANGE UP
VARIANT LYMPHS # BLD: 1.7 % — SIGNIFICANT CHANGE UP (ref 0–5)
WBC # BLD: 6.11 K/UL — SIGNIFICANT CHANGE UP (ref 4.8–10.8)
WBC # FLD AUTO: 6.11 K/UL — SIGNIFICANT CHANGE UP (ref 4.8–10.8)

## 2023-01-16 PROCEDURE — 99285 EMERGENCY DEPT VISIT HI MDM: CPT | Mod: FS

## 2023-01-16 RX ORDER — TAMSULOSIN HYDROCHLORIDE 0.4 MG/1
0.4 CAPSULE ORAL AT BEDTIME
Refills: 0 | Status: DISCONTINUED | OUTPATIENT
Start: 2023-01-16 | End: 2023-01-24

## 2023-01-16 RX ORDER — POTASSIUM CHLORIDE 20 MEQ
40 PACKET (EA) ORAL ONCE
Refills: 0 | Status: DISCONTINUED | OUTPATIENT
Start: 2023-01-16 | End: 2023-01-24

## 2023-01-16 RX ORDER — PANTOPRAZOLE SODIUM 20 MG/1
40 TABLET, DELAYED RELEASE ORAL EVERY 12 HOURS
Refills: 0 | Status: DISCONTINUED | OUTPATIENT
Start: 2023-01-16 | End: 2023-01-24

## 2023-01-16 RX ORDER — ATORVASTATIN CALCIUM 80 MG/1
20 TABLET, FILM COATED ORAL AT BEDTIME
Refills: 0 | Status: DISCONTINUED | OUTPATIENT
Start: 2023-01-16 | End: 2023-01-24

## 2023-01-16 RX ORDER — CHLORHEXIDINE GLUCONATE 213 G/1000ML
1 SOLUTION TOPICAL DAILY
Refills: 0 | Status: DISCONTINUED | OUTPATIENT
Start: 2023-01-16 | End: 2023-01-24

## 2023-01-16 RX ORDER — DIGOXIN 250 MCG
250 TABLET ORAL DAILY
Refills: 0 | Status: DISCONTINUED | OUTPATIENT
Start: 2023-01-16 | End: 2023-01-24

## 2023-01-16 RX ORDER — ASPIRIN/CALCIUM CARB/MAGNESIUM 324 MG
81 TABLET ORAL DAILY
Refills: 0 | Status: DISCONTINUED | OUTPATIENT
Start: 2023-01-16 | End: 2023-01-24

## 2023-01-16 RX ADMIN — ATORVASTATIN CALCIUM 20 MILLIGRAM(S): 80 TABLET, FILM COATED ORAL at 22:11

## 2023-01-16 RX ADMIN — TAMSULOSIN HYDROCHLORIDE 0.4 MILLIGRAM(S): 0.4 CAPSULE ORAL at 23:07

## 2023-01-16 NOTE — H&P ADULT - HISTORY OF PRESENT ILLNESS
83 y/o M PMHx CAD s/p PCI to LAD in 8/2021, Afib on xarelto, HLD, HTN and severe MR s/o mitral clip in 9/22 presenting to the ED for low Hb on routine blood work.  Pt states that he got a call from his PCP Dr. Antoine this morning and was instructed to go to the ED due to low Hb.   Pt denies any hematemesis, melena, hematochezia or hematuria. Denies any abdominal pain, CP, SOB.   Recent admission in 11/2022 for same issue.    In the ED, BP 99/61, , temp 97.1 and spO2 99% on RA. Hb noted 5.6. 2 units PRBCs ordered.   84 y/o M PMHx CAD s/p PCI to LAD in 8/2021, Afib on xarelto, HLD, HTN and severe MR s/o mitral clip in 9/22 presenting to the ED for low Hb on routine blood work.  Pt states that he got a call from his PCP Dr. Antoine this morning and was instructed to go to the ED due to low Hb.   Pt denies any hematemesis, melena, hematochezia or hematuria. Denies any abdominal pain, CP, SOB.   Recent admission in 11/2022 for same issue.    In the ED, BP 99/61, , temp 97.1 and spO2 99% on RA. Hb noted 5.6. 2 units PRBCs ordered.

## 2023-01-16 NOTE — ED ADULT NURSE NOTE - HISTORY OF COVID-19 VACCINATION
[FreeTextEntry1] : Check lipids on Atorvastatin.   Discussed diet and exercise.  \par \par The blood pressure was checked a few times and is borderline.  He was advised to be careful with his diet, limit salt and caffeine and check in three months.\par \par The low back pain is chronic and fairly mild but can be bothersome.  He can use NSAIDs PRN.  He was advised to see a pain management doctor if it becomes more troubling.\par \par He had the flu vaccine.  Shingrix advised when available.  \par \par Check a PSA.\par \par Colonoscopry 10/14 and a five year follow-up was advised.  
Yes

## 2023-01-16 NOTE — H&P ADULT - NSHPPHYSICALEXAM_GEN_ALL_CORE
PHYSICAL EXAM:    General: Not in distress. Well-developed, speaks in full sentences.   Cardio: Regular rate and rhythm. S1, S2 appreciated.   Pulm: Bilateral breath sounds.  Abdomen: Soft, non-tender, non-distended.  Extremities: No edema  Neuro: No focal deficits.

## 2023-01-16 NOTE — ED ADULT TRIAGE NOTE - CHIEF COMPLAINT QUOTE
Patient wity hx anemia presents th  after being found to have low hgb on routine blood work. Patient is on Xarelto but denies blood in stool or urine and

## 2023-01-16 NOTE — ED PROVIDER NOTE - CLINICAL SUMMARY MEDICAL DECISION MAKING FREE TEXT BOX
83 male here for evaluation of anemia. Had screening labs imaging medications and reevaluation, plan is for inpatient admission for continued management. Plan d/w PMD with recommendations in disposition.

## 2023-01-16 NOTE — ED PROVIDER NOTE - PROGRESS NOTE DETAILS
KA: Lab callback Hgb of 5.6 RADHA: Called and spoke to Dr Antoine, discussed pt's PMHx and results; will admit pt to her service.

## 2023-01-16 NOTE — ED ADULT NURSE NOTE - OBJECTIVE STATEMENT
Pt sent from doctors office for low hgb level on routine blood work. Denies any dizziness, weakness, SOB, blood in stool.

## 2023-01-16 NOTE — H&P ADULT - NSHPLABSRESULTS_GEN_ALL_CORE
5.6    6.11  )-----------( 368      ( 16 Jan 2023 12:10 )             21.3       01-16    142  |  102  |  13  ----------------------------<  106<H>  3.4<L>   |  32  |  0.9    Ca    8.6      16 Jan 2023 12:10    TPro  6.8  /  Alb  3.7  /  TBili  0.5  /  DBili  x   /  AST  19  /  ALT  9   /  AlkPhos  57  01-16                  PT/INR - ( 16 Jan 2023 12:10 )   PT: 20.60 sec;   INR: 1.78 ratio         PTT - ( 16 Jan 2023 12:10 )  PTT:33.7 sec    Lactate Trend            CAPILLARY BLOOD GLUCOSE

## 2023-01-16 NOTE — ED PROVIDER NOTE - PHYSICAL EXAMINATION
As Follows:  CONST: Well appearing in NAD. Lying in stretcher.   EYES: PERRL, EOMI, Sclera and conjunctiva clear.   ENT: No nasal discharge. Oropharynx normal appearing, no erythema or exudates. Uvula midline.  CARD: Normal S1 S2; Normal rate and rhythm  RESP: Equal BS B/L, No wheezes, rhonchi or rales. No distress  SKIN: Warm, dry, no acute rashes. Moist mucous membranes. Good turgor  NEURO: A&Ox3, No focal deficits. Strength and sensation intact.

## 2023-01-16 NOTE — ED PROVIDER NOTE - ATTENDING APP SHARED VISIT CONTRIBUTION OF CARE
I personally evaluated the patient. I reviewed the Resident’s or Physician Assistant’s note (as assigned above), and agree with the findings and plan except as documented in my note.     83 male here for evaluation of low blood count, being followed by PMD for same. Has multiple outpatient services with PMD, GI, Cardiology. No new Rx but has atrial fibrillation and takes DOAC and is compliant. Denies melena, syncope, chest discomfort.     ROS otherwise unremarkable    PE: male in no distress. appears pale CV: pulses intact. CHEST: normal work of breathing. ABD: nondistended. SKIN: normal. EXT: FROM. NEURO: AAO 3 no focal deficits. HEENT: mucosa normal pale conjunctiva    Impression: anemia    Plan: IV labs imaging supportive care and reevaluation

## 2023-01-16 NOTE — ED PROVIDER NOTE - NS ED MD DISPO ISOLATION TYPES
Heart Failure: Care Instructions  Your Care Instructions    Heart failure occurs when your heart does not pump as much blood as the body needs. Failure does not mean that the heart has stopped pumping but rather that it is not pumping as well as it should. Over time, this causes fluid buildup in your lungs and other parts of your body. Fluid buildup can cause shortness of breath, fatigue, swollen ankles, and other problems. By taking medicines regularly, reducing sodium (salt) in your diet, checking your weight every day, and making lifestyle changes, you can feel better and live longer. Follow-up care is a key part of your treatment and safety. Be sure to make and go to all appointments, and call your doctor if you are having problems. It's also a good idea to know your test results and keep a list of the medicines you take. How can you care for yourself at home? Medicines  · Be safe with medicines. Take your medicines exactly as prescribed. Call your doctor if you think you are having a problem with your medicine. · Do not take any vitamins, over-the-counter medicine, or herbal products without talking to your doctor first. Michael Marcos not take ibuprofen (Advil or Motrin) and naproxen (Aleve) without talking to your doctor first. They could make your heart failure worse. · You may be taking some of the following medicine. ¨ Beta-blockers can slow heart rate, decrease blood pressure, and improve your condition. Taking a beta-blocker may lower your chance of needing to be hospitalized. ¨ Angiotensin-converting enzyme inhibitors (ACEIs) reduce the heart's workload, lower blood pressure, and reduce swelling. Taking an ACEI may lower your chance of needing to be hospitalized again. ¨ Angiotensin II receptor blockers (ARBs) work like ACEIs. Your doctor may prescribe them instead of ACEIs. ¨ Diuretics, also called water pills, reduce swelling.   ¨ Potassium supplements replace this important mineral, which is sometimes lost with diuretics. ¨ Aspirin and other blood thinners prevent blood clots, which can cause a stroke or heart attack. You will get more details on the specific medicines your doctor prescribes. Diet  · Your doctor may suggest that you limit sodium to 2,000 milligrams (mg) a day or less. That is less than 1 teaspoon of salt a day, including all the salt you eat in cooking or in packaged foods. People get most of their sodium from processed foods. Fast food and restaurant meals also tend to be very high in sodium. · Ask your doctor how much liquid you can drink each day. You may have to limit liquids. Weight  · Weigh yourself without clothing at the same time each day. Record your weight. Call your doctor if you have a sudden weight gain, such as more than 2 to 3 pounds in a day or 5 pounds in a week. (Your doctor may suggest a different range of weight gain.) A sudden weight gain may mean that your heart failure is getting worse. Activity level  · Start light exercise (if your doctor says it is okay). Even if you can only do a small amount, exercise will help you get stronger, have more energy, and manage your weight and your stress. Walking is an easy way to get exercise. Start out by walking a little more than you did before. Bit by bit, increase the amount you walk. · When you exercise, watch for signs that your heart is working too hard. You are pushing yourself too hard if you cannot talk while you are exercising. If you become short of breath or dizzy or have chest pain, stop, sit down, and rest.  · If you feel \"wiped out\" the day after you exercise, walk slower or for a shorter distance until you can work up to a better pace. · Get enough rest at night. Sleeping with 1 or 2 pillows under your upper body and head may help you breathe easier. Lifestyle changes  · Do not smoke. Smoking can make a heart condition worse.  If you need help quitting, talk to your doctor about stop-smoking programs and medicines. These can increase your chances of quitting for good. Quitting smoking may be the most important step you can take to protect your heart. · Limit alcohol to 2 drinks a day for men and 1 drink a day for women. Too much alcohol can cause health problems. · Avoid getting sick from colds and the flu. Get a pneumococcal vaccine shot. If you have had one before, ask your doctor whether you need another dose. Get a flu shot each year. If you must be around people with colds or the flu, wash your hands often. When should you call for help? Call 911 if you have symptoms of sudden heart failure such as:  · You have severe trouble breathing. · You cough up pink, foamy mucus. · You have a new irregular or rapid heartbeat. Call your doctor now or seek immediate medical care if:  · You have new or increased shortness of breath. · You are dizzy or lightheaded, or you feel like you may faint. · You have sudden weight gain, such as more than 2 to 3 pounds in a day or 5 pounds in a week. (Your doctor may suggest a different range of weight gain.)  · You have increased swelling in your legs, ankles, or feet. · You are suddenly so tired or weak that you cannot do your usual activities. Watch closely for changes in your health, and be sure to contact your doctor if:  · You develop new symptoms. Where can you learn more? Go to http://angelina-rajiv.info/. Enter Y172 in the search box to learn more about \"Heart Failure: Care Instructions. \"  Current as of: April 3, 2017  Content Version: 11.3  © 5362-8113 [employer]. Care instructions adapted under license by [employer] (which disclaims liability or warranty for this information). If you have questions about a medical condition or this instruction, always ask your healthcare professional. [place] [address] any warranty or liability for your use of this information.     DISCHARGE SUMMARY from Nurse    The following personal items are in your possession at time of discharge:    Dental Appliances: At home  Visual Aid: None     Home Medications: None  Jewelry: None  Clothing: Shirt, Shorts, Footwear  Other Valuables: None             PATIENT INSTRUCTIONS:    After general anesthesia or intravenous sedation, for 24 hours or while taking prescription Narcotics:  · Limit your activities  · Do not drive and operate hazardous machinery  · Do not make important personal or business decisions  · Do  not drink alcoholic beverages  · If you have not urinated within 8 hours after discharge, please contact your surgeon on call. Report the following to your surgeon:  · Excessive pain, swelling, redness or odor of or around the surgical area  · Temperature over 100.5  · Nausea and vomiting lasting longer than 4 hours or if unable to take medications  · Any signs of decreased circulation or nerve impairment to extremity: change in color, persistent  numbness, tingling, coldness or increase pain  · Any questions        What to do at Home:    *  Please give a list of your current medications to your Primary Care Provider. *  Please update this list whenever your medications are discontinued, doses are      changed, or new medications (including over-the-counter products) are added. *  Please carry medication information at all times in case of emergency situations. These are general instructions for a healthy lifestyle:    No smoking/ No tobacco products/ Avoid exposure to second hand smoke    Surgeon General's Warning:  Quitting smoking now greatly reduces serious risk to your health.     Obesity, smoking, and sedentary lifestyle greatly increases your risk for illness    A healthy diet, regular physical exercise & weight monitoring are important for maintaining a healthy lifestyle    You may be retaining fluid if you have a history of heart failure or if you experience any of the following symptoms: Weight gain of 3 pounds or more overnight or 5 pounds in a week, increased swelling in our hands or feet or shortness of breath while lying flat in bed. Please call your doctor as soon as you notice any of these symptoms; do not wait until your next office visit. Recognize signs and symptoms of STROKE:    F-face looks uneven    A-arms unable to move or move unevenly    S-speech slurred or non-existent    T-time-call 911 as soon as signs and symptoms begin-DO NOT go       Back to bed or wait to see if you get better-TIME IS BRAIN. Warning Signs of HEART ATTACK     Call 911 if you have these symptoms:   Chest discomfort. Most heart attacks involve discomfort in the center of the chest that lasts more than a few minutes, or that goes away and comes back. It can feel like uncomfortable pressure, squeezing, fullness, or pain.  Discomfort in other areas of the upper body. Symptoms can include pain or discomfort in one or both arms, the back, neck, jaw, or stomach.  Shortness of breath with or without chest discomfort.  Other signs may include breaking out in a cold sweat, nausea, or lightheadedness. Don't wait more than five minutes to call 911 - MINUTES MATTER! Fast action can save your life. Calling 911 is almost always the fastest way to get lifesaving treatment. Emergency Medical Services staff can begin treatment when they arrive -- up to an hour sooner than if someone gets to the hospital by car. The discharge information has been reviewed with the patient. The patient verbalized understanding. Discharge medications reviewed with the patient and appropriate educational materials and side effects teaching were provided. None

## 2023-01-16 NOTE — H&P ADULT - ASSESSMENT
84 y/o M PMHx CAD s/p PCI to LAD in 8/2021, Afib on xarelto, HLD, HTN and severe MR s/o mitral clip in 9/22 presenting to the ED for low Hb on routine blood work.    # Acute on chronic anemia, r/o GIB  - Hb 5.6 on admission (baseline ~8)  - Pt hemodynamically stable  - EGD/Colonoscopy in 11/2022: small non-bleeding angioectasia in the 2nd part of duodenum underwent hemostasis, A single non-bleeding 1 cm ulcer was found in the posterior bulb. 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 Hemostasis was performed,  One Endoclip was placed to prevent bleeding.  - DANIELA reported negative by ED  - Hold xarelto  - Keep active T&S  - Transfuse to keep Hb >8  - 2 18 gauges  - PPI BID  - GI consult    #CAD  #HTN  #Paroxysmal Afib  #Mitral regurg s/p clip   # HFrEF  - holding xarelto   - c/w digoxin 250 daily  - c/w simvastatin 20 daily  - c/w torsemide 20 mg daily     # Misc  DVT ppx: SCD  GI ppx: protonix  Diet: clear  Activity: IAT   Code Status: Full  Dispo: admit to medicine   84 y/o M PMHx CAD s/p PCI to LAD in 8/2021, Afib on xarelto, HLD, HTN and severe MR s/o mitral clip in 9/22 presenting to the ED for low Hb on routine blood work.    # Acute on chronic anemia, r/o GIB  - Hb 5.6 on admission (baseline ~8)  - Pt hemodynamically stable  - EGD/Colonoscopy in 11/2022: small non-bleeding angioectasia in the 2nd part of duodenum underwent hemostasis, A single non-bleeding 1 cm ulcer was found in the posterior bulb. 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 Hemostasis was performed,  One Endoclip was placed to prevent bleeding.  - DANIELA reported negative by ED  - Hold xarelto  - Keep active T&S  - Serial CBC. Transfuse to keep Hb >8  - 2 18 gauges  - PPI BID  - GI consult    #CAD  #HTN  #Paroxysmal Afib  #Mitral regurg s/p clip   # HFrEF  - holding xarelto   - c/w digoxin 250 daily  - c/w simvastatin 20 daily  - c/w torsemide 20 mg daily     # Misc  DVT ppx: SCD  GI ppx: protonix  Diet: clear  Activity: IAT   Code Status: Full  Dispo: admit to medicine

## 2023-01-16 NOTE — ED PROVIDER NOTE - OBJECTIVE STATEMENT
Pt is an 84 y/o male with PMHx of HTN, HLD< CHF, CAD, afib, and iron deficiency anemia presents after being referred by Dr Antoine for an abnormally low hgb to the ED. He has no aggravating or alleviating factors at this time. Pt denies any sites of bleeding, general complaints, pain, or weakness at this time. He does not recall the specific lab result.    PCP Dr Antoine Pt is an 84 y/o male with PMHx of HTN, HLD, CHF, CAD, afib, and iron deficiency anemia presents after being referred by Dr Antoine for an abnormally low hgb to the ED. He has no aggravating or alleviating factors at this time. Pt denies any sites of bleeding, general complaints, pain, or weakness at this time. He does not recall the specific lab result.    PCP Dr Antoine  Cardio Dr Howard Bellamy

## 2023-01-16 NOTE — PATIENT PROFILE ADULT - FALL HARM RISK - HARM RISK INTERVENTIONS

## 2023-01-16 NOTE — ED PROVIDER NOTE - CONSIDERATION OF ADMISSION OBSERVATION
patient will be admitted for failure of outpatient management Consideration of Admission/Observation

## 2023-01-17 LAB
ANION GAP SERPL CALC-SCNC: 9 MMOL/L — SIGNIFICANT CHANGE UP (ref 7–14)
BASOPHILS # BLD AUTO: 0.02 K/UL — SIGNIFICANT CHANGE UP (ref 0–0.2)
BASOPHILS NFR BLD AUTO: 0.3 % — SIGNIFICANT CHANGE UP (ref 0–1)
BUN SERPL-MCNC: 8 MG/DL — LOW (ref 10–20)
CALCIUM SERPL-MCNC: 8.7 MG/DL — SIGNIFICANT CHANGE UP (ref 8.4–10.5)
CHLORIDE SERPL-SCNC: 101 MMOL/L — SIGNIFICANT CHANGE UP (ref 98–110)
CO2 SERPL-SCNC: 29 MMOL/L — SIGNIFICANT CHANGE UP (ref 17–32)
CREAT SERPL-MCNC: 0.8 MG/DL — SIGNIFICANT CHANGE UP (ref 0.7–1.5)
EGFR: 88 ML/MIN/1.73M2 — SIGNIFICANT CHANGE UP
EOSINOPHIL # BLD AUTO: 0.1 K/UL — SIGNIFICANT CHANGE UP (ref 0–0.7)
EOSINOPHIL NFR BLD AUTO: 1.4 % — SIGNIFICANT CHANGE UP (ref 0–8)
GLUCOSE SERPL-MCNC: 119 MG/DL — HIGH (ref 70–99)
HCT VFR BLD CALC: 26.6 % — LOW (ref 42–52)
HGB BLD-MCNC: 7.6 G/DL — LOW (ref 14–18)
IMM GRANULOCYTES NFR BLD AUTO: 0.1 % — SIGNIFICANT CHANGE UP (ref 0.1–0.3)
LYMPHOCYTES # BLD AUTO: 1.12 K/UL — LOW (ref 1.2–3.4)
LYMPHOCYTES # BLD AUTO: 15.9 % — LOW (ref 20.5–51.1)
MAGNESIUM SERPL-MCNC: 1.3 MG/DL — LOW (ref 1.8–2.4)
MCHC RBC-ENTMCNC: 19.4 PG — LOW (ref 27–31)
MCHC RBC-ENTMCNC: 28.6 G/DL — LOW (ref 32–37)
MCV RBC AUTO: 67.9 FL — LOW (ref 80–94)
MONOCYTES # BLD AUTO: 0.8 K/UL — HIGH (ref 0.1–0.6)
MONOCYTES NFR BLD AUTO: 11.4 % — HIGH (ref 1.7–9.3)
NEUTROPHILS # BLD AUTO: 4.98 K/UL — SIGNIFICANT CHANGE UP (ref 1.4–6.5)
NEUTROPHILS NFR BLD AUTO: 70.9 % — SIGNIFICANT CHANGE UP (ref 42.2–75.2)
NRBC # BLD: 0 /100 WBCS — SIGNIFICANT CHANGE UP (ref 0–0)
PLATELET # BLD AUTO: 365 K/UL — SIGNIFICANT CHANGE UP (ref 130–400)
POTASSIUM SERPL-MCNC: 3.5 MMOL/L — SIGNIFICANT CHANGE UP (ref 3.5–5)
POTASSIUM SERPL-SCNC: 3.5 MMOL/L — SIGNIFICANT CHANGE UP (ref 3.5–5)
RBC # BLD: 3.92 M/UL — LOW (ref 4.7–6.1)
RBC # FLD: 24.9 % — HIGH (ref 11.5–14.5)
SODIUM SERPL-SCNC: 139 MMOL/L — SIGNIFICANT CHANGE UP (ref 135–146)
WBC # BLD: 7.03 K/UL — SIGNIFICANT CHANGE UP (ref 4.8–10.8)
WBC # FLD AUTO: 7.03 K/UL — SIGNIFICANT CHANGE UP (ref 4.8–10.8)

## 2023-01-17 PROCEDURE — 99223 1ST HOSP IP/OBS HIGH 75: CPT

## 2023-01-17 RX ORDER — MAGNESIUM SULFATE 500 MG/ML
2 VIAL (ML) INJECTION
Refills: 0 | Status: COMPLETED | OUTPATIENT
Start: 2023-01-17 | End: 2023-01-17

## 2023-01-17 RX ORDER — PREGABALIN 225 MG/1
1000 CAPSULE ORAL DAILY
Refills: 0 | Status: DISCONTINUED | OUTPATIENT
Start: 2023-01-17 | End: 2023-01-24

## 2023-01-17 RX ORDER — FOLIC ACID 0.8 MG
1 TABLET ORAL DAILY
Refills: 0 | Status: DISCONTINUED | OUTPATIENT
Start: 2023-01-17 | End: 2023-01-24

## 2023-01-17 RX ADMIN — Medication 250 MICROGRAM(S): at 05:57

## 2023-01-17 RX ADMIN — Medication 20 MILLIGRAM(S): at 05:58

## 2023-01-17 RX ADMIN — Medication 25 GRAM(S): at 16:50

## 2023-01-17 RX ADMIN — Medication 81 MILLIGRAM(S): at 11:38

## 2023-01-17 RX ADMIN — CHLORHEXIDINE GLUCONATE 1 APPLICATION(S): 213 SOLUTION TOPICAL at 11:38

## 2023-01-17 RX ADMIN — PANTOPRAZOLE SODIUM 40 MILLIGRAM(S): 20 TABLET, DELAYED RELEASE ORAL at 06:05

## 2023-01-17 RX ADMIN — Medication 25 GRAM(S): at 21:33

## 2023-01-17 RX ADMIN — Medication 1 MILLIGRAM(S): at 21:35

## 2023-01-17 RX ADMIN — TAMSULOSIN HYDROCHLORIDE 0.4 MILLIGRAM(S): 0.4 CAPSULE ORAL at 21:33

## 2023-01-17 RX ADMIN — ATORVASTATIN CALCIUM 20 MILLIGRAM(S): 80 TABLET, FILM COATED ORAL at 21:35

## 2023-01-17 RX ADMIN — Medication 25 GRAM(S): at 11:38

## 2023-01-17 RX ADMIN — PANTOPRAZOLE SODIUM 40 MILLIGRAM(S): 20 TABLET, DELAYED RELEASE ORAL at 21:33

## 2023-01-17 NOTE — CONSULT NOTE ADULT - SUBJECTIVE AND OBJECTIVE BOX
Outpt cardiologist: Dr Bliss/Dutch, last followed w/ Dr Michele    HPI:  84 y/o M PMHx CAD s/p PCI to LAD in 8/2021, Afib on xarelto, HLD, HTN and severe MR s/o mitral clip in 9/22 presenting to the ED for low Hb on routine blood work.  Pt states that he got a call from his PCP Dr. Antoine this morning and was instructed to go to the ED due to low Hb.   Pt denies any hematemesis, melena, hematochezia or hematuria. Denies any abdominal pain, CP, SOB.   Recent admission in 11/2022 for same issue.    In the ED, BP 99/61, , temp 97.1 and spO2 99% on RA. Hb noted 5.6. 2 units PRBCs ordered.   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 s/p 2 PRBCs. Patient reports shortness of breath on exertion. Denies hematochezia, melena, hematuria, hematemesis, hemoptysis.    Denies hx stroke/TIA  Used to drink 1 glass of wine/day. Stopped 1-2 years ago.    PAST MEDICAL & SURGICAL HISTORY  HTN (hypertension)    High cholesterol    GERD (gastroesophageal reflux disease)    Atrial fibrillation    CAD (coronary artery disease)  s/p PCI    JONAH (iron deficiency anemia)    BPH (benign prostatic hyperplasia)    H/O CHF    S/P coronary angioplasty  s/p pci        FAMILY HISTORY:  FAMILY HISTORY:  FH: heart disease (Mother)        SOCIAL HISTORY:  Social History:      ALLERGIES:  No Known Allergies      MEDICATIONS:  aspirin enteric coated 81 milliGRAM(s) Oral daily  atorvastatin 20 milliGRAM(s) Oral at bedtime  chlorhexidine 2% Cloths 1 Application(s) Topical daily  cyanocobalamin Injectable 1000 MICROGram(s) IntraMuscular daily  digoxin     Tablet 250 MICROGram(s) Oral daily  folic acid 1 milliGRAM(s) Oral daily  magnesium sulfate  IVPB 2 Gram(s) IV Intermittent every 2 hours  pantoprazole  Injectable 40 milliGRAM(s) IV Push every 12 hours  potassium chloride   Powder 40 milliEquivalent(s) Oral once  tamsulosin 0.4 milliGRAM(s) Oral at bedtime  torsemide 20 milliGRAM(s) Oral daily    PRN:      HOME MEDICATIONS:  Home Medications:  aspirin 81 mg oral delayed release tablet: 1 tab(s) orally once a day (16 Jan 2023 17:57)  bisacodyl 5 mg oral delayed release tablet: 4 tab(s) orally once a day (at bedtime) (16 Jan 2023 17:57)  digoxin 250 mcg (0.25 mg) oral tablet: 1 tab(s) orally once a day (16 Jan 2023 17:57)  pantoprazole 40 mg oral delayed release tablet: 1 tab(s) orally 2 times a day (16 Jan 2023 17:57)      VITALS:   T(F): 98.6 (01-17 @ 07:00), Max: 98.6 (01-17 @ 07:00)  HR: 91 (01-17 @ 07:00) (82 - 101)  BP: 103/61 (01-17 @ 07:00) (96/65 - 110/53)  BP(mean): --  RR: 18 (01-17 @ 07:00) (18 - 18)  SpO2: 91% (01-17 @ 07:00) (91% - 99%)    I&O's Summary      REVIEW OF SYSTEMS:  CONSTITUTIONAL: weakness  HEENT: No visual changes, neck/ear pain  RESPIRATORY: No cough; sob on ambulation  CARDIOVASCULAR: See HPI  GASTROINTESTINAL: No abdominal pain. No nausea, vomiting, diarrhea; Denies hematochezia, melena, hematuria, hematemesis, hemoptysis  GENITOURINARY: No dysuria, frequency or hematuria  NEUROLOGICAL: No new focal deficits  SKIN: No new rashes    PHYSICAL EXAM:  General: Not in distress.  Non-toxic appearing.   HEENT: EOMI  Cardio: regular, S1, S2, no murmur  Pulm: B/L BS.  No wheezing / crackles / rales  Abdomen: Soft, non-tender, non-distended. Normoactive bowel sounds  Extremities: No edema b/l le  Neuro: A&O x3.  DANIELA not done as patient is in the hallway with no privacy accessible    LABS:                        7.6    7.03  )-----------( 365      ( 17 Jan 2023 08:43 )             26.6     01-17    139  |  101  |  8<L>  ----------------------------<  119<H>  3.5   |  29  |  0.8    Ca    8.7      17 Jan 2023 08:43  Mg     1.3     01-17    TPro  6.8  /  Alb  3.7  /  TBili  0.5  /  DBili  x   /  AST  19  /  ALT  9   /  AlkPhos  57  01-16    PT/INR - ( 16 Jan 2023 12:10 )   PT: 20.60 sec;   INR: 1.78 ratio         PTT - ( 16 Jan 2023 12:10 )  PTT:33.7 sec          Troponin trend:        COVID-19 PCR: NotDetec (27 Nov 2022 17:55)  COVID-19 PCR: NotDetec (22 Nov 2022 22:28)  COVID-19 PCR: NotDetec (15 Nov 2022 17:23)  COVID-19 PCR: NotDetec (10 Nov 2022 16:15)      RADIOLOGY:  -CXR:  -TTE: EF 43%, Mod decreased RV systolic function, mitral clip  -CCTA:  -STRESS TEST:  -CATHETERIZATION:  -OTHER:  ECG:      TELEMETRY EVENTS:   Outpt cardiologist: Dr Bliss/Dutch, last followed w/ Dr Michele    HPI:  84 y/o M PMHx CAD s/p PCI to LAD in 8/2021, Afib on xarelto, HLD, HTN and severe MR s/o mitral clip in 9/22 presenting to the ED for low Hb on routine blood work.  Pt states that he got a call from his PCP Dr. Antoine this morning and was instructed to go to the ED due to low Hb.   Pt denies any hematemesis, melena, hematochezia or hematuria. Denies any abdominal pain, CP, SOB.   Recent admission in 11/2022 for same issue.    In the ED, BP 99/61, , temp 97.1 and spO2 99% on RA. Hb noted 5.6. 2 units PRBCs ordered.   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 s/p 2 PRBCs. Patient reports shortness of breath on exertion. Denies hematochezia, melena, hematuria, hematemesis, hemoptysis.    Denies hx stroke/TIA  Used to drink 1 glass of wine/day. Stopped 1-2 years ago.    PAST MEDICAL & SURGICAL HISTORY  HTN (hypertension)    High cholesterol    GERD (gastroesophageal reflux disease)    Atrial fibrillation    CAD (coronary artery disease)  s/p PCI    JONAH (iron deficiency anemia)    BPH (benign prostatic hyperplasia)    H/O CHF    S/P coronary angioplasty  s/p pci        FAMILY HISTORY:  FH: heart disease (Mother)    ALLERGIES:  No Known Allergies      MEDICATIONS:  aspirin enteric coated 81 milliGRAM(s) Oral daily  atorvastatin 20 milliGRAM(s) Oral at bedtime  chlorhexidine 2% Cloths 1 Application(s) Topical daily  cyanocobalamin Injectable 1000 MICROGram(s) IntraMuscular daily  digoxin     Tablet 250 MICROGram(s) Oral daily  folic acid 1 milliGRAM(s) Oral daily  magnesium sulfate  IVPB 2 Gram(s) IV Intermittent every 2 hours  pantoprazole  Injectable 40 milliGRAM(s) IV Push every 12 hours  potassium chloride   Powder 40 milliEquivalent(s) Oral once  tamsulosin 0.4 milliGRAM(s) Oral at bedtime  torsemide 20 milliGRAM(s) Oral daily      Home Medications:  aspirin 81 mg oral delayed release tablet: 1 tab(s) orally once a day (16 Jan 2023 17:57)  bisacodyl 5 mg oral delayed release tablet: 4 tab(s) orally once a day (at bedtime) (16 Jan 2023 17:57)  digoxin 250 mcg (0.25 mg) oral tablet: 1 tab(s) orally once a day (16 Jan 2023 17:57)  pantoprazole 40 mg oral delayed release tablet: 1 tab(s) orally 2 times a day (16 Jan 2023 17:57)      VITALS:   T(F): 98.6 (01-17 @ 07:00), Max: 98.6 (01-17 @ 07:00)  HR: 91 (01-17 @ 07:00) (82 - 101)  BP: 103/61 (01-17 @ 07:00) (96/65 - 110/53)  BP(mean): --  RR: 18 (01-17 @ 07:00) (18 - 18)  SpO2: 91% (01-17 @ 07:00) (91% - 99%)      REVIEW OF SYSTEMS:  CONSTITUTIONAL: +weakness, +fatigue, no fever, weight loss  NECK: No pain or stiffness  RESPIRATORY: See HPI  CARDIOVASCULAR: See HPI  GASTROINTESTINAL: No abdominal/epigastric pain, nausea, vomiting, hematemesis, diarrhea, constipation, melena or hematochezia  GENITOURINARY: No dysuria, frequency, hematuria, incontinence  NEUROLOGICAL: No headaches, memory loss, loss of strength, numbness, tremors  SKIN: No itching, burning, rashes, lesions   ENDOCRINE: No heat/cold intolerance or hair loss  MUSCULOSKELETAL: No joint pain or swelling  HEME/LYMPH: No easy bruising or bleeding gums    PHYSICAL EXAM:  General: Not in distress.  Non-toxic appearing.   HEENT: EOMI  Cardio: regular, S1, S2, no murmur  Pulm: B/L BS.  No wheezing / crackles / rales  Abdomen: Soft, non-tender, non-distended  Extremities: No edema b/l le  Neuro: A&O x3.      LABS:                        7.6    7.03  )-----------( 365      ( 17 Jan 2023 08:43 )             26.6     01-17    139  |  101  |  8<L>  ----------------------------<  119<H>  3.5   |  29  |  0.8    Ca    8.7      17 Jan 2023 08:43  Mg     1.3     01-17    TPro  6.8  /  Alb  3.7  /  TBili  0.5  /  DBili  x   /  AST  19  /  ALT  9   /  AlkPhos  57  01-16    PT/INR - ( 16 Jan 2023 12:10 )   PT: 20.60 sec;   INR: 1.78 ratio      PTT - ( 16 Jan 2023 12:10 )  PTT:33.7 sec        < from: TTE Echo Complete w/o Contrast w/ Doppler (09.15.22 @ 10:15) >  Summary:   1. LV Ejection Fraction by Avelar's Method with a biplane EF of 43 %.   2. Mildly to moderately decreased global left ventricular systolic   function.   3. Mildly enlarged right ventricle.   4. Moderately reduced RV systolic function.   5. Edge to edge mitral valve repair with Mitral clip (XTW central and XT   lateral) Clips appear to eb stable. Mild residual mitral regurgitation   noted at the lateral location. Mean transmitrla pressure gradient is 3   mmHg at HR of 60 BPM.   6. Mild aortic regurgitation.   7. Mild tricuspid regurgitation.   8. Estimated pulmonary arterysystolic pressure is 43.1 mmHg assuming a   right atrial pressure of 15 mmHg, which is consistent with mild pulmonary   hypertension.   9. Moderately enlarged left atrium.  10. LA volume Index is 42.2 ml/m² ml/m2.  11. Moderate pleural effusion in the left lateral region.

## 2023-01-17 NOTE — PROGRESS NOTE ADULT - ASSESSMENT
Elderly male with complex cardiac issues on Xarelto 10mg instructed to come to the ER for low H/H in PMDs office  and noted to have severe pallor.  The pt was hosp 11-12/22 for same with EGD and colonoscopy.  The pt was restarted on Xarelto 10 mg and returns with drop in H/H req transfusions.    Severe anemia, probable recurrence of GIB  Hx of HTN, ASHD, CHF, CAD, sp LAD stent 8/21, severe MR, sp Mitral Valve clipping 9/22 chr AC with Xarelto  Hx of DLD  Hx of GERS, gastritis, duodenitis sp EGD 11/22, diverticulosis, colonic AVMs sp colonoscopy 11/22  Hx of ch anemia, Alpha THALL, iron def  Hx of OA, DDD, DJD  Hx of chronic lower ext edema, Venous Insuff  Hx of prostatism    on ad H/H 5.6/21, RBC 3.2,  MCV 66  transfuse 2 u PRBC  Hold Xarelto  PPI  monitor H/H, check Haptos, retic, ferritin and iron panel  may benefit from another round of Venofer infusion  Hematology consult any other etiology of anemia besides GIB  GI consult Dr Herbie Guzman pt had EGD and colonoscopy  11/25.22 any need for further investigation  Cardio consult Dr Bliss, pt with second severe episode of anemia, Xarelto on hold can we D/C anticoagulation given pt's cardiac Hx and recurrent GBI bleed   Elderly male with complex cardiac issues on Xarelto 10mg instructed to come to the ER for low H/H in PMDs office  and noted to have severe pallor.  The pt was hosp 11-12/22 for same with EGD and colonoscopy.  The pt was restarted on Xarelto 10 mg and returns with drop in H/H req transfusions.    Severe anemia, probable recurrence of GIB  Hx of HTN, ASHD, CHF, CAD, sp LAD stent 8/21, severe MR, sp Mitral Valve clipping 9/22 chr AC with Xarelto  Hx of DLD  Hx of GERS, gastritis, duodenitis sp EGD 11/22, diverticulosis, colonic AVMs sp colonoscopy 11/22  Hx of ch anemia, Alpha THALL, iron def  Hx of OA, DDD, DJD  Hx of chronic lower ext edema, Venous Insuff  Hx of prostatism    on ad H/H 5.6/21, RBC 3.2,  MCV 66  transfuse 2 u PRBC  Hold Xarelto  PPI  low Mg 1.3 replete and keep at 2 and above  monitor H/H, check Haptos, retic, ferritin and iron panel, B12 and folate  may benefit from another round of Venofer infusion  Hematology consult any other etiology of anemia besides GIB  GI consult Dr Herbie Guzman pt had EGD and colonoscopy  11/25.22 any need for further investigation  Cardio consult Dr Bliss, pt with second severe episode of anemia, Xarelto on hold can we D/C anticoagulation given pt's cardiac Hx and recurrent GBI bleed

## 2023-01-17 NOTE — CONSULT NOTE ADULT - ASSESSMENT
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 If concern for hemolysis, check Jayson [however could be complicated by recent transfusion]. 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%  # Mod Decreased RV systolic function  # CAD s/p PCI (LAD Nov 2021)  # Hx NSTEMI  # Severe MR s/p Mitral Clip (9/2022)  # paroxysmal AF (Xarelto 10 QD @ home)  # HTN  # DL  # Hx Duodenal angiectasia  # 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 If concern for hemolysis, check Jayson [however could be complicated by recent transfusion]. 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%  # Mod Decreased RV systolic function  # CAD s/p PCI (LAD Nov 2021)  # Hx NSTEMI  # Severe MR s/p Mitral Clip (9/2022)  # paroxysmal AF (Xarelto 10 QD @ home)  # HTN  # DL  # Hx Duodenal angiectasia  # 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  # Hx Antrum angiectasia s/p APC  # Hx nonbleeding duodenal ulcer  # Hx Duodenal AVMs  # Diverticulosis  # Hx JONAH  # Microcytic anemia  # Hx low haptoglobin  # Elevated INR    * CHADS VASc 5 -> 10% annual risk  * HAS BLED 4 -> 8.7% per 100 patient years [patient doesn't have a long life expectancy as per Charlson Comorbidity Index]  * DANIELA noted -ve by ED team as per H&P  * INR 1.8  * s/p 2 PRBCs    - Target Hb > 8  - Active T&S  - Anemia w/u (reticulocyte count, ferritin, iron, TIBC, haptoglobin, LDH, hemosiderin)  - If concern for hemolysis, check Jayson [however could be complicated by recent transfusion]  - f/u Heme/Onc  - c/w ASA  - c/w Digoxin  - hold Xarelto for now  - EP evaluation for ANGY closure  - f/u GI

## 2023-01-17 NOTE — CONSULT NOTE ADULT - SUBJECTIVE AND OBJECTIVE BOX
Gastroenterology Consultation:    Patient is a 83y old  Male who presents with a chief complaint of Anemia (17 Jan 2023 14:14)    Admitted on: 01-16-23    HPI:  82 y/o M PMHx CAD s/p PCI to LAD in 8/2021, Afib on xarelto, HLD, HTN and severe MR s/o mitral clip in 9/22 presenting to the ED for low Hb on routine blood work.  Pt states that he got a call from his PCP Dr. Antoine this morning and was instructed to go to the ED due to low Hb.   Pt denies any hematemesis, melena, or hematochezia Denies any abdominal pain  Recent admission in 11/2022 for anemia s/p EGD and colonoscopy reveling clean based ulcer in the stomach, and multiple AVms in the colon s/p APC    In the ED, Hb noted 5.6. 2 units. GI consulted for anemia. ROS is otherwise unremarkable.     Prior EGD/ Colonoscopy:  < from: EGD-Colonoscopy (11.25.22 @ 13:00) >  EGD Impressions:    Normal mucosa in the whole esophagus.    Erythema and erosions in the stomach compatible with erosive gastritis.  (Biopsy).    1.5 cm duodenal bulb ulcer along the posterior wall with no stigmata of  bleeding.    Erosions and erythema in the whole examined duodenumcompatible with erosive  duodenitis.    < end of copied text >  < from: EGD-Colonoscopy (11.25.22 @ 13:00) >  Colonoscopy Impressions:    Internal hemorrhoids.    Moderate diverticulosis of the sigmoid colon.    Angioectasia in the cecum. (Hemostasis).    Angioectasia in the colon.    The colon and TI were otherwise unremarkable.    < end of copied text >      PAST MEDICAL & SURGICAL HISTORY:  HTN (hypertension)      High cholesterol      GERD (gastroesophageal reflux disease)      Atrial fibrillation      CAD (coronary artery disease)  s/p PCI      JONAH (iron deficiency anemia)      BPH (benign prostatic hyperplasia)      H/O CHF      S/P coronary angioplasty  s/p pci    FAMILY HISTORY:  FH: heart disease (Mother)  NO FH of GI cancers       Social History:  Tobacco: denies   Alcohol: denies   Drugs: denies     Home Medications:  aspirin 81 mg oral delayed release tablet: 1 tab(s) orally once a day (16 Jan 2023 17:57)  bisacodyl 5 mg oral delayed release tablet: 4 tab(s) orally once a day (at bedtime) (16 Jan 2023 17:57)  digoxin 250 mcg (0.25 mg) oral tablet: 1 tab(s) orally once a day (16 Jan 2023 17:57)  pantoprazole 40 mg oral delayed release tablet: 1 tab(s) orally 2 times a day (16 Jan 2023 17:57)        MEDICATIONS  (STANDING):  aspirin enteric coated 81 milliGRAM(s) Oral daily  atorvastatin 20 milliGRAM(s) Oral at bedtime  chlorhexidine 2% Cloths 1 Application(s) Topical daily  cyanocobalamin Injectable 1000 MICROGram(s) IntraMuscular daily  digoxin     Tablet 250 MICROGram(s) Oral daily  folic acid 1 milliGRAM(s) Oral daily  magnesium sulfate  IVPB 2 Gram(s) IV Intermittent every 2 hours  pantoprazole  Injectable 40 milliGRAM(s) IV Push every 12 hours  potassium chloride   Powder 40 milliEquivalent(s) Oral once  tamsulosin 0.4 milliGRAM(s) Oral at bedtime  torsemide 20 milliGRAM(s) Oral daily    MEDICATIONS  (PRN):      Allergies  No Known Allergies      Review of Systems:   Constitutional:  No Fever, No Chills  ENT/Mouth:  No Hearing Changes,  No Difficulty Swallowing  Eyes:  No Eye Pain, No Vision Changes  Cardiovascular:  No Chest Pain, No Palpitations  Respiratory:  No Cough, No Dyspnea  Gastrointestinal:  As described in HPI  Musculoskeletal:  No Joint Swelling, No Back Pain  Skin:  No Skin Lesions, No Jaundice  Neuro:  No Syncope, No Dizziness  Heme/Lymph:  No Bruising, No Bleeding.          Physical Examination:  T(C): 37 (01-17-23 @ 07:00), Max: 37 (01-17-23 @ 07:00)  HR: 91 (01-17-23 @ 07:00) (82 - 96)  BP: 103/61 (01-17-23 @ 07:00) (96/65 - 104/55)  RR: 18 (01-17-23 @ 07:00) (18 - 18)  SpO2: 91% (01-17-23 @ 07:00) (91% - 98%)          GENERAL: AAOx3, no acute distress.  HEAD:  Atraumatic, Normocephalic  EYES: conjunctiva and sclera clear  NECK: Supple, no JVD or thyromegaly  CHEST/LUNG: Clear to auscultation bilaterally; No wheeze, rhonchi, or rales  HEART: Regular rate and rhythm; normal S1, S2, No murmurs.  ABDOMEN: Soft, nontender, nondistended; Bowel sounds present  NEUROLOGY: No asterixis or tremor.   SKIN: Intact, no jaundice        Data:                        7.6    7.03  )-----------( 365      ( 17 Jan 2023 08:43 )             26.6     Hgb Trend:  7.6  01-17-23 @ 08:43  5.6  01-16-23 @ 12:10      01-17    139  |  101  |  8<L>  ----------------------------<  119<H>  3.5   |  29  |  0.8    Ca    8.7      17 Jan 2023 08:43  Mg     1.3     01-17    TPro  6.8  /  Alb  3.7  /  TBili  0.5  /  DBili  x   /  AST  19  /  ALT  9   /  AlkPhos  57  01-16    Liver panel trend:  TBili 0.5   /   AST 19   /   ALT 9   /   AlkP 57   /   Tptn 6.8   /   Alb 3.7    /   DBili --      01-16      PT/INR - ( 16 Jan 2023 12:10 )   PT: 20.60 sec;   INR: 1.78 ratio         PTT - ( 16 Jan 2023 12:10 )  PTT:33.7 sec        Radiology:  < from: CT Abdomen and Pelvis w/ IV Cont (03.26.22 @ 18:13) >  IMPRESSION:    No CT evidence for acute intra-abdominal pathology. Diverticulosis   without evidence for diverticulitis.    Cardiomegaly with small pericardial and small left pleural effusions.    < end of copied text >

## 2023-01-17 NOTE — PROGRESS NOTE ADULT - SUBJECTIVE AND OBJECTIVE BOX
MIGEL MOYA 83y Male  MRN#: [id number]   Hospital Day: 1d    SUBJECTIVE  Patient is a 83y old Male who presents with a chief complaint of Anemia.  Currently admitted to medicine with the primary diagnosis of Low hemoglobin      INTERVAL HPI AND OVERNIGHT EVENTS:  Patient was examined and seen at bedside. This morning he is resting comfortably in bed and reports no issues or overnight events.    OBJECTIVE  PAST MEDICAL & SURGICAL HISTORY  HTN (hypertension)    High cholesterol    GERD (gastroesophageal reflux disease)    Atrial fibrillation    CAD (coronary artery disease)  s/p PCI    JONAH (iron deficiency anemia)    BPH (benign prostatic hyperplasia)    H/O CHF    S/P coronary angioplasty  s/p pci      ALLERGIES:  No Known Allergies    MEDICATIONS:  STANDING MEDICATIONS  aspirin enteric coated 81 milliGRAM(s) Oral daily  atorvastatin 20 milliGRAM(s) Oral at bedtime  chlorhexidine 2% Cloths 1 Application(s) Topical daily  digoxin     Tablet 250 MICROGram(s) Oral daily  magnesium sulfate  IVPB 2 Gram(s) IV Intermittent every 2 hours  pantoprazole  Injectable 40 milliGRAM(s) IV Push every 12 hours  potassium chloride   Powder 40 milliEquivalent(s) Oral once  tamsulosin 0.4 milliGRAM(s) Oral at bedtime  torsemide 20 milliGRAM(s) Oral daily    PRN MEDICATIONS      VITAL SIGNS: Last 24 Hours  T(C): 37 (17 Jan 2023 07:00), Max: 37 (17 Jan 2023 07:00)  T(F): 98.6 (17 Jan 2023 07:00), Max: 98.6 (17 Jan 2023 07:00)  HR: 91 (17 Jan 2023 07:00) (82 - 101)  BP: 103/61 (17 Jan 2023 07:00) (96/65 - 110/53)  BP(mean): --  RR: 18 (17 Jan 2023 07:00) (18 - 18)  SpO2: 91% (17 Jan 2023 07:00) (91% - 99%)    LABS:                        7.6    7.03  )-----------( 365      ( 17 Jan 2023 08:43 )             26.6     01-17    139  |  101  |  8<L>  ----------------------------<  119<H>  3.5   |  29  |  0.8    Ca    8.7      17 Jan 2023 08:43  Mg     1.3     01-17    TPro  6.8  /  Alb  3.7  /  TBili  0.5  /  DBili  x   /  AST  19  /  ALT  9   /  AlkPhos  57  01-16    PT/INR - ( 16 Jan 2023 12:10 )   PT: 20.60 sec;   INR: 1.78 ratio         PTT - ( 16 Jan 2023 12:10 )  PTT:33.7 sec        PHYSICAL EXAM:  CONSTITUTIONAL: No acute distress, AAOx3  HEAD: Atraumatic, normocephalic  EYES: EOM intact, PERRLA, conjunctiva and sclera clear  ENT: moist mucous membranes  PULMONARY: Clear to auscultation bilaterally  CARDIOVASCULAR: Regular rate and rhythm  GASTROINTESTINAL: Soft, non-tender, non-distended; bowel sounds present  MUSCULOSKELETAL: no edema  NEUROLOGY: non-focal  SKIN: warm and dry

## 2023-01-17 NOTE — PROGRESS NOTE ADULT - SUBJECTIVE AND OBJECTIVE BOX
MIGEL GRIFFIN 83y o W Male instructed to go to the ER for drop in H/H.  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  Subsequently pt restarted on Xarelto 10mg.  The pt had ff up c  PMD  and was noted to have severe pallor with low H/H.  The pt admits to gen weakness and SOB on exertion. The pt denies CP, palp, melena or BRBPR.  The pt is being ad for transfusion of PRBC.  Xarelto on hold.  The PMHx includes:  HTN, ASHD, CHF, CAD, sp stent LAD 8/21/Dr Bliss, DLD, MR, sp clipping,  9/22, DLD, GERD, gastritis, duodenitis, diverticulosis, AVMs, chronic anemia, alpha THALL, Fe def, OA, DJD, DDD, chronic leg edema, Venous insuff, BPH.    INTERVAL HPI/OVERNIGHT EVENTS: pt is sp 2 u of PRBCs, feels better, Xarelto on hold, PPI    MEDICATIONS  (STANDING):  aspirin enteric coated 81 milliGRAM(s) Oral daily  atorvastatin 20 milliGRAM(s) Oral at bedtime  chlorhexidine 2% Cloths 1 Application(s) Topical daily  digoxin     Tablet 250 MICROGram(s) Oral daily  magnesium sulfate  IVPB 2 Gram(s) IV Intermittent every 2 hours  pantoprazole  Injectable 40 milliGRAM(s) IV Push every 12 hours  potassium chloride   Powder 40 milliEquivalent(s) Oral once  tamsulosin 0.4 milliGRAM(s) Oral at bedtime  torsemide 20 milliGRAM(s) Oral daily    MEDICATIONS  (PRN):      Allergies    No Known Allergies    Intolerances        REVIEW OF SYSTEMS      General: gen weakness, easy fatigue	    Skin/Breast: gen pallor, no rash    Respiratory and Thorax: SOB naomi on exertion  	  Cardiovascular:	denies CP    Gastrointestinal:	denies abd pain/N/V/hematemesis/melena/BRBPR    Genitourinary: denies dysuria    Musculoskeletal:	 back and limb pain	    Hematology/Lymphatics:	  " I always have anemia"	    Vital Signs Last 24 Hrs  T(C): 37 (17 Jan 2023 07:00), Max: 37 (17 Jan 2023 07:00)  T(F): 98.6 (17 Jan 2023 07:00), Max: 98.6 (17 Jan 2023 07:00)  HR: 91 (17 Jan 2023 07:00) (82 - 96)  BP: 103/61 (17 Jan 2023 07:00) (96/65 - 110/53)  BP(mean): --  RR: 18 (17 Jan 2023 07:00) (18 - 18)  SpO2: 91% (17 Jan 2023 07:00) (91% - 98%)    Parameters below as of 17 Jan 2023 00:05  Patient On (Oxygen Delivery Method): room air        PHYSICAL EXAM:      Constitutional: A&O, verbal, elderly, thin and frail and chr ill looking but NAD, gen pallor    Eyes: pale conjunctivae    ENMT: dental defects    Neck: supple, no JVD/bruits/stridor    Back: skyler, + perilumbar mm spasm and tenderness on palp    Respiratory: shallow resp, no wheezing/crackles/rales    Cardiovascular: s1s2 reg, II/VI WILLIE    Gastrointestinal: globose, soft and benign, no organomegaly, + BS    Genitourinary: no ludwig    Rectal: as per ER records neg     Extremities: moves all ext, + arthritic changes, no edema    Vascular: dec pedal pulses    Neurological: nonfocal    Skin: gen pallor, severe palmar pallor, no rash    Lymph Nodes: not enlarged    Musculoskeletal: thin mm mass    Psychiatric: stable        LABS:                        7.6    7.03  )-----------( 365      on ad H/H 5.6/21, RBC 3.2, MCV 66             26.6     01-17    139  |  101  |  8<L>  ----------------------------<  119<H>  3.5   |  29  |  0.8  GFR 85  Ca    8.7      17 Jan 2023 08:43  Mg     1.3     01-17  )O-  Covid neg, FLU A/B neg, RSV neg    TPro  6.8  /  Alb  3.7  /  TBili  0.5  /  DBili  x   /  AST  19  /  ALT  9   /  AlkPhos  57  01-16    PT/INR - ( 16 Jan 2023 12:10 )   PT: 20.60 sec;   INR: 1.78 ratio         PTT - ( 16 Jan 2023 12:10 )  PTT:33.7 sec      RADIOLOGY & ADDITIONAL TESTS:     MIGEL GRIFFIN 83y o W Male instructed to go to the ER for drop in H/H.  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  Subsequently pt restarted on Xarelto 10mg.  The pt had ff up c  PMD  and was noted to have severe pallor with low H/H.  The pt admits to gen weakness and SOB on exertion. The pt denies CP, palp, melena or BRBPR.  The pt is being ad for transfusion of PRBC.  Xarelto on hold.  The PMHx includes:  HTN, ASHD, CHF, CAD, sp stent LAD 8/21/Dr Bliss, DLD, MR, sp clipping,  9/22, DLD, GERD, gastritis, duodenitis, diverticulosis, AVMs, chronic anemia, alpha THALL, Fe def, OA, DJD, DDD, chronic leg edema, Venous insuff, BPH.    INTERVAL HPI/OVERNIGHT EVENTS: pt is sp 2 u of PRBCs, feels better, Xarelto on hold, PPI, low Mg 1.3, replete and keep 2 and above    MEDICATIONS  (STANDING):  aspirin enteric coated 81 milliGRAM(s) Oral daily  atorvastatin 20 milliGRAM(s) Oral at bedtime  chlorhexidine 2% Cloths 1 Application(s) Topical daily  digoxin     Tablet 250 MICROGram(s) Oral daily  magnesium sulfate  IVPB 2 Gram(s) IV Intermittent every 2 hours  pantoprazole  Injectable 40 milliGRAM(s) IV Push every 12 hours  potassium chloride   Powder 40 milliEquivalent(s) Oral once  tamsulosin 0.4 milliGRAM(s) Oral at bedtime  torsemide 20 milliGRAM(s) Oral daily    MEDICATIONS  (PRN):      Allergies    No Known Allergies    Intolerances        REVIEW OF SYSTEMS      General: gen weakness, easy fatigue	    Skin/Breast: gen pallor, no rash    Respiratory and Thorax: SOB naomi on exertion  	  Cardiovascular:	denies CP    Gastrointestinal:	denies abd pain/N/V/hematemesis/melena/BRBPR    Genitourinary: denies dysuria    Musculoskeletal:	 back and limb pain	    Hematology/Lymphatics:	  " I always have anemia"	    Vital Signs Last 24 Hrs  T(C): 37 (17 Jan 2023 07:00), Max: 37 (17 Jan 2023 07:00)  T(F): 98.6 (17 Jan 2023 07:00), Max: 98.6 (17 Jan 2023 07:00)  HR: 91 (17 Jan 2023 07:00) (82 - 96)  BP: 103/61 (17 Jan 2023 07:00) (96/65 - 110/53)  BP(mean): --  RR: 18 (17 Jan 2023 07:00) (18 - 18)  SpO2: 91% (17 Jan 2023 07:00) (91% - 98%)    Parameters below as of 17 Jan 2023 00:05  Patient On (Oxygen Delivery Method): room air        PHYSICAL EXAM:      Constitutional: A&O, verbal, elderly, thin and frail and chr ill looking but NAD, gen pallor    Eyes: pale conjunctivae    ENMT: dental defects    Neck: supple, no JVD/bruits/stridor    Back: skyler, + perilumbar mm spasm and tenderness on palp    Respiratory: shallow resp, no wheezing/crackles/rales    Cardiovascular: s1s2 reg, II/VI WILLIE    Gastrointestinal: globose, soft and benign, no organomegaly, + BS    Genitourinary: no ludwig    Rectal: as per ER records neg     Extremities: moves all ext, + arthritic changes, no edema    Vascular: dec pedal pulses    Neurological: nonfocal    Skin: gen pallor, severe palmar pallor, no rash    Lymph Nodes: not enlarged    Musculoskeletal: thin mm mass    Psychiatric: stable        LABS:                        7.6    7.03  )-----------( 365      on ad H/H 5.6/21, RBC 3.2, MCV 66             26.6     01-17    139  |  101  |  8<L>  ----------------------------<  119<H>  3.5   |  29  |  0.8  GFR 85  Ca    8.7      17 Jan 2023 08:43  Mg     1.3     01-17  O-  Covid neg, FLU A/B neg, RSV neg    TPro  6.8  /  Alb  3.7  /  TBili  0.5  /  DBili  x   /  AST  19  /  ALT  9   /  AlkPhos  57  01-16    PT/INR - ( 16 Jan 2023 12:10 )   PT: 20.60 sec;   INR: 1.78 ratio         PTT - ( 16 Jan 2023 12:10 )  PTT:33.7 sec      RADIOLOGY & ADDITIONAL TESTS:     MIGEL GRIFFIN 83y o W Male instructed to go to the ER for drop in H/H.  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  Subsequently pt restarted on Xarelto 10mg.  The pt had ff up c  PMD  and was noted to have severe pallor with low H/H.  The pt admits to gen weakness and SOB on exertion. The pt denies CP, palp, melena or BRBPR.  The pt is being ad for transfusion of PRBC.  Xarelto on hold.  The PMHx includes:  HTN, ASHD, CHF, CAD, sp stent LAD 8/21/Dr Bliss, DLD, MR, sp clipping,  9/22, DLD, GERD, gastritis, duodenitis, diverticulosis, AVMs, chronic anemia, alpha THALL, Fe def, OA, DJD, DDD, chronic leg edema, Venous insuff, BPH.    INTERVAL HPI/OVERNIGHT EVENTS: pt is sp 2 u of PRBCs, feels better post transfusion after transfusion,  Xarelto on hold, still on ASA, PPI, low Mg 1.3, replete and keep 2 and above    MEDICATIONS  (STANDING):  aspirin enteric coated 81 milliGRAM(s) Oral daily  atorvastatin 20 milliGRAM(s) Oral at bedtime  chlorhexidine 2% Cloths 1 Application(s) Topical daily  digoxin     Tablet 250 MICROGram(s) Oral daily  magnesium sulfate  IVPB 2 Gram(s) IV Intermittent every 2 hours  pantoprazole  Injectable 40 milliGRAM(s) IV Push every 12 hours  potassium chloride   Powder 40 milliEquivalent(s) Oral once  tamsulosin 0.4 milliGRAM(s) Oral at bedtime  torsemide 20 milliGRAM(s) Oral daily    MEDICATIONS  (PRN):      Allergies    No Known Allergies    Intolerances        REVIEW OF SYSTEMS      General: gen weakness, easy fatigue	    Skin/Breast: gen pallor, no rash    Respiratory and Thorax: SOB naomi on exertion  	  Cardiovascular:	denies CP    Gastrointestinal:	denies abd pain/N/V/hematemesis/melena/BRBPR    Genitourinary: denies dysuria    Musculoskeletal:	 back and limb pain	    Hematology/Lymphatics:	  " I always have anemia"	    Vital Signs Last 24 Hrs  T(C): 37 (17 Jan 2023 07:00), Max: 37 (17 Jan 2023 07:00)  T(F): 98.6 (17 Jan 2023 07:00), Max: 98.6 (17 Jan 2023 07:00)  HR: 91 (17 Jan 2023 07:00) (82 - 96)  BP: 103/61 (17 Jan 2023 07:00) (96/65 - 110/53)  BP(mean): --  RR: 18 (17 Jan 2023 07:00) (18 - 18)  SpO2: 91% (17 Jan 2023 07:00) (91% - 98%)    Parameters below as of 17 Jan 2023 00:05  Patient On (Oxygen Delivery Method): room air        PHYSICAL EXAM:      Constitutional: A&O, verbal, elderly, thin and frail and chr ill looking but NAD, gen pallor    Eyes: pale conjunctivae    ENMT: dental defects    Neck: supple, no JVD/bruits/stridor    Back: skyler, + perilumbar mm spasm and tenderness on palp    Respiratory: shallow resp, no wheezing/crackles/rales    Cardiovascular: s1s2 reg, II/VI WILLIE    Gastrointestinal: globose, soft and benign, no organomegaly, + BS    Genitourinary: no ludwig    Rectal: as per ER records neg     Extremities: moves all ext, + arthritic changes, no edema    Vascular: dec pedal pulses    Neurological: nonfocal    Skin: gen pallor, severe palmar pallor, no rash    Lymph Nodes: not enlarged    Musculoskeletal: thin mm mass    Psychiatric: stable        LABS:                        7.6    7.03  )-----------( 365      on ad H/H 5.6/21, RBC 3.2, MCV 66             26.6     01-17    139  |  101  |  8<L>  ----------------------------<  119<H>  3.5   |  29  |  0.8  GFR 85  Ca    8.7      17 Jan 2023 08:43  Mg     1.3     01-17  O-  Covid neg, FLU A/B neg, RSV neg    TPro  6.8  /  Alb  3.7  /  TBili  0.5  /  DBili  x   /  AST  19  /  ALT  9   /  AlkPhos  57  01-16    PT/INR - ( 16 Jan 2023 12:10 )   PT: 20.60 sec;   INR: 1.78 ratio         PTT - ( 16 Jan 2023 12:10 )  PTT:33.7 sec      RADIOLOGY & ADDITIONAL TESTS:

## 2023-01-17 NOTE — PROGRESS NOTE ADULT - ASSESSMENT
84 y/o M PMHx CAD s/p PCI to LAD in 8/2021, Afib on xarelto, HLD, HTN and severe MR s/o mitral clip in 9/22 presenting to the ED for low Hb on routine blood work.    #Acute on chronic anemia, r/o GIB  - Hb 5.6 on admission (baseline ~8) s/p 2 u pRBC went up to 7.5  - Pt hemodynamically stable  - EGD/Colonoscopy in 11/2022: small non-bleeding angioectasia in the 2nd part of duodenum underwent hemostasis, A single non-bleeding 1 cm ulcer was found in the posterior bulb. 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 Hemostasis was performed,  One Endoclip was placed to prevent bleeding.  - DANIELA reported negative by ED - pt denies bloody stools  - Xarelto held on admission, continue to hold  - Keep active T&S, Transfuse to keep Hb >8  - C/w PPI BID  - GI consult pending      #Paroxysmal Afib  #Mitral regurg s/p clip   - holding xarelto in setting of anemia likely 2/2 GI bleed  - c/w digoxin 250 daily    #HFrEF  #CAD  #HTN  - c/w simvastatin 20 daily  - c/w torsemide 20 mg daily     # Misc  DVT ppx: SCD - holding AC  GI ppx: protonix  Diet: clear  Activity: IAT   Code Status: Full  Pending: GI FU

## 2023-01-18 LAB
ALBUMIN SERPL ELPH-MCNC: 3.9 G/DL — SIGNIFICANT CHANGE UP (ref 3.5–5.2)
ALP SERPL-CCNC: 70 U/L — SIGNIFICANT CHANGE UP (ref 30–115)
ALT FLD-CCNC: 11 U/L — SIGNIFICANT CHANGE UP (ref 0–41)
ANION GAP SERPL CALC-SCNC: 10 MMOL/L — SIGNIFICANT CHANGE UP (ref 7–14)
AST SERPL-CCNC: 17 U/L — SIGNIFICANT CHANGE UP (ref 0–41)
BASOPHILS # BLD AUTO: 0.03 K/UL — SIGNIFICANT CHANGE UP (ref 0–0.2)
BASOPHILS NFR BLD AUTO: 0.5 % — SIGNIFICANT CHANGE UP (ref 0–1)
BILIRUB SERPL-MCNC: 1.3 MG/DL — HIGH (ref 0.2–1.2)
BUN SERPL-MCNC: 7 MG/DL — LOW (ref 10–20)
CALCIUM SERPL-MCNC: 9.1 MG/DL — SIGNIFICANT CHANGE UP (ref 8.4–10.5)
CHLORIDE SERPL-SCNC: 99 MMOL/L — SIGNIFICANT CHANGE UP (ref 98–110)
CO2 SERPL-SCNC: 30 MMOL/L — SIGNIFICANT CHANGE UP (ref 17–32)
CREAT SERPL-MCNC: 0.8 MG/DL — SIGNIFICANT CHANGE UP (ref 0.7–1.5)
EGFR: 88 ML/MIN/1.73M2 — SIGNIFICANT CHANGE UP
EOSINOPHIL # BLD AUTO: 0.18 K/UL — SIGNIFICANT CHANGE UP (ref 0–0.7)
EOSINOPHIL NFR BLD AUTO: 2.9 % — SIGNIFICANT CHANGE UP (ref 0–8)
FERRITIN SERPL-MCNC: 15 NG/ML — LOW (ref 30–400)
GLUCOSE SERPL-MCNC: 94 MG/DL — SIGNIFICANT CHANGE UP (ref 70–99)
HAPTOGLOB SERPL-MCNC: 27 MG/DL — LOW (ref 34–200)
HCT VFR BLD CALC: 31.6 % — LOW (ref 42–52)
HGB BLD-MCNC: 9.3 G/DL — LOW (ref 14–18)
IMM GRANULOCYTES NFR BLD AUTO: 0.5 % — HIGH (ref 0.1–0.3)
IRON SATN MFR SERPL: 20 UG/DL — LOW (ref 35–150)
IRON SATN MFR SERPL: 7 % — LOW (ref 15–50)
LYMPHOCYTES # BLD AUTO: 1.04 K/UL — LOW (ref 1.2–3.4)
LYMPHOCYTES # BLD AUTO: 16.6 % — LOW (ref 20.5–51.1)
MAGNESIUM SERPL-MCNC: 2.1 MG/DL — SIGNIFICANT CHANGE UP (ref 1.8–2.4)
MCHC RBC-ENTMCNC: 20.5 PG — LOW (ref 27–31)
MCHC RBC-ENTMCNC: 29.4 G/DL — LOW (ref 32–37)
MCV RBC AUTO: 69.6 FL — LOW (ref 80–94)
MONOCYTES # BLD AUTO: 0.64 K/UL — HIGH (ref 0.1–0.6)
MONOCYTES NFR BLD AUTO: 10.2 % — HIGH (ref 1.7–9.3)
NEUTROPHILS # BLD AUTO: 4.35 K/UL — SIGNIFICANT CHANGE UP (ref 1.4–6.5)
NEUTROPHILS NFR BLD AUTO: 69.3 % — SIGNIFICANT CHANGE UP (ref 42.2–75.2)
NRBC # BLD: 0 /100 WBCS — SIGNIFICANT CHANGE UP (ref 0–0)
PLATELET # BLD AUTO: 332 K/UL — SIGNIFICANT CHANGE UP (ref 130–400)
POTASSIUM SERPL-MCNC: 3.3 MMOL/L — LOW (ref 3.5–5)
POTASSIUM SERPL-SCNC: 3.3 MMOL/L — LOW (ref 3.5–5)
PROT SERPL-MCNC: 7.2 G/DL — SIGNIFICANT CHANGE UP (ref 6–8)
RBC # BLD: 4.54 M/UL — LOW (ref 4.7–6.1)
RBC # BLD: 4.54 M/UL — LOW (ref 4.7–6.1)
RBC # FLD: 25.4 % — HIGH (ref 11.5–14.5)
RETICS #: 85.9 K/UL — SIGNIFICANT CHANGE UP (ref 25–125)
RETICS/RBC NFR: 1.9 % — HIGH (ref 0.5–1.5)
SODIUM SERPL-SCNC: 139 MMOL/L — SIGNIFICANT CHANGE UP (ref 135–146)
TIBC SERPL-MCNC: 296 UG/DL — SIGNIFICANT CHANGE UP (ref 220–430)
UIBC SERPL-MCNC: 276 UG/DL — SIGNIFICANT CHANGE UP (ref 110–370)
WBC # BLD: 6.27 K/UL — SIGNIFICANT CHANGE UP (ref 4.8–10.8)
WBC # FLD AUTO: 6.27 K/UL — SIGNIFICANT CHANGE UP (ref 4.8–10.8)

## 2023-01-18 PROCEDURE — 99223 1ST HOSP IP/OBS HIGH 75: CPT

## 2023-01-18 PROCEDURE — 99222 1ST HOSP IP/OBS MODERATE 55: CPT

## 2023-01-18 PROCEDURE — 99233 SBSQ HOSP IP/OBS HIGH 50: CPT

## 2023-01-18 RX ORDER — SOD SULF/SODIUM/NAHCO3/KCL/PEG
4000 SOLUTION, RECONSTITUTED, ORAL ORAL ONCE
Refills: 0 | Status: COMPLETED | OUTPATIENT
Start: 2023-01-18 | End: 2023-01-18

## 2023-01-18 RX ORDER — POTASSIUM CHLORIDE 20 MEQ
40 PACKET (EA) ORAL ONCE
Refills: 0 | Status: COMPLETED | OUTPATIENT
Start: 2023-01-18 | End: 2023-01-18

## 2023-01-18 RX ADMIN — Medication 81 MILLIGRAM(S): at 11:47

## 2023-01-18 RX ADMIN — Medication 250 MICROGRAM(S): at 05:34

## 2023-01-18 RX ADMIN — Medication 10 MILLIGRAM(S): at 22:59

## 2023-01-18 RX ADMIN — Medication 40 MILLIEQUIVALENT(S): at 11:55

## 2023-01-18 RX ADMIN — Medication 4000 MILLILITER(S): at 18:08

## 2023-01-18 RX ADMIN — Medication 1 MILLIGRAM(S): at 11:47

## 2023-01-18 RX ADMIN — PANTOPRAZOLE SODIUM 40 MILLIGRAM(S): 20 TABLET, DELAYED RELEASE ORAL at 05:34

## 2023-01-18 RX ADMIN — ATORVASTATIN CALCIUM 20 MILLIGRAM(S): 80 TABLET, FILM COATED ORAL at 22:58

## 2023-01-18 RX ADMIN — Medication 10 MILLIGRAM(S): at 18:06

## 2023-01-18 RX ADMIN — PANTOPRAZOLE SODIUM 40 MILLIGRAM(S): 20 TABLET, DELAYED RELEASE ORAL at 18:10

## 2023-01-18 RX ADMIN — Medication 20 MILLIGRAM(S): at 05:34

## 2023-01-18 RX ADMIN — CHLORHEXIDINE GLUCONATE 1 APPLICATION(S): 213 SOLUTION TOPICAL at 11:48

## 2023-01-18 RX ADMIN — PREGABALIN 1000 MICROGRAM(S): 225 CAPSULE ORAL at 11:47

## 2023-01-18 RX ADMIN — TAMSULOSIN HYDROCHLORIDE 0.4 MILLIGRAM(S): 0.4 CAPSULE ORAL at 22:59

## 2023-01-18 NOTE — PROGRESS NOTE ADULT - ASSESSMENT
Elderly male with complex cardiac issues on Xarelto 10mg instructed to come to the ER for low H/H in PMDs office  and noted to have severe pallor.  The pt was hosp 11-12/22 for same with EGD and colonoscopy.  The pt was restarted on Xarelto 10 mg and returns with drop in H/H req transfusions.    Severe anemia, probable recurrence of GIB  Hx of HTN, ASHD, CHF, CAD, sp LAD stent 8/21, severe MR, sp Mitral Valve clipping 9/22 chr AC with Xarelto  Hx of DLD  Hx of GERD, gastritis, duodenitis sp EGD 11/22, diverticulosis, colonic AVMs sp colonoscopy 11/22  Hx of ch anemia, Alpha THALL, iron def, GIB  Hx of OA, DDD, DJD  Hx of chronic lower ext edema, Venous Insuff  Hx of prostatism    pt is sp 2 u of PRBC, H/H 9.3/31.6,  GI evluated pt and recommend repeat EGD/colonoscopy tomorrow, clear liq today, prep tonight  evaluated by cardio and the que for cardio is can we D/C AC as this is 3rd severe drop in H/H, recommend EPS consult for Watchman procedure  EPS consult  Hem, pt known to them, prior w/up for hemlysis is neg, venofer 200mg  x 2     on ad H/H 5.6/21, RBC 3.2,  MCV 66  transfuse 2 u PRBC  Hold Xarelto  PPI  low Mg 1.3 replete and keep at 2 and above, low K 3.3 replete  monitor H/H, check Haptos, retic, ferritin and iron panel, B12 and folate  Rehab  [facility]

## 2023-01-18 NOTE — CONSULT NOTE ADULT - SUBJECTIVE AND OBJECTIVE BOX
HPI:  84 y/o M PMHx CAD s/p PCI to LAD in 8/2021, Afib on xarelto, HLD, HTN and severe MR s/o mitral clip in 9/22 presenting to the ED for low Hb on routine blood work.  Pt states that he got a call from his PCP Dr. Antoine this morning and was instructed to go to the ED due to low Hb.   Pt denies any hematemesis, melena, hematochezia or hematuria. Denies any abdominal pain, CP, SOB.   Recent admission in 11/2022 for same issue.    In the ED, BP 99/61, , temp 97.1 and spO2 99% on RA. Hb noted 5.6. 2 units PRBCs ordered.    Pt seen and examined at bedside. Pt presenting with acute anemia secondary to GI bleed. Has multiple episodes of GI bleed requiring transfusions on Xarelto for Afib.    PAST MEDICAL & SURGICAL HISTORY  HTN (hypertension)    High cholesterol    GERD (gastroesophageal reflux disease)    Atrial fibrillation    CAD (coronary artery disease)  s/p PCI    JONAH (iron deficiency anemia)    BPH (benign prostatic hyperplasia)    H/O CHF    S/P coronary angioplasty  s/p pci        FAMILY HISTORY:  FAMILY HISTORY:  FH: heart disease (Mother)        SOCIAL HISTORY:  []smoker  []Alcohol  []Drug    ALLERGIES:  No Known Allergies      MEDICATIONS:  MEDICATIONS  (STANDING):  aspirin enteric coated 81 milliGRAM(s) Oral daily  atorvastatin 20 milliGRAM(s) Oral at bedtime  bisacodyl Suppository 10 milliGRAM(s) Rectal <User Schedule>  chlorhexidine 2% Cloths 1 Application(s) Topical daily  cyanocobalamin Injectable 1000 MICROGram(s) IntraMuscular daily  digoxin     Tablet 250 MICROGram(s) Oral daily  folic acid 1 milliGRAM(s) Oral daily  pantoprazole  Injectable 40 milliGRAM(s) IV Push every 12 hours  polyethylene glycol/electrolyte Solution. 4000 milliLiter(s) Oral once  potassium chloride   Powder 40 milliEquivalent(s) Oral once  tamsulosin 0.4 milliGRAM(s) Oral at bedtime  torsemide 20 milliGRAM(s) Oral daily    MEDICATIONS  (PRN):      HOME MEDICATIONS:  Home Medications:  aspirin 81 mg oral delayed release tablet: 1 tab(s) orally once a day (16 Jan 2023 17:57)  bisacodyl 5 mg oral delayed release tablet: 4 tab(s) orally once a day (at bedtime) (16 Jan 2023 17:57)  digoxin 250 mcg (0.25 mg) oral tablet: 1 tab(s) orally once a day (16 Jan 2023 17:57)  pantoprazole 40 mg oral delayed release tablet: 1 tab(s) orally 2 times a day (16 Jan 2023 17:57)      VITALS:   T(F): 96.7 (01-18 @ 15:30), Max: 98.9 (01-18 @ 00:42)  HR: 84 (01-18 @ 15:30) (82 - 101)  BP: 95/59 (01-18 @ 15:30) (95/59 - 113/57)  BP(mean): --  RR: 18 (01-18 @ 15:30) (18 - 18)  SpO2: 97% (01-18 @ 05:00) (91% - 99%)    I&O's Summary    17 Jan 2023 07:01  -  18 Jan 2023 07:00  --------------------------------------------------------  IN: 0 mL / OUT: 1600 mL / NET: -1600 mL        REVIEW OF SYSTEMS:  CONSTITUTIONAL: No weakness, fevers or chills  EYES: No visual changes  ENT: No vertigo or throat pain   NECK: No pain or stiffness  RESPIRATORY: No cough, wheezing, hemoptysis; No shortness of breath  CARDIOVASCULAR: No chest pain or palpitations  GASTROINTESTINAL: No abdominal or epigastric pain. No nausea, vomiting, or hematemesis; No diarrhea or constipation. No melena or hematochezia.  GENITOURINARY: No dysuria, frequency or hematuria  NEUROLOGICAL: No numbness or weakness  SKIN: No itching, no rashes  MSK: no    PHYSICAL EXAM:  NEURO: patient is awake , alert and oriented  GEN: Not in acute distress  NECK: no thyroid enlargement, no JVD  LUNGS: Clear to auscultation bilaterally   CARDIOVASCULAR: S1/S2 present, RRR , no murmurs or rubs, no carotid bruits,  + PP bilaterally  ABD: Soft, non-tender, non-distended, +BS  EXT: No JOANNE  SKIN: Intact    LABS:                        9.3    6.27  )-----------( 332      ( 18 Jan 2023 06:30 )             31.6     01-18    139  |  99  |  7<L>  ----------------------------<  94  3.3<L>   |  30  |  0.8    Ca    9.1      18 Jan 2023 06:30  Mg     2.1     01-18    TPro  7.2  /  Alb  3.9  /  TBili  1.3<H>  /  DBili  x   /  AST  17  /  ALT  11  /  AlkPhos  70  01-18              Troponin trend:            RADIOLOGY:  -CXR:  -TTE:  < from: TTE Echo Complete w/o Contrast w/ Doppler (09.15.22 @ 10:15) >   1. LV Ejection Fraction by Avelar's Method with a biplane EF of 43 %.   2. Mildly to moderately decreased global left ventricular systolic   function.   3. Mildly enlarged right ventricle.   4. Moderately reduced RV systolic function.   5. Edge to edge mitral valve repair with Mitral clip (XTW central and XT   lateral) Clips appear to eb stable. Mild residual mitral regurgitation   noted at the lateral location. Mean transmitrla pressure gradient is 3   mmHg at HR of 60 BPM.   6. Mild aortic regurgitation.   7. Mild tricuspid regurgitation.   8. Estimated pulmonary arterysystolic pressure is 43.1 mmHg assuming a   right atrial pressure of 15 mmHg, which is consistent with mild pulmonary   hypertension.   9. Moderately enlarged left atrium.  10. LA volume Index is 42.2 ml/m² ml/m2.  11. Moderate pleural effusion in the left lateral region.    < end of copied text >    -CCTA:  -STRESS TEST:  -CATHETERIZATION:    ECG:    TELEMETRY EVENTS:

## 2023-01-18 NOTE — PROGRESS NOTE ADULT - ASSESSMENT
84 y/o M PMHx CAD s/p PCI to LAD in 8/2021, Afib on xarelto, HLD, HTN and severe MR s/o mitral clip in 9/22 presenting to the ED for low Hb on routine blood work.    #Acute on chronic anemia, r/o GIB  - Hb 5.6 on admission (baseline ~8) s/p 2 u pRBC went up to 7.5  - Pt hemodynamically stable  - EGD/Colonoscopy in 11/2022: small non-bleeding angioectasia in the 2nd part of duodenum underwent hemostasis, A single non-bleeding 1 cm ulcer was found in the posterior bulb. 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 Hemostasis was performed,  One Endoclip was placed to prevent bleeding.  - DANIELA reported negative by ED - pt denies bloody stools  - Xarelto held on admission, continue to hold  - Keep active T&S, Transfuse to keep Hb >8  - C/w PPI BID  - Prep for EGD/Colonoscopy 1/18      #Paroxysmal Afib  #Mitral regurg s/p clip   - holding xarelto in setting of anemia likely 2/2 GI bleed  - c/w digoxin 250 daily    #HFrEF  #CAD  #HTN  - c/w simvastatin 20 daily  - c/w torsemide 20 mg daily     # Misc  DVT ppx: SCD - holding AC  GI ppx: protonix  Diet: clear  Activity: IAT   Code Status: Full  Pending: EGD/Colonoscopy

## 2023-01-18 NOTE — PROGRESS NOTE ADULT - SUBJECTIVE AND OBJECTIVE BOX
MIGEL MOYA 83y Male  MRN#: [id number]   Hospital Day: 2d    SUBJECTIVE  Patient is a 83y old Male who presents with a chief complaint of Anemia, Fe def of GI loss, 11/22 EGD/colonoscopy + AVMs, complex cardiac Hx, on AC/Xarelto 10mg (18 Jan 2023 11:33)  Currently admitted to medicine with the primary diagnosis of Low hemoglobin    INTERVAL HPI AND OVERNIGHT EVENTS:  Patient was examined and seen at bedside. This morning he is resting comfortably in bed and reports no issues or overnight events.    REVIEW OF SYMPTOMS:  CONSTITUTIONAL: No weakness, fevers or chills; No headaches  EYES: No visual changes, eye pain, or discharge  ENT: No vertigo; No ear pain or change in hearing; No sore throat or difficulty swallowing  NECK: No pain or stiffness  RESPIRATORY: No cough, wheezing, or hemoptysis; No shortness of breath  CARDIOVASCULAR: No chest pain or palpitations  GASTROINTESTINAL: No abdominal or epigastric pain; No nausea, vomiting, or hematemesis; No diarrhea or constipation; No melena or hematochezia  GENITOURINARY: No dysuria, frequency or hematuria  MUSCULOSKELETAL: No joint pain, no muscle pain, no weakness  NEUROLOGICAL: No numbness or weakness  SKIN: No itching or rashes    OBJECTIVE  PAST MEDICAL & SURGICAL HISTORY  HTN (hypertension)    High cholesterol    GERD (gastroesophageal reflux disease)    Atrial fibrillation    CAD (coronary artery disease)  s/p PCI    JONAH (iron deficiency anemia)    BPH (benign prostatic hyperplasia)    H/O CHF    S/P coronary angioplasty  s/p pci      ALLERGIES:  No Known Allergies    MEDICATIONS:  STANDING MEDICATIONS  aspirin enteric coated 81 milliGRAM(s) Oral daily  atorvastatin 20 milliGRAM(s) Oral at bedtime  chlorhexidine 2% Cloths 1 Application(s) Topical daily  cyanocobalamin Injectable 1000 MICROGram(s) IntraMuscular daily  digoxin     Tablet 250 MICROGram(s) Oral daily  folic acid 1 milliGRAM(s) Oral daily  pantoprazole  Injectable 40 milliGRAM(s) IV Push every 12 hours  potassium chloride   Powder 40 milliEquivalent(s) Oral once  tamsulosin 0.4 milliGRAM(s) Oral at bedtime  torsemide 20 milliGRAM(s) Oral daily    PRN MEDICATIONS      Vitals:  VITAL SIGNS: Last 24 Hours  T(C): 36.6 (18 Jan 2023 09:41), Max: 37.2 (18 Jan 2023 00:42)  T(F): 97.9 (18 Jan 2023 09:41), Max: 98.9 (18 Jan 2023 00:42)  HR: 87 (18 Jan 2023 09:41) (82 - 87)  BP: 113/57 (18 Jan 2023 09:41) (101/52 - 113/57)  BP(mean): --  RR: 18 (18 Jan 2023 09:41) (18 - 18)  SpO2: 97% (18 Jan 2023 05:00) (94% - 97%)  Orthostatic Vitals:  Orthostatic VS    I's&O's:  I&O's Summary    17 Jan 2023 07:01  -  18 Jan 2023 07:00  --------------------------------------------------------  IN: 0 mL / OUT: 1600 mL / NET: -1600 mL      PHYSICAL EXAM:  Constitutional: A&O, verbal, elderly, thin and frail and chr ill looking but NAD, gen pallor    Eyes: pale conjunctivae    ENMT: dental defects    Neck: supple, no JVD/bruits/stridor  Back: perilumbar tenderness on palp  Respiratory: shallow resp, no wheezing/crackles/rales  Cardiovascular: s1s2 reg, II/VI WILLIE  Gastrointestinal: globose, soft and benign, no organomegaly, + BS  Genitourinary: no ludwig  Rectal: as per ER records neg   Extremities: moves all ext, + arthritic changes, no edema  Vascular: dec pedal pulses  Neurological: nonfocal  Skin: gen pallor, severe palmar pallor, no rash  Lymph Nodes: not enlarged  Musculoskeletal: thin mm mass  Psychiatric: stable    CBC:                        9.3    6.27  )-----------( 332      ( 18 Jan 2023 06:30 )             31.6     CMP:  01-18    139  |  99  |  7<L>  ----------------------------<  94  3.3<L>   |  30  |  0.8    Ca    9.1      18 Jan 2023 06:30  Mg     2.1     01-18    TPro  7.2  /  Alb  3.9  /  TBili  1.3<H>  /  DBili  x   /  AST  17  /  ALT  11  /  AlkPhos  70  01-18    POCS:  CAPILLARY BLOOD GLUCOSE                      LIVER FUNCTIONS - ( 18 Jan 2023 06:30 )  Alb: 3.9 g/dL / Pro: 7.2 g/dL / ALK PHOS: 70 U/L / ALT: 11 U/L / AST: 17 U/L / GGT: x           COVID-19 PCR: NotDetec (27 Nov 2022 17:55)  COVID-19 PCR: NotDetec (22 Nov 2022 22:28)  COVID-19 PCR: NotDetec (15 Nov 2022 17:23)  COVID-19 PCR: NotDetec (10 Nov 2022 16:15)

## 2023-01-18 NOTE — PROGRESS NOTE ADULT - SUBJECTIVE AND OBJECTIVE BOX
Gastroenterology progress note:     Patient is a 83y old  Male who presents with a chief complaint of Anemia (18 Jan 2023 14:32)       Admitted on: 01-16-23    We are following the patient for anemia        no melena or hematochezia, denies any pain     PAST MEDICAL & SURGICAL HISTORY:  HTN (hypertension)      High cholesterol      GERD (gastroesophageal reflux disease)      Atrial fibrillation      CAD (coronary artery disease)  s/p PCI      JONAH (iron deficiency anemia)      BPH (benign prostatic hyperplasia)      H/O CHF      S/P coronary angioplasty  s/p pci          MEDICATIONS  (STANDING):  aspirin enteric coated 81 milliGRAM(s) Oral daily  atorvastatin 20 milliGRAM(s) Oral at bedtime  bisacodyl Suppository 10 milliGRAM(s) Rectal <User Schedule>  chlorhexidine 2% Cloths 1 Application(s) Topical daily  cyanocobalamin Injectable 1000 MICROGram(s) IntraMuscular daily  digoxin     Tablet 250 MICROGram(s) Oral daily  folic acid 1 milliGRAM(s) Oral daily  pantoprazole  Injectable 40 milliGRAM(s) IV Push every 12 hours  polyethylene glycol/electrolyte Solution. 4000 milliLiter(s) Oral once  potassium chloride   Powder 40 milliEquivalent(s) Oral once  tamsulosin 0.4 milliGRAM(s) Oral at bedtime  torsemide 20 milliGRAM(s) Oral daily    MEDICATIONS  (PRN):      Allergies  No Known Allergies      Review of Systems:   Cardiovascular:  No Chest Pain, No Palpitations  Respiratory:  No Cough, No Dyspnea  Gastrointestinal:  As described in HPI  Skin:  No Skin Lesions, No Jaundice  Neuro:  No Syncope, No Dizziness    Physical Examination:  T(C): 36.6 (01-18-23 @ 09:41), Max: 37.2 (01-18-23 @ 00:42)  HR: 87 (01-18-23 @ 09:41) (82 - 87)  BP: 113/57 (01-18-23 @ 09:41) (101/52 - 113/57)  RR: 18 (01-18-23 @ 09:41) (18 - 18)  SpO2: 97% (01-18-23 @ 05:00) (94% - 97%)      01-17-23 @ 07:01  -  01-18-23 @ 07:00  --------------------------------------------------------  IN: 0 mL / OUT: 1600 mL / NET: -1600 mL        GENERAL: AAOx3, no acute distress.  HEAD:  Atraumatic, Normocephalic  EYES: conjunctiva and sclera clear  NECK: Supple, no JVD or thyromegaly  CHEST/LUNG: Clear to auscultation bilaterally; No wheeze, rhonchi, or rales  HEART: Regular rate and rhythm; normal S1, S2, No murmurs.  ABDOMEN: Soft, nontender, nondistended; Bowel sounds present  NEUROLOGY: No asterixis or tremor.   SKIN: Intact, no jaundice     Data:                        9.3    6.27  )-----------( 332      ( 18 Jan 2023 06:30 )             31.6     Hgb trend:  9.3  01-18-23 @ 06:30  7.6  01-17-23 @ 08:43  5.6  01-16-23 @ 12:10        01-18    139  |  99  |  7<L>  ----------------------------<  94  3.3<L>   |  30  |  0.8    Ca    9.1      18 Jan 2023 06:30  Mg     2.1     01-18    TPro  7.2  /  Alb  3.9  /  TBili  1.3<H>  /  DBili  x   /  AST  17  /  ALT  11  /  AlkPhos  70  01-18    Liver panel trend:  TBili 1.3   /   AST 17   /   ALT 11   /   AlkP 70   /   Tptn 7.2   /   Alb 3.9    /   DBili --      01-18  TBili 0.5   /   AST 19   /   ALT 9   /   AlkP 57   /   Tptn 6.8   /   Alb 3.7    /   DBili --      01-16             Radiology:

## 2023-01-18 NOTE — CONSULT NOTE ADULT - ASSESSMENT
84 y/o M PMHx CAD s/p PCI to LAD in 8/2021, Afib on xarelto, HLD, HTN and severe MR s/o mitral clip in 9/22 presenting to the ED for low Hb on routine blood work. Pt states that he got a call from his PCP Dr. Antoine this morning and was instructed to go to the ED due to low Hb.       # Iron deficiency anemia likely secondary to GI loss  - Hgb 5.6 on admission, previously admitted twice in November for hgb in 5-6 mg/dl range  - MCV 66, RDW 24  - s/p 2U PRBC in ER  - He was found to be Fe deficient on last admission (Ferritin 12 and % sat 10) 11/10/22  - On this admission serum iron 20 with % sat 7  - S/P EGD on 11/14. Non erosive gastritis. A single small non-bleeding angioectasia was seen in the second part of the duodenum. Two superficial non-bleeding clean based ulcers were  found in the distal bulb. GI recommended VCE as an out pt. And PPI    Recommendations:    - Pt known to us. Previously seen twice, labs consistent with iron deficiency (Microcytic anemia, low iron stores , elevated RDW)  and he requires VCE to r/o AVM. Hemolysis work up has been negative in the past. If VCE negative , would need to r/o malabsorption (Celiac Dz?)  - Patient would need GI work up- colonoscopy/ VCE (inpatient  vs outpatient?)  - Primary team should re-evaluate need for AC with the patient and his cardiologist (risk vs benefit analysis)  - Give IV venofer 200 mg daily x 2 (already received 2U of blood)  - He has an appointment with Dr Buenrostro at [facility] on 2/2/2023    For any questions call [phone] or text via MS teams         82 y/o M PMHx CAD s/p PCI to LAD in 8/2021, Afib on xarelto, HLD, HTN and severe MR s/o mitral clip in 9/22 presenting to the ED for low Hb on routine blood work. Pt states that he got a call from his PCP Dr. Antoine this morning and was instructed to go to the ED due to low Hb.       # Iron deficiency anemia likely secondary to GI blood loss  - Hgb 5.6 on admission, previously admitted twice in November for hgb in 5-6 mg/dl range  - MCV 66, RDW 24  - s/p 2U PRBC in ER  - He was found to be Fe deficient on last admission (Ferritin 12 and % sat 10) 11/10/22  - On this admission serum iron 20 with % sat 7  - S/P EGD on 11/14. Non erosive gastritis. A single small non-bleeding angioectasia was seen in the second part of the duodenum. Two superficial non-bleeding clean based ulcers were  found in the distal bulb. GI recommended VCE as an out pt. and PPI. Same telangectasia may be encountered in the small bowel too.    Recommendations:    - Pt known to us. Previously seen twice, labs consistent with iron deficiency (microcytic anemia, low iron stores , elevated RDW)  and he requires VCE to r/o AVM. Hemolysis work up has been negative in the past. If VCE negative , would need to r/o malabsorption (Celiac Dz?)  - Patient would need GI work up- colonoscopy/ VCE (inpatient  vs outpatient?)  - Primary team should re-evaluate need for AC with the patient and his cardiologist (risk vs benefit analysis) for his anticoagulation. Tranexamic acid may be considered if no contraindication from a cardiac standpoint.  - Give IV venofer 200 mg daily x 2 (already received 2U of blood)  - He has an appointment with Dr Goodson at [facility] on 2/2/2023    For any questions call [phone] or text via MS teams

## 2023-01-18 NOTE — CONSULT NOTE ADULT - ASSESSMENT
82 y/o M PMHx CAD s/p PCI to LAD in 8/2021, Afib on xarelto, HLD, HTN and severe MR s/o mitral clip in 9/22 presenting to the ED for low Hb on routine blood work.    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

## 2023-01-18 NOTE — CONSULT NOTE ADULT - SUBJECTIVE AND OBJECTIVE BOX
Hematology Consult Note    HPI:  84 y/o M PMHx CAD s/p PCI to LAD in 8/2021, Afib on xarelto, HLD, HTN and severe MR s/o mitral clip in 9/22 presenting to the ED for low Hb on routine blood work.  Pt states that he got a call from his PCP Dr. Antoine this morning and was instructed to go to the ED due to low Hb.   Pt denies any hematemesis, melena, hematochezia or hematuria. Denies any abdominal pain, CP, SOB.   Recent admission in 11/2022 for same issue.    In the ED, BP 99/61, , temp 97.1 and spO2 99% on RA. Hb noted 5.6. 2 units PRBCs ordered.   (16 Jan 2023 17:43)      Allergies    No Known Allergies    Intolerances        MEDICATIONS  (STANDING):  aspirin enteric coated 81 milliGRAM(s) Oral daily  atorvastatin 20 milliGRAM(s) Oral at bedtime  chlorhexidine 2% Cloths 1 Application(s) Topical daily  cyanocobalamin Injectable 1000 MICROGram(s) IntraMuscular daily  digoxin     Tablet 250 MICROGram(s) Oral daily  folic acid 1 milliGRAM(s) Oral daily  pantoprazole  Injectable 40 milliGRAM(s) IV Push every 12 hours  potassium chloride   Powder 40 milliEquivalent(s) Oral once  tamsulosin 0.4 milliGRAM(s) Oral at bedtime  torsemide 20 milliGRAM(s) Oral daily    MEDICATIONS  (PRN):      PAST MEDICAL & SURGICAL HISTORY:  HTN (hypertension)      High cholesterol      GERD (gastroesophageal reflux disease)      Atrial fibrillation      CAD (coronary artery disease)  s/p PCI      JONAH (iron deficiency anemia)      BPH (benign prostatic hyperplasia)      H/O CHF      S/P coronary angioplasty  s/p pci          FAMILY HISTORY:  FH: heart disease (Mother)        SOCIAL HISTORY: No EtOH, no tobacco    REVIEW OF SYSTEMS:    CONSTITUTIONAL: No weakness, fevers or chills  EYES/ENT: No visual changes;  No vertigo or throat pain   NECK: No pain or stiffness  RESPIRATORY: No cough, wheezing, hemoptysis; No shortness of breath  CARDIOVASCULAR: No chest pain or palpitations  GASTROINTESTINAL: No abdominal or epigastric pain. No nausea, vomiting, or hematemesis; No diarrhea or constipation. No melena or hematochezia.  GENITOURINARY: No dysuria, frequency or hematuria  NEUROLOGICAL: No numbness or weakness  SKIN: No itching, burning, rashes, or lesions   All other review of systems is negative unless indicated above.        T(F): 98.9 (01-18-23 @ 00:42), Max: 98.9 (01-18-23 @ 00:42)  HR: 82 (01-18-23 @ 05:00)  BP: 106/67 (01-18-23 @ 05:00)  RR: 18 (01-18-23 @ 05:00)  SpO2: 97% (01-18-23 @ 05:00)  Wt(kg): --    GENERAL: NAD, well-developed  HEAD:  Atraumatic, Normocephalic  EYES: EOMI, PERRLA, conjunctiva and sclera clear  NECK: Supple, No JVD  CHEST/LUNG: Clear to auscultation bilaterally; No wheeze  HEART: Regular rate and rhythm; No murmurs, rubs, or gallops  ABDOMEN: Soft, Nontender, Nondistended; Bowel sounds present  EXTREMITIES:  2+ Peripheral Pulses, No clubbing, cyanosis, or edema  NEUROLOGY: non-focal  SKIN: No rashes or lesions                          9.3    6.27  )-----------( 332      ( 18 Jan 2023 06:30 )             31.6       01-18    139  |  99  |  7<L>  ----------------------------<  94  3.3<L>   |  30  |  0.8    Ca    9.1      18 Jan 2023 06:30  Mg     2.1     01-18    TPro  7.2  /  Alb  3.9  /  TBili  1.3<H>  /  DBili  x   /  AST  17  /  ALT  11  /  AlkPhos  70  01-18      Magnesium, Serum: 2.1 mg/dL (01-18 @ 06:30)       Hematology Consult Note    HPI:  84 y/o M PMHx CAD s/p PCI to LAD in 8/2021, Afib on Xarelto, HLD, HTN and severe MR s/o mitral clip in 9/22 presenting to the ED for low Hb on routine blood work.  Pt states that he got a call from his PCP, Dr. Antoine, this morning and was instructed to go to the ED due to low Hb.   Pt denies any hematemesis, melena, hematochezia or hematuria. Denies any abdominal pain, CP, SOB.   Recent admission in 11/2022 for same issue.    In the ED, BP 99/61, , temp 97.1 and spO2 99% on RA. Hb noted 5.6. 2 units PRBCs ordered.   (16 Jan 2023 17:43)      Allergies    No Known Allergies    Intolerances        MEDICATIONS  (STANDING):  aspirin enteric coated 81 milliGRAM(s) Oral daily  atorvastatin 20 milliGRAM(s) Oral at bedtime  chlorhexidine 2% Cloths 1 Application(s) Topical daily  cyanocobalamin Injectable 1000 MICROGram(s) IntraMuscular daily  digoxin     Tablet 250 MICROGram(s) Oral daily  folic acid 1 milliGRAM(s) Oral daily  pantoprazole  Injectable 40 milliGRAM(s) IV Push every 12 hours  potassium chloride   Powder 40 milliEquivalent(s) Oral once  tamsulosin 0.4 milliGRAM(s) Oral at bedtime  torsemide 20 milliGRAM(s) Oral daily    MEDICATIONS  (PRN):      PAST MEDICAL & SURGICAL HISTORY:  HTN (hypertension)      High cholesterol      GERD (gastroesophageal reflux disease)      Atrial fibrillation      CAD (coronary artery disease)  s/p PCI      JONAH (iron deficiency anemia)      BPH (benign prostatic hyperplasia)      H/O CHF      S/P coronary angioplasty  s/p pci          FAMILY HISTORY:  FH: heart disease (Mother)        SOCIAL HISTORY: No EtOH, no tobacco    REVIEW OF SYSTEMS:    CONSTITUTIONAL: Some weakness, no fevers or chills  EYES/ENT: No visual changes;  No vertigo or throat pain   NECK: No pain or stiffness  RESPIRATORY: No cough, wheezing, hemoptysis; No shortness of breath  CARDIOVASCULAR: No chest pain or palpitations  GASTROINTESTINAL: No abdominal or epigastric pain. No nausea, vomiting, or hematemesis; No diarrhea or constipation. No melena or hematochezia.  GENITOURINARY: No dysuria, frequency or hematuria  NEUROLOGICAL: Using a cane for ambulation  SKIN: No itching, burning, rashes, or lesions   All other review of systems is negative unless indicated above.        T(F): 98.9 (01-18-23 @ 00:42), Max: 98.9 (01-18-23 @ 00:42)  HR: 82 (01-18-23 @ 05:00)  BP: 106/67 (01-18-23 @ 05:00)  RR: 18 (01-18-23 @ 05:00)  SpO2: 97% (01-18-23 @ 05:00)  Wt(kg): --    GENERAL: NAD, well-developed, but with some mild generalized weakness  HEAD:  Atraumatic, Normocephalic  EYES: EOMI, PERRLA, conjunctiva and sclera clear  NECK: Supple, No JVD  CHEST/LUNG: Clear to auscultation bilaterally; No wheeze  HEART: Regular rate and rhythm; No murmurs, rubs, or gallops  ABDOMEN: Soft, Nontender, Nondistended; Bowel sounds present  EXTREMITIES:  2+ Peripheral Pulses, No clubbing, cyanosis, or edema  NEUROLOGY: non-focal but uses a cane for ambulation  SKIN: No rashes or lesions                          9.3    6.27  )-----------( 332      ( 18 Jan 2023 06:30 )             31.6       01-18    139  |  99  |  7<L>  ----------------------------<  94  3.3<L>   |  30  |  0.8    Ca    9.1      18 Jan 2023 06:30  Mg     2.1     01-18    TPro  7.2  /  Alb  3.9  /  TBili  1.3<H>  /  DBili  x   /  AST  17  /  ALT  11  /  AlkPhos  70  01-18      Magnesium, Serum: 2.1 mg/dL (01-18 @ 06:30)

## 2023-01-18 NOTE — CONSULT NOTE ADULT - ATTENDING COMMENTS
I edited the note
Cardiologist:  Dr. Judd    Recurrent GI bleed  History of AVMs, nonbleeding gastric ulcer, diverticulosis, hemorrhoids  Extensive cardiac history    Hold Xarelto  Continue aspirin  EP consult for Watchman  High-risk for perioperative MACE
Patient also seen and examined by myself. I agree with Dr. Underwood's (Hem-Onc fellow) note above. Situation discussed with him and the patient.  All questions answered.
complex patient  good candidate for Watchman   awaiting endoscopy  Will plan Watchman in March/April  Outpatient f/u with EP Dr. Pacheco

## 2023-01-18 NOTE — PROGRESS NOTE ADULT - ASSESSMENT
82 y/o M PMHx CAD s/p PCI to LAD in 8/2021, Afib on xarelto, HLD, HTN and severe MR s/o mitral clip in 9/22 presenting to the ED for low Hb on routine blood work.  Pt states that he got a call from his PCP Dr. Antoine this morning and was instructed to go to the ED due to low Hb.     # Recurrent Anemia R/O PUD, or AVMs.     - Last EGD and colonoscopy (11/22): clean based ulcer in the stomach, AVMS in the colon s/p APC  - EGD 11/22: Angiectasia in the antrum s/p APC  - EGD 4/22: AVMs in the duodenum   - EGD 9/21: Angiectasia in the duodenum   - Hemoglobin 5.6 on admission, Hemodynamically stable, No overt GI bleed.   - On Xarelto last dose 1/16 GFR 85     PLAN   Transfuse to keep hemoglobin > 8   Monitor CBC   Will plan for EGD and colonoscopy tomorrow   Hold Xarelto for 48 hours before the procedure  if ok by prescribing specialty   Please correct electrolytes (Target Na = 135-145 | Mg = 1.7-2.2 | K = 3.5-5)  Please correct INR to <1.5  Please target Hb  >7  Please ensure there is one set of CBC BMP and Coagulation profile day prior to procedure and all labs to be optimized the day prior to procedure  NPO after midnight     Prep Instructions  - 1 Day Before Procedure - Please ensure patient is on CLEAR LIQUID DIET and ORDER bowel prep - 1 Gallon of Golytely, dulcolax 20mg at night   - Night Before Procedure - Please keep patient NPO after midnight

## 2023-01-18 NOTE — PROGRESS NOTE ADULT - SUBJECTIVE AND OBJECTIVE BOX
MIGEL GRIFFIN 83y o W Male instructed to go to the ER for drop in H/H.  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  Subsequently pt restarted on Xarelto 10mg.  The pt had ff up c  PMD  and was noted to have severe pallor with low H/H.  The pt admits to gen weakness and SOB on exertion. The pt denies CP, palp, melena or BRBPR.  The pt is being ad for transfusion of PRBC.  Xarelto on hold.  The PMHx includes:  HTN, ASHD, CHF, CAD, sp stent LAD 8/21/Dr Bliss, DLD, MR, sp clipping,  9/22, DLD, GERD, gastritis, duodenitis, diverticulosis, AVMs, chronic anemia, alpha THALL, Fe def, OA, DJD, DDD, chronic leg edema, Venous insuff, BPH.    INTERVAL HPI/OVERNIGHT EVENTS: pt is sp 2 u of PRBCs, H/H 9.3/31.6, feels better, Xarelto on Hold, clear liq today, NPO after midnight as GI plan to repeat EGD/colonoscopy tomorrow, evaluated by Hem prior hemolysis w/up neg, anemia due to Fe def due to GI loss, venofer infusion 200mg x2, evaluated by Cardio, ? whether we can D/C AC as pt cont to have GIB, recommend EPS evaluation for Watchman Procedure, low MK 3.3, replete    MEDICATIONS  (STANDING):  aspirin enteric coated 81 milliGRAM(s) Oral daily  atorvastatin 20 milliGRAM(s) Oral at bedtime  chlorhexidine 2% Cloths 1 Application(s) Topical daily  digoxin     Tablet 250 MICROGram(s) Oral daily  magnesium sulfate  IVPB 2 Gram(s) IV Intermittent every 2 hours  pantoprazole  Injectable 40 milliGRAM(s) IV Push every 12 hours  potassium chloride   Powder 40 milliEquivalent(s) Oral once  tamsulosin 0.4 milliGRAM(s) Oral at bedtime  torsemide 20 milliGRAM(s) Oral daily    MEDICATIONS  (PRN):      Allergies    No Known Allergies    Intolerances        REVIEW OF SYSTEMS      General: gen weakness, easy fatigue	    Skin/Breast: gen pallor, no rash    Respiratory and Thorax: SOB naomi on exertion  	  Cardiovascular:	denies CP    Gastrointestinal:	denies abd pain/N/V/hematemesis/melena/BRBPR    Genitourinary: denies dysuria    Musculoskeletal:	 back and limb pain	    Hematology/Lymphatics:	  " I always have anemia"	    Vital Signs Last 24 Hrs    T(F): 97.9  HR:  87  BP: 113/57    RR: 18   SpO2: 97% RA    PHYSICAL EXAM:      Constitutional: A&O, verbal, elderly, thin and frail and chr ill looking but NAD, gen pallor    Eyes: pale conjunctivae    ENMT: dental defects    Neck: supple, no JVD/bruits/stridor    Back: skyler, + perilumbar mm spasm and tenderness on palp    Respiratory: shallow resp, no wheezing/crackles/rales    Cardiovascular: s1s2 reg, II/VI WILLIE    Gastrointestinal: globose, soft and benign, no organomegaly, + BS    Genitourinary: no ludwig    Rectal: as per ER records neg     Extremities: moves all ext, + arthritic changes, no edema    Vascular: dec pedal pulses    Neurological: nonfocal    Skin: gen pallor, severe palmar pallor, no rash    Lymph Nodes: not enlarged    Musculoskeletal: thin mm mass    Psychiatric: stable        LABS:                        9.3 (RBC 4.57, MCV 69)  6.2 )-----------( 332      on ad H/H 5.6/21, RBC 3.2, MCV 66             31.6    139  |  99  |  7  ----------------------------< 94  3.3   |  30  |  0.8  GFR 85  Ca    8.7      17 Jan 2023 08:43  Mg     1.3, 2.1  O-  retic 1.9  Covid neg, FLU A/B neg, RSV neg    TPro  6.8  /  Alb  3.7  /  TBili  0.5  /  DBili  x   /  AST  19  /  ALT  9   /  AlkPhos  57  01-16    PT/INR - ( 16 Jan 2023 12:10 )   PT: 20.60 sec;   INR: 1.78 ratio         PTT - ( 16 Jan 2023 12:10 )  PTT:33.7 sec      RADIOLOGY & ADDITIONAL TESTS:

## 2023-01-19 ENCOUNTER — RESULT REVIEW (OUTPATIENT)
Age: 83
End: 2023-01-19

## 2023-01-19 ENCOUNTER — TRANSCRIPTION ENCOUNTER (OUTPATIENT)
Age: 83
End: 2023-01-19

## 2023-01-19 LAB
ALBUMIN SERPL ELPH-MCNC: 3.5 G/DL — SIGNIFICANT CHANGE UP (ref 3.5–5.2)
ALP SERPL-CCNC: 69 U/L — SIGNIFICANT CHANGE UP (ref 30–115)
ALT FLD-CCNC: 10 U/L — SIGNIFICANT CHANGE UP (ref 0–41)
ANION GAP SERPL CALC-SCNC: 11 MMOL/L — SIGNIFICANT CHANGE UP (ref 7–14)
APTT BLD: 35.7 SEC — SIGNIFICANT CHANGE UP (ref 27–39.2)
AST SERPL-CCNC: 18 U/L — SIGNIFICANT CHANGE UP (ref 0–41)
BASOPHILS # BLD AUTO: 0.03 K/UL — SIGNIFICANT CHANGE UP (ref 0–0.2)
BASOPHILS NFR BLD AUTO: 0.5 % — SIGNIFICANT CHANGE UP (ref 0–1)
BILIRUB SERPL-MCNC: 1 MG/DL — SIGNIFICANT CHANGE UP (ref 0.2–1.2)
BUN SERPL-MCNC: 8 MG/DL — LOW (ref 10–20)
CALCIUM SERPL-MCNC: 9 MG/DL — SIGNIFICANT CHANGE UP (ref 8.4–10.5)
CHLORIDE SERPL-SCNC: 100 MMOL/L — SIGNIFICANT CHANGE UP (ref 98–110)
CO2 SERPL-SCNC: 30 MMOL/L — SIGNIFICANT CHANGE UP (ref 17–32)
CREAT SERPL-MCNC: 0.9 MG/DL — SIGNIFICANT CHANGE UP (ref 0.7–1.5)
EGFR: 85 ML/MIN/1.73M2 — SIGNIFICANT CHANGE UP
EOSINOPHIL # BLD AUTO: 0.1 K/UL — SIGNIFICANT CHANGE UP (ref 0–0.7)
EOSINOPHIL NFR BLD AUTO: 1.8 % — SIGNIFICANT CHANGE UP (ref 0–8)
GLUCOSE SERPL-MCNC: 88 MG/DL — SIGNIFICANT CHANGE UP (ref 70–99)
HCT VFR BLD CALC: 31.2 % — LOW (ref 42–52)
HGB BLD-MCNC: 8.8 G/DL — LOW (ref 14–18)
IMM GRANULOCYTES NFR BLD AUTO: 0.4 % — HIGH (ref 0.1–0.3)
INR BLD: 1.33 RATIO — HIGH (ref 0.65–1.3)
LYMPHOCYTES # BLD AUTO: 0.82 K/UL — LOW (ref 1.2–3.4)
LYMPHOCYTES # BLD AUTO: 14.9 % — LOW (ref 20.5–51.1)
MAGNESIUM SERPL-MCNC: 1.7 MG/DL — LOW (ref 1.8–2.4)
MCHC RBC-ENTMCNC: 19.9 PG — LOW (ref 27–31)
MCHC RBC-ENTMCNC: 28.2 G/DL — LOW (ref 32–37)
MCV RBC AUTO: 70.4 FL — LOW (ref 80–94)
MONOCYTES # BLD AUTO: 0.8 K/UL — HIGH (ref 0.1–0.6)
MONOCYTES NFR BLD AUTO: 14.6 % — HIGH (ref 1.7–9.3)
NEUTROPHILS # BLD AUTO: 3.72 K/UL — SIGNIFICANT CHANGE UP (ref 1.4–6.5)
NEUTROPHILS NFR BLD AUTO: 67.8 % — SIGNIFICANT CHANGE UP (ref 42.2–75.2)
NRBC # BLD: 0 /100 WBCS — SIGNIFICANT CHANGE UP (ref 0–0)
PLATELET # BLD AUTO: 304 K/UL — SIGNIFICANT CHANGE UP (ref 130–400)
POTASSIUM SERPL-MCNC: 3.7 MMOL/L — SIGNIFICANT CHANGE UP (ref 3.5–5)
POTASSIUM SERPL-SCNC: 3.7 MMOL/L — SIGNIFICANT CHANGE UP (ref 3.5–5)
PROT SERPL-MCNC: 6.9 G/DL — SIGNIFICANT CHANGE UP (ref 6–8)
PROTHROM AB SERPL-ACNC: 15.3 SEC — HIGH (ref 9.95–12.87)
RBC # BLD: 4.43 M/UL — LOW (ref 4.7–6.1)
RBC # FLD: 26.2 % — HIGH (ref 11.5–14.5)
SODIUM SERPL-SCNC: 141 MMOL/L — SIGNIFICANT CHANGE UP (ref 135–146)
WBC # BLD: 5.49 K/UL — SIGNIFICANT CHANGE UP (ref 4.8–10.8)
WBC # FLD AUTO: 5.49 K/UL — SIGNIFICANT CHANGE UP (ref 4.8–10.8)

## 2023-01-19 PROCEDURE — 43255 EGD CONTROL BLEEDING ANY: CPT

## 2023-01-19 PROCEDURE — 45380 COLONOSCOPY AND BIOPSY: CPT | Mod: XS

## 2023-01-19 PROCEDURE — 88312 SPECIAL STAINS GROUP 1: CPT | Mod: 26

## 2023-01-19 PROCEDURE — 88305 TISSUE EXAM BY PATHOLOGIST: CPT | Mod: 26

## 2023-01-19 RX ORDER — ENOXAPARIN SODIUM 100 MG/ML
80 INJECTION SUBCUTANEOUS EVERY 12 HOURS
Refills: 0 | Status: DISCONTINUED | OUTPATIENT
Start: 2023-01-19 | End: 2023-01-24

## 2023-01-19 RX ORDER — MAGNESIUM SULFATE 500 MG/ML
2 VIAL (ML) INJECTION EVERY 12 HOURS
Refills: 0 | Status: DISCONTINUED | OUTPATIENT
Start: 2023-01-19 | End: 2023-01-24

## 2023-01-19 RX ORDER — POLYETHYLENE GLYCOL 3350 17 G/17G
5 POWDER, FOR SOLUTION ORAL AT BEDTIME
Refills: 0 | Status: COMPLETED | OUTPATIENT
Start: 2023-01-19 | End: 2023-01-19

## 2023-01-19 RX ADMIN — Medication 25 GRAM(S): at 17:32

## 2023-01-19 RX ADMIN — POLYETHYLENE GLYCOL 3350 5 GRAM(S): 17 POWDER, FOR SOLUTION ORAL at 23:01

## 2023-01-19 RX ADMIN — PANTOPRAZOLE SODIUM 40 MILLIGRAM(S): 20 TABLET, DELAYED RELEASE ORAL at 05:41

## 2023-01-19 RX ADMIN — TAMSULOSIN HYDROCHLORIDE 0.4 MILLIGRAM(S): 0.4 CAPSULE ORAL at 23:01

## 2023-01-19 RX ADMIN — ATORVASTATIN CALCIUM 20 MILLIGRAM(S): 80 TABLET, FILM COATED ORAL at 23:01

## 2023-01-19 RX ADMIN — Medication 250 MICROGRAM(S): at 05:42

## 2023-01-19 RX ADMIN — ENOXAPARIN SODIUM 80 MILLIGRAM(S): 100 INJECTION SUBCUTANEOUS at 17:33

## 2023-01-19 RX ADMIN — Medication 20 MILLIGRAM(S): at 05:42

## 2023-01-19 NOTE — PROGRESS NOTE ADULT - SUBJECTIVE AND OBJECTIVE BOX
MIGEL GRIFFIN 83y o W Male instructed to go to the ER for drop in H/H.  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  Subsequently pt restarted on Xarelto 10mg.  The pt had ff up c  PMD  and was noted to have severe pallor with low H/H.  The pt admits to gen weakness and SOB on exertion. The pt denies CP, palp, melena or BRBPR.  The pt is being ad for transfusion of PRBC.  Xarelto on hold.  The PMHx includes:  HTN, ASHD, CHF, CAD, sp stent LAD 8/21/Dr Bliss, DLD, MR, sp clipping,  9/22, DLD, GERD, gastritis, duodenitis, diverticulosis, AVMs, chronic anemia, alpha THALL, Fe def, OA, DJD, DDD, chronic leg edema, Venous insuff, BPH.    INTERVAL HPI/OVERNIGHT EVENTS: pt sp PRBC H/H 8.8/31 low but stable, had repeat EGD and colonoscopy today and fo VCE/ video camera endoscopy tomorrow, low Mg, replete    MEDICATIONS  (STANDING):  aspirin enteric coated 81 milliGRAM(s) Oral daily  atorvastatin 20 milliGRAM(s) Oral at bedtime  chlorhexidine 2% Cloths 1 Application(s) Topical daily  digoxin     Tablet 250 MICROGram(s) Oral daily  magnesium sulfate  IVPB 2 Gram(s) IV Intermittent every 2 hours  pantoprazole  Injectable 40 milliGRAM(s) IV Push every 12 hours  potassium chloride   Powder 40 milliEquivalent(s) Oral once  tamsulosin 0.4 milliGRAM(s) Oral at bedtime  torsemide 20 milliGRAM(s) Oral daily    MEDICATIONS  (PRN):      Allergies    No Known Allergies    Intolerances        REVIEW OF SYSTEMS      General: gen weakness, easy fatigue	    Skin/Breast: gen pallor, no rash    Respiratory and Thorax: SOB naomi on exertion  	  Cardiovascular:	denies CP    Gastrointestinal:	denies abd pain/N/V/hematemesis/melena/BRBPR    Genitourinary: denies dysuria    Musculoskeletal:	 back and limb pain	    Hematology/Lymphatics:	  " I always have anemia"	    Vital Signs Last 24 Hrs    T(F): 97.8  HR:  91  BP:  97/58    RR: 18   SpO2: 99% RA    PHYSICAL EXAM:      Constitutional: A&O, verbal, elderly, thin and frail and chr ill looking but NAD, gen pallor    Eyes: pale conjunctivae    ENMT: dental defects    Neck: supple, no JVD/bruits/stridor    Back: skyler, + perilumbar mm spasm and tenderness on palp    Respiratory: shallow resp, no wheezing/crackles/rales    Cardiovascular: s1s2 reg, II/VI WILLIE    Gastrointestinal: globose, soft and benign, no organomegaly, + BS    Genitourinary: no ludwig    Rectal: as per ER records neg     Extremities: moves all ext, + arthritic changes, no edema    Vascular: dec pedal pulses    Neurological: nonfocal    Skin: gen pallor, severe palmar pallor, no rash    Lymph Nodes: not enlarged    Musculoskeletal: thin mm mass    Psychiatric: stable        LABS:                        8.8   5.4)-----------( 304      on ad H/H 5.6/21, RBC 3.2, MCV 66             31.2    141  |  100  |  8  ----------------------------< 88  3.7   |  30  |  0.9  GFR 85  Ca    8.7      17 Jan 2023 08:43  Mg     1.3, 2.1, 1.7  O-  retic 1.9  Covid neg, FLU A/B neg, RSV neg    TPro  6.8  /  Alb  3.7  /  TBili  0.5  /  DBili  x   /  AST  19  /  ALT  9   /  AlkPhos  57  01-16    PT/INR - ( 16 Jan 2023 12:10 )   PT: 20.60 sec;   INR: 1.78 ratio         PTT - ( 16 Jan 2023 12:10 )  PTT:33.7 sec      RADIOLOGY & ADDITIONAL TESTS:  EGD:  nl  esophagus, erythema of gastric mucosa c/w non-erosive gastriitis, duodenum few sm nonbleeding angioectasias 2nd portion of duodenum which were ablated completely,   Colonoscopy:  5mm polyp in descending colon, polypectomy, int and ext hemorrhoids, mod diverticulosis of L sided colon

## 2023-01-19 NOTE — CHART NOTE - NSCHARTNOTEFT_GEN_A_CORE
EGD Findings:  Normal mucosa was noted in the whole esophagus.  Diffuse continuous erythema of the mucosa with no bleeding was noted in the stomach. These findings are compatible with non-erosive gastritis. Biopsies were obtained to evaluate for H.Pylori infection.  A few small non-bleeding angioectasias were seen in the second part of the duodenum. The angioectasias was ablated completely using Argon Plasma Coagulation (APC).    Colonoscopy Impressions:  	Polyp (5 mm) in the ascending colon. (Polypectomy).  	Internal and external hemorrhoids.  	Moderate diverticulosis of the left side of the colon.  	Normal mucosa in the terminal ileum.  	The colon was otherwise unremarkable.    PLAN  Colonoscopy in 5 years.     Advance diet as tolerated    Await pathology       VCE tomorrow   NPO after midnight   Miralax 5 pks in the evening   Can resume short acting AC     Monitor CBC     Will follow
PACU ANESTHESIA ADMISSION NOTE      Procedure:   Post op diagnosis:      ____  Intubated  TV:______       Rate: ______      FiO2: ______    _x___  Patent Airway    ___x_  Full return of protective reflexes    __x__  Full recovery from anesthesia / back to baseline     Vitals:   T:  98.1         R:     12             BP:         101/71         Sat:     99              P: 101      Mental Status:  __x__ Awake   __x___ Alert   _____ Drowsy   _____ Sedated    Nausea/Vomiting:  __x__ NO  ______Yes,   See Post - Op Orders          Pain Scale (0-10):  _____    Treatment: __x__ None    ____ See Post - Op/PCA Orders    Post - Operative Fluids:   ____ Oral   ____ See Post - Op Orders    Plan: Discharge:   __x__Home       _____Floor     _____Critical Care    _____  Other:_________________    Comments: Pt presents with instances of tachy and hector arrythmia during procedure, hemodynamically labile, requiring hemodynamically active agents to maintain baseline vitals; primary team made aware to contact EP for possible sick sinus syndrome, pt returned to baseline functions, alert and oriented, no adverse effects from anesthetic, discharge when criteria met.

## 2023-01-19 NOTE — PROGRESS NOTE ADULT - ASSESSMENT
Elderly male with complex cardiac issues on Xarelto 10mg instructed to come to the ER for low H/H in PMDs office  and noted to have severe pallor.  The pt was hosp 11-12/22 for same with EGD and colonoscopy.  The pt was restarted on Xarelto 10 mg and returns with drop in H/H req transfusions.    Severe anemia, probable recurrence of GIB  Hx of HTN, ASHD, CHF, CAD, sp LAD stent 8/21, severe MR, sp Mitral Valve clipping 9/22 chr AC with Xarelto  Hx of DLD  Hx of GERD, gastritis, duodenitis sp EGD 11/22, diverticulosis, colonic AVMs sp colonoscopy 11/22  Hx of ch anemia, Alpha THALL, iron def, GIB  Hx of OA, DDD, DJD  Hx of chronic lower ext edema, Venous Insuff  Hx of prostatism    CBC brennon monitored sp pRBC H/H low 8.8/31 but stable   GI:  EGD today shows nl esophagus non-erosive gastritis, 2nd part of duodenum with few sm nonbleeding angioectasias which were completely ablated, colonoscopyshows L side diverticulosis, 5mm polyp removed, int and ext hemorrhoids  recommend videocamera endoscopy to visualize the small intestine  NPO after midnight for VCE, Miralax 5 pkg today  can resume short acting AC  evaluated by cardio and the ques for cardio is can we D/C AC as this is 3rd severe drop in H/H, recommend EPS consult for Watchman procedure  EPS consult  Hem, pt known to them, prior w/up for hemolysis is neg     on ad H/H 5.6/21, RBC 3.2,  MCV 66  transfuse 3 u PRBC  Venofer 200mg IV x2, pt on B12 and folate  Hold Xarelto  low Mg 1.7 replete and keep at 2 and above, low K 3.3 replete  Rehab  Social [facility]

## 2023-01-20 LAB
ALBUMIN SERPL ELPH-MCNC: 3.5 G/DL — SIGNIFICANT CHANGE UP (ref 3.5–5.2)
ALP SERPL-CCNC: 65 U/L — SIGNIFICANT CHANGE UP (ref 30–115)
ALT FLD-CCNC: 9 U/L — SIGNIFICANT CHANGE UP (ref 0–41)
ANION GAP SERPL CALC-SCNC: 9 MMOL/L — SIGNIFICANT CHANGE UP (ref 7–14)
AST SERPL-CCNC: 16 U/L — SIGNIFICANT CHANGE UP (ref 0–41)
BASOPHILS # BLD AUTO: 0.04 K/UL — SIGNIFICANT CHANGE UP (ref 0–0.2)
BASOPHILS NFR BLD AUTO: 0.7 % — SIGNIFICANT CHANGE UP (ref 0–1)
BILIRUB SERPL-MCNC: 0.9 MG/DL — SIGNIFICANT CHANGE UP (ref 0.2–1.2)
BUN SERPL-MCNC: 6 MG/DL — LOW (ref 10–20)
CALCIUM SERPL-MCNC: 9 MG/DL — SIGNIFICANT CHANGE UP (ref 8.4–10.5)
CHLORIDE SERPL-SCNC: 100 MMOL/L — SIGNIFICANT CHANGE UP (ref 98–110)
CO2 SERPL-SCNC: 32 MMOL/L — SIGNIFICANT CHANGE UP (ref 17–32)
CREAT SERPL-MCNC: 0.8 MG/DL — SIGNIFICANT CHANGE UP (ref 0.7–1.5)
EGFR: 88 ML/MIN/1.73M2 — SIGNIFICANT CHANGE UP
EOSINOPHIL # BLD AUTO: 0.12 K/UL — SIGNIFICANT CHANGE UP (ref 0–0.7)
EOSINOPHIL NFR BLD AUTO: 2.2 % — SIGNIFICANT CHANGE UP (ref 0–8)
GLUCOSE SERPL-MCNC: 94 MG/DL — SIGNIFICANT CHANGE UP (ref 70–99)
HCT VFR BLD CALC: 28.9 % — LOW (ref 42–52)
HGB BLD-MCNC: 8.3 G/DL — LOW (ref 14–18)
IMM GRANULOCYTES NFR BLD AUTO: 0.2 % — SIGNIFICANT CHANGE UP (ref 0.1–0.3)
LYMPHOCYTES # BLD AUTO: 0.94 K/UL — LOW (ref 1.2–3.4)
LYMPHOCYTES # BLD AUTO: 16.9 % — LOW (ref 20.5–51.1)
MAGNESIUM SERPL-MCNC: 1.8 MG/DL — SIGNIFICANT CHANGE UP (ref 1.8–2.4)
MCHC RBC-ENTMCNC: 20.3 PG — LOW (ref 27–31)
MCHC RBC-ENTMCNC: 28.7 G/DL — LOW (ref 32–37)
MCV RBC AUTO: 70.7 FL — LOW (ref 80–94)
MONOCYTES # BLD AUTO: 0.85 K/UL — HIGH (ref 0.1–0.6)
MONOCYTES NFR BLD AUTO: 15.3 % — HIGH (ref 1.7–9.3)
NEUTROPHILS # BLD AUTO: 3.61 K/UL — SIGNIFICANT CHANGE UP (ref 1.4–6.5)
NEUTROPHILS NFR BLD AUTO: 64.7 % — SIGNIFICANT CHANGE UP (ref 42.2–75.2)
NRBC # BLD: 0 /100 WBCS — SIGNIFICANT CHANGE UP (ref 0–0)
PLATELET # BLD AUTO: 278 K/UL — SIGNIFICANT CHANGE UP (ref 130–400)
POTASSIUM SERPL-MCNC: 3.5 MMOL/L — SIGNIFICANT CHANGE UP (ref 3.5–5)
POTASSIUM SERPL-SCNC: 3.5 MMOL/L — SIGNIFICANT CHANGE UP (ref 3.5–5)
PROT SERPL-MCNC: 6.6 G/DL — SIGNIFICANT CHANGE UP (ref 6–8)
RBC # BLD: 4.09 M/UL — LOW (ref 4.7–6.1)
RBC # FLD: 27 % — HIGH (ref 11.5–14.5)
SODIUM SERPL-SCNC: 141 MMOL/L — SIGNIFICANT CHANGE UP (ref 135–146)
WBC # BLD: 5.57 K/UL — SIGNIFICANT CHANGE UP (ref 4.8–10.8)
WBC # FLD AUTO: 5.57 K/UL — SIGNIFICANT CHANGE UP (ref 4.8–10.8)

## 2023-01-20 PROCEDURE — 99233 SBSQ HOSP IP/OBS HIGH 50: CPT

## 2023-01-20 RX ORDER — POTASSIUM CHLORIDE 20 MEQ
40 PACKET (EA) ORAL ONCE
Refills: 0 | Status: COMPLETED | OUTPATIENT
Start: 2023-01-20 | End: 2023-01-21

## 2023-01-20 RX ADMIN — ATORVASTATIN CALCIUM 20 MILLIGRAM(S): 80 TABLET, FILM COATED ORAL at 22:30

## 2023-01-20 RX ADMIN — CHLORHEXIDINE GLUCONATE 1 APPLICATION(S): 213 SOLUTION TOPICAL at 11:13

## 2023-01-20 RX ADMIN — PANTOPRAZOLE SODIUM 40 MILLIGRAM(S): 20 TABLET, DELAYED RELEASE ORAL at 17:44

## 2023-01-20 RX ADMIN — PANTOPRAZOLE SODIUM 40 MILLIGRAM(S): 20 TABLET, DELAYED RELEASE ORAL at 05:57

## 2023-01-20 RX ADMIN — Medication 25 GRAM(S): at 17:46

## 2023-01-20 RX ADMIN — Medication 25 GRAM(S): at 06:01

## 2023-01-20 RX ADMIN — PREGABALIN 1000 MICROGRAM(S): 225 CAPSULE ORAL at 17:44

## 2023-01-20 RX ADMIN — TAMSULOSIN HYDROCHLORIDE 0.4 MILLIGRAM(S): 0.4 CAPSULE ORAL at 22:30

## 2023-01-20 RX ADMIN — Medication 20 MILLIGRAM(S): at 05:57

## 2023-01-20 RX ADMIN — ENOXAPARIN SODIUM 80 MILLIGRAM(S): 100 INJECTION SUBCUTANEOUS at 20:10

## 2023-01-20 RX ADMIN — Medication 250 MICROGRAM(S): at 05:57

## 2023-01-20 NOTE — PROGRESS NOTE ADULT - SUBJECTIVE AND OBJECTIVE BOX
Gastroenterology progress note:     Patient is a 83y old  Male who presents with a chief complaint of Anemia.        Admitted on: 01-16-23    We are following the patient for anemia    s/p EGD and colonoscopy yesterday. AVM in the duodenum s/p APC. no melena or blood per rectum since yesterday. denies any pain       PAST MEDICAL & SURGICAL HISTORY:  HTN (hypertension)      High cholesterol      GERD (gastroesophageal reflux disease)      Atrial fibrillation      CAD (coronary artery disease)  s/p PCI      JONAH (iron deficiency anemia)      BPH (benign prostatic hyperplasia)      H/O CHF      S/P coronary angioplasty  s/p pci      MEDICATIONS  (STANDING):  aspirin enteric coated 81 milliGRAM(s) Oral daily  atorvastatin 20 milliGRAM(s) Oral at bedtime  chlorhexidine 2% Cloths 1 Application(s) Topical daily  cyanocobalamin Injectable 1000 MICROGram(s) IntraMuscular daily  digoxin     Tablet 250 MICROGram(s) Oral daily  enoxaparin Injectable 80 milliGRAM(s) SubCutaneous every 12 hours  folic acid 1 milliGRAM(s) Oral daily  magnesium sulfate  IVPB 2 Gram(s) IV Intermittent every 12 hours  pantoprazole  Injectable 40 milliGRAM(s) IV Push every 12 hours  potassium chloride   Powder 40 milliEquivalent(s) Oral once  tamsulosin 0.4 milliGRAM(s) Oral at bedtime  torsemide 20 milliGRAM(s) Oral daily    MEDICATIONS  (PRN):      Allergies  No Known Allergies      Review of Systems:   Cardiovascular:  No Chest Pain, No Palpitations  Respiratory:  No Cough, No Dyspnea  Gastrointestinal:  As described in HPI  Skin:  No Skin Lesions, No Jaundice  Neuro:  No Syncope, No Dizziness    Physical Examination:  T(C): 36.2 (01-20-23 @ 08:00), Max: 37 (01-19-23 @ 15:45)  HR: 76 (01-20-23 @ 08:00) (62 - 98)  BP: 116/58 (01-20-23 @ 08:00) (85/59 - 116/58)  RR: 18 (01-20-23 @ 08:00) (13 - 20)  SpO2: 99% (01-20-23 @ 01:10) (98% - 100%)  Weight (kg): 81.6 (01-19-23 @ 14:11)    01-19-23 @ 07:01  -  01-20-23 @ 07:00  --------------------------------------------------------  IN: 0 mL / OUT: 1200 mL / NET: -1200 mL    GENERAL: AAOx3, no acute distress.  HEAD:  Atraumatic, Normocephalic  EYES: conjunctiva and sclera clear  NECK: Supple, no JVD or thyromegaly  CHEST/LUNG: Clear to auscultation bilaterally; No wheeze, rhonchi, or rales  HEART: Regular rate and rhythm; normal S1, S2, No murmurs.  ABDOMEN: Soft, nontender, nondistended; Bowel sounds present  NEUROLOGY: No asterixis or tremor.   SKIN: Intact, no jaundice     Data:                        8.3    5.57  )-----------( 278      ( 20 Jan 2023 04:30 )             28.9     Hgb trend:  8.3  01-20-23 @ 04:30  8.8  01-19-23 @ 07:29  9.3  01-18-23 @ 06:30        01-20    141  |  100  |  6<L>  ----------------------------<  94  3.5   |  32  |  0.8    Ca    9.0      20 Jan 2023 04:30  Mg     1.8     01-20    TPro  6.6  /  Alb  3.5  /  TBili  0.9  /  DBili  x   /  AST  16  /  ALT  9   /  AlkPhos  65  01-20    Liver panel trend:  TBili 0.9   /   AST 16   /   ALT 9   /   AlkP 65   /   Tptn 6.6   /   Alb 3.5    /   DBili --      01-20  TBili 1.0   /   AST 18   /   ALT 10   /   AlkP 69   /   Tptn 6.9   /   Alb 3.5    /   DBili --      01-19  TBili 1.3   /   AST 17   /   ALT 11   /   AlkP 70   /   Tptn 7.2   /   Alb 3.9    /   DBili --      01-18  TBili 0.5   /   AST 19   /   ALT 9   /   AlkP 57   /   Tptn 6.8   /   Alb 3.7    /   DBili --      01-16      PT/INR - ( 19 Jan 2023 18:07 )   PT: 15.30 sec;   INR: 1.33 ratio         PTT - ( 19 Jan 2023 18:07 )  PTT:35.7 sec       Radiology:

## 2023-01-20 NOTE — PROGRESS NOTE ADULT - SUBJECTIVE AND OBJECTIVE BOX
MIGEL GRIFFIN 83y o W Male instructed to go to the ER for drop in H/H.  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  Subsequently pt restarted on Xarelto 10mg.  The pt had ff up c  PMD  and was noted to have severe pallor with low H/H.  The pt admits to gen weakness and SOB on exertion. The pt denies CP, palp, melena or BRBPR.  The pt is being ad for transfusion of PRBC.  Xarelto on hold.  The PMHx includes:  HTN, ASHD, CHF, CAD, sp stent LAD 8/21/Dr Bliss, DLD, MR, sp clipping,  9/22, DLD, GERD, gastritis, duodenitis, diverticulosis, AVMs, chronic anemia, alpha THALL, Fe def, OA, DJD, DDD, chronic leg edema, Venous insuff, BPH.    INTERVAL HPI/OVERNIGHT EVENTS: pt feels better post transfusions and EGD/colonoscopy, H/H low but stable 8.8/28 for VCE/ video camera endoscopy today, ff by GI     MEDICATIONS  (STANDING):  aspirin enteric coated 81 milliGRAM(s) Oral daily  atorvastatin 20 milliGRAM(s) Oral at bedtime  chlorhexidine 2% Cloths 1 Application(s) Topical daily  digoxin     Tablet 250 MICROGram(s) Oral daily  magnesium sulfate  IVPB 2 Gram(s) IV Intermittent every 2 hours  pantoprazole  Injectable 40 milliGRAM(s) IV Push every 12 hours  potassium chloride   Powder 40 milliEquivalent(s) Oral once  tamsulosin 0.4 milliGRAM(s) Oral at bedtime  torsemide 20 milliGRAM(s) Oral daily    MEDICATIONS  (PRN):      Allergies    No Known Allergies    Intolerances        REVIEW OF SYSTEMS      General: gen weakness, easy fatigue	    Skin/Breast: gen pallor, no rash    Respiratory and Thorax: SOB naomi on exertion  	  Cardiovascular:	denies CP    Gastrointestinal:	denies abd pain/N/V/hematemesis/melena/BRBPR    Genitourinary: denies dysuria    Musculoskeletal:	 back and limb pain	    Hematology/Lymphatics:	  " I always have anemia"	    Vital Signs Last 24 Hrs    T(F): 96.8  HR:  89  BP:  92/55    RR: 16  SpO2: 98% RA    PHYSICAL EXAM:      Constitutional: A&O, verbal, elderly, thin and frail and chr ill looking but NAD, gen pallor    Eyes: pale conjunctivae    ENMT: dental defects    Neck: supple, no JVD/bruits/stridor    Back: skyler, + perilumbar mm spasm and tenderness on palp    Respiratory: shallow resp, no wheezing/crackles/rales    Cardiovascular: s1s2 reg, II/VI WILLIE    Gastrointestinal: globose, soft and benign, no organomegaly, + BS    Genitourinary: no ludwig    Rectal: as per ER records neg     Extremities: moves all ext, + arthritic changes, no edema    Vascular: dec pedal pulses    Neurological: nonfocal    Skin: gen pallor, severe palmar pallor, no rash    Lymph Nodes: not enlarged    Musculoskeletal: thin mm mass    Psychiatric: stable        LABS:                        8.8   5.5)-----------( 278    on ad H/H 5.6/21, RBC 3.2, MCV 66             28.9  RBC 4    141  |  100  |  6  ----------------------------< 94  3.5   |  32  |  0.8  GFR 85  Ca    8.7      17 Jan 2023 08:43  Mg     1.3, 2.1, 1.7  O-  retic 1.9  Haptos 27  ferritin 15    Covid neg, FLU A/B neg, RSV neg    TPro  6.8  /  Alb  3.7  /  TBili  0.5  /  DBili  x   /  AST  19  /  ALT  9   /  AlkPhos  57  01-16    PT/INR - ( 16 Jan 2023 12:10 )   PT: 20.60 sec;   INR: 1.78 ratio         PTT - ( 16 Jan 2023 12:10 )  PTT:33.7 sec      RADIOLOGY & ADDITIONAL TESTS:  EGD:  nl  esophagus, erythema of gastric mucosa c/w non-erosive gastritis, duodenum few sm nonbleeding angioectasias 2nd portion of duodenum which were ablated completely,   Colonoscopy:  5mm polyp in descending colon, polypectomy, int and ext hemorrhoids, mod diverticulosis of L sided colon

## 2023-01-20 NOTE — PROGRESS NOTE ADULT - ASSESSMENT
Elderly male with complex cardiac issues on Xarelto 10mg instructed to come to the ER for low H/H in PMDs office  and noted to have severe pallor.  The pt was hosp 11-12/22 for same with EGD and colonoscopy.  The pt was restarted on Xarelto 10 mg and returns with drop in H/H req transfusions.    Severe anemia, probable recurrence of GIB  Hx of HTN, ASHD, CHF, CAD, sp LAD stent 8/21, severe MR, sp Mitral Valve clipping 9/22 chr AC with Xarelto  Hx of DLD  Hx of GERD, gastritis, duodenitis sp EGD 11/22, diverticulosis, colonic AVMs sp colonoscopy 11/22  Hx of ch anemia, Alpha THALL, iron def, GIB  Hx of OA, DDD, DJD  Hx of chronic lower ext edema, Venous Insuff  Hx of prostatism    CBC being monitored sp pRBC H/H8.8/28.9 low  but stable   GI:  EGD today shows nl esophagus non-erosive gastritis, 2nd part of duodenum with few sm nonbleeding angioectasias which were completely ablated, colonoscopyshows L side diverticulosis, 5mm polyp removed, int and ext hemorrhoids  videocamera endoscopy today   NPO after midnight for VCE, Miralax 5 pkg today  can resume short acting AC  evaluated by cardio and the ques for cardio is can we D/C AC as this is 3rd severe drop in H/H, recommend EPS consult for Watchman procedure  EPS consult:  complex cardiac pt, good candidate for Watchman Procedure, will w/up as out pt and plan for procedure March/April  Hem, pt known to them, prior w/up for hemolysis is neg    on ad H/H 5.6/21, RBC 3.2,  MCV 66  transfused 3 u PRBC  Venofer 200mg IV x2, pt on B12 and folate  Hold Xarelto  monitor electrolyte, keep K 4 or above and Mg 2 or above  Rehab  Social [facility]

## 2023-01-20 NOTE — PROGRESS NOTE ADULT - ASSESSMENT
82 y/o M PMHx CAD s/p PCI to LAD in 8/2021, Afib on xarelto, HLD, HTN and severe MR s/o mitral clip in 9/22 presenting to the ED for low Hb on routine blood work.  Pt states that he got a call from his PCP Dr. Antoine this morning and was instructed to go to the ED due to low Hb.     # Recurrent Anemia S/P EGD and colonoscopy revealing small non-bleeding angioectasias in the second part of the duodenum. The angiectasias was ablated completely using Argon Plasma Coagulation (APC).    - Last EGD and colonoscopy (11/22): clean based ulcer in the stomach, AVMS in the colon s/p APC  - EGD 11/22: Angiectasia in the antrum s/p APC  - EGD 4/22: AVMs in the duodenum   - EGD 9/21: Angiectasia in the duodenum   - Hemoglobin 5.6 on admission, Hemodynamically stable, No overt GI bleed.   - On Xarelto last dose 1/16 GFR 85     PLAN   Colonoscopy in 5 years.     Advance diet as tolerated    Await pathology   VCE today   Can resume AC if deemed necessary   Monitor CBC   Will follow.

## 2023-01-21 LAB
ALBUMIN SERPL ELPH-MCNC: 3.6 G/DL — SIGNIFICANT CHANGE UP (ref 3.5–5.2)
ALP SERPL-CCNC: 69 U/L — SIGNIFICANT CHANGE UP (ref 30–115)
ALT FLD-CCNC: 12 U/L — SIGNIFICANT CHANGE UP (ref 0–41)
ANION GAP SERPL CALC-SCNC: 12 MMOL/L — SIGNIFICANT CHANGE UP (ref 7–14)
AST SERPL-CCNC: 36 U/L — SIGNIFICANT CHANGE UP (ref 0–41)
BASOPHILS # BLD AUTO: 0.02 K/UL — SIGNIFICANT CHANGE UP (ref 0–0.2)
BASOPHILS NFR BLD AUTO: 0.3 % — SIGNIFICANT CHANGE UP (ref 0–1)
BILIRUB SERPL-MCNC: 0.9 MG/DL — SIGNIFICANT CHANGE UP (ref 0.2–1.2)
BUN SERPL-MCNC: 10 MG/DL — SIGNIFICANT CHANGE UP (ref 10–20)
CALCIUM SERPL-MCNC: 9.3 MG/DL — SIGNIFICANT CHANGE UP (ref 8.4–10.5)
CHLORIDE SERPL-SCNC: 97 MMOL/L — LOW (ref 98–110)
CO2 SERPL-SCNC: 29 MMOL/L — SIGNIFICANT CHANGE UP (ref 17–32)
CREAT SERPL-MCNC: 0.9 MG/DL — SIGNIFICANT CHANGE UP (ref 0.7–1.5)
EGFR: 85 ML/MIN/1.73M2 — SIGNIFICANT CHANGE UP
EOSINOPHIL # BLD AUTO: 0.12 K/UL — SIGNIFICANT CHANGE UP (ref 0–0.7)
EOSINOPHIL NFR BLD AUTO: 2.1 % — SIGNIFICANT CHANGE UP (ref 0–8)
GLUCOSE SERPL-MCNC: 91 MG/DL — SIGNIFICANT CHANGE UP (ref 70–99)
HCT VFR BLD CALC: 32.3 % — LOW (ref 42–52)
HGB BLD-MCNC: 9 G/DL — LOW (ref 14–18)
IMM GRANULOCYTES NFR BLD AUTO: 0.2 % — SIGNIFICANT CHANGE UP (ref 0.1–0.3)
LYMPHOCYTES # BLD AUTO: 1.27 K/UL — SIGNIFICANT CHANGE UP (ref 1.2–3.4)
LYMPHOCYTES # BLD AUTO: 21.9 % — SIGNIFICANT CHANGE UP (ref 20.5–51.1)
MAGNESIUM SERPL-MCNC: 1.8 MG/DL — SIGNIFICANT CHANGE UP (ref 1.8–2.4)
MCHC RBC-ENTMCNC: 19.9 PG — LOW (ref 27–31)
MCHC RBC-ENTMCNC: 27.9 G/DL — LOW (ref 32–37)
MCV RBC AUTO: 71.3 FL — LOW (ref 80–94)
MONOCYTES # BLD AUTO: 0.86 K/UL — HIGH (ref 0.1–0.6)
MONOCYTES NFR BLD AUTO: 14.9 % — HIGH (ref 1.7–9.3)
NEUTROPHILS # BLD AUTO: 3.51 K/UL — SIGNIFICANT CHANGE UP (ref 1.4–6.5)
NEUTROPHILS NFR BLD AUTO: 60.6 % — SIGNIFICANT CHANGE UP (ref 42.2–75.2)
NRBC # BLD: 0 /100 WBCS — SIGNIFICANT CHANGE UP (ref 0–0)
PLATELET # BLD AUTO: 191 K/UL — SIGNIFICANT CHANGE UP (ref 130–400)
POTASSIUM SERPL-MCNC: 4.1 MMOL/L — SIGNIFICANT CHANGE UP (ref 3.5–5)
POTASSIUM SERPL-SCNC: 4.1 MMOL/L — SIGNIFICANT CHANGE UP (ref 3.5–5)
PROT SERPL-MCNC: 7.3 G/DL — SIGNIFICANT CHANGE UP (ref 6–8)
RBC # BLD: 4.53 M/UL — LOW (ref 4.7–6.1)
RBC # FLD: 27 % — HIGH (ref 11.5–14.5)
SODIUM SERPL-SCNC: 138 MMOL/L — SIGNIFICANT CHANGE UP (ref 135–146)
WBC # BLD: 5.79 K/UL — SIGNIFICANT CHANGE UP (ref 4.8–10.8)
WBC # FLD AUTO: 5.79 K/UL — SIGNIFICANT CHANGE UP (ref 4.8–10.8)

## 2023-01-21 RX ADMIN — Medication 250 MICROGRAM(S): at 05:51

## 2023-01-21 RX ADMIN — PANTOPRAZOLE SODIUM 40 MILLIGRAM(S): 20 TABLET, DELAYED RELEASE ORAL at 17:22

## 2023-01-21 RX ADMIN — Medication 25 GRAM(S): at 17:22

## 2023-01-21 RX ADMIN — ENOXAPARIN SODIUM 80 MILLIGRAM(S): 100 INJECTION SUBCUTANEOUS at 17:22

## 2023-01-21 RX ADMIN — ENOXAPARIN SODIUM 80 MILLIGRAM(S): 100 INJECTION SUBCUTANEOUS at 05:50

## 2023-01-21 RX ADMIN — Medication 40 MILLIEQUIVALENT(S): at 11:09

## 2023-01-21 RX ADMIN — TAMSULOSIN HYDROCHLORIDE 0.4 MILLIGRAM(S): 0.4 CAPSULE ORAL at 21:40

## 2023-01-21 RX ADMIN — PANTOPRAZOLE SODIUM 40 MILLIGRAM(S): 20 TABLET, DELAYED RELEASE ORAL at 05:51

## 2023-01-21 RX ADMIN — ATORVASTATIN CALCIUM 20 MILLIGRAM(S): 80 TABLET, FILM COATED ORAL at 21:40

## 2023-01-21 RX ADMIN — Medication 20 MILLIGRAM(S): at 05:51

## 2023-01-21 RX ADMIN — PREGABALIN 1000 MICROGRAM(S): 225 CAPSULE ORAL at 11:08

## 2023-01-21 RX ADMIN — Medication 81 MILLIGRAM(S): at 11:08

## 2023-01-21 RX ADMIN — CHLORHEXIDINE GLUCONATE 1 APPLICATION(S): 213 SOLUTION TOPICAL at 11:08

## 2023-01-21 RX ADMIN — Medication 1 MILLIGRAM(S): at 11:08

## 2023-01-21 RX ADMIN — Medication 25 GRAM(S): at 05:34

## 2023-01-21 NOTE — PROGRESS NOTE ADULT - SUBJECTIVE AND OBJECTIVE BOX
MIGEL GRIFFIN 83y o W Male instructed to go to the ER for drop in H/H.  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  Subsequently pt restarted on Xarelto 10mg.  The pt had ff up c  PMD  and was noted to have severe pallor with low H/H.  The pt admits to gen weakness and SOB on exertion. The pt denies CP, palp, melena or BRBPR.  The pt is being ad for transfusion of PRBC.  Xarelto on hold.  The PMHx includes:  HTN, ASHD, CHF, CAD, sp stent LAD 8/21/Dr Bliss, DLD, MR, sp clipping,  9/22, DLD, GERD, gastritis, duodenitis, diverticulosis, AVMs, chronic anemia, alpha THALL, Fe def, OA, DJD, DDD, chronic leg edema, Venous insuff, BPH.    INTERVAL HPI/OVERNIGHT EVENTS: pt stable and without complaints,  post transfusions and EGD/colonoscopy, H/H stable 9/32 for VCE/ video camera endoscopy yesterday, ff by GI, restarted on AC with Lovenox    MEDICATIONS  (STANDING):  aspirin enteric coated 81 milliGRAM(s) Oral daily  Lovenox  atorvastatin 20 milliGRAM(s) Oral at bedtime  chlorhexidine 2% Cloths 1 Application(s) Topical daily  digoxin     Tablet 250 MICROGram(s) Oral daily  magnesium sulfate  IVPB 2 Gram(s) IV Intermittent every 2 hours  pantoprazole  Injectable 40 milliGRAM(s) IV Push every 12 hours  potassium chloride   Powder 40 milliEquivalent(s) Oral once  tamsulosin 0.4 milliGRAM(s) Oral at bedtime  torsemide 20 milliGRAM(s) Oral daily    MEDICATIONS  (PRN):      Allergies    No Known Allergies    Intolerances        REVIEW OF SYSTEMS      General: gen weakness, easy fatigue	    Skin/Breast: gen pallor, no rash    Respiratory and Thorax: SOB naomi on exertion  	  Cardiovascular:	denies CP    Gastrointestinal:	denies abd pain/N/V/hematemesis/melena/BRBPR    Genitourinary: denies dysuria    Musculoskeletal:	 back and limb pain	    Hematology/Lymphatics:	  " I always have anemia"	    Vital Signs Last 24 Hrs    T(F): 96.4  HR:  89  BP:  101/57    RR: 100  SpO2: 100% RA    PHYSICAL EXAM:      Constitutional: A&O, verbal, elderly, thin and frail and chr ill looking but NAD, gen pallor    Eyes: pale conjunctivae    ENMT: dental defects    Neck: supple, no JVD/bruits/stridor    Back: skyler, + perilumbar mm spasm and tenderness on palp    Respiratory: shallow resp, no wheezing/crackles/rales    Cardiovascular: s1s2 reg, II/VI WILLIE    Gastrointestinal: globose, soft and benign, no organomegaly, + BS    Genitourinary: no ludwig    Rectal: as per ER records neg     Extremities: moves all ext, + arthritic changes, no edema    Vascular: dec pedal pulses    Neurological: nonfocal    Skin: gen pallor, severe palmar pallor, no rash    Lymph Nodes: not enlarged    Musculoskeletal: thin mm mass    Psychiatric: stable        LABS:                        9   5.7)-----------( 191    on ad H/H 5.6/21, RBC 3.2, MCV 66             32  RBC 4.5, MCV 71    138  |  97  |  10  ----------------------------< 94  3.5   |  29  |  0.9  GFR 85  Ca    8.7        Mg     1.3, 2.1, 1.7, 1.8  O-  retic 1.9  Haptos 27  ferritin 15    Covid neg, FLU A/B neg, RSV neg    TPro  6.8  /  Alb  3.7  /  TBili  0.5  /  DBili  x   /  AST  19  /  ALT  9   /  AlkPhos  57  01-16    PT/INR - ( 16 Jan 2023 12:10 )   PT: 20.60 sec;   INR: 1.78 ratio         PTT - ( 16 Jan 2023 12:10 )  PTT:33.7 sec      RADIOLOGY & ADDITIONAL TESTS:  EGD:  nl  esophagus, erythema of gastric mucosa c/w non-erosive gastritis, duodenum few sm nonbleeding angioectasias 2nd portion of duodenum which were ablated completely,   Colonoscopy:  5mm polyp in descending colon, polypectomy, int and ext hemorrhoids, mod diverticulosis of L sided colon

## 2023-01-21 NOTE — PROGRESS NOTE ADULT - ASSESSMENT
Elderly male with complex cardiac issues on Xarelto 10mg instructed to come to the ER for low H/H in PMDs office  and noted to have severe pallor.  The pt was hosp 11-12/22 for same with EGD and colonoscopy.  The pt was restarted on Xarelto 10 mg and returns with drop in H/H req transfusions.    Severe anemia, probable recurrence of GIB  Hx of HTN, ASHD, CHF, CAD, sp LAD stent 8/21, severe MR, sp Mitral Valve clipping 9/22 chr AC with Xarelto  Hx of DLD  Hx of GERD, gastritis, duodenitis sp EGD 11/22, diverticulosis, colonic AVMs sp colonoscopy 11/22  Hx of ch anemia, Alpha THALL, iron def, GIB  Hx of OA, DDD, DJD  Hx of chronic lower ext edema, Venous Insuff  Hx of prostatism    CBC being monitored sp pRBC H/H 9/32stable  pt restarted on AC with Lovenox  GI:  EGD today shows nl esophagus non-erosive gastritis, 2nd part of duodenum with few sm nonbleeding angioectasias which were completely ablated, colonoscopys hows L side diverticulosis, 5mm polyp removed, int and ext hemorrhoids  videocamera endoscopy 1/20  NPO after midnight for VCE, Miralax 5 pkg today  can resume short acting AC  evaluated by cardio and the ques for cardio is can we D/C AC as this is 3rd severe drop in H/H, recommend EPS consult for Watchman procedure  EPS consult:  complex cardiac pt, good candidate for Watchman Procedure, will w/up as out pt and plan for procedure March/April  Hem, pt known to them, prior w/up for hemolysis is neg    on ad H/H 5.6/21, RBC 3.2,  MCV 66  transfused 3 u PRBC  Venofer 200mg IV x2, pt on B12 and folate  Hold Xarelto. restarted Lovenox  monitor electrolyte, keep K 4 or above and Mg 2 or above  Rehab  Social SVC Elderly male with complex cardiac issues on Xarelto 10mg instructed to come to the ER for low H/H in PMDs office  and noted to have severe pallor.  The pt was hosp 11-12/22 for same with EGD and colonoscopy.  The pt was restarted on Xarelto 10 mg and returns with drop in H/H req transfusions.    Severe anemia, probable recurrence of GIB  Hx of HTN, ASHD, CHF, CAD, sp LAD stent 8/21, severe MR, sp Mitral Valve clipping 9/22 chr AC with Xarelto  Hx of DLD  Hx of GERD, gastritis, duodenitis sp EGD 11/22, diverticulosis, colonic AVMs sp colonoscopy 11/22  Hx of ch anemia, Alpha THALL, iron def, GIB  Hx of OA, DDD, DJD  Hx of chronic lower ext edema, Venous Insuff  Hx of prostatism    CBC being monitored sp pRBC H/H 9/32stable  pt restarted on AC with Lovenox  GI:  EGD today shows nl esophagus non-erosive gastritis, 2nd part of duodenum with few sm nonbleeding angioectasias which were completely ablated, colonoscopys hows L side diverticulosis, 5mm polyp removed, int and ext hemorrhoids  videocamera endoscopy 1/20  can resume short acting AC  evaluated by cardio and the ques for cardio is can we D/C AC as this is 3rd severe drop in H/H, recommend EPS consult for Watchman procedure  EPS consult:  complex cardiac pt, good candidate for Watchman Procedure, will w/up as out pt and plan for procedure March/April  Hem, pt known to them, prior w/up for hemolysis is neg    on ad H/H 5.6/21, RBC 3.2,  MCV 66  transfused 3 u PRBC  Venofer 200mg IV x2, pt on B12 and folate    monitor electrolyte, keep K 4 or above and Mg 2 or above  Rehab  Social SVC

## 2023-01-22 LAB
ALBUMIN SERPL ELPH-MCNC: 3.4 G/DL — LOW (ref 3.5–5.2)
ALP SERPL-CCNC: 73 U/L — SIGNIFICANT CHANGE UP (ref 30–115)
ALT FLD-CCNC: 18 U/L — SIGNIFICANT CHANGE UP (ref 0–41)
ANION GAP SERPL CALC-SCNC: 9 MMOL/L — SIGNIFICANT CHANGE UP (ref 7–14)
AST SERPL-CCNC: 38 U/L — SIGNIFICANT CHANGE UP (ref 0–41)
BASOPHILS # BLD AUTO: 0.04 K/UL — SIGNIFICANT CHANGE UP (ref 0–0.2)
BASOPHILS NFR BLD AUTO: 0.6 % — SIGNIFICANT CHANGE UP (ref 0–1)
BILIRUB SERPL-MCNC: 0.7 MG/DL — SIGNIFICANT CHANGE UP (ref 0.2–1.2)
BUN SERPL-MCNC: 12 MG/DL — SIGNIFICANT CHANGE UP (ref 10–20)
CALCIUM SERPL-MCNC: 9.2 MG/DL — SIGNIFICANT CHANGE UP (ref 8.4–10.5)
CHLORIDE SERPL-SCNC: 100 MMOL/L — SIGNIFICANT CHANGE UP (ref 98–110)
CO2 SERPL-SCNC: 31 MMOL/L — SIGNIFICANT CHANGE UP (ref 17–32)
CREAT SERPL-MCNC: 0.9 MG/DL — SIGNIFICANT CHANGE UP (ref 0.7–1.5)
EGFR: 85 ML/MIN/1.73M2 — SIGNIFICANT CHANGE UP
EOSINOPHIL # BLD AUTO: 0.32 K/UL — SIGNIFICANT CHANGE UP (ref 0–0.7)
EOSINOPHIL NFR BLD AUTO: 5.1 % — SIGNIFICANT CHANGE UP (ref 0–8)
GLUCOSE SERPL-MCNC: 96 MG/DL — SIGNIFICANT CHANGE UP (ref 70–99)
HCT VFR BLD CALC: 32.4 % — LOW (ref 42–52)
HGB BLD-MCNC: 9.3 G/DL — LOW (ref 14–18)
IMM GRANULOCYTES NFR BLD AUTO: 0.5 % — HIGH (ref 0.1–0.3)
LYMPHOCYTES # BLD AUTO: 1.32 K/UL — SIGNIFICANT CHANGE UP (ref 1.2–3.4)
LYMPHOCYTES # BLD AUTO: 21.1 % — SIGNIFICANT CHANGE UP (ref 20.5–51.1)
MAGNESIUM SERPL-MCNC: 1.9 MG/DL — SIGNIFICANT CHANGE UP (ref 1.8–2.4)
MCHC RBC-ENTMCNC: 20.1 PG — LOW (ref 27–31)
MCHC RBC-ENTMCNC: 28.7 G/DL — LOW (ref 32–37)
MCV RBC AUTO: 70.1 FL — LOW (ref 80–94)
MONOCYTES # BLD AUTO: 0.84 K/UL — HIGH (ref 0.1–0.6)
MONOCYTES NFR BLD AUTO: 13.4 % — HIGH (ref 1.7–9.3)
NEUTROPHILS # BLD AUTO: 3.71 K/UL — SIGNIFICANT CHANGE UP (ref 1.4–6.5)
NEUTROPHILS NFR BLD AUTO: 59.3 % — SIGNIFICANT CHANGE UP (ref 42.2–75.2)
NRBC # BLD: 0 /100 WBCS — SIGNIFICANT CHANGE UP (ref 0–0)
PLATELET # BLD AUTO: 244 K/UL — SIGNIFICANT CHANGE UP (ref 130–400)
POTASSIUM SERPL-MCNC: 3.8 MMOL/L — SIGNIFICANT CHANGE UP (ref 3.5–5)
POTASSIUM SERPL-SCNC: 3.8 MMOL/L — SIGNIFICANT CHANGE UP (ref 3.5–5)
PROT SERPL-MCNC: 7.4 G/DL — SIGNIFICANT CHANGE UP (ref 6–8)
RBC # BLD: 4.62 M/UL — LOW (ref 4.7–6.1)
RBC # FLD: 27 % — HIGH (ref 11.5–14.5)
SODIUM SERPL-SCNC: 140 MMOL/L — SIGNIFICANT CHANGE UP (ref 135–146)
WBC # BLD: 6.26 K/UL — SIGNIFICANT CHANGE UP (ref 4.8–10.8)
WBC # FLD AUTO: 6.26 K/UL — SIGNIFICANT CHANGE UP (ref 4.8–10.8)

## 2023-01-22 RX ADMIN — PREGABALIN 1000 MICROGRAM(S): 225 CAPSULE ORAL at 11:47

## 2023-01-22 RX ADMIN — CHLORHEXIDINE GLUCONATE 1 APPLICATION(S): 213 SOLUTION TOPICAL at 11:47

## 2023-01-22 RX ADMIN — TAMSULOSIN HYDROCHLORIDE 0.4 MILLIGRAM(S): 0.4 CAPSULE ORAL at 21:13

## 2023-01-22 RX ADMIN — Medication 20 MILLIGRAM(S): at 05:31

## 2023-01-22 RX ADMIN — PANTOPRAZOLE SODIUM 40 MILLIGRAM(S): 20 TABLET, DELAYED RELEASE ORAL at 06:11

## 2023-01-22 RX ADMIN — Medication 25 GRAM(S): at 05:31

## 2023-01-22 RX ADMIN — ATORVASTATIN CALCIUM 20 MILLIGRAM(S): 80 TABLET, FILM COATED ORAL at 21:13

## 2023-01-22 RX ADMIN — ENOXAPARIN SODIUM 80 MILLIGRAM(S): 100 INJECTION SUBCUTANEOUS at 05:31

## 2023-01-22 RX ADMIN — ENOXAPARIN SODIUM 80 MILLIGRAM(S): 100 INJECTION SUBCUTANEOUS at 17:08

## 2023-01-22 RX ADMIN — Medication 250 MICROGRAM(S): at 05:31

## 2023-01-22 RX ADMIN — Medication 25 GRAM(S): at 17:08

## 2023-01-22 RX ADMIN — Medication 81 MILLIGRAM(S): at 11:47

## 2023-01-22 RX ADMIN — Medication 1 MILLIGRAM(S): at 11:47

## 2023-01-22 RX ADMIN — PANTOPRAZOLE SODIUM 40 MILLIGRAM(S): 20 TABLET, DELAYED RELEASE ORAL at 17:08

## 2023-01-22 NOTE — PROGRESS NOTE ADULT - SUBJECTIVE AND OBJECTIVE BOX
MIGEL GRIFFIN 83y o W Male instructed to go to the ER for drop in H/H.  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  Subsequently pt restarted on Xarelto 10mg.  The pt had ff up c  PMD  and was noted to have severe pallor with low H/H.  The pt admits to gen weakness and SOB on exertion. The pt denies CP, palp, melena or BRBPR.  The pt is being ad for transfusion of PRBC.  Xarelto on hold.  The PMHx includes:  HTN, ASHD, CHF, CAD, sp stent LAD 8/21/Dr Bliss, DLD, MR, sp clipping,  9/22, DLD, GERD, gastritis, duodenitis, diverticulosis, AVMs, chronic anemia, alpha THALL, Fe def, OA, DJD, DDD, chronic leg edema, Venous insuff, BPH.    INTERVAL HPI/OVERNIGHT EVENTS: pt stable and without complaints,  post transfusions and EGD/colonoscopy, H/H stable 9/32 for VCE/ video camera endoscopy 1/10, results P, , ff by GI, restarted on AC with Lovenox    MEDICATIONS  (STANDING):  aspirin enteric coated 81 milliGRAM(s) Oral daily  Lovenox  atorvastatin 20 milliGRAM(s) Oral at bedtime  chlorhexidine 2% Cloths 1 Application(s) Topical daily  digoxin     Tablet 250 MICROGram(s) Oral daily  magnesium sulfate  IVPB 2 Gram(s) IV Intermittent every 2 hours  pantoprazole  Injectable 40 milliGRAM(s) IV Push every 12 hours  potassium chloride   Powder 40 milliEquivalent(s) Oral once  tamsulosin 0.4 milliGRAM(s) Oral at bedtime  torsemide 20 milliGRAM(s) Oral daily    MEDICATIONS  (PRN):      Allergies    No Known Allergies    Intolerances        REVIEW OF SYSTEMS      General: gen weakness, easy fatigue	    Skin/Breast: gen pallor, no rash    Respiratory and Thorax: SOB naomi on exertion  	  Cardiovascular:	denies CP    Gastrointestinal:	denies abd pain/N/V/hematemesis/melena/BRBPR    Genitourinary: denies dysuria    Musculoskeletal:	 back and limb pain	    Hematology/Lymphatics:	  " I always have anemia"	    Vital Signs Last 24 Hrs    T(F): 96.4  HR:  89  BP:  101/57    RR: 100  SpO2: 100% RA    PHYSICAL EXAM:      Constitutional: A&O, verbal, elderly, thin and frail and chr ill looking but NAD, gen pallor    Eyes: pale conjunctivae    ENMT: dental defects    Neck: supple, no JVD/bruits/stridor    Back: skyler, + perilumbar mm spasm and tenderness on palp    Respiratory: shallow resp, no wheezing/crackles/rales    Cardiovascular: s1s2 reg, II/VI WILLIE    Gastrointestinal: globose, soft and benign, no organomegaly, + BS    Genitourinary: no ludwig    Rectal: as per ER records neg     Extremities: moves all ext, + arthritic changes, no edema    Vascular: dec pedal pulses    Neurological: nonfocal    Skin: gen pallor, severe palmar pallor, no rash    Lymph Nodes: not enlarged    Musculoskeletal: thin mm mass    Psychiatric: stable        LABS:                        9.3  6)-----------( 244    on ad H/H 5.6/21, RBC 3.2, MCV 66             32  RBC 4.5, MCV 71    140 |  100  |  12  ----------------------------< 96  3.8   |  31  |  0.9  GFR 85  Ca    8.7        Mg     1.3, 2.1, 1.7, 1.8  O-  retic 1.9  Haptos 27  ferritin 15    Covid neg, FLU A/B neg, RSV neg    TPro  6.8  /  Alb  3.7  /  TBili  0.5  /  DBili  x   /  AST  19  /  ALT  9   /  AlkPhos  57  01-16    PT/INR - ( 16 Jan 2023 12:10 )   PT: 20.60 sec;   INR: 1.78 ratio         PTT - ( 16 Jan 2023 12:10 )  PTT:33.7 sec      RADIOLOGY & ADDITIONAL TESTS:  EGD:  nl  esophagus, erythema of gastric mucosa c/w non-erosive gastritis, duodenum few sm nonbleeding angioectasias 2nd portion of duodenum which were ablated completely,   Colonoscopy:  5mm polyp in descending colon, polypectomy, int and ext hemorrhoids, mod diverticulosis of L sided colon

## 2023-01-23 ENCOUNTER — TRANSCRIPTION ENCOUNTER (OUTPATIENT)
Age: 83
End: 2023-01-23

## 2023-01-23 LAB
ALBUMIN SERPL ELPH-MCNC: 3.4 G/DL — LOW (ref 3.5–5.2)
ALP SERPL-CCNC: 70 U/L — SIGNIFICANT CHANGE UP (ref 30–115)
ALT FLD-CCNC: 19 U/L — SIGNIFICANT CHANGE UP (ref 0–41)
ANION GAP SERPL CALC-SCNC: 7 MMOL/L — SIGNIFICANT CHANGE UP (ref 7–14)
AST SERPL-CCNC: 36 U/L — SIGNIFICANT CHANGE UP (ref 0–41)
BASOPHILS # BLD AUTO: 0.05 K/UL — SIGNIFICANT CHANGE UP (ref 0–0.2)
BASOPHILS NFR BLD AUTO: 0.8 % — SIGNIFICANT CHANGE UP (ref 0–1)
BILIRUB SERPL-MCNC: 0.5 MG/DL — SIGNIFICANT CHANGE UP (ref 0.2–1.2)
BUN SERPL-MCNC: 14 MG/DL — SIGNIFICANT CHANGE UP (ref 10–20)
CALCIUM SERPL-MCNC: 8.8 MG/DL — SIGNIFICANT CHANGE UP (ref 8.4–10.4)
CHLORIDE SERPL-SCNC: 101 MMOL/L — SIGNIFICANT CHANGE UP (ref 98–110)
CO2 SERPL-SCNC: 33 MMOL/L — HIGH (ref 17–32)
CREAT SERPL-MCNC: 0.9 MG/DL — SIGNIFICANT CHANGE UP (ref 0.7–1.5)
EGFR: 85 ML/MIN/1.73M2 — SIGNIFICANT CHANGE UP
EOSINOPHIL # BLD AUTO: 0.18 K/UL — SIGNIFICANT CHANGE UP (ref 0–0.7)
EOSINOPHIL NFR BLD AUTO: 2.8 % — SIGNIFICANT CHANGE UP (ref 0–8)
GLUCOSE SERPL-MCNC: 100 MG/DL — HIGH (ref 70–99)
HCT VFR BLD CALC: 30.8 % — LOW (ref 42–52)
HGB BLD-MCNC: 8.7 G/DL — LOW (ref 14–18)
IMM GRANULOCYTES NFR BLD AUTO: 0.5 % — HIGH (ref 0.1–0.3)
LYMPHOCYTES # BLD AUTO: 1.31 K/UL — SIGNIFICANT CHANGE UP (ref 1.2–3.4)
LYMPHOCYTES # BLD AUTO: 20.4 % — LOW (ref 20.5–51.1)
MAGNESIUM SERPL-MCNC: 1.6 MG/DL — LOW (ref 1.8–2.4)
MCHC RBC-ENTMCNC: 20 PG — LOW (ref 27–31)
MCHC RBC-ENTMCNC: 28.2 G/DL — LOW (ref 32–37)
MCV RBC AUTO: 71 FL — LOW (ref 80–94)
MONOCYTES # BLD AUTO: 0.84 K/UL — HIGH (ref 0.1–0.6)
MONOCYTES NFR BLD AUTO: 13.1 % — HIGH (ref 1.7–9.3)
NEUTROPHILS # BLD AUTO: 4.02 K/UL — SIGNIFICANT CHANGE UP (ref 1.4–6.5)
NEUTROPHILS NFR BLD AUTO: 62.4 % — SIGNIFICANT CHANGE UP (ref 42.2–75.2)
NRBC # BLD: 0 /100 WBCS — SIGNIFICANT CHANGE UP (ref 0–0)
PLATELET # BLD AUTO: 235 K/UL — SIGNIFICANT CHANGE UP (ref 130–400)
POTASSIUM SERPL-MCNC: 3.8 MMOL/L — SIGNIFICANT CHANGE UP (ref 3.5–5)
POTASSIUM SERPL-SCNC: 3.8 MMOL/L — SIGNIFICANT CHANGE UP (ref 3.5–5)
PROT SERPL-MCNC: 7.2 G/DL — SIGNIFICANT CHANGE UP (ref 6–8)
RBC # BLD: 4.34 M/UL — LOW (ref 4.7–6.1)
RBC # FLD: 26.6 % — HIGH (ref 11.5–14.5)
SODIUM SERPL-SCNC: 141 MMOL/L — SIGNIFICANT CHANGE UP (ref 135–146)
SURGICAL PATHOLOGY STUDY: SIGNIFICANT CHANGE UP
SURGICAL PATHOLOGY STUDY: SIGNIFICANT CHANGE UP
WBC # BLD: 6.43 K/UL — SIGNIFICANT CHANGE UP (ref 4.8–10.8)
WBC # FLD AUTO: 6.43 K/UL — SIGNIFICANT CHANGE UP (ref 4.8–10.8)

## 2023-01-23 RX ORDER — MAGNESIUM SULFATE 500 MG/ML
2 VIAL (ML) INJECTION ONCE
Refills: 0 | Status: DISCONTINUED | OUTPATIENT
Start: 2023-01-23 | End: 2023-01-24

## 2023-01-23 RX ADMIN — CHLORHEXIDINE GLUCONATE 1 APPLICATION(S): 213 SOLUTION TOPICAL at 11:22

## 2023-01-23 RX ADMIN — PREGABALIN 1000 MICROGRAM(S): 225 CAPSULE ORAL at 11:22

## 2023-01-23 RX ADMIN — Medication 25 GRAM(S): at 05:28

## 2023-01-23 RX ADMIN — PANTOPRAZOLE SODIUM 40 MILLIGRAM(S): 20 TABLET, DELAYED RELEASE ORAL at 17:25

## 2023-01-23 RX ADMIN — TAMSULOSIN HYDROCHLORIDE 0.4 MILLIGRAM(S): 0.4 CAPSULE ORAL at 21:11

## 2023-01-23 RX ADMIN — PANTOPRAZOLE SODIUM 40 MILLIGRAM(S): 20 TABLET, DELAYED RELEASE ORAL at 05:32

## 2023-01-23 RX ADMIN — ENOXAPARIN SODIUM 80 MILLIGRAM(S): 100 INJECTION SUBCUTANEOUS at 05:27

## 2023-01-23 RX ADMIN — Medication 25 GRAM(S): at 17:25

## 2023-01-23 RX ADMIN — Medication 250 MICROGRAM(S): at 05:27

## 2023-01-23 RX ADMIN — ENOXAPARIN SODIUM 80 MILLIGRAM(S): 100 INJECTION SUBCUTANEOUS at 17:26

## 2023-01-23 RX ADMIN — ATORVASTATIN CALCIUM 20 MILLIGRAM(S): 80 TABLET, FILM COATED ORAL at 21:12

## 2023-01-23 RX ADMIN — Medication 1 MILLIGRAM(S): at 11:22

## 2023-01-23 RX ADMIN — Medication 81 MILLIGRAM(S): at 11:22

## 2023-01-23 RX ADMIN — Medication 20 MILLIGRAM(S): at 05:27

## 2023-01-23 NOTE — PHYSICAL THERAPY INITIAL EVALUATION ADULT - TRANSFER TRAINING, PT EVAL
will improve transfers to Independent using rolling walker by discharge  to decrease burden of care.

## 2023-01-23 NOTE — DISCHARGE NOTE NURSING/CASE MANAGEMENT/SOCIAL WORK - PATIENT PORTAL LINK FT
You can access the FollowMyHealth Patient Portal offered by [facility] by registering at the following website: [email or web address]. By joining [facility]’s FollowMyHealth portal, you will also be able to view your health information using other applications (apps) compatible with our system.

## 2023-01-23 NOTE — PHYSICAL THERAPY INITIAL EVALUATION ADULT - PERTINENT HX OF CURRENT PROBLEM, REHAB EVAL
83y o W Male instructed to go to the ER for drop in H/H. 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  Subsequently pt restarted on Xarelto 10mg.  The pt had ff up c  PMD  and was noted to have severe pallor with low H/H.  The pt admits to gen weakness and SOB on exertion. The pt denies CP, palp, melena or BRBPR.  Pt admitted for transfusion of PRBC.   GI:  EGD 1/23 shows nl esophagus non-erosive gastritis, 2nd part of duodenum with few sm nonbleeding angioectasias which were completely ablated, colonoscopys hows L side diverticulosis, 5mm polyp removed, int and ext hemorrhoids videocamera endoscopy 1/20

## 2023-01-23 NOTE — PROGRESS NOTE ADULT - SUBJECTIVE AND OBJECTIVE BOX
MIGEL GRIFFIN 83y o W Male instructed to go to the ER for drop in H/H.  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  Subsequently pt restarted on Xarelto 10mg.  The pt had ff up c  PMD  and was noted to have severe pallor with low H/H.  The pt admits to gen weakness and SOB on exertion. The pt denies CP, palp, melena or BRBPR.  The pt is being ad for transfusion of PRBC.  Xarelto on hold.  The PMHx includes:  HTN, ASHD, CHF, CAD, sp stent LAD 8/21/Dr Bliss, DLD, MR, sp clipping,  9/22, DLD, GERD, gastritis, duodenitis, diverticulosis, AVMs, chronic anemia, alpha THALL, Fe def, OA, DJD, DDD, chronic leg edema, Venous insuff, BPH.    INTERVAL HPI/OVERNIGHT EVENTS: pt stable and without complaints,  post transfusions and EGD/colonoscopy, H/H stable 8.7/30, awaiting result of VCE,  restarted on AC with Lovenox, low Mg 1.6, replete, evaluated by Rehab    MEDICATIONS  (STANDING):  aspirin enteric coated 81 milliGRAM(s) Oral daily  Lovenox 80mg sq q 12  atorvastatin 20 milliGRAM(s) Oral at bedtime  chlorhexidine 2% Cloths 1 Application(s) Topical daily  digoxin     Tablet 250 MICROGram(s) Oral daily  magnesium sulfate  IVPB 2 Gram(s) IV Intermittent every 2 hours  pantoprazole  Injectable 40 milliGRAM(s) IV Push every 12 hours  potassium chloride   Powder 40 milliEquivalent(s) Oral once  tamsulosin 0.4 milliGRAM(s) Oral at bedtime  torsemide 20 milliGRAM(s) Oral daily    MEDICATIONS  (PRN):      Allergies    No Known Allergies    Intolerances        REVIEW OF SYSTEMS      General: gen weakness, easy fatigue	    Skin/Breast: gen pallor, no rash    Respiratory and Thorax: SOB naomi on exertion  	  Cardiovascular:	denies CP    Gastrointestinal:	denies abd pain/N/V/hematemesis/melena/BRBPR    Genitourinary: denies dysuria    Musculoskeletal:	 back and limb pain	    Hematology/Lymphatics:	  " I always have anemia"	    Vital Signs Last 24 Hrs    T(F): 98.4  HR:  81  BP:  108/59    RR: 100  SpO2: 99% RA    PHYSICAL EXAM:      Constitutional: A&O, verbal, elderly, thin and frail and chr ill looking but NAD, gen pallor    Eyes: pale conjunctivae    ENMT: dental defects    Neck: supple, no JVD/bruits/stridor    Back: skyler, + perilumbar mm spasm and tenderness on palp    Respiratory: shallow resp, no wheezing/crackles/rales    Cardiovascular: s1s2 reg, II/VI WILLIE    Gastrointestinal: globose, soft and benign, no organomegaly, + BS    Genitourinary: no ludwig    Rectal: as per ER records neg     Extremities: moves all ext, + arthritic changes, no edema    Vascular: dec pedal pulses    Neurological: nonfocal    Skin: gen pallor, severe palmar pallor, no rash    Lymph Nodes: not enlarged    Musculoskeletal: thin mm mass    Psychiatric: stable        LABS:                        8.7  6.4)-----------( 235    on ad H/H 5.6/21, RBC 3.2, MCV 66             30  RBC 4.5, MCV 71    141 |  101  |  14  ----------------------------< 100  3.8   |  33  |  0.9  GFR 85  Ca    8.7        Mg     1.3, 2.1, 1.7, 1.8  O-  retic 1.9  Haptos 27  ferritin 15    Covid neg, FLU A/B neg, RSV neg    TPro  6.8  /  Alb  3.7  /  TBili  0.5  /  DBili  x   /  AST  19  /  ALT  9   /  AlkPhos  57  01-16    PT/INR - ( 16 Jan 2023 12:10 )   PT: 20.60 sec;   INR: 1.78 ratio         PTT - ( 16 Jan 2023 12:10 )  PTT:33.7 sec      RADIOLOGY & ADDITIONAL TESTS:  EGD:  nl  esophagus, erythema of gastric mucosa c/w non-erosive gastritis, duodenum few sm nonbleeding angioectasias 2nd portion of duodenum which were ablated completely,   Colonoscopy:  5mm polyp ( tubular adenoma)  in descending colon, polypectomy, int and ext hemorrhoids, mod diverticulosis of L sided colon  VCE results P

## 2023-01-23 NOTE — PHYSICAL THERAPY INITIAL EVALUATION ADULT - ADDITIONAL COMMENTS
As per pt, he lives with gf PH, with 8 MANDEEP with B/L HR, 10 steps to bedroom with L HR going up. He is Independent with mobility without using any AD of would sometimes use SC if needed.

## 2023-01-23 NOTE — PROGRESS NOTE ADULT - ASSESSMENT
Elderly male with complex cardiac issues on Xarelto 10mg instructed to come to the ER for low H/H in PMDs office  and noted to have severe pallor.  The pt was hosp 11-12/22 for same with EGD and colonoscopy.  The pt was restarted on Xarelto 10 mg and returns with drop in H/H req transfusions.    Severe anemia, probable recurrence of GIB, on anticoagulation  Iron Deficiency  Hx of HTN, ASHD, CHF, CAD, sp LAD stent 8/21, severe MR, sp Mitral Valve clipping 9/22 chr AC with Xarelto  Hx of DLD  Hx of GERD, gastritis, duodenitis sp EGD 11/22, diverticulosis, colonic AVMs sp colonoscopy 11/22  Hx of ch anemia, Alpha THALL, iron def, GIB, GI  angioectasia  Hx of OA, DDD, DJD  Hx of chronic lower ext edema, Venous Insuff  Hx of prostatism    CBC being monitored sp PRBC H/H 8.7/30 stable  pt restarted on AC with Lovenox 80mg  sq q 12,   GI:  EGD today shows nl esophagus non-erosive gastritis, 2nd part of duodenum with few sm nonbleeding angioectasias which were completely ablated, colonoscopy  shows L side diverticulosis, 5mm polyp ( tubular adenoma) removed, int and ext hemorrhoids  videocamera endoscopy 1/20, results P  bernice Mg 1.6 replete    Cardio consult  EPS consult:  complex cardiac pt, good candidate for Watchman Procedure, will w/up as out pt and plan for procedure March/April  Hem, pt known to them, prior w/up for hemolysis is neg    on ad H/H 5.6/21, RBC 3.2,  MCV 66  transfused 3 u PRBC  Venofer 200mg IV x2, pt on B12 and folate    monitor electrolyte, keep K 4 or above and Mg 2 or above  Rehab  Social SVC to prepare pt for safe D/C

## 2023-01-23 NOTE — PHYSICAL THERAPY INITIAL EVALUATION ADULT - GAIT TRAINING, PT EVAL
will improve gait using  rolling walker Independent for 75 feet by discharge to decrease burden of care.

## 2023-01-23 NOTE — DISCHARGE NOTE NURSING/CASE MANAGEMENT/SOCIAL WORK - NSDCPEFALRISK_GEN_ALL_CORE
For information on Fall & Injury Prevention, visit: [email or web address] OR  [email or web address] OR  https://www.cdc.gov/steadi/patient.html

## 2023-01-24 ENCOUNTER — TRANSCRIPTION ENCOUNTER (OUTPATIENT)
Age: 83
End: 2023-01-24

## 2023-01-24 VITALS
SYSTOLIC BLOOD PRESSURE: 102 MMHG | RESPIRATION RATE: 18 BRPM | TEMPERATURE: 98 F | DIASTOLIC BLOOD PRESSURE: 68 MMHG | HEART RATE: 86 BPM

## 2023-01-24 LAB
ALBUMIN SERPL ELPH-MCNC: 3.7 G/DL — SIGNIFICANT CHANGE UP (ref 3.5–5.2)
ALP SERPL-CCNC: 74 U/L — SIGNIFICANT CHANGE UP (ref 30–115)
ALT FLD-CCNC: 21 U/L — SIGNIFICANT CHANGE UP (ref 0–41)
ANION GAP SERPL CALC-SCNC: 11 MMOL/L — SIGNIFICANT CHANGE UP (ref 7–14)
AST SERPL-CCNC: 32 U/L — SIGNIFICANT CHANGE UP (ref 0–41)
BASOPHILS # BLD AUTO: 0.04 K/UL — SIGNIFICANT CHANGE UP (ref 0–0.2)
BASOPHILS NFR BLD AUTO: 0.7 % — SIGNIFICANT CHANGE UP (ref 0–1)
BILIRUB SERPL-MCNC: 0.5 MG/DL — SIGNIFICANT CHANGE UP (ref 0.2–1.2)
BUN SERPL-MCNC: 16 MG/DL — SIGNIFICANT CHANGE UP (ref 10–20)
CALCIUM SERPL-MCNC: 9.5 MG/DL — SIGNIFICANT CHANGE UP (ref 8.4–10.5)
CHLORIDE SERPL-SCNC: 98 MMOL/L — SIGNIFICANT CHANGE UP (ref 98–110)
CO2 SERPL-SCNC: 32 MMOL/L — SIGNIFICANT CHANGE UP (ref 17–32)
CREAT SERPL-MCNC: 0.9 MG/DL — SIGNIFICANT CHANGE UP (ref 0.7–1.5)
EGFR: 85 ML/MIN/1.73M2 — SIGNIFICANT CHANGE UP
EOSINOPHIL # BLD AUTO: 0.14 K/UL — SIGNIFICANT CHANGE UP (ref 0–0.7)
EOSINOPHIL NFR BLD AUTO: 2.3 % — SIGNIFICANT CHANGE UP (ref 0–8)
GLUCOSE SERPL-MCNC: 102 MG/DL — HIGH (ref 70–99)
HCT VFR BLD CALC: 33.4 % — LOW (ref 42–52)
HGB BLD-MCNC: 9.4 G/DL — LOW (ref 14–18)
IMM GRANULOCYTES NFR BLD AUTO: 0.5 % — HIGH (ref 0.1–0.3)
LYMPHOCYTES # BLD AUTO: 1.37 K/UL — SIGNIFICANT CHANGE UP (ref 1.2–3.4)
LYMPHOCYTES # BLD AUTO: 22.5 % — SIGNIFICANT CHANGE UP (ref 20.5–51.1)
MAGNESIUM SERPL-MCNC: 1.8 MG/DL — SIGNIFICANT CHANGE UP (ref 1.8–2.4)
MCHC RBC-ENTMCNC: 19.9 PG — LOW (ref 27–31)
MCHC RBC-ENTMCNC: 28.1 G/DL — LOW (ref 32–37)
MCV RBC AUTO: 70.6 FL — LOW (ref 80–94)
MONOCYTES # BLD AUTO: 0.66 K/UL — HIGH (ref 0.1–0.6)
MONOCYTES NFR BLD AUTO: 10.9 % — HIGH (ref 1.7–9.3)
NEUTROPHILS # BLD AUTO: 3.84 K/UL — SIGNIFICANT CHANGE UP (ref 1.4–6.5)
NEUTROPHILS NFR BLD AUTO: 63.1 % — SIGNIFICANT CHANGE UP (ref 42.2–75.2)
NRBC # BLD: 0 /100 WBCS — SIGNIFICANT CHANGE UP (ref 0–0)
PLATELET # BLD AUTO: 229 K/UL — SIGNIFICANT CHANGE UP (ref 130–400)
POTASSIUM SERPL-MCNC: 3.7 MMOL/L — SIGNIFICANT CHANGE UP (ref 3.5–5)
POTASSIUM SERPL-SCNC: 3.7 MMOL/L — SIGNIFICANT CHANGE UP (ref 3.5–5)
PROT SERPL-MCNC: 7.8 G/DL — SIGNIFICANT CHANGE UP (ref 6–8)
RBC # BLD: 4.73 M/UL — SIGNIFICANT CHANGE UP (ref 4.7–6.1)
RBC # FLD: 26.5 % — HIGH (ref 11.5–14.5)
SODIUM SERPL-SCNC: 141 MMOL/L — SIGNIFICANT CHANGE UP (ref 135–146)
WBC # BLD: 6.08 K/UL — SIGNIFICANT CHANGE UP (ref 4.8–10.8)
WBC # FLD AUTO: 6.08 K/UL — SIGNIFICANT CHANGE UP (ref 4.8–10.8)

## 2023-01-24 RX ORDER — FOLIC ACID 0.8 MG
1 TABLET ORAL
Qty: 30 | Refills: 0
Start: 2023-01-24 | End: 2023-02-22

## 2023-01-24 RX ADMIN — Medication 81 MILLIGRAM(S): at 12:04

## 2023-01-24 RX ADMIN — ENOXAPARIN SODIUM 80 MILLIGRAM(S): 100 INJECTION SUBCUTANEOUS at 06:59

## 2023-01-24 RX ADMIN — CHLORHEXIDINE GLUCONATE 1 APPLICATION(S): 213 SOLUTION TOPICAL at 12:02

## 2023-01-24 RX ADMIN — Medication 250 MICROGRAM(S): at 07:00

## 2023-01-24 RX ADMIN — Medication 20 MILLIGRAM(S): at 07:00

## 2023-01-24 RX ADMIN — Medication 1 MILLIGRAM(S): at 12:04

## 2023-01-24 NOTE — DISCHARGE NOTE PROVIDER - HOSPITAL COURSE
Patient informed of normal lab results.   Elderly male with complex cardiac issues on Xarelto 10mg instructed to come to the ER for low H/H in PMDs office  and noted to have severe pallor.  The pt was hosp 11-12/22 for same with EGD and colonoscopy.  The pt was restarted on Xarelto 10 mg and returns with drop in H/H req transfusions.    Pt evaluated and treated for the following problems:    #Severe anemia, probable recurrence of GIB, on anticoagulation  #Iron Deficiency  - sp EGD and colonoscopy 1/19  - sp c video camera endoscopy 1/20  - pt med stable, transition to Xarelto 10mg, safe for D/C today  - CBC being monitored sp pRBC H/H 9.4/33 stable  - pt restarted on AC with Lovenox 80mg  q q 12 with no drop in H/H   - GI:  EGD 1/19 shows nl esophagus non-erosive gastritis, 2nd part of duodenum with few small nonbleeding angioectasias which were completely ablated, colonoscopy shows L side diverticulosis, 5mm polyp ( tubular adenoma) removed, int and ext hemorrhoids  v- ideocamera endoscopy 1/20, results still pending, spoke with GI fellow    #EPS consult:    -complex cardiac pt, good candidate for Watchman Procedure, will w/up as out pt and plan for procedure March/April

## 2023-01-24 NOTE — DISCHARGE NOTE PROVIDER - NSDCMRMEDTOKEN_GEN_ALL_CORE_FT
aspirin 81 mg oral delayed release tablet: 1 tab(s) orally once a day  atorvastatin 20 mg oral tablet: 1 tab(s) orally once a day (at bedtime)  bisacodyl 5 mg oral delayed release tablet: 4 tab(s) orally once a day (at bedtime)  digoxin 250 mcg (0.25 mg) oral tablet: 1 tab(s) orally once a day  folic acid 1 mg oral tablet: 1 tab(s) orally once a day  pantoprazole 40 mg oral delayed release tablet: 1 tab(s) orally 2 times a day  rivaroxaban 10 mg oral tablet: 1 tab(s) orally once a day  tamsulosin 0.4 mg oral capsule: 1 cap(s) orally once a day (at bedtime)  torsemide 20 mg oral tablet: 1 tab(s) orally once a day

## 2023-01-24 NOTE — DISCHARGE NOTE PROVIDER - CARE PROVIDER_API CALL
Hanna Antoine)  Medicine  [address] [place] [address] [address]  [place] [address]  Phone: [phone]  Fax: [phone]  Established Patient  Follow Up Time:

## 2023-01-24 NOTE — PROGRESS NOTE ADULT - ASSESSMENT
Elderly male with complex cardiac issues on Xarelto 10mg instructed to come to the ER for low H/H in PMDs office  and noted to have severe pallor.  The pt was hosp 11-12/22 for same with EGD and colonoscopy.  The pt was restarted on Xarelto 10 mg and returns with drop in H/H req transfusions.    Severe anemia, probable recurrence of GIB, on anticoagulation  Iron Deficiency  sp EGD and colonoscopy 1/19  sp c video camera endoscopy 1/20  Hx of HTN, ASHD, CHF, CAD, sp LAD stent 8/21, severe MR, sp Mitral Valve clipping 9/22 chr AC with Xarelto  Hx of DLD  Hx of GERD, gastritis, duodenitis sp EGD 11/22, diverticulosis, colonic AVMs sp colonoscopy 11/22  Hx of ch anemia, Alpha THALL, iron def, GIB, GI  angioectasia  Hx of OA, DDD, DJD  Hx of chronic lower ext edema, Venous Insuff  Hx of prostatism    pt med stable, transition to Xarelto 10mg, safe for D/C today  CBC being monitored sp PRBC H/H 9.4/33 stable  pt restarted on AC with Lovenox 80mg  sq q 12 with no drop in H/H   GI:  EGD 1/19 shows nl esophagus non-erosive gastritis, 2nd part of duodenum with few sm nonbleeding angioectasias which were completely ablated, colonoscopy  shows L side diverticulosis, 5mm polyp ( tubular adenoma) removed, int and ext hemorrhoids  videocamera endoscopy 1/20, results still  P, spoke with GI fellow  bernice Torres 1.6 repleted to 1.8    Cardio consult  EPS consult:  complex cardiac pt, good candidate for Watchman Procedure, will w/up as out pt and plan for procedure March/April  Hem, pt known to them, prior w/up for hemolysis is neg    on ad H/H 5.6/21, RBC 3.2,  MCV 66  transfused 3 u PRBC  Venofer 200mg IV x2, pt on B12 and folate    monitor electrolyte, keep K 4 or above and Mg 2 or above  Rehab  Social SVC pt med stable for D/C home,   early ff up with me [phone] within 2 wks, will ff CBC closely  appt with EPS Dr Pacheco for possible Watchman

## 2023-01-24 NOTE — PROGRESS NOTE ADULT - REASON FOR ADMISSION
Anemia
Anemia, Fe def of GI loss, 11/22 EGD/colonoscopy + AVMs, complex cardiac Hx, on AC/Xarelto 10mg
Anemia
Anemia
Anemia of blood loss, complex cardiac Hx, AC with Xarelto, Hx of GI angioectasia
Anemia, AC with Xarelto, GI blood loss suspected, complex cardiac Hx
Anemia, AC, complex cardiac Hx
Anemia, GI blood loss, AC/Xarelto
Anemia, complex cardiac Hx, AC c Xarelto, recurrent GIB
Anemia
Anemia
Anemia due to GIB, on Xarelto Hx of GI angioectasias

## 2023-01-24 NOTE — DISCHARGE NOTE PROVIDER - NSDCCPCAREPLAN_GEN_ALL_CORE_FT
PRINCIPAL DISCHARGE DIAGNOSIS  Diagnosis: Low hemoglobin  Assessment and Plan of Treatment: Please follow-up with Dr Antoine within a week top monitor your blood count You have also been evaluated by electrophysiology Please follow-up with Dr Pacheco (Electrophysiology) outpatient for Watchman procedure  Gastrointestinal (GI) bleeding is a symptom of a disorder in your digestive tract The blood often appears in stool or vomit but isn't always visible, though it may cause the stool to look black or tarry The level of bleeding can range from mild to severe and can be life-threatening  Sophisticated imaging technology, when needed, can usually locate the cause of the bleeding Treatment depends on the source of the bleeding  A gastrointestinal bleed can cause: Shock, Anemia, or even Death  To help prevent a GI bleed: Limit your use of nonsteroidal anti-inflammatory drugs, Limit your use of alcohol If you smoke, quit  Often, GI bleeding stops on its own If it doesn't, treatment depends on where the bleed is from In many cases, medication or a procedure to control the bleeding can be given during some tests For example, it's sometimes possible to treat a bleeding peptic ulcer during an upper endoscopy or to remove polyps during a colonoscopy  If you have an upper GI bleed, you might be given an IV drug known as a proton pump inhibitor (PPI) to suppress stomach acid production Once the source of the bleeding is identified, your doctor will determine whether you need to continue taking a PPI   If you have symptoms of shock, you or someone else should call 911 or your local emergency medical number If you're vomiting blood, see blood in your stools or have black, tarry stools, seek immediate medical care For other indications of GI bleeding, make an appointment with your doctor

## 2023-01-24 NOTE — DISCHARGE NOTE PROVIDER - NSDCFUSCHEDAPPT_GEN_ALL_CORE_FT
Brant Goodson  [facility]  [facility] [address]  Scheduled Appointment: 02/02/2023    Jonathan Pacheco  [facility]  ELECTROPH [address]  Scheduled Appointment: 03/07/2023    [facility]  [address] [address]  Scheduled Appointment: 04/13/2023

## 2023-01-24 NOTE — PROGRESS NOTE ADULT - SUBJECTIVE AND OBJECTIVE BOX
MIGEL GRIFFIN 83y o W Male instructed to go to the ER for drop in H/H.  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  Subsequently pt restarted on Xarelto 10mg.  The pt had ff up c  PMD  and was noted to have severe pallor with low H/H.  The pt admits to gen weakness and SOB on exertion. The pt denies CP, palp, melena or BRBPR.  The pt is being ad for transfusion of PRBC.  Xarelto on hold.  The PMHx includes:  HTN, ASHD, CHF, CAD, sp stent LAD 8/21/Dr Bliss, DLD, MR, sp clipping,  9/22, DLD, GERD, gastritis, duodenitis, diverticulosis, AVMs, chronic anemia, alpha THALL, Fe def, OA, DJD, DDD, chronic leg edema, Venous insuff, BPH.    INTERVAL HPI/OVERNIGHT EVENTS: pt stable and without complaints,  post transfusions and EGD/colonoscopy, H/H stable 9.3/32, has been on therapeutic Lovenox 80mg BID with stable H/H, VCE on 1/20, spoke with GI fellow who will notify me and pt with results when they are available, in the meantime, pt stable for D/C, transition to Xarelto 10mg  po QD, early ff up with me within 2 wks, ff up with EPS Dr Pacheco who will prep pt for possible Watchman procedure    MEDICATIONS  (STANDING):  aspirin enteric coated 81 milliGRAM(s) Oral daily  Lovenox 80mg sq q 12  atorvastatin 20 milliGRAM(s) Oral at bedtime  chlorhexidine 2% Cloths 1 Application(s) Topical daily  digoxin     Tablet 250 MICROGram(s) Oral daily  magnesium sulfate  IVPB 2 Gram(s) IV Intermittent every 2 hours  pantoprazole  Injectable 40 milliGRAM(s) IV Push every 12 hours  potassium chloride   Powder 40 milliEquivalent(s) Oral once  tamsulosin 0.4 milliGRAM(s) Oral at bedtime  torsemide 20 milliGRAM(s) Oral daily    MEDICATIONS  (PRN):      Allergies    No Known Allergies    Intolerances        REVIEW OF SYSTEMS      General: gen weakness, easy fatigue	    Skin/Breast: gen pallor, no rash    Respiratory and Thorax: SOB naomi on exertion  	  Cardiovascular:	denies CP    Gastrointestinal:	denies abd pain/N/V/hematemesis/melena/BRBPR    Genitourinary: denies dysuria    Musculoskeletal:	 back and limb pain	    Hematology/Lymphatics:	  " I always have anemia"	    Vital Signs Last 24 Hrs    T(F): 97.5  HR:  86  BP:  101/56    RR: 100  SpO2: 100% RA    PHYSICAL EXAM:      Constitutional: A&O, verbal, elderly, thin and frail and chr ill looking but NAD,  pallor improved    Eyes: pale conjunctivae    ENMT: dental defects    Neck: supple, no JVD/bruits/stridor    Back: skyler, + perilumbar mm spasm and tenderness on palp    Respiratory: shallow resp, no wheezing/crackles/rales    Cardiovascular: s1s2 reg, II/VI WILLIE    Gastrointestinal: globose, soft and benign, no organomegaly, + BS    Genitourinary: no ludwig    Rectal: as per ER records neg     Extremities: moves all ext, + arthritic changes, no edema    Vascular: dec pedal pulses    Neurological: nonfocal    Skin: gen pallor, severe palmar pallor, no rash    Lymph Nodes: not enlarged    Musculoskeletal: thin mm mass    Psychiatric: stable        LABS:                       9.4  6)-----------( 2229    on ad H/H 5.6/21, RBC 3.2, MCV 66             33  RBC 4.5, MCV 71    141 |  98  |  16  ----------------------------< 102  3.7   |  32  |  0.9  GFR 85  Ca    8.7        Mg     1.3, 2.1, 1.7, 1.8  O-  retic 1.9  Haptos 27  ferritin 15    Covid neg, FLU A/B neg, RSV neg    TPro  6.8  /  Alb  3.7  /  TBili  0.5  /  DBili  x   /  AST  19  /  ALT  9   /  AlkPhos  57  01-16    PT/INR - ( 16 Jan 2023 12:10 )   PT: 20.60 sec;   INR: 1.78 ratio         PTT - ( 16 Jan 2023 12:10 )  PTT:33.7 sec      RADIOLOGY & ADDITIONAL TESTS:  EGD:  nl  esophagus, erythema of gastric mucosa c/w non-erosive gastritis, duodenum few sm nonbleeding angioectasias 2nd portion of duodenum which were ablated completely,   Colonoscopy:  5mm polyp ( tubular adenoma)  in descending colon, polypectomy, int and ext hemorrhoids, mod diverticulosis of L sided colon  VCE results P

## 2023-01-24 NOTE — PROGRESS NOTE ADULT - PROVIDER SPECIALTY LIST ADULT
Gastroenterology
Internal Medicine
Gastroenterology
Internal Medicine

## 2023-02-02 ENCOUNTER — APPOINTMENT (OUTPATIENT)
Dept: HEMATOLOGY ONCOLOGY | Facility: CLINIC | Age: 83
End: 2023-02-02
Payer: MEDICARE

## 2023-02-02 ENCOUNTER — LABORATORY RESULT (OUTPATIENT)
Age: 83
End: 2023-02-02

## 2023-02-02 ENCOUNTER — OUTPATIENT (OUTPATIENT)
Dept: OUTPATIENT SERVICES | Facility: HOSPITAL | Age: 83
LOS: 1 days | End: 2023-02-02

## 2023-02-02 VITALS
WEIGHT: 176 LBS | RESPIRATION RATE: 14 BRPM | TEMPERATURE: 98.3 F | SYSTOLIC BLOOD PRESSURE: 98 MMHG | HEIGHT: 72 IN | BODY MASS INDEX: 23.84 KG/M2 | DIASTOLIC BLOOD PRESSURE: 55 MMHG | OXYGEN SATURATION: 98 %

## 2023-02-02 DIAGNOSIS — K64.8 OTHER HEMORRHOIDS: ICD-10-CM

## 2023-02-02 DIAGNOSIS — K21.9 GASTRO-ESOPHAGEAL REFLUX DISEASE WITHOUT ESOPHAGITIS: ICD-10-CM

## 2023-02-02 DIAGNOSIS — Z98.61 CORONARY ANGIOPLASTY STATUS: Chronic | ICD-10-CM

## 2023-02-02 DIAGNOSIS — I11.0 HYPERTENSIVE HEART DISEASE WITH HEART FAILURE: ICD-10-CM

## 2023-02-02 DIAGNOSIS — I25.2 OLD MYOCARDIAL INFARCTION: ICD-10-CM

## 2023-02-02 DIAGNOSIS — D62 ACUTE POSTHEMORRHAGIC ANEMIA: ICD-10-CM

## 2023-02-02 DIAGNOSIS — R79.9 ABNORMAL FINDING OF BLOOD CHEMISTRY, UNSPECIFIED: ICD-10-CM

## 2023-02-02 DIAGNOSIS — I48.0 PAROXYSMAL ATRIAL FIBRILLATION: ICD-10-CM

## 2023-02-02 DIAGNOSIS — K63.5 POLYP OF COLON: ICD-10-CM

## 2023-02-02 DIAGNOSIS — Z95.5 PRESENCE OF CORONARY ANGIOPLASTY IMPLANT AND GRAFT: ICD-10-CM

## 2023-02-02 DIAGNOSIS — N40.0 BENIGN PROSTATIC HYPERPLASIA WITHOUT LOWER URINARY TRACT SYMPTOMS: ICD-10-CM

## 2023-02-02 DIAGNOSIS — Z79.01 LONG TERM (CURRENT) USE OF ANTICOAGULANTS: ICD-10-CM

## 2023-02-02 DIAGNOSIS — K31.811 ANGIODYSPLASIA OF STOMACH AND DUODENUM WITH BLEEDING: ICD-10-CM

## 2023-02-02 DIAGNOSIS — K64.4 RESIDUAL HEMORRHOIDAL SKIN TAGS: ICD-10-CM

## 2023-02-02 DIAGNOSIS — K44.9 DIAPHRAGMATIC HERNIA WITHOUT OBSTRUCTION OR GANGRENE: ICD-10-CM

## 2023-02-02 DIAGNOSIS — I50.22 CHRONIC SYSTOLIC (CONGESTIVE) HEART FAILURE: ICD-10-CM

## 2023-02-02 DIAGNOSIS — K57.31 DIVERTICULOSIS OF LARGE INTESTINE WITHOUT PERFORATION OR ABSCESS WITH BLEEDING: ICD-10-CM

## 2023-02-02 DIAGNOSIS — Z79.82 LONG TERM (CURRENT) USE OF ASPIRIN: ICD-10-CM

## 2023-02-02 DIAGNOSIS — K29.51 UNSPECIFIED CHRONIC GASTRITIS WITH BLEEDING: ICD-10-CM

## 2023-02-02 DIAGNOSIS — I25.10 ATHEROSCLEROTIC HEART DISEASE OF NATIVE CORONARY ARTERY WITHOUT ANGINA PECTORIS: ICD-10-CM

## 2023-02-02 DIAGNOSIS — E78.5 HYPERLIPIDEMIA, UNSPECIFIED: ICD-10-CM

## 2023-02-02 PROCEDURE — 99213 OFFICE O/P EST LOW 20 MIN: CPT

## 2023-02-03 LAB
FERRITIN SERPL-MCNC: 11 NG/ML
HCT VFR BLD CALC: 28.3 %
HGB BLD-MCNC: 7.7 G/DL
MCHC RBC-ENTMCNC: 19.3 PG
MCHC RBC-ENTMCNC: 27.2 G/DL
MCV RBC AUTO: 70.8 FL
PLATELET # BLD AUTO: 327 K/UL
PMV BLD: 10.8 FL
RBC # BLD: 4 M/UL
RBC # FLD: 25.5 %
WBC # FLD AUTO: 8.45 K/UL

## 2023-02-04 NOTE — HISTORY OF PRESENT ILLNESS
Patient is sleeping   1:1 sitter is present outside the room      [id number] [facility]  02/19/21 1983 [de-identified] : 82 year old white male who was seen on consultation in the hospital for iron deficiency anemia , ferritin was 8 . he received packed RBCs and venofer . most recent Hgb was 10 . He feels well and denies any hematochezia. EGD showed erosive gastritis , bleeder was cauterized and resumed eliquis on discharge . He had prior episodes of bleeding in the past ( AV malformations ? )  [de-identified] : 2/2/2023 Patient returns for history of recurrent GI bleeding , admitted recently for profound anemia S/P EGD and colonoscopy revealing AVM in the duodenum s/p APC.\par He received 2 units rbcs and venofer X 2 , He feels much better and denies any hematochezia .\par

## 2023-02-04 NOTE — PHYSICAL EXAM
[Normal] : normoactive bowel sounds, soft and nontender, no hepatosplenomegaly or masses appreciated [de-identified] : well preserved male in no acute distress.  [de-identified] : irregular.

## 2023-02-04 NOTE — ASSESSMENT
[FreeTextEntry1] : 82 year old male with atrial fibrillation on anticoagulation chronic recurrent upper GI bleeding secondary to angiodysplasias ,                                                                                                                                                      Chronic  iron deficiency anemia .\par S/P admission and transfusion for HGb 5.7 , venofer X 2 , EGD : duodenal AVM .\par Feels better , no active bleeding , Hgb : 7.7 \par Plan : resume venofer 2X per week X 4 doses .\par          monitor CBC , ferritin . \par          follow up with cardiology , candidate for Watchman ?

## 2023-02-15 ENCOUNTER — NON-APPOINTMENT (OUTPATIENT)
Age: 83
End: 2023-02-15

## 2023-03-07 ENCOUNTER — LABORATORY RESULT (OUTPATIENT)
Age: 83
End: 2023-03-07

## 2023-03-07 ENCOUNTER — APPOINTMENT (OUTPATIENT)
Dept: HEMATOLOGY ONCOLOGY | Facility: CLINIC | Age: 83
End: 2023-03-07

## 2023-03-07 ENCOUNTER — OUTPATIENT (OUTPATIENT)
Dept: OUTPATIENT SERVICES | Facility: HOSPITAL | Age: 83
LOS: 1 days | End: 2023-03-07
Payer: MEDICARE

## 2023-03-07 ENCOUNTER — APPOINTMENT (OUTPATIENT)
Dept: HEMATOLOGY ONCOLOGY | Facility: CLINIC | Age: 83
End: 2023-03-07
Payer: MEDICARE

## 2023-03-07 ENCOUNTER — APPOINTMENT (OUTPATIENT)
Dept: ELECTROPHYSIOLOGY | Facility: CLINIC | Age: 83
End: 2023-03-07
Payer: MEDICARE

## 2023-03-07 VITALS
WEIGHT: 174 LBS | OXYGEN SATURATION: 97 % | BODY MASS INDEX: 23.57 KG/M2 | DIASTOLIC BLOOD PRESSURE: 50 MMHG | SYSTOLIC BLOOD PRESSURE: 100 MMHG | HEIGHT: 72 IN | TEMPERATURE: 96.5 F | HEART RATE: 96 BPM

## 2023-03-07 VITALS
BODY MASS INDEX: 24.11 KG/M2 | HEIGHT: 72 IN | TEMPERATURE: 97.7 F | HEART RATE: 95 BPM | OXYGEN SATURATION: 98 % | DIASTOLIC BLOOD PRESSURE: 63 MMHG | RESPIRATION RATE: 14 BRPM | SYSTOLIC BLOOD PRESSURE: 120 MMHG | WEIGHT: 178 LBS

## 2023-03-07 DIAGNOSIS — Z98.61 CORONARY ANGIOPLASTY STATUS: Chronic | ICD-10-CM

## 2023-03-07 DIAGNOSIS — D50.9 IRON DEFICIENCY ANEMIA, UNSPECIFIED: ICD-10-CM

## 2023-03-07 PROCEDURE — 99213 OFFICE O/P EST LOW 20 MIN: CPT

## 2023-03-07 PROCEDURE — 82728 ASSAY OF FERRITIN: CPT

## 2023-03-07 PROCEDURE — 99215 OFFICE O/P EST HI 40 MIN: CPT

## 2023-03-07 PROCEDURE — 85027 COMPLETE CBC AUTOMATED: CPT

## 2023-03-07 PROCEDURE — 93000 ELECTROCARDIOGRAM COMPLETE: CPT

## 2023-03-07 RX ORDER — OMEGA-3/DHA/EPA/FISH OIL 300-1000MG
1000 CAPSULE ORAL DAILY
Refills: 0 | Status: DISCONTINUED | COMMUNITY
End: 2023-03-07

## 2023-03-08 DIAGNOSIS — D50.9 IRON DEFICIENCY ANEMIA, UNSPECIFIED: ICD-10-CM

## 2023-03-08 LAB
FERRITIN SERPL-MCNC: 66 NG/ML
HCT VFR BLD CALC: 33 %
HGB BLD-MCNC: 9 G/DL
MCHC RBC-ENTMCNC: 20 PG
MCHC RBC-ENTMCNC: 27.3 G/DL
MCV RBC AUTO: 73.2 FL
PLATELET # BLD AUTO: 253 K/UL
PMV BLD: NORMAL
RBC # BLD: 4.51 M/UL
RBC # FLD: 20.5 %
WBC # FLD AUTO: 6.8 K/UL

## 2023-03-09 NOTE — ASSESSMENT
[FreeTextEntry1] : 82 year old male with atrial fibrillation on anticoagulation chronic recurrent upper GI bleeding secondary to angiodysplasias ,                                                                                                                                                      Chronic  iron deficiency anemia .\par S/P admission in January 2022 and transfusion for HGb 5.7 , venofer X 2 , EGD : duodenal AVM .\par Feels better, no active bleeding , Hgb : 9.0\par \par Capsule endoscopy done in Feb 2023, multiple non-bleeding AVMs\par \par Plan:\par --CBC today shows improved Hgb (9.0), ferritin 11 from February 2023. Will repeat ferritin today\par --Continue on PO iron once daily\par --Will schedule for Venofer x2. \par --Follow up with GI as scheduled \par --Follow up with cardiology as scheduled regarding Watchman (2 months). Continue on Xarelto as recommended\par \par Repeat CBC, ferritin monthly for the next 3 months \par \par Patient was seen and examined and discussed with Dr. Goodson who agrees to the above plan of care.\par \par

## 2023-03-09 NOTE — HISTORY OF PRESENT ILLNESS
[de-identified] : 82 year old white male who was seen on consultation in the hospital for iron deficiency anemia , ferritin was 8 . he received packed RBCs and venofer . most recent Hgb was 10 . He feels well and denies any hematochezia. EGD showed erosive gastritis , bleeder was cauterized and resumed eliquis on discharge . He had prior episodes of bleeding in the past ( AV malformations ? 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 He had a capsule endoscopy in February which showed multiple non-bleeding AVMs. GI recommended a follow up to do an enteroscopy. He also follows with cardiology as a potential candidate for the Watchman procedure, he was seen today and it was recommended to hold off on this procedure until he is seen again by GI. It was recommended that he continue on Xarelto until his next follow up with cardiology in 2 months. He reports feeling well, denies hematochezia. Hgb today increased to 9.0 since 7.7 in the beginning of February. He is taking PO iron once daily. Last ferritin from February 2023 was 11 however the patient was not able to come in for iron infusions at that time. \par

## 2023-03-09 NOTE — PHYSICAL EXAM
[Normal] : no JVD, no calf tenderness, venous stasis changes, varices [de-identified] : well preserved male in no acute distress.  [de-identified] : irregular.

## 2023-03-14 NOTE — END OF VISIT
[Time Spent: ___ minutes] : I have spent [unfilled] minutes of time on the encounter. [FreeTextEntry3] : I, Jonathan Pacheco, personally performed the services described in this documentation. All medical record entries made by the scribe/nurse CTA were at my direction and in my presence. I have reviewed the chart and agree that the record reflects my personal performance and is accurate and complete.\par

## 2023-03-14 NOTE — DISCUSSION/SUMMARY
[EKG obtained to assist in diagnosis and management of assessed problem(s)] : EKG obtained to assist in diagnosis and management of assessed problem(s) [FreeTextEntry1] : Mr. Bhavik Miguel is a pleasant 83 year-old man with hypertension, hyperlipidemia, paroxysmal atrial fibrillation, coronary artery disease s/p PCI, cardiomyopathy with EF of 38%, severe MR s/p mitral clip, and recurrent GI bleed. Patient is here to discuss left atrial appendage closure. \par \par Patient's CHADSVASC score is 5 (age, HTN, CAD, CHF), and HASBLED score is 4. Left atrial appendage closure with Watchman device is recommended for this patient due to recurrent GI bleed. In order to be able to schedule the patient for the Watchman implant, he needs to be optimized from a GI perspective to allow him to take at least 6 months of dual anti-platelet therapy and preferably an addition 6 weeks of DOAC full-dose. Patient is currently only taking Xarelto 10 mg and is at an increased risk of bleeding when he goes on full-dose DOAC. \par \par I contacted the GI service regarding scheduling the enteroscopy. The physician covering GI services will be in touch with the patient to schedule enteroscopy and to optimize patient to allow him to at least be able to take DAPT for 6 months to allow left atrial appendage closure.\par \par I discussed with patient at length the above. He expressed understanding of the discussion and satisfaction with care provided. \par \par I will plan prior to the Watchman a repeat echo to assess patient's ejection fraction. Last EF from 10/2022 was 38%.  \par \par I recommend to continue the same medication for now. \par \par I discussed with patient plan of care in great details. I answered all his questions to his satisfaction. Patient was pleased with the visit.\par \par Patient will follow with me in 2 months’ time. Please do not hesitate to contact me at [phone] if you have any further questions regarding this patient care.\par \par

## 2023-03-14 NOTE — REASON FOR VISIT
[Arrhythmia/ECG Abnorrmalities] : arrhythmia/ECG abnormalities [FreeTextEntry3] : Dr. Hanna Antoine - Dr. Tera Judd

## 2023-03-14 NOTE — ADDENDUM
[FreeTextEntry1] : Carmenza MANRIQUEZ assisted in documentation on 03/08/2023   acting as a scribe for Dr. Rob Pacheco.\par

## 2023-03-14 NOTE — HISTORY OF PRESENT ILLNESS
[FreeTextEntry1] : Hypertension, hyperlipidemia, paroxysmal atrial fibrillation (04/2021), mitral regurgitation s/p mitral clip, coronary artery disease s/p PCI to proximal LAD, cardiomyopathy, and recurrent GI bleed. \par \par Patient had multiple hospitalizations for GI bleeds and anemia, including January 2022 and January 2023. He had EGD, colonoscopy, and capsule endoscopy. EGD on 01/19/2023 showed non-erosive gastritis, angioectasias in the second part of the duodenum, and hiatal hernia. Colonoscopy on 01/19/2023 showed polyp in the ascending colon s/p polypectomy, internal and external hemorrhoids, and diverticulosis of the left side of the colon. 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 Patient is planned for enteroscopy with possible APC.\par \par Patient is currently on Xarelto 10 mg daily and is following with hematology and gastroenterology. He is not able to tolerate full-dose DOAC due to recurrence of GI bleeds. \par \par Patient has no chest pain, no shortness of breath at rest, no dyspnea on exertion, no dizziness, no lightheadedness, and no syncope. \par \par He presents for evaluation of left atrial appendage closure.\par

## 2023-03-14 NOTE — CARDIOLOGY SUMMARY
[de-identified] : (03/07/2023): Sinus rhythm at 96 bpm, RBBB, non-specific ST-T wave abnormalities. \par  [de-identified] : (10/13/2022): 2D echo. LVEF 38%. Moderately decreased global LV systolic function. Moderately reduced RV systolic function. [id number] mitral valve repair with mitral clip. Moderate pericardial effusion.

## 2023-03-22 ENCOUNTER — APPOINTMENT (OUTPATIENT)
Dept: INFUSION THERAPY | Facility: CLINIC | Age: 83
End: 2023-03-22

## 2023-03-22 ENCOUNTER — OUTPATIENT (OUTPATIENT)
Dept: OUTPATIENT SERVICES | Facility: HOSPITAL | Age: 83
LOS: 1 days | End: 2023-03-22
Payer: MEDICARE

## 2023-03-22 ENCOUNTER — LABORATORY RESULT (OUTPATIENT)
Age: 83
End: 2023-03-22

## 2023-03-22 DIAGNOSIS — D50.9 IRON DEFICIENCY ANEMIA, UNSPECIFIED: ICD-10-CM

## 2023-03-22 DIAGNOSIS — Z98.61 CORONARY ANGIOPLASTY STATUS: Chronic | ICD-10-CM

## 2023-03-22 LAB
HCT VFR BLD CALC: 28.3 %
HGB BLD-MCNC: 8 G/DL
MCHC RBC-ENTMCNC: 21.4 PG
MCHC RBC-ENTMCNC: 28.3 G/DL
MCV RBC AUTO: 75.7 FL
PLATELET # BLD AUTO: 314 K/UL
PMV BLD: 10.5 FL
RBC # BLD: 3.74 M/UL
RBC # FLD: 21.9 %
WBC # FLD AUTO: 7.67 K/UL

## 2023-03-22 PROCEDURE — 85027 COMPLETE CBC AUTOMATED: CPT

## 2023-03-22 PROCEDURE — 96365 THER/PROPH/DIAG IV INF INIT: CPT

## 2023-03-22 RX ORDER — IRON SUCROSE 20 MG/ML
200 INJECTION, SOLUTION INTRAVENOUS ONCE
Refills: 0 | Status: COMPLETED | OUTPATIENT
Start: 2023-03-22 | End: 2023-03-22

## 2023-03-22 RX ADMIN — IRON SUCROSE 220 MILLIGRAM(S): 20 INJECTION, SOLUTION INTRAVENOUS at 14:12

## 2023-03-24 ENCOUNTER — APPOINTMENT (OUTPATIENT)
Dept: INFUSION THERAPY | Facility: CLINIC | Age: 83
End: 2023-03-24

## 2023-03-27 ENCOUNTER — OUTPATIENT (OUTPATIENT)
Dept: INPATIENT UNIT | Facility: HOSPITAL | Age: 83
LOS: 1 days | Discharge: HOME | End: 2023-03-27
Payer: MEDICARE

## 2023-03-27 ENCOUNTER — TRANSCRIPTION ENCOUNTER (OUTPATIENT)
Age: 83
End: 2023-03-27

## 2023-03-27 ENCOUNTER — RESULT REVIEW (OUTPATIENT)
Age: 83
End: 2023-03-27

## 2023-03-27 VITALS
SYSTOLIC BLOOD PRESSURE: 119 MMHG | RESPIRATION RATE: 17 BRPM | OXYGEN SATURATION: 99 % | DIASTOLIC BLOOD PRESSURE: 82 MMHG | HEART RATE: 86 BPM

## 2023-03-27 VITALS — WEIGHT: 174.17 LBS | HEIGHT: 72 IN

## 2023-03-27 DIAGNOSIS — Z98.890 OTHER SPECIFIED POSTPROCEDURAL STATES: Chronic | ICD-10-CM

## 2023-03-27 DIAGNOSIS — Z98.61 CORONARY ANGIOPLASTY STATUS: Chronic | ICD-10-CM

## 2023-03-27 DIAGNOSIS — D64.9 ANEMIA, UNSPECIFIED: ICD-10-CM

## 2023-03-27 PROCEDURE — 43255 EGD CONTROL BLEEDING ANY: CPT | Mod: XU

## 2023-03-27 PROCEDURE — 88305 TISSUE EXAM BY PATHOLOGIST: CPT | Mod: 26

## 2023-03-27 PROCEDURE — 88305 TISSUE EXAM BY PATHOLOGIST: CPT

## 2023-03-27 PROCEDURE — C1889: CPT

## 2023-03-27 PROCEDURE — 43239 EGD BIOPSY SINGLE/MULTIPLE: CPT | Mod: XU

## 2023-03-27 PROCEDURE — 43251 EGD REMOVE LESION SNARE: CPT

## 2023-03-27 NOTE — CHART NOTE - NSCHARTNOTEFT_GEN_A_CORE
PACU ANESTHESIA ADMISSION NOTE      Procedure: Enterosocpy  Post op diagnosis:  AVM    ____  Intubated  TV:______       Rate: ______      FiO2: ______    __x__  Patent Airway    __x__  Full return of protective reflexes    __x__  Full recovery from anesthesia / back to baseline     Vitals:   T: 98          R: 16                 BP: 99/61                 Sat:  98%                 P: 65      Mental Status:  __x__ Awake   _____ Alert   _____ Drowsy   _____ Sedated    Nausea/Vomiting:  __x__ NO  ______Yes,   See Post - Op Orders          Pain Scale (0-10):  _____    Treatment: ____ None    __x_ See Post - Op/PCA Orders    Post - Operative Fluids:   ____ Oral   ___x_ See Post - Op Orders    Plan: Discharge:   __x__Home       _____Floor     _____Critical Care    _____  Other:_________________    Comments:

## 2023-03-27 NOTE — ASU PREOP CHECKLIST - NS PREOP CHK MONITOR ANESTHESIA CONSENT
NUTRITION BRIEF NOTE:      See RD note 11/22 for full reassessment details    Recommendations for Provider:  Patient meeting <25% estimated nutrition needs since 11/13 and <50% protein needs since 9/6.    New findings in last 24 hours:    Diet order:   Orders Placed This Encounter      Combination Diet Regular Diet; Low Phosphorous Diet  Vanilla Nepro 1/day      Oral intakes: No food intake recorded since 11/29 when it was recorded he ate 50% of breakfast, per HT he had only ordered a bowl of Rice Krispies and Nepro    Weight: continues to trend down, ongoing severe weight loss since admission. Patient was estimated to be at dry weight of 390# in March 2022 per nephrology note. Current weight 220#    RD note 11/9 requested advanced directives be addressed/POC    Patient seen 3x/week on non-dialysis days by PT, patient typically declines participation    Fluid: unable to remove any fluid in HD today, ended net positive 374 mL after medications administered    BM: stooling regularly    Labs: reviewed    Meds: renavite, senna-docusate BID. PRN Compazine    Antiemetics adjusted d/t prolonged QTc with Reglan and Zofran  Labs:  Electrolytes  Potassium (mmol/L)   Date Value   11/28/2022 4.2   11/21/2022 4.0   11/16/2022 4.5   09/20/2022 4.0   09/19/2022 4.8   09/12/2022 4.4     Phosphorus (mg/dL)   Date Value   11/28/2022 5.4 (H)   11/21/2022 4.5   11/16/2022 3.6   11/07/2022 3.8   10/31/2022 2.4 (L)    Blood Glucose  Glucose (mg/dL)   Date Value   11/28/2022 90   11/21/2022 82   11/16/2022 84   11/07/2022 110 (H)   10/31/2022 77   09/20/2022 76   09/19/2022 82   09/12/2022 101   09/05/2022 88   08/29/2022 72     GLUCOSE BY METER POCT (mg/dL)   Date Value   11/27/2022 77     Hemoglobin A1C (%)   Date Value   07/07/2022 5.9 (H)    Inflammatory Markers  CRP Inflammation (mg/L)   Date Value   07/07/2022 97.40 (H)     WBC Count (10e3/uL)   Date Value   11/28/2022 6.5   11/21/2022 3.5 (L)   11/16/2022 3.1 (L)     Albumin (g/dL)    Date Value   11/28/2022 3.5   11/21/2022 3.5   11/16/2022 3.3 (L)   09/20/2022 3.0 (L)   09/19/2022 3.0 (L)   09/12/2022 3.3 (L)      Magnesium (mg/dL)   Date Value   11/28/2022 2.3   11/21/2022 2.3   11/16/2022 2.4 (H)     Sodium (mmol/L)   Date Value   11/28/2022 132 (L)   11/21/2022 133 (L)   11/16/2022 133 (L)    Renal  Urea Nitrogen (mg/dL)   Date Value   11/28/2022 37.8 (H)   11/21/2022 33.3 (H)   11/16/2022 35.1 (H)   09/20/2022 26 (H)   09/19/2022 51 (H)   09/12/2022 52 (H)     Creatinine (mg/dL)   Date Value   11/28/2022 5.29 (H)   11/21/2022 5.00 (H)   11/16/2022 4.51 (H)     Additional  Triglycerides (mg/dL)   Date Value   07/09/2022 104     Ketones Urine (mg/dL)   Date Value   07/08/2022 Negative        Interventions:    Collaboration and Referral of care: Discussed patient during interdisciplinary care rounds this morning, discussed benefits of care progression conference.    Please page/consult as needed.    Philly Solis RDN, LD  Clinical Dietitian     done

## 2023-03-27 NOTE — ASU DISCHARGE PLAN (ADULT/PEDIATRIC) - CALL YOUR DOCTOR IF YOU HAVE ANY OF THE FOLLOWING:
Bleeding that does not stop/Swelling that gets worse/Pain not relieved by Medications/Numbness, tingling, color or temperature change to extremity/Nausea and vomiting that does not stop/Unable to urinate/Excessive diarrhea/Inability to tolerate liquids or foods/Increased irritability or sluggishness

## 2023-03-27 NOTE — ASU DISCHARGE PLAN (ADULT/PEDIATRIC) - ASU DC SPECIAL INSTRUCTIONSFT
- Follow up with our [facility] located at [address] [place] [address]. Phone Number: [phone] - Follow up with our [facility] located at [address] [place] [address]. Phone Number: [phone]  - Resume all necessary antiplatelets and Anticoagulation

## 2023-03-27 NOTE — ASU PATIENT PROFILE, ADULT - FALL HARM RISK - UNIVERSAL INTERVENTIONS
Bed in lowest position, wheels locked, appropriate side rails in place/Call bell, personal items and telephone in reach/Instruct patient to call for assistance before getting out of bed or chair/Non-slip footwear when patient is out of bed/[place] to call system/Physically safe environment - no spills, clutter or unnecessary equipment/Purposeful Proactive Rounding/Room/bathroom lighting operational, light cord in reach

## 2023-03-28 LAB — SURGICAL PATHOLOGY STUDY: SIGNIFICANT CHANGE UP

## 2023-03-29 ENCOUNTER — APPOINTMENT (OUTPATIENT)
Dept: INFUSION THERAPY | Facility: CLINIC | Age: 83
End: 2023-03-29

## 2023-03-29 ENCOUNTER — LABORATORY RESULT (OUTPATIENT)
Age: 83
End: 2023-03-29

## 2023-03-29 ENCOUNTER — OUTPATIENT (OUTPATIENT)
Dept: OUTPATIENT SERVICES | Facility: HOSPITAL | Age: 83
LOS: 1 days | End: 2023-03-29
Payer: MEDICARE

## 2023-03-29 DIAGNOSIS — D50.9 IRON DEFICIENCY ANEMIA, UNSPECIFIED: ICD-10-CM

## 2023-03-29 DIAGNOSIS — Z98.61 CORONARY ANGIOPLASTY STATUS: Chronic | ICD-10-CM

## 2023-03-29 DIAGNOSIS — Z98.890 OTHER SPECIFIED POSTPROCEDURAL STATES: Chronic | ICD-10-CM

## 2023-03-29 LAB
HCT VFR BLD CALC: 31.5 %
HGB BLD-MCNC: 8.7 G/DL
MCHC RBC-ENTMCNC: 22.1 PG
MCHC RBC-ENTMCNC: 27.6 G/DL
MCV RBC AUTO: 79.9 FL
PLATELET # BLD AUTO: 277 K/UL
PMV BLD: 10.4 FL
RBC # BLD: 3.94 M/UL
RBC # FLD: 20.8 %
WBC # FLD AUTO: 5.96 K/UL

## 2023-03-29 PROCEDURE — 82728 ASSAY OF FERRITIN: CPT

## 2023-03-29 PROCEDURE — 96365 THER/PROPH/DIAG IV INF INIT: CPT

## 2023-03-29 PROCEDURE — 85027 COMPLETE CBC AUTOMATED: CPT

## 2023-03-29 RX ORDER — IRON SUCROSE 20 MG/ML
200 INJECTION, SOLUTION INTRAVENOUS ONCE
Refills: 0 | Status: COMPLETED | OUTPATIENT
Start: 2023-03-29 | End: 2023-03-29

## 2023-03-29 RX ADMIN — IRON SUCROSE 220 MILLIGRAM(S): 20 INJECTION, SOLUTION INTRAVENOUS at 16:20

## 2023-03-30 LAB — FERRITIN SERPL-MCNC: 38 NG/ML

## 2023-03-31 ENCOUNTER — APPOINTMENT (OUTPATIENT)
Dept: INFUSION THERAPY | Facility: CLINIC | Age: 83
End: 2023-03-31

## 2023-03-31 DIAGNOSIS — K31.819 ANGIODYSPLASIA OF STOMACH AND DUODENUM WITHOUT BLEEDING: ICD-10-CM

## 2023-03-31 DIAGNOSIS — K21.9 GASTRO-ESOPHAGEAL REFLUX DISEASE WITHOUT ESOPHAGITIS: ICD-10-CM

## 2023-03-31 DIAGNOSIS — I10 ESSENTIAL (PRIMARY) HYPERTENSION: ICD-10-CM

## 2023-03-31 DIAGNOSIS — D13.30 BENIGN NEOPLASM OF UNSPECIFIED PART OF SMALL INTESTINE: ICD-10-CM

## 2023-03-31 DIAGNOSIS — I25.10 ATHEROSCLEROTIC HEART DISEASE OF NATIVE CORONARY ARTERY WITHOUT ANGINA PECTORIS: ICD-10-CM

## 2023-03-31 DIAGNOSIS — E78.00 PURE HYPERCHOLESTEROLEMIA, UNSPECIFIED: ICD-10-CM

## 2023-03-31 DIAGNOSIS — D50.9 IRON DEFICIENCY ANEMIA, UNSPECIFIED: ICD-10-CM

## 2023-04-13 ENCOUNTER — APPOINTMENT (OUTPATIENT)
Dept: ORTHOPEDIC SURGERY | Facility: CLINIC | Age: 83
End: 2023-04-13
Payer: MEDICARE

## 2023-04-13 PROCEDURE — 99213 OFFICE O/P EST LOW 20 MIN: CPT

## 2023-04-13 NOTE — DISCUSSION/SUMMARY
[de-identified] : Patient may take Tylenol as needed for pain.  The patient was advised to rest/ice the area and may alternate with warm compresses as needed.  The knee conditioning program from the [facility] was given to the patient so they may try that at home.\par \par Bilateral knee Synvisc-one gel injections ordered for the patient so he may receive that at his next visit.  The patient will follow-up in 6 weeks for further evaluation.  All of the patient's questions/concerns were answered in detail.\par \par The patient was seen under the supervision of Dr. Toney.

## 2023-04-13 NOTE — IMAGING
[de-identified] : Bilateral knee exam is as follows: No effusion noted.  No erythema or ecchymosis.  Able to perform active straight leg raise.  Knee flexion from 0 to 110 degrees.  Calf is soft and nontender.  Light touch intact throughout.  Mild medial joint line tenderness to palpation.  Nonantalgic gait.  Ambulation with cane.

## 2023-04-13 NOTE — HISTORY OF PRESENT ILLNESS
[de-identified] : Patient is a 83-year-old male who reports to the office for subsequent reevaluation of his bilateral knee pain.  He had gel injections done back in October 2022 which gave him relief.  He states that they have been starting to wear off and his pain has restarted.  Walking, certain range of motion, and palpating certain areas of the knee aggravate the patient's pain at times.  Denies any numbness or tingling.

## 2023-04-25 ENCOUNTER — APPOINTMENT (OUTPATIENT)
Dept: PULMONOLOGY | Facility: CLINIC | Age: 83
End: 2023-04-25
Payer: MEDICARE

## 2023-04-25 VITALS
OXYGEN SATURATION: 93 % | HEART RATE: 77 BPM | WEIGHT: 180 LBS | SYSTOLIC BLOOD PRESSURE: 120 MMHG | HEIGHT: 72 IN | BODY MASS INDEX: 24.38 KG/M2 | RESPIRATION RATE: 14 BRPM | DIASTOLIC BLOOD PRESSURE: 80 MMHG

## 2023-04-25 PROCEDURE — 99213 OFFICE O/P EST LOW 20 MIN: CPT | Mod: 25

## 2023-04-25 PROCEDURE — 94727 GAS DIL/WSHOT DETER LNG VOL: CPT

## 2023-04-25 PROCEDURE — 94729 DIFFUSING CAPACITY: CPT

## 2023-04-25 PROCEDURE — 94010 BREATHING CAPACITY TEST: CPT

## 2023-04-25 NOTE — HISTORY OF PRESENT ILLNESS
[TextBox_4] : Overall he is doing good denies any cough wheezing shortness of breath not on any inhaler status post PFT today and the results showed some mild restriction.\par Patient did not do the CAT scan I told him again why he did not do it even though I given prescription twice before he said that he has been forgetting to do it I explained to him the risk of developing cancer and the need to do the CAT scan so now he promised to get a go and do it

## 2023-04-26 ENCOUNTER — LABORATORY RESULT (OUTPATIENT)
Age: 83
End: 2023-04-26

## 2023-04-26 ENCOUNTER — OUTPATIENT (OUTPATIENT)
Dept: OUTPATIENT SERVICES | Facility: HOSPITAL | Age: 83
LOS: 1 days | End: 2023-04-26
Payer: MEDICARE

## 2023-04-26 ENCOUNTER — APPOINTMENT (OUTPATIENT)
Dept: HEMATOLOGY ONCOLOGY | Facility: CLINIC | Age: 83
End: 2023-04-26

## 2023-04-26 DIAGNOSIS — D50.9 IRON DEFICIENCY ANEMIA, UNSPECIFIED: ICD-10-CM

## 2023-04-26 DIAGNOSIS — Z98.61 CORONARY ANGIOPLASTY STATUS: Chronic | ICD-10-CM

## 2023-04-26 DIAGNOSIS — Z98.890 OTHER SPECIFIED POSTPROCEDURAL STATES: Chronic | ICD-10-CM

## 2023-04-26 LAB
HCT VFR BLD CALC: 31.8 %
HGB BLD-MCNC: 9 G/DL
MCHC RBC-ENTMCNC: 23.3 PG
MCHC RBC-ENTMCNC: 28.3 G/DL
MCV RBC AUTO: 82.2 FL
PLATELET # BLD AUTO: 326 K/UL
PMV BLD: 10.7 FL
RBC # BLD: 3.87 M/UL
RBC # FLD: 17.7 %
WBC # FLD AUTO: 7.78 K/UL

## 2023-04-26 PROCEDURE — 36415 COLL VENOUS BLD VENIPUNCTURE: CPT

## 2023-04-26 PROCEDURE — 85027 COMPLETE CBC AUTOMATED: CPT

## 2023-04-26 PROCEDURE — 82728 ASSAY OF FERRITIN: CPT

## 2023-04-27 LAB — FERRITIN SERPL-MCNC: 28 NG/ML

## 2023-05-02 ENCOUNTER — APPOINTMENT (OUTPATIENT)
Dept: HEMATOLOGY ONCOLOGY | Facility: CLINIC | Age: 83
End: 2023-05-02
Payer: MEDICARE

## 2023-05-02 ENCOUNTER — APPOINTMENT (OUTPATIENT)
Dept: INFUSION THERAPY | Facility: CLINIC | Age: 83
End: 2023-05-02
Payer: MEDICARE

## 2023-05-02 ENCOUNTER — LABORATORY RESULT (OUTPATIENT)
Age: 83
End: 2023-05-02

## 2023-05-02 ENCOUNTER — OUTPATIENT (OUTPATIENT)
Dept: OUTPATIENT SERVICES | Facility: HOSPITAL | Age: 83
LOS: 1 days | End: 2023-05-02
Payer: MEDICARE

## 2023-05-02 VITALS
RESPIRATION RATE: 14 BRPM | OXYGEN SATURATION: 98 % | HEIGHT: 72 IN | TEMPERATURE: 97.5 F | WEIGHT: 182 LBS | DIASTOLIC BLOOD PRESSURE: 51 MMHG | SYSTOLIC BLOOD PRESSURE: 92 MMHG | HEART RATE: 69 BPM | BODY MASS INDEX: 24.65 KG/M2

## 2023-05-02 DIAGNOSIS — Z98.61 CORONARY ANGIOPLASTY STATUS: Chronic | ICD-10-CM

## 2023-05-02 DIAGNOSIS — D50.9 IRON DEFICIENCY ANEMIA, UNSPECIFIED: ICD-10-CM

## 2023-05-02 DIAGNOSIS — Z98.890 OTHER SPECIFIED POSTPROCEDURAL STATES: Chronic | ICD-10-CM

## 2023-05-02 LAB
HCT VFR BLD CALC: 29.3 %
HGB BLD-MCNC: 8.3 G/DL
MCHC RBC-ENTMCNC: 23 PG
MCHC RBC-ENTMCNC: 28.3 G/DL
MCV RBC AUTO: 81.2 FL
PLATELET # BLD AUTO: 239 K/UL
PMV BLD: 10.8 FL
RBC # BLD: 3.61 M/UL
RBC # FLD: 17.1 %
WBC # FLD AUTO: 6.77 K/UL

## 2023-05-02 PROCEDURE — 85027 COMPLETE CBC AUTOMATED: CPT

## 2023-05-02 PROCEDURE — 86900 BLOOD TYPING SEROLOGIC ABO: CPT

## 2023-05-02 PROCEDURE — 86850 RBC ANTIBODY SCREEN: CPT

## 2023-05-02 PROCEDURE — 99213 OFFICE O/P EST LOW 20 MIN: CPT

## 2023-05-02 PROCEDURE — 36415 COLL VENOUS BLD VENIPUNCTURE: CPT

## 2023-05-02 PROCEDURE — 96365 THER/PROPH/DIAG IV INF INIT: CPT

## 2023-05-02 PROCEDURE — 86901 BLOOD TYPING SEROLOGIC RH(D): CPT

## 2023-05-02 RX ORDER — IRON SUCROSE 20 MG/ML
200 INJECTION, SOLUTION INTRAVENOUS ONCE
Refills: 0 | Status: COMPLETED | OUTPATIENT
Start: 2023-05-02 | End: 2023-05-02

## 2023-05-02 RX ADMIN — IRON SUCROSE 200 MILLIGRAM(S): 20 INJECTION, SOLUTION INTRAVENOUS at 16:40

## 2023-05-02 RX ADMIN — IRON SUCROSE 220 MILLIGRAM(S): 20 INJECTION, SOLUTION INTRAVENOUS at 16:07

## 2023-05-02 NOTE — ASSESSMENT
[FreeTextEntry1] : 83 year old male with atrial fibrillation on anticoagulation chronic recurrent upper GI bleeding secondary to angiodysplasias ,                                                                                                                                                      Chronic  iron deficiency anemia .\par S/P admission in January 2022 and transfusion for HGb 5.7 , venofer X 2 , EGD : duodenal AVM .\par Feels better, no active bleeding , Hgb : 9.0\par \par Capsule endoscopy done in Feb 2023, multiple non-bleeding AVMs\par \par Plan:\par --CBC today shows Hgb (8.3), Ferritin 26 from April 2023. He is scheduled for Venofer 200mg x 5 doses to start today\par --Weekly CBC and Type and Cross with plan to transfuse for Hb <8 \par --Follow up with GI as scheduled \par --Follow up with cardiology as scheduled regarding Watchman. Continue on Xarelto as recommended\par \par RTC in 3 weeks \par \par Patient was seen and examined and discussed with Dr. Goodson who agrees to the above plan of care.\par \par

## 2023-05-02 NOTE — HISTORY OF PRESENT ILLNESS
[de-identified] : 82 year old white male who was seen on consultation in the hospital for iron deficiency anemia , ferritin was 8 . he received packed RBCs and venofer . most recent Hgb was 10 . He feels well and denies any hematochezia. EGD showed erosive gastritis , bleeder was cauterized and resumed eliquis on discharge . He had prior episodes of bleeding in the past ( AV malformations ? 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 He had a capsule endoscopy in February which showed multiple non-bleeding AVMs. GI recommended a follow up to do an enteroscopy. He also follows with cardiology as a potential candidate for the Watchman procedure, he was seen today and it was recommended to hold off on this procedure until he is seen again by GI. It was recommended that he continue on Xarelto until his next follow up with cardiology in 2 months. He reports feeling well, denies hematochezia. Hgb today increased to 9.0 since 7.7 in the beginning of February. He is taking PO iron once daily. Last ferritin from February 2023 was 11 however the patient was not able to come in for iron infusions at that time. \par \par 5/2/23 Patient returns for follow up for history of iron deficiency anemia. He had his Ferritin checked on 4/26/23 and it was 26 with CBC today showing a hemoglobin of 8.3. Since his last visit, he had a capsule enteroscopy with some thermal therapy and endoclips applied to non bleeding AVMs as per GI report.

## 2023-05-02 NOTE — PHYSICAL EXAM
[Normal] : normoactive bowel sounds, soft and nontender, no hepatosplenomegaly or masses appreciated [de-identified] : well preserved male in no acute distress.  [de-identified] : irregular.

## 2023-05-03 LAB
ABO + RH PNL BLD: NORMAL
BLD GP AB SCN SERPL QL: NORMAL

## 2023-05-04 ENCOUNTER — APPOINTMENT (OUTPATIENT)
Dept: ORTHOPEDIC SURGERY | Facility: CLINIC | Age: 83
End: 2023-05-04
Payer: MEDICARE

## 2023-05-04 PROCEDURE — 20610 DRAIN/INJ JOINT/BURSA W/O US: CPT | Mod: 50

## 2023-05-04 PROCEDURE — 99213 OFFICE O/P EST LOW 20 MIN: CPT | Mod: 25

## 2023-05-04 NOTE — DISCUSSION/SUMMARY
[de-identified] : With the patient's approval, the bilateral knee Synvisc–one gel injections were  performed in the office today.  See the attached procedure note for further details.  Explained to the patient that the full effect of the medication may take up to 6 weeks for it to kick in.\par \par The patient was advised to rest/ice the area and can alternate with warm compresses.  Instructed not to do any strenuous activity that would further aggravate their symptoms.\par \par Patient will follow-up in 5-6 months for further evaluation.  All of the patient's questions/concerns were answered in detail.  \par \par The patient was seen under the supervision of Dr. Toney.

## 2023-05-04 NOTE — PROCEDURE
[Large Joint Injection] : Large joint injection [Bilateral] : bilaterally of the [Knee] : knee [Alcohol] : alcohol [Ethyl Chloride sprayed topically] : ethyl chloride sprayed topically [Sterile technique used] : sterile technique used [Synvisc-one (48mg)] : 48mg of Synvisc-one [] : Patient tolerated procedure well [Call if redness, pain or fever occur] : call if redness, pain or fever occur [Apply ice for 15min out of every hour for the next 12-24 hours as tolerated] : apply ice for 15 minutes out of every hour for the next 12-24 hours as tolerated [Risks, benefits, alternatives discussed / Verbal consent obtained] : the risks benefits, and alternatives have been discussed, and verbal consent was obtained

## 2023-05-04 NOTE — HISTORY OF PRESENT ILLNESS
[de-identified] : Patient is a 83-year-old male who reports to the office for subsequent reevaluation of his bilateral knee pain/osteoarthritis.  He is here to receive bilateral knee Synvisc 1 gel injections.

## 2023-05-04 NOTE — IMAGING
[de-identified] : Bilateral knee exam is as follows: No effusion noted.  No erythema or ecchymosis.  Able to perform active straight leg raise.  Knee flexion from 0 to 110 degrees.  Mild medial/lateral joint line tenderness to palpation.  Calf is soft and nontender.  Light touch intact throughout.  Nonantalgic gait.

## 2023-05-08 ENCOUNTER — LABORATORY RESULT (OUTPATIENT)
Age: 83
End: 2023-05-08

## 2023-05-08 ENCOUNTER — APPOINTMENT (OUTPATIENT)
Dept: ELECTROPHYSIOLOGY | Facility: CLINIC | Age: 83
End: 2023-05-08
Payer: MEDICARE

## 2023-05-08 VITALS
WEIGHT: 174 LBS | HEIGHT: 72 IN | TEMPERATURE: 97.1 F | BODY MASS INDEX: 23.57 KG/M2 | RESPIRATION RATE: 14 BRPM | SYSTOLIC BLOOD PRESSURE: 110 MMHG | DIASTOLIC BLOOD PRESSURE: 70 MMHG | HEART RATE: 90 BPM

## 2023-05-08 PROCEDURE — 93000 ELECTROCARDIOGRAM COMPLETE: CPT

## 2023-05-08 PROCEDURE — 99215 OFFICE O/P EST HI 40 MIN: CPT

## 2023-05-08 RX ORDER — POTASSIUM CHLORIDE 1.5 G/1.58G
20 POWDER, FOR SOLUTION ORAL DAILY
Qty: 3 | Refills: 0 | Status: COMPLETED | COMMUNITY
Start: 2022-09-29 | End: 2023-05-08

## 2023-05-08 RX ORDER — FLUTICASONE FUROATE AND VILANTEROL TRIFENATATE 100; 25 UG/1; UG/1
100-25 POWDER RESPIRATORY (INHALATION)
Qty: 60 | Refills: 0 | Status: COMPLETED | COMMUNITY
Start: 2022-04-05 | End: 2023-05-08

## 2023-05-08 RX ORDER — METOPROLOL TARTRATE 25 MG/1
25 TABLET, FILM COATED ORAL TWICE DAILY
Qty: 60 | Refills: 2 | Status: COMPLETED | COMMUNITY
End: 2023-05-08

## 2023-05-08 NOTE — DISCUSSION/SUMMARY
[FreeTextEntry1] : Mr. Bhavik Miguel is a pleasant 83 year-old man with hypertension, hyperlipidemia, paroxysmal atrial fibrillation, coronary artery disease s/p PCI, cardiomyopathy with EF of 38%, severe MR s/p mitral clip, and recurrent GI bleed. Patient is here to discuss left atrial appendage closure. \par \par Patient's CHADSVASC score is 5 (age, HTN, CAD, CHF), and HASBLED score is 4. Left atrial appendage closure with Watchman device is recommended for this patient due to recurrent GI bleed. In order to be able to schedule the patient for the Watchman implant, he needs to be optimized from a GI perspective to allow him to take at least 6 months of dual anti-platelet therapy and preferably an addition 6 weeks of DOAC full-dose. Patient is currently only taking Xarelto 10 mg and is at an increased risk of bleeding when he goes on full-dose DOAC. \par \par He had enteroscopy with thermal therapy and endoclip. I recommend GI follow up.\par \par I recommend to stop Digoxin 250 mcg daily. last digoxin level 1.2 in November 2022.\par \par I discussed with patient at length the above. He expressed understanding of the discussion and satisfaction with care provided. \par \par I will plan prior to the Watchman a repeat echo to assess patient's ejection fraction. Last EF from 10/2022 was 38%.  \par \par I recommend to continue the same medication for now. \par \par I have discussed different treatment options with the patient including other anticoagulation medication. I have explained the risks and benefits of the procedure to the patient. I have explained to the patient the patient will require to be on blood thinners (warfarin or NOACs) for 45 days after implant and ROSARIO will be repeated. If there is no significant leak and no device clot, patient will remain on aspirin and Plavix for next 6 months, and then aspirin only. There is approximately 1-2% chance of any major cardiovascular complication to occur. Complications include, but are not limited to infection, bleeding, and damage to the vessels, hole in the heart, stroke, death, heart attack, injury to esophagus, aspiration, device dislodgement, and 1-5% risk of device related clot that will require anticoagulation. The patient/family understand the risk and would like to proceed with the procedure. Educational Materials and references were provided to the patient. Patient/family indicated that all of his questions were answered to their satisfaction and verbalized understanding.\par Patients CHADVASC Score is 5\par Patients HASBLED score is 4\par (Hypertension, Abnormal Renal/Liver Function, Stroke, Bleeding History or Predisposition, Labile INR, >65, Antiplatelet agents, NSAID, Drugs/Alcohol)\par \par Dr. Judd recommends and agrees with implant of ANGY closure with watchman\par \par I discussed with patient plan of care in great details. I answered all his questions to his satisfaction. Patient was pleased with the visit.\par \par Patient will follow with me in 2 months’ time. Please do not hesitate to contact me at [phone] if you have any further questions regarding this patient care. [EKG obtained to assist in diagnosis and management of assessed problem(s)] : EKG obtained to assist in diagnosis and management of assessed problem(s)

## 2023-05-08 NOTE — PHYSICAL EXAM
[Well Developed] : well developed [Well Nourished] : well nourished [No Acute Distress] : no acute distress [Normal Conjunctiva] : normal conjunctiva [Normal Venous Pressure] : normal venous pressure [No Carotid Bruit] : no carotid bruit [Normal S1, S2] : normal S1, S2 [No Murmur] : no murmur [No Rub] : no rub [No Gallop] : no gallop [Clear Lung Fields] : clear lung fields [Good Air Entry] : good air entry [No Respiratory Distress] : no respiratory distress  [Soft] : abdomen soft [Non Tender] : non-tender [No Masses/organomegaly] : no masses/organomegaly [Normal Bowel Sounds] : normal bowel sounds [Normal Gait] : normal gait [No Cyanosis] : no cyanosis [No Edema] : no edema [No Clubbing] : no clubbing [No Varicosities] : no varicosities [No Rash] : no rash [No Skin Lesions] : no skin lesions [Moves all extremities] : moves all extremities [No Focal Deficits] : no focal deficits [Normal Speech] : normal speech [Normal memory] : normal memory [Alert and Oriented] : alert and oriented

## 2023-05-08 NOTE — HISTORY OF PRESENT ILLNESS
[FreeTextEntry1] : Hypertension, hyperlipidemia, paroxysmal atrial fibrillation (04/2021), mitral regurgitation s/p mitral clip, coronary artery disease s/p PCI to proximal LAD, cardiomyopathy, and recurrent GI bleed. \par \par Patient had multiple hospitalizations for GI bleeds and anemia, including January 2022 and January 2023. He had EGD, colonoscopy, and capsule endoscopy. EGD on 01/19/2023 showed non-erosive gastritis, angioectasias in the second part of the duodenum, and hiatal hernia. Colonoscopy on 01/19/2023 showed polyp in the ascending colon s/p polypectomy, internal and external hemorrhoids, and diverticulosis of the left side of the colon. 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 Patient is planned for enteroscopy with possible APC. \par \par on 3/27/2023 patient had endoscopy and thermal treatment and endoclip for AVMs\par \par Patient is currently on Xarelto 10 mg daily and is following with hematology and gastroenterology. He is not able to tolerate full-dose DOAC due to recurrence of GI bleeds. \par \par Patient has no chest pain, no shortness of breath at rest, no dyspnea on exertion, no dizziness, no lightheadedness, and no syncope. \par \par He presents for evaluation of left atrial appendage closure.\par

## 2023-05-08 NOTE — CARDIOLOGY SUMMARY
[de-identified] : (05/08/2023): Sinus rhythm at 90 bpm, RBBB, non-specific ST-T wave abnormalities. \par (03/07/2023): Sinus rhythm at 96 bpm, RBBB, non-specific ST-T wave abnormalities. \par  [de-identified] : (10/13/2022): 2D echo. LVEF 38%. Moderately decreased global LV systolic function. Moderately reduced RV systolic function. [id number] mitral valve repair with mitral clip. Moderate pericardial effusion.  [de-identified] : (3/27/2023) Endoscopy with thermal treatment and endoclip.

## 2023-05-09 ENCOUNTER — APPOINTMENT (OUTPATIENT)
Dept: INFUSION THERAPY | Facility: CLINIC | Age: 83
End: 2023-05-09

## 2023-05-09 ENCOUNTER — OUTPATIENT (OUTPATIENT)
Dept: OUTPATIENT SERVICES | Facility: HOSPITAL | Age: 83
LOS: 1 days | End: 2023-05-09
Payer: MEDICARE

## 2023-05-09 DIAGNOSIS — Z98.61 CORONARY ANGIOPLASTY STATUS: Chronic | ICD-10-CM

## 2023-05-09 DIAGNOSIS — D50.9 IRON DEFICIENCY ANEMIA, UNSPECIFIED: ICD-10-CM

## 2023-05-09 PROCEDURE — 96365 THER/PROPH/DIAG IV INF INIT: CPT

## 2023-05-09 RX ORDER — IRON SUCROSE 20 MG/ML
200 INJECTION, SOLUTION INTRAVENOUS ONCE
Refills: 0 | Status: COMPLETED | OUTPATIENT
Start: 2023-05-09 | End: 2023-05-09

## 2023-05-09 RX ADMIN — IRON SUCROSE 200 MILLIGRAM(S): 20 INJECTION, SOLUTION INTRAVENOUS at 16:50

## 2023-05-09 RX ADMIN — IRON SUCROSE 220 MILLIGRAM(S): 20 INJECTION, SOLUTION INTRAVENOUS at 16:17

## 2023-05-14 ENCOUNTER — OUTPATIENT (OUTPATIENT)
Dept: OUTPATIENT SERVICES | Facility: HOSPITAL | Age: 83
LOS: 1 days | End: 2023-05-14
Payer: MEDICARE

## 2023-05-14 ENCOUNTER — RESULT REVIEW (OUTPATIENT)
Age: 83
End: 2023-05-14

## 2023-05-14 DIAGNOSIS — Z98.890 OTHER SPECIFIED POSTPROCEDURAL STATES: Chronic | ICD-10-CM

## 2023-05-14 DIAGNOSIS — Z98.61 CORONARY ANGIOPLASTY STATUS: Chronic | ICD-10-CM

## 2023-05-14 DIAGNOSIS — Z00.8 ENCOUNTER FOR OTHER GENERAL EXAMINATION: ICD-10-CM

## 2023-05-14 DIAGNOSIS — R07.9 CHEST PAIN, UNSPECIFIED: ICD-10-CM

## 2023-05-14 PROCEDURE — 71250 CT THORAX DX C-: CPT | Mod: 26,MH

## 2023-05-15 DIAGNOSIS — R07.9 CHEST PAIN, UNSPECIFIED: ICD-10-CM

## 2023-05-16 ENCOUNTER — INPATIENT (INPATIENT)
Facility: HOSPITAL | Age: 83
LOS: 4 days | Discharge: HOME | DRG: 273 | End: 2023-05-21
Attending: INTERNAL MEDICINE | Admitting: STUDENT IN AN ORGANIZED HEALTH CARE EDUCATION/TRAINING PROGRAM
Payer: MEDICARE

## 2023-05-16 ENCOUNTER — APPOINTMENT (OUTPATIENT)
Dept: ELECTROPHYSIOLOGY | Facility: HOSPITAL | Age: 83
End: 2023-05-16

## 2023-05-16 VITALS — WEIGHT: 171.96 LBS

## 2023-05-16 DIAGNOSIS — Z98.61 CORONARY ANGIOPLASTY STATUS: Chronic | ICD-10-CM

## 2023-05-16 DIAGNOSIS — I48.19 OTHER PERSISTENT ATRIAL FIBRILLATION: ICD-10-CM

## 2023-05-16 DIAGNOSIS — I48.91 UNSPECIFIED ATRIAL FIBRILLATION: ICD-10-CM

## 2023-05-16 DIAGNOSIS — I48.0 PAROXYSMAL ATRIAL FIBRILLATION: ICD-10-CM

## 2023-05-16 DIAGNOSIS — Z00.6 ENCOUNTER FOR EXAMINATION FOR NORMAL COMPARISON AND CONTROL IN CLINICAL RESEARCH PROGRAM: ICD-10-CM

## 2023-05-16 DIAGNOSIS — Z98.890 OTHER SPECIFIED POSTPROCEDURAL STATES: Chronic | ICD-10-CM

## 2023-05-16 LAB
ALBUMIN SERPL ELPH-MCNC: 3.4 G/DL — LOW (ref 3.5–5.2)
ALP SERPL-CCNC: 73 U/L — SIGNIFICANT CHANGE UP (ref 30–115)
ALT FLD-CCNC: 11 U/L — SIGNIFICANT CHANGE UP (ref 0–41)
ANION GAP SERPL CALC-SCNC: 9 MMOL/L — SIGNIFICANT CHANGE UP (ref 7–14)
APTT BLD: 33.9 SEC — SIGNIFICANT CHANGE UP (ref 27–39.2)
AST SERPL-CCNC: 18 U/L — SIGNIFICANT CHANGE UP (ref 0–41)
BILIRUB SERPL-MCNC: 0.3 MG/DL — SIGNIFICANT CHANGE UP (ref 0.2–1.2)
BUN SERPL-MCNC: 11 MG/DL — SIGNIFICANT CHANGE UP (ref 10–20)
CALCIUM SERPL-MCNC: 9.9 MG/DL — SIGNIFICANT CHANGE UP (ref 8.4–10.4)
CHLORIDE SERPL-SCNC: 108 MMOL/L — SIGNIFICANT CHANGE UP (ref 98–110)
CO2 SERPL-SCNC: 26 MMOL/L — SIGNIFICANT CHANGE UP (ref 17–32)
CREAT SERPL-MCNC: 0.9 MG/DL — SIGNIFICANT CHANGE UP (ref 0.7–1.5)
DIGOXIN SERPL-MCNC: <0.3 NG/ML — LOW (ref 0.8–2)
EGFR: 85 ML/MIN/1.73M2 — SIGNIFICANT CHANGE UP
GAS PNL BLDA: SIGNIFICANT CHANGE UP
GLUCOSE SERPL-MCNC: 136 MG/DL — HIGH (ref 70–99)
HCT VFR BLD CALC: 34.4 % — LOW (ref 42–52)
HGB BLD-MCNC: 10.3 G/DL — LOW (ref 14–18)
INR BLD: 1.2 RATIO — SIGNIFICANT CHANGE UP (ref 0.65–1.3)
MAGNESIUM SERPL-MCNC: 1.9 MG/DL — SIGNIFICANT CHANGE UP (ref 1.8–2.4)
MCHC RBC-ENTMCNC: 24.3 PG — LOW (ref 27–31)
MCHC RBC-ENTMCNC: 29.9 G/DL — LOW (ref 32–37)
MCV RBC AUTO: 81.3 FL — SIGNIFICANT CHANGE UP (ref 80–94)
NRBC # BLD: 0 /100 WBCS — SIGNIFICANT CHANGE UP (ref 0–0)
PLATELET # BLD AUTO: 228 K/UL — SIGNIFICANT CHANGE UP (ref 130–400)
PMV BLD: 10.8 FL — HIGH (ref 7.4–10.4)
POTASSIUM SERPL-MCNC: 3.5 MMOL/L — SIGNIFICANT CHANGE UP (ref 3.5–5)
POTASSIUM SERPL-SCNC: 3.5 MMOL/L — SIGNIFICANT CHANGE UP (ref 3.5–5)
PROT SERPL-MCNC: 6.7 G/DL — SIGNIFICANT CHANGE UP (ref 6–8)
PROTHROM AB SERPL-ACNC: 13.8 SEC — HIGH (ref 9.95–12.87)
RBC # BLD: 4.23 M/UL — LOW (ref 4.7–6.1)
RBC # FLD: 17.4 % — HIGH (ref 11.5–14.5)
SODIUM SERPL-SCNC: 143 MMOL/L — SIGNIFICANT CHANGE UP (ref 135–146)
WBC # BLD: 6.51 K/UL — SIGNIFICANT CHANGE UP (ref 4.8–10.8)
WBC # FLD AUTO: 6.51 K/UL — SIGNIFICANT CHANGE UP (ref 4.8–10.8)

## 2023-05-16 PROCEDURE — 85610 PROTHROMBIN TIME: CPT

## 2023-05-16 PROCEDURE — 36415 COLL VENOUS BLD VENIPUNCTURE: CPT

## 2023-05-16 PROCEDURE — 85027 COMPLETE CBC AUTOMATED: CPT

## 2023-05-16 PROCEDURE — 84132 ASSAY OF SERUM POTASSIUM: CPT

## 2023-05-16 PROCEDURE — C1759: CPT

## 2023-05-16 PROCEDURE — 85730 THROMBOPLASTIN TIME PARTIAL: CPT

## 2023-05-16 PROCEDURE — 85018 HEMOGLOBIN: CPT

## 2023-05-16 PROCEDURE — 84295 ASSAY OF SERUM SODIUM: CPT

## 2023-05-16 PROCEDURE — 92960 CARDIOVERSION ELECTRIC EXT: CPT | Mod: 59

## 2023-05-16 PROCEDURE — C1769: CPT

## 2023-05-16 PROCEDURE — C1894: CPT

## 2023-05-16 PROCEDURE — 83735 ASSAY OF MAGNESIUM: CPT

## 2023-05-16 PROCEDURE — 83605 ASSAY OF LACTIC ACID: CPT

## 2023-05-16 PROCEDURE — 82330 ASSAY OF CALCIUM: CPT

## 2023-05-16 PROCEDURE — C1889: CPT | Mod: Q0

## 2023-05-16 PROCEDURE — 33340 PERQ CLSR TCAT L ATR APNDGE: CPT | Mod: Q0

## 2023-05-16 PROCEDURE — 80162 ASSAY OF DIGOXIN TOTAL: CPT

## 2023-05-16 PROCEDURE — 80048 BASIC METABOLIC PNL TOTAL CA: CPT

## 2023-05-16 PROCEDURE — 71045 X-RAY EXAM CHEST 1 VIEW: CPT

## 2023-05-16 PROCEDURE — 84100 ASSAY OF PHOSPHORUS: CPT

## 2023-05-16 PROCEDURE — 93005 ELECTROCARDIOGRAM TRACING: CPT

## 2023-05-16 PROCEDURE — C1887: CPT

## 2023-05-16 PROCEDURE — 80053 COMPREHEN METABOLIC PANEL: CPT

## 2023-05-16 PROCEDURE — 82803 BLOOD GASES ANY COMBINATION: CPT

## 2023-05-16 PROCEDURE — 85025 COMPLETE CBC W/AUTO DIFF WBC: CPT

## 2023-05-16 PROCEDURE — 85014 HEMATOCRIT: CPT

## 2023-05-16 RX ORDER — TAMSULOSIN HYDROCHLORIDE 0.4 MG/1
0.4 CAPSULE ORAL AT BEDTIME
Refills: 0 | Status: DISCONTINUED | OUTPATIENT
Start: 2023-05-16 | End: 2023-05-21

## 2023-05-16 RX ORDER — CEFAZOLIN SODIUM 1 G
1000 VIAL (EA) INJECTION EVERY 8 HOURS
Refills: 0 | Status: COMPLETED | OUTPATIENT
Start: 2023-05-16 | End: 2023-05-17

## 2023-05-16 RX ORDER — HEPARIN SODIUM 5000 [USP'U]/ML
2500 INJECTION INTRAVENOUS; SUBCUTANEOUS EVERY 6 HOURS
Refills: 0 | Status: DISCONTINUED | OUTPATIENT
Start: 2023-05-16 | End: 2023-05-17

## 2023-05-16 RX ORDER — HEPARIN SODIUM 5000 [USP'U]/ML
5500 INJECTION INTRAVENOUS; SUBCUTANEOUS EVERY 6 HOURS
Refills: 0 | Status: DISCONTINUED | OUTPATIENT
Start: 2023-05-16 | End: 2023-05-17

## 2023-05-16 RX ORDER — PANTOPRAZOLE SODIUM 20 MG/1
40 TABLET, DELAYED RELEASE ORAL
Refills: 0 | Status: DISCONTINUED | OUTPATIENT
Start: 2023-05-16 | End: 2023-05-21

## 2023-05-16 RX ORDER — HEPARIN SODIUM 5000 [USP'U]/ML
1200 INJECTION INTRAVENOUS; SUBCUTANEOUS
Qty: 25000 | Refills: 0 | Status: DISCONTINUED | OUTPATIENT
Start: 2023-05-16 | End: 2023-05-17

## 2023-05-16 RX ORDER — ATORVASTATIN CALCIUM 80 MG/1
20 TABLET, FILM COATED ORAL AT BEDTIME
Refills: 0 | Status: DISCONTINUED | OUTPATIENT
Start: 2023-05-16 | End: 2023-05-21

## 2023-05-16 RX ORDER — RIVAROXABAN 15 MG-20MG
20 KIT ORAL
Refills: 0 | Status: DISCONTINUED | OUTPATIENT
Start: 2023-05-16 | End: 2023-05-16

## 2023-05-16 RX ORDER — FOLIC ACID 0.8 MG
1 TABLET ORAL DAILY
Refills: 0 | Status: DISCONTINUED | OUTPATIENT
Start: 2023-05-16 | End: 2023-05-21

## 2023-05-16 RX ORDER — ASPIRIN/CALCIUM CARB/MAGNESIUM 324 MG
81 TABLET ORAL DAILY
Refills: 0 | Status: DISCONTINUED | OUTPATIENT
Start: 2023-05-16 | End: 2023-05-21

## 2023-05-16 RX ORDER — RIVAROXABAN 15 MG-20MG
10 KIT ORAL
Refills: 0 | Status: DISCONTINUED | OUTPATIENT
Start: 2023-05-16 | End: 2023-05-16

## 2023-05-16 RX ORDER — AMIODARONE HYDROCHLORIDE 400 MG/1
0.5 TABLET ORAL
Qty: 450 | Refills: 0 | Status: DISCONTINUED | OUTPATIENT
Start: 2023-05-17 | End: 2023-05-17

## 2023-05-16 RX ORDER — AMIODARONE HYDROCHLORIDE 400 MG/1
1 TABLET ORAL
Qty: 450 | Refills: 0 | Status: DISCONTINUED | OUTPATIENT
Start: 2023-05-16 | End: 2023-05-17

## 2023-05-16 RX ORDER — CEFAZOLIN SODIUM 1 G
2000 VIAL (EA) INJECTION ONCE
Refills: 0 | Status: COMPLETED | OUTPATIENT
Start: 2023-05-16 | End: 2023-05-17

## 2023-05-16 RX ADMIN — TAMSULOSIN HYDROCHLORIDE 0.4 MILLIGRAM(S): 0.4 CAPSULE ORAL at 21:10

## 2023-05-16 RX ADMIN — AMIODARONE HYDROCHLORIDE 33.3 MG/MIN: 400 TABLET ORAL at 19:50

## 2023-05-16 RX ADMIN — Medication 100 MILLIGRAM(S): at 20:41

## 2023-05-16 RX ADMIN — HEPARIN SODIUM 1200 UNIT(S)/HR: 5000 INJECTION INTRAVENOUS; SUBCUTANEOUS at 20:41

## 2023-05-16 RX ADMIN — ATORVASTATIN CALCIUM 20 MILLIGRAM(S): 80 TABLET, FILM COATED ORAL at 21:10

## 2023-05-16 NOTE — H&P ADULT - HISTORY OF PRESENT ILLNESS
Hypertension, hyperlipidemia, paroxysmal atrial fibrillation (04/2021), mitral regurgitation s/p mitral clip, coronary artery disease s/p PCI to proximal LAD, cardiomyopathy, and recurrent GI bleed.     Patient had multiple hospitalizations for GI bleeds and anemia, including January 2022 and January 2023. He had EGD, colonoscopy, and capsule endoscopy. EGD on 01/19/2023 showed non-erosive gastritis, angioectasias in the second part of the duodenum, and hiatal hernia. Colonoscopy on 01/19/2023 showed polyp in the ascending colon s/p polypectomy, internal and external hemorrhoids, and diverticulosis of the left side of the colon. 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 Patient is planned for enteroscopy with possible APC.     on 3/27/2023 patient had endoscopy and thermal treatment and endoclip for AVMs    Patient is currently on Xarelto 10 mg daily and is following with hematology and gastroenterology. He is not able to tolerate full-dose DOAC due to recurrence of GI bleeds.     Patient has no chest pain, no shortness of breath at rest, no dyspnea on exertion, no dizziness, no lightheadedness, and no syncope.     He presents for elective Watchman device

## 2023-05-16 NOTE — CHART NOTE - NSCHARTNOTEFT_GEN_A_CORE
Called by Dr. Pacheco,   Patient with Severe MR, and Low EF, s/p cardioversion for Afib w RVR on amiodarone drip,   Patient has a TLC in the left groin, and Arterial line,   Please start Full AC heparin protocol,   Patient on Levophed 0.1   Patient will need to be on xarelto tomorrow 15 vs 20 mg, depending on cR Clearance and aspirin 81 mg,  Please place order when system  available

## 2023-05-16 NOTE — H&P ADULT - ASSESSMENT
83 year-old man with hypertension, hyperlipidemia, paroxysmal atrial fibrillation, coronary artery disease s/p PCI, cardiomyopathy with EF of 38%, severe MR s/p mitral clip, and recurrent GI bleed.     Patient's CHADSVASC score is 5 (age, HTN, CAD, CHF), and HASBLED score is 4.    Pt presents for elective Watchman device

## 2023-05-16 NOTE — PATIENT PROFILE ADULT - FALL HARM RISK - RISK INTERVENTIONS

## 2023-05-16 NOTE — CHART NOTE - NSCHARTNOTEFT_GEN_A_CORE
Electrophysiology Brief Post-Op Note      I have personally seen and examined the patient.  I agree with the history and physical which I have reviewed and noted any changes below.     PRE-OP DIAGNOSIS: Atrial Fibrillation    POST-OP DIAGNOSIS: Atrial Fibrillation    PROCEDURE:  Left Atrial Appendage Closure - Watchman Device -  Transesophageal Echocardiogram  DC cardioversion      Vascular Access used (using Ultrasound Guidance)  -Right Femoral Vein: 16F  -Left Femoral Vein: 11F 6F  -Right Femoral Artery: none    All sheaths and wires removed,   Perclose sutures applied (2 perclose for 16F, 1 perclose to 11F)  and manual pressure applied  6Fr LFV was exchanged with triple lumen    Physician: Tara  Assistant: Ajay    ANESTHESIA TYPE:  [X]General Anesthesia  [  ] Sedation  [  ] Local/Regional        CONDITION  [  ] Critical  [  ] Serious  [  ]Fair  [X]Good      SPECIMENS REMOVED (IF APPLICABLE): NONE      IMPLANTS (IF APPLICABLE):  Watchman Device FLX 24 mm      FINDINGS  Successful deployment of the Watchman FLX Device 24 mm in the left atrial appendage  successful DC cardioversion  No LA/ANGY thrombus  No pericardial effusion on ROSARIO/ICE    ESTIMATED BLOOD LOSS:  30 mL  Contrast Use: 7 cc      No complications      PLAN OF CARE  -	Resume full dose anticoagulation after procedure (continuous schedule)  -       Start Aspirin 81 mg daily  -	Bed rest for 4 hours  -	Admit to cardiac telemetry service Electrophysiology Brief Post-Op Note      I have personally seen and examined the patient.  I agree with the history and physical which I have reviewed and noted any changes below.     PRE-OP DIAGNOSIS: Atrial Fibrillation    POST-OP DIAGNOSIS: Atrial Fibrillation    PROCEDURE:  Left Atrial Appendage Closure - Watchman Device -  Transesophageal Echocardiogram  DC cardioversion      Vascular Access used (using Ultrasound Guidance)  -Right Femoral Vein: 16F  -Left Femoral Vein: 11F 6F  -Right Femoral Artery: none    All sheaths and wires removed,   Perclose sutures applied (2 perclose for 16F, 1 perclose to 11F)  and manual pressure applied  6Fr LFV was exchanged with triple lumen    Physician: Tara  Assistant: Ajay    ANESTHESIA TYPE:  [X]General Anesthesia  [  ] Sedation  [  ] Local/Regional        CONDITION  [  ] Critical  [  ] Serious  [  ]Fair  [X]Good      SPECIMENS REMOVED (IF APPLICABLE): NONE      IMPLANTS (IF APPLICABLE):  Watchman Device FLX 24 mm      FINDINGS  Successful deployment of the Watchman FLX Device 24 mm in the left atrial appendage  successful DC cardioversion  No LA/ANGY thrombus  No pericardial effusion on ROSARIO/ICE    ESTIMATED BLOOD LOSS:  30 mL  Contrast Use: 7 cc      No complications      PLAN OF CARE  -	Resume full dose anticoagulation after procedure (continuous schedule): heparin tonight, then Xarelto tomorrow.  -             Start Aspirin 81 mg daily  -	Bed rest for 4 hours  -	Admit to CCU due to levophed drip, Amio drip and heparin drip

## 2023-05-16 NOTE — PATIENT PROFILE ADULT - NSPRONUTRITIONRISK_GEN_A_NUR
Chief Complaint   Patient presents with    Hypertension     Patient has BP Log     Medication Refill       HPI:  Patient is here for follow-up    Feeling okay  BP log to review    No complaints      Allergy and Medications are reviewed and updated. Past Medical History, Surgical History, and Family History has been reviewed and updated. Review of Systems:  Review of Systems   Constitutional: Negative for chills and fever. HENT: Negative for congestion and ear pain. Eyes: Negative for discharge. Respiratory: Negative for shortness of breath (No new SOB). Cardiovascular: Negative for chest pain and leg swelling. Gastrointestinal: Negative for abdominal pain, blood in stool and nausea. Endocrine: Negative for polydipsia. Genitourinary: Negative for flank pain and hematuria. Musculoskeletal: Negative for myalgias and neck pain. Skin: Negative for rash. Neurological: Negative for dizziness and seizures. Hematological: Does not bruise/bleed easily. Psychiatric/Behavioral: Negative for hallucinations and suicidal ideas. Vitals:    08/20/21 1441   BP: 121/64   Pulse: 75   Resp: 16   Temp: 97.4 °F (36.3 °C)   TempSrc: Temporal   SpO2: 97%   Weight: 226 lb 6.4 oz (102.7 kg)   Height: 5' 6\" (1.676 m)       Physical Exam  Vitals reviewed. Constitutional:       Appearance: He is well-developed. HENT:      Head: Normocephalic and atraumatic. Eyes:      Conjunctiva/sclera: Conjunctivae normal.      Pupils: Pupils are equal, round, and reactive to light. Neck:      Vascular: No JVD. Cardiovascular:      Rate and Rhythm: Normal rate and regular rhythm. Pulmonary:      Effort: Pulmonary effort is normal.      Breath sounds: Normal breath sounds. No rales. Abdominal:      General: Bowel sounds are normal.      Palpations: Abdomen is soft. Musculoskeletal:         General: Normal range of motion. Lymphadenopathy:      Cervical: No cervical adenopathy.    Skin:     General: Skin is warm and dry. Neurological:      Mental Status: He is alert and oriented to person, place, and time. Psychiatric:         Behavior: Behavior normal.          Labs :    Lab Results   Component Value Date    WBC 10.4 10/01/2020    HGB 13.2 10/01/2020    HCT 40.0 10/01/2020     10/01/2020    CHOL 139 12/19/2017    TRIG 112 12/19/2017    HDL 37 04/06/2021    ALT 12 04/06/2021    AST 15 04/06/2021     04/06/2021    K 4.1 04/06/2021     04/06/2021    CREATININE 0.8 04/06/2021    BUN 18 04/06/2021    CO2 27 04/06/2021    TSH 3.030 10/01/2020    PSA 0.46 10/01/2020    GLUF 131 (H) 04/06/2021    LABA1C 6.1 (H) 04/06/2021    LABMICR <12.0 04/06/2021     Lab Results   Component Value Date    COLORU Yellow 10/01/2020    NITRU Negative 10/01/2020    GLUCOSEU Negative 10/01/2020    KETUA Negative 10/01/2020    UROBILINOGEN 0.2 10/01/2020    BILIRUBINUR Negative 10/01/2020     Lab Results   Component Value Date    PSA 0.46 10/01/2020             ASSESSMENT     Patient Active Problem List    Diagnosis Date Noted    Lipid disorder 06/18/2021     Overview Note:     Formatting of this note might be different from the original.  History: Lipitor 20 mg at home  Assessment: recent LDL 68   Plan: Continue home regimen. Recommend repeat FLP as outpatient. Trend LFT      S/P AVR (aortic valve replacement) 02/26/2021    Coronary atherosclerosis 03/04/2020     Overview Note:     Formatting of this note might be different from the original.  History: mild cad on pre-op cath   Assessment: stable with no ischemic presentation   Plan: Medical management with ASA, BB, statin      Encounter for support and coordination of transition of care 03/04/2020     Overview Note:     Formatting of this note might be different from the original.  Indication for Surgery:  Severe Aortic Stenosis.                  Preop LVEF: 65%      RVF: Normal     Yessenia: Brock     ECG: NSR      Cath: mild CAD  Postop LVEF: Normal       RVF: Normal    Important/Relevant PMH/PSH: Aortic valve disease, HTN, HLD, and Former Smoker (Quit 2/13/1990). Preoperative Hospital Course: Elective. Airway Difficulty: Grade I - No special instrumentation  Pacing Wires: No    Chronological List of Surgeries and Major Events (Diagnosis):  (Surgeries in bold characters)  3/2/2020: J-Incision (Upper Hemisternotomy) for AVR with 23 Inspiris. A/P of Major Active Problems:  -sp AVR & CABG: ASA, BB, statin   -Rhythm: Now NSR. Continue BB and Amio. Now in NSR. If PAF persists, consider AC.  -FVO/CXR: Up in weight. CXR with small effusions/atelectasis. Continue IV lasix.   -Elevated Tbili: Follow trend. Trending down. AST/ALT nml. Discharge Planning: From [place], [place]. Await PT eval. OPD. Plans to follow with local cards. Discharge unclear at this time. Anticipated Discharge Date: 3/7  Barriers to Discharge: Atrial Fibrilation: In Process, Volume Overload: In Process, Other:  urinary retention     Care Management Discharge Needs:  Needs Prior to Discharge: To Be Determined;OT/PT Evaluation      Acute postoperative pain 03/02/2020     Overview Note:     Formatting of this note might be different from the original.  History: Postoperative. Assessment: Pain well controlled on current regimen  Plan: Scheduled tylenol through 3/6 & PRN oxycodone.  Atelectasis 03/02/2020     Overview Note:     Formatting of this note might be different from the original.  History: Postoperative. Assessment: Bibasilar atelectasis on CXR. Plan: Continue to encourage PEP/BPH, OOB, PT/OT, and optimize pain control.  Hypertension 03/02/2018    Hyperlipidemia 03/02/2018    Aortic stenosis, severe 03/02/2018     Overview Note:     Following with Dr Oneil Lorenzo and evaluated at [facility] [place], Medical management at present      Impaired fasting blood sugar 03/02/2018    Edema leg - chronic 03/02/2018    Obesity 03/02/2018        Diagnosis:     ICD-10-CM    1.  Essential hypertension  I10 CBC Auto Differential     Comprehensive Metabolic Panel, Fasting     TSH without Reflex     Urinalysis with Microscopic   2. Hyperlipidemia, unspecified hyperlipidemia type  E78.5 Lipid, Fasting   3. S/P AVR (aortic valve replacement)  Z95.2    4. Impaired fasting blood sugar  R73.01 Hemoglobin A1C     Microalbumin / Creatinine Urine Ratio   5. Hypokalemia  E87.6 potassium chloride (KLOR-CON M) 10 MEQ extended release tablet     Comprehensive Metabolic Panel, Fasting   6. Screening PSA (prostate specific antigen)  Z12.5 PSA screening       PLAN:       Advise to have ( Fasting) lab test prior to next visit. Pt is stable on current medical treatment. Continue current treatment plan    Side effects/Adverse effects/Precautions are reviewed with patient. Low salt, Low Carb diet an low fat diet  Continue medications as advised and take them regularly  Regular exercises as advised    Discussed natural and expected course of this diagnosis and need to alert me if symptoms do not follow expected course, or if any worse. Smoking cessation if applicable, discussed with patient. RF is sent         Patient Instructions   The medication list included in this document is our record of what you are currently taking, including any changes that were made at today's visit.  If you find any differences when compared to your medications at home, or have any questions that were not answered at your visit, please contact the office. Advise to have ( Fasting) lab test prior to next visit. Return in about 2 months (around 10/20/2021). No indicators present

## 2023-05-16 NOTE — H&P ADULT - NSHPPHYSICALEXAM_GEN_ALL_CORE
well developed, well nourished, no acute distress.   Eyes:  normal conjunctiva.   Neck:  normal venous pressure, no carotid bruit.   Cardiac:  normal S1, S2, no murmur, no rub, no gallop.   Pulmonary:  clear lung fields, good air entry, no respiratory distress.   Abdomen:  abdomen soft, non-tender, no masses/organomegaly, normal bowel sounds.   Musculoskeletal:  normal gait.   Extremities:  no edema, no cyanosis, no clubbing, no varicosities.   Skin:  no rash, no skin lesions.   Neurological:  moves all extremities, no focal deficits, normal speech.   Psychiatric:  alert and oriented, normal memory.

## 2023-05-17 LAB
ANION GAP SERPL CALC-SCNC: 9 MMOL/L — SIGNIFICANT CHANGE UP (ref 7–14)
APTT BLD: 51.4 SEC — HIGH (ref 27–39.2)
APTT BLD: 89.1 SEC — CRITICAL HIGH (ref 27–39.2)
BASOPHILS # BLD AUTO: 0.03 K/UL — SIGNIFICANT CHANGE UP (ref 0–0.2)
BASOPHILS NFR BLD AUTO: 0.3 % — SIGNIFICANT CHANGE UP (ref 0–1)
BUN SERPL-MCNC: 11 MG/DL — SIGNIFICANT CHANGE UP (ref 10–20)
CALCIUM SERPL-MCNC: 8.7 MG/DL — SIGNIFICANT CHANGE UP (ref 8.4–10.5)
CHLORIDE SERPL-SCNC: 107 MMOL/L — SIGNIFICANT CHANGE UP (ref 98–110)
CO2 SERPL-SCNC: 27 MMOL/L — SIGNIFICANT CHANGE UP (ref 17–32)
CREAT SERPL-MCNC: 0.8 MG/DL — SIGNIFICANT CHANGE UP (ref 0.7–1.5)
EGFR: 88 ML/MIN/1.73M2 — SIGNIFICANT CHANGE UP
EOSINOPHIL # BLD AUTO: 0.03 K/UL — SIGNIFICANT CHANGE UP (ref 0–0.7)
EOSINOPHIL NFR BLD AUTO: 0.3 % — SIGNIFICANT CHANGE UP (ref 0–8)
GLUCOSE SERPL-MCNC: 152 MG/DL — HIGH (ref 70–99)
HCT VFR BLD CALC: 27.9 % — LOW (ref 42–52)
HCT VFR BLD CALC: 29.7 % — LOW (ref 42–52)
HCT VFR BLD CALC: 32.9 % — LOW (ref 42–52)
HGB BLD-MCNC: 8.4 G/DL — LOW (ref 14–18)
HGB BLD-MCNC: 8.8 G/DL — LOW (ref 14–18)
HGB BLD-MCNC: 9.6 G/DL — LOW (ref 14–18)
IMM GRANULOCYTES NFR BLD AUTO: 0.3 % — SIGNIFICANT CHANGE UP (ref 0.1–0.3)
LYMPHOCYTES # BLD AUTO: 1.07 K/UL — LOW (ref 1.2–3.4)
LYMPHOCYTES # BLD AUTO: 10.6 % — LOW (ref 20.5–51.1)
MCHC RBC-ENTMCNC: 24 PG — LOW (ref 27–31)
MCHC RBC-ENTMCNC: 24.2 PG — LOW (ref 27–31)
MCHC RBC-ENTMCNC: 24.7 PG — LOW (ref 27–31)
MCHC RBC-ENTMCNC: 29.2 G/DL — LOW (ref 32–37)
MCHC RBC-ENTMCNC: 29.6 G/DL — LOW (ref 32–37)
MCHC RBC-ENTMCNC: 30.1 G/DL — LOW (ref 32–37)
MCV RBC AUTO: 81.8 FL — SIGNIFICANT CHANGE UP (ref 80–94)
MCV RBC AUTO: 82.1 FL — SIGNIFICANT CHANGE UP (ref 80–94)
MCV RBC AUTO: 82.3 FL — SIGNIFICANT CHANGE UP (ref 80–94)
MONOCYTES # BLD AUTO: 0.75 K/UL — HIGH (ref 0.1–0.6)
MONOCYTES NFR BLD AUTO: 7.5 % — SIGNIFICANT CHANGE UP (ref 1.7–9.3)
NEUTROPHILS # BLD AUTO: 8.14 K/UL — HIGH (ref 1.4–6.5)
NEUTROPHILS NFR BLD AUTO: 81 % — HIGH (ref 42.2–75.2)
NRBC # BLD: 0 /100 WBCS — SIGNIFICANT CHANGE UP (ref 0–0)
PLATELET # BLD AUTO: 196 K/UL — SIGNIFICANT CHANGE UP (ref 130–400)
PLATELET # BLD AUTO: 201 K/UL — SIGNIFICANT CHANGE UP (ref 130–400)
PLATELET # BLD AUTO: 230 K/UL — SIGNIFICANT CHANGE UP (ref 130–400)
PMV BLD: 11.3 FL — HIGH (ref 7.4–10.4)
PMV BLD: 11.7 FL — HIGH (ref 7.4–10.4)
PMV BLD: 12 FL — HIGH (ref 7.4–10.4)
POTASSIUM SERPL-MCNC: 3.6 MMOL/L — SIGNIFICANT CHANGE UP (ref 3.5–5)
POTASSIUM SERPL-SCNC: 3.6 MMOL/L — SIGNIFICANT CHANGE UP (ref 3.5–5)
RBC # BLD: 3.4 M/UL — LOW (ref 4.7–6.1)
RBC # BLD: 3.63 M/UL — LOW (ref 4.7–6.1)
RBC # BLD: 4 M/UL — LOW (ref 4.7–6.1)
RBC # FLD: 17.3 % — HIGH (ref 11.5–14.5)
SODIUM SERPL-SCNC: 143 MMOL/L — SIGNIFICANT CHANGE UP (ref 135–146)
WBC # BLD: 10.05 K/UL — SIGNIFICANT CHANGE UP (ref 4.8–10.8)
WBC # BLD: 7.97 K/UL — SIGNIFICANT CHANGE UP (ref 4.8–10.8)
WBC # BLD: 8.79 K/UL — SIGNIFICANT CHANGE UP (ref 4.8–10.8)
WBC # FLD AUTO: 10.05 K/UL — SIGNIFICANT CHANGE UP (ref 4.8–10.8)
WBC # FLD AUTO: 7.97 K/UL — SIGNIFICANT CHANGE UP (ref 4.8–10.8)
WBC # FLD AUTO: 8.79 K/UL — SIGNIFICANT CHANGE UP (ref 4.8–10.8)

## 2023-05-17 PROCEDURE — 93010 ELECTROCARDIOGRAM REPORT: CPT

## 2023-05-17 PROCEDURE — 99233 SBSQ HOSP IP/OBS HIGH 50: CPT

## 2023-05-17 RX ORDER — AMIODARONE HYDROCHLORIDE 400 MG/1
0.5 TABLET ORAL
Qty: 450 | Refills: 0 | Status: DISCONTINUED | OUTPATIENT
Start: 2023-05-17 | End: 2023-05-19

## 2023-05-17 RX ORDER — AMIODARONE HYDROCHLORIDE 400 MG/1
200 TABLET ORAL EVERY 12 HOURS
Refills: 0 | Status: DISCONTINUED | OUTPATIENT
Start: 2023-05-17 | End: 2023-05-18

## 2023-05-17 RX ORDER — CHLORHEXIDINE GLUCONATE 213 G/1000ML
1 SOLUTION TOPICAL
Refills: 0 | Status: DISCONTINUED | OUTPATIENT
Start: 2023-05-17 | End: 2023-05-21

## 2023-05-17 RX ORDER — RIVAROXABAN 15 MG-20MG
20 KIT ORAL
Refills: 0 | Status: DISCONTINUED | OUTPATIENT
Start: 2023-05-17 | End: 2023-05-21

## 2023-05-17 RX ORDER — NOREPINEPHRINE BITARTRATE/D5W 8 MG/250ML
0.05 PLASTIC BAG, INJECTION (ML) INTRAVENOUS
Qty: 16 | Refills: 0 | Status: DISCONTINUED | OUTPATIENT
Start: 2023-05-17 | End: 2023-05-19

## 2023-05-17 RX ADMIN — Medication 100 MILLIGRAM(S): at 04:18

## 2023-05-17 RX ADMIN — CHLORHEXIDINE GLUCONATE 1 APPLICATION(S): 213 SOLUTION TOPICAL at 06:20

## 2023-05-17 RX ADMIN — Medication 81 MILLIGRAM(S): at 12:48

## 2023-05-17 RX ADMIN — Medication 20 MILLIGRAM(S): at 05:47

## 2023-05-17 RX ADMIN — HEPARIN SODIUM 2500 UNIT(S): 5000 INJECTION INTRAVENOUS; SUBCUTANEOUS at 14:19

## 2023-05-17 RX ADMIN — PANTOPRAZOLE SODIUM 40 MILLIGRAM(S): 20 TABLET, DELAYED RELEASE ORAL at 05:47

## 2023-05-17 RX ADMIN — Medication 100 MILLIGRAM(S): at 12:52

## 2023-05-17 RX ADMIN — Medication 3.23 MICROGRAM(S)/KG/MIN: at 04:28

## 2023-05-17 RX ADMIN — HEPARIN SODIUM 1300 UNIT(S)/HR: 5000 INJECTION INTRAVENOUS; SUBCUTANEOUS at 14:13

## 2023-05-17 RX ADMIN — TAMSULOSIN HYDROCHLORIDE 0.4 MILLIGRAM(S): 0.4 CAPSULE ORAL at 22:06

## 2023-05-17 RX ADMIN — Medication 100 MILLIGRAM(S): at 03:33

## 2023-05-17 RX ADMIN — RIVAROXABAN 20 MILLIGRAM(S): KIT at 22:06

## 2023-05-17 RX ADMIN — Medication 1 MILLIGRAM(S): at 12:48

## 2023-05-17 RX ADMIN — ATORVASTATIN CALCIUM 20 MILLIGRAM(S): 80 TABLET, FILM COATED ORAL at 22:06

## 2023-05-17 RX ADMIN — AMIODARONE HYDROCHLORIDE 16.7 MG/MIN: 400 TABLET ORAL at 02:03

## 2023-05-17 RX ADMIN — HEPARIN SODIUM 1200 UNIT(S)/HR: 5000 INJECTION INTRAVENOUS; SUBCUTANEOUS at 03:33

## 2023-05-17 RX ADMIN — AMIODARONE HYDROCHLORIDE 200 MILLIGRAM(S): 400 TABLET ORAL at 18:03

## 2023-05-17 NOTE — PROGRESS NOTE ADULT - SUBJECTIVE AND OBJECTIVE BOX
CHIEF COMPLAINT:  Patient is a 83y old  Male who presents with a chief complaint of Watchman device (17 May 2023 10:16)      INTERVAL HISTORY/OVERNIGHT EVENTS:  No major overnight events.     ======================  MEDICATIONS:  aMIOdarone    Tablet 200 milliGRAM(s) Oral every 12 hours  aspirin enteric coated 81 milliGRAM(s) Oral daily  atorvastatin 20 milliGRAM(s) Oral at bedtime  ceFAZolin   IVPB 1000 milliGRAM(s) IV Intermittent every 8 hours  chlorhexidine 2% Cloths 1 Application(s) Topical <User Schedule>  folic acid 1 milliGRAM(s) Oral daily  pantoprazole    Tablet 40 milliGRAM(s) Oral before breakfast  rivaroxaban 20 milliGRAM(s) Oral with dinner  tamsulosin 0.4 milliGRAM(s) Oral at bedtime  torsemide 20 milliGRAM(s) Oral daily    DRIPS:  aMIOdarone Infusion 0.501 mG/Min (16.7 mL/Hr) IV Continuous <Continuous>  heparin  Infusion. 1200 Unit(s)/Hr (12 mL/Hr) IV Continuous <Continuous>  norepinephrine Infusion 0.05 MICROgram(s)/kG/Min (3.23 mL/Hr) IV Continuous <Continuous>      ======================  PHYSICAL EXAMINATION:  GEN:  nad.   HEENT:  eomi. ncat  PULM:  b/l lung sounds   CARD: s1, s2  ABD: +bs. ntnd  EXT:  no new rashes.    NEURO:  no new focal deficits.   ======================  OBJECTIVE:        VS:  T(F): 97.5 (05-17 @ 08:00), Max: 97.5 (05-17 @ 08:00)  HR: 83 (05-17 @ 10:00) (74 - 90)  BP: --  RR: 24 (05-17 @ 10:00) (9 - 28)  SpO2: 98% (05-17 @ 10:00) (73% - 99%)    I/O:      05-16 @ 07:01  -  05-17 @ 07:00  --------------------------------------------------------  IN: 590 mL / OUT: 880 mL / NET: -290 mL    05-17 @ 07:01  -  05-17 @ 12:05  --------------------------------------------------------  IN: 316 mL / OUT: 2700 mL / NET: -2384 mL        Weight trend:  Weight (kg): 68.9 (05-16)    ======================    LABS:  ABG - ( 16 May 2023 17:57 )  pH, Arterial: 7.40  pH, Blood: x     /  pCO2: 43    /  pO2: 255   / HCO3: 27    / Base Excess: 1.5   /  SaO2: 100.0                                   8.4    7.97  )-----------( 196      ( 17 May 2023 04:45 )             27.9     05-17    143  |  107  |  11  ----------------------------<  152<H>  3.6   |  27  |  0.8    Ca    8.7      17 May 2023 00:21  Mg     1.9     05-16    TPro  6.7  /  Alb  3.4<L>  /  TBili  0.3  /  DBili  x   /  AST  18  /  ALT  11  /  AlkPhos  73  05-16    LIVER FUNCTIONS - ( 16 May 2023 15:50 )  Alb: 3.4 g/dL / Pro: 6.7 g/dL / ALK PHOS: 73 U/L / ALT: 11 U/L / AST: 18 U/L / GGT: x           PT/INR - ( 16 May 2023 15:50 )   PT: 13.80 sec;   INR: 1.20 ratio         PTT - ( 17 May 2023 09:21 )  PTT:51.4 sec

## 2023-05-17 NOTE — PROGRESS NOTE ADULT - ASSESSMENT
an 82 yo male [facility] Hypertension, hyperlipidemia, paroxysmal atrial fibrillation (04/2021), CAD s/p PCI to pLAD, mitral regurgitation s/p mitral clip, coronary artery disease s/p PCI to proximal LAD, cardiomyopathy, and recurrent GI bleed.  MR s/p Mitral clip,  Presents for elective Watchman device.  Patient underwent cardioversion for Afib w RVR on amiodarone drip    Afib w RVR   - s/p Wathcmann  - s/p cardioversion  - s/p mitral clip  - On amiodarone drip to be stopped at 6 pm and switched to Amiodarone 200 mg PO BID for 14 days and then 200 mg PO OD  - on heparin drip stopped 2 hrs for removal of Right Fem Line  - s/p R. Fem line removal   - Will restart in 1 hr   - cw heparin till 6 pm   - start xarelto 20 mg PO OD at 7 pm on 05/17   - cw aspirin flomax and torsemide 20 mg PO OD

## 2023-05-17 NOTE — PROGRESS NOTE ADULT - ASSESSMENT
A/P   Patient  s/p Watchman device and DCCV yesterday    - Finish IV amiodarone load and then transition to po Amiodarone 200 mg BID x 2 weeks then 200 mg po daily  - Resume Xarelto 20 mg tonight with dinner.  Stop Heparin at that time  - Cont ASA 81 mg daily  - Pt may shower  - No lifting >5 pounds x 1 week  - Pt will be scheduled for [facility] in 6 week

## 2023-05-17 NOTE — PROGRESS NOTE ADULT - ASSESSMENT
IMPRESSION  A Fib with RVR s/p Cardioversion  Recurrent GIB s/p Watchman Device implant 5/16  MR s/p mitral clip  CAD s/p PCI to pLAD  HTN, HLD      PLAN:    CNS:   -no depressants.     HEENT:   -Oral care    PULMONARY:    -HOB @ 45 degrees    CARDIOVASCULAR:   -switch Amio to PO 200mg BID for 14 days and then 200mg qd  -start Xarelto 20mg qd  -c/w aspirin 81mg qd  -c/w lipitor 20mg qhs  -c/w Torsemide 20mg qd    GI:   -GI prophylaxis.    -oral feeds    RENAL:    -Follow up lytes.  Correct as needed.     INFECTIOUS DISEASE:   -Follow up cultures: blood and urine.   -CHG 4% daily bath    HEMATOLOGICAL:    -DVT prophylaxis.    ENDOCRINE:    -Follow up FS.  -insulin sliding scale if needed    MUSCULOSKELETAL:   -increase ambulation as tolerated    CCU monitoring

## 2023-05-17 NOTE — PROGRESS NOTE ADULT - SUBJECTIVE AND OBJECTIVE BOX
INTERVAL HPI/OVERNIGHT EVENTS:    Patient s/p Watchman yesterday.    No events over night. Pt without complaints    MEDICATIONS  (STANDING):  aMIOdarone Infusion 0.5 mG/Min (16.7 mL/Hr) IV Continuous <Continuous>  aMIOdarone Infusion 1 mG/Min (33.3 mL/Hr) IV Continuous <Continuous>  aspirin enteric coated 81 milliGRAM(s) Oral daily  atorvastatin 20 milliGRAM(s) Oral at bedtime  ceFAZolin   IVPB 1000 milliGRAM(s) IV Intermittent every 8 hours  chlorhexidine 2% Cloths 1 Application(s) Topical <User Schedule>  folic acid 1 milliGRAM(s) Oral daily  heparin  Infusion. 1200 Unit(s)/Hr (12 mL/Hr) IV Continuous <Continuous>  norepinephrine Infusion 0.05 MICROgram(s)/kG/Min (3.23 mL/Hr) IV Continuous <Continuous>  pantoprazole    Tablet 40 milliGRAM(s) Oral before breakfast  tamsulosin 0.4 milliGRAM(s) Oral at bedtime  torsemide 20 milliGRAM(s) Oral daily    MEDICATIONS  (PRN):  heparin   Injectable 5500 Unit(s) IV Push every 6 hours PRN For aPTT less than 40  heparin   Injectable 2500 Unit(s) IV Push every 6 hours PRN For aPTT between 40 - 57    Allergies    No Known Allergies    Intolerances    Vital Signs Last 24 Hrs  T(C): 36.4 (17 May 2023 08:00), Max: 36.4 (17 May 2023 08:00)  T(F): 97.5 (17 May 2023 08:00), Max: 97.5 (17 May 2023 08:00)  HR: 81 (17 May 2023 08:00) (74 - 89)  BP: --  BP(mean): --  RR: 21 (17 May 2023 08:00) (9 - 28)  SpO2: 97% (17 May 2023 08:00) (73% - 99%)    Parameters below as of 17 May 2023 08:00  Patient On (Oxygen Delivery Method): room air    groin without hematoma/bleeding    RADIOLOGY & ADDITIONAL TESTS:

## 2023-05-17 NOTE — PROGRESS NOTE ADULT - SUBJECTIVE AND OBJECTIVE BOX
SUBJECTIVE / OVERNIGHT EVENTS  Patient slept well overnight. No acute complaints this AM. Patient does not report fevers, chills, CP, SOB, or n/v/d    MEDICATIONS  aMIOdarone    Tablet 200 milliGRAM(s) Oral every 12 hours  aMIOdarone Infusion 0.501 mG/Min IV Continuous <Continuous>  aspirin enteric coated 81 milliGRAM(s) Oral daily  atorvastatin 20 milliGRAM(s) Oral at bedtime  chlorhexidine 2% Cloths 1 Application(s) Topical <User Schedule>  folic acid 1 milliGRAM(s) Oral daily  heparin  Infusion. 1200 Unit(s)/Hr IV Continuous <Continuous>  norepinephrine Infusion 0.05 MICROgram(s)/kG/Min IV Continuous <Continuous>  pantoprazole    Tablet 40 milliGRAM(s) Oral before breakfast  rivaroxaban 20 milliGRAM(s) Oral with dinner  tamsulosin 0.4 milliGRAM(s) Oral at bedtime  torsemide 20 milliGRAM(s) Oral daily    heparin   Injectable 2500 Unit(s) IV Push every 6 hours PRN For aPTT between 40 - 57  heparin   Injectable 5500 Unit(s) IV Push every 6 hours PRN For aPTT less than 40    VITALS /  EXAM    T(C): 36.4 (05-17-23 @ 08:00), Max: 36.4 (05-17-23 @ 08:00)  HR: 80 (05-17-23 @ 12:00) (74 - 91)  BP: --  RR: 19 (05-17-23 @ 12:00) (9 - 34)  SpO2: 97% (05-17-23 @ 12:00) (73% - 99%)    GENERAL: NAD, well-developed  CHEST/LUNG: Clear to auscultation bilaterally; No wheezes, rales or rhonchi  HEART: Regular rate and rhythm; No murmurs, rubs, or gallops  ABDOMEN: Soft, Nontender, Nondistended; Bowel sounds present, no masses.  EXTREMITIES:  2+ Peripheral Pulses, No clubbing, cyanosis, or edema    I's & O's     05-16-23 @ 07:01  -  05-17-23 @ 07:00  --------------------------------------------------------  IN:    Amiodarone: 33.4 mL    Amiodarone: 233.3 mL    Heparin Infusion: 144 mL    IV PiggyBack: 100 mL    Norepinephrine: 79.3 mL  Total IN: 590 mL    OUT:    Voided (mL): 880 mL  Total OUT: 880 mL    Total NET: -290 mL      05-17-23 @ 07:01  -  05-17-23 @ 13:14  --------------------------------------------------------  IN:    Amiodarone: 66.8 mL    Heparin Infusion: 36 mL    Norepinephrine: 13.2 mL    Oral Fluid: 200 mL  Total IN: 316 mL    OUT:    Voided (mL): 2700 mL  Total OUT: 2700 mL    Total NET: -2384 mL        LABS             8.4    7.97  )-----------( 196      ( 05-17-23 @ 04:45 )             27.9     143  |  107  |  11  -------------------------<  152   05-17-23 @ 00:21  3.6  |  27  |  0.8    Ca      8.7     05-17-23 @ 00:21  Mg     1.9     05-16-23 @ 15:50    TPro  6.7  /  Alb  3.4  /  TBili  0.3  /  DBili  x   /  AST  18  /  ALT  11  /  AlkPhos  73  /  GGT  x     05-16-23 @ 15:50    PT/INR - ( 05-16-23 @ 15:50 )   PT: 13.80 sec<H>;   INR: 1.20 ratio  PTT - ( 05-17-23 @ 09:21 )  PTT:51.4 sec                Blood Gas Arterial, Lactate: 0.90 mmol/L (05-16-23 @ 17:57)      MICRO / IMAGING / CARDIOLOGY  Telemetry: Reviewed   EKG: Reviewed    CULTURES    IMAGING    CARDIOLOGY

## 2023-05-18 LAB
ALBUMIN SERPL ELPH-MCNC: 3.1 G/DL — LOW (ref 3.5–5.2)
ALP SERPL-CCNC: 66 U/L — SIGNIFICANT CHANGE UP (ref 30–115)
ALT FLD-CCNC: 7 U/L — SIGNIFICANT CHANGE UP (ref 0–41)
ANION GAP SERPL CALC-SCNC: 8 MMOL/L — SIGNIFICANT CHANGE UP (ref 7–14)
ANION GAP SERPL CALC-SCNC: 9 MMOL/L — SIGNIFICANT CHANGE UP (ref 7–14)
AST SERPL-CCNC: 14 U/L — SIGNIFICANT CHANGE UP (ref 0–41)
BASOPHILS # BLD AUTO: 0.03 K/UL — SIGNIFICANT CHANGE UP (ref 0–0.2)
BASOPHILS NFR BLD AUTO: 0.4 % — SIGNIFICANT CHANGE UP (ref 0–1)
BILIRUB SERPL-MCNC: 0.2 MG/DL — SIGNIFICANT CHANGE UP (ref 0.2–1.2)
BUN SERPL-MCNC: 12 MG/DL — SIGNIFICANT CHANGE UP (ref 10–20)
BUN SERPL-MCNC: 15 MG/DL — SIGNIFICANT CHANGE UP (ref 10–20)
CALCIUM SERPL-MCNC: 8.2 MG/DL — LOW (ref 8.4–10.4)
CALCIUM SERPL-MCNC: 8.4 MG/DL — SIGNIFICANT CHANGE UP (ref 8.4–10.5)
CHLORIDE SERPL-SCNC: 104 MMOL/L — SIGNIFICANT CHANGE UP (ref 98–110)
CHLORIDE SERPL-SCNC: 105 MMOL/L — SIGNIFICANT CHANGE UP (ref 98–110)
CO2 SERPL-SCNC: 27 MMOL/L — SIGNIFICANT CHANGE UP (ref 17–32)
CO2 SERPL-SCNC: 31 MMOL/L — SIGNIFICANT CHANGE UP (ref 17–32)
CREAT SERPL-MCNC: 0.8 MG/DL — SIGNIFICANT CHANGE UP (ref 0.7–1.5)
CREAT SERPL-MCNC: 1 MG/DL — SIGNIFICANT CHANGE UP (ref 0.7–1.5)
EGFR: 75 ML/MIN/1.73M2 — SIGNIFICANT CHANGE UP
EGFR: 88 ML/MIN/1.73M2 — SIGNIFICANT CHANGE UP
EOSINOPHIL # BLD AUTO: 0.15 K/UL — SIGNIFICANT CHANGE UP (ref 0–0.7)
EOSINOPHIL NFR BLD AUTO: 2.2 % — SIGNIFICANT CHANGE UP (ref 0–8)
GLUCOSE SERPL-MCNC: 104 MG/DL — HIGH (ref 70–99)
GLUCOSE SERPL-MCNC: 117 MG/DL — HIGH (ref 70–99)
HCT VFR BLD CALC: 28.6 % — LOW (ref 42–52)
HGB BLD-MCNC: 8.5 G/DL — LOW (ref 14–18)
IMM GRANULOCYTES NFR BLD AUTO: 0.3 % — SIGNIFICANT CHANGE UP (ref 0.1–0.3)
LACTATE SERPL-SCNC: 0.8 MMOL/L — SIGNIFICANT CHANGE UP (ref 0.7–2)
LYMPHOCYTES # BLD AUTO: 0.95 K/UL — LOW (ref 1.2–3.4)
LYMPHOCYTES # BLD AUTO: 13.7 % — LOW (ref 20.5–51.1)
MAGNESIUM SERPL-MCNC: 1.5 MG/DL — LOW (ref 1.8–2.4)
MCHC RBC-ENTMCNC: 23.9 PG — LOW (ref 27–31)
MCHC RBC-ENTMCNC: 29.7 G/DL — LOW (ref 32–37)
MCV RBC AUTO: 80.3 FL — SIGNIFICANT CHANGE UP (ref 80–94)
MONOCYTES # BLD AUTO: 0.69 K/UL — HIGH (ref 0.1–0.6)
MONOCYTES NFR BLD AUTO: 9.9 % — HIGH (ref 1.7–9.3)
NEUTROPHILS # BLD AUTO: 5.11 K/UL — SIGNIFICANT CHANGE UP (ref 1.4–6.5)
NEUTROPHILS NFR BLD AUTO: 73.5 % — SIGNIFICANT CHANGE UP (ref 42.2–75.2)
NRBC # BLD: 0 /100 WBCS — SIGNIFICANT CHANGE UP (ref 0–0)
PHOSPHATE SERPL-MCNC: 2.5 MG/DL — SIGNIFICANT CHANGE UP (ref 2.1–4.9)
PLATELET # BLD AUTO: 182 K/UL — SIGNIFICANT CHANGE UP (ref 130–400)
PMV BLD: 12.3 FL — HIGH (ref 7.4–10.4)
POTASSIUM SERPL-MCNC: 3 MMOL/L — LOW (ref 3.5–5)
POTASSIUM SERPL-MCNC: 3.7 MMOL/L — SIGNIFICANT CHANGE UP (ref 3.5–5)
POTASSIUM SERPL-SCNC: 3 MMOL/L — LOW (ref 3.5–5)
POTASSIUM SERPL-SCNC: 3.7 MMOL/L — SIGNIFICANT CHANGE UP (ref 3.5–5)
PROT SERPL-MCNC: 5.9 G/DL — LOW (ref 6–8)
RBC # BLD: 3.56 M/UL — LOW (ref 4.7–6.1)
RBC # FLD: 17.1 % — HIGH (ref 11.5–14.5)
SODIUM SERPL-SCNC: 141 MMOL/L — SIGNIFICANT CHANGE UP (ref 135–146)
SODIUM SERPL-SCNC: 143 MMOL/L — SIGNIFICANT CHANGE UP (ref 135–146)
WBC # BLD: 6.95 K/UL — SIGNIFICANT CHANGE UP (ref 4.8–10.8)
WBC # FLD AUTO: 6.95 K/UL — SIGNIFICANT CHANGE UP (ref 4.8–10.8)

## 2023-05-18 PROCEDURE — 99233 SBSQ HOSP IP/OBS HIGH 50: CPT

## 2023-05-18 PROCEDURE — 71045 X-RAY EXAM CHEST 1 VIEW: CPT | Mod: 26

## 2023-05-18 PROCEDURE — 93010 ELECTROCARDIOGRAM REPORT: CPT

## 2023-05-18 RX ORDER — METOPROLOL TARTRATE 50 MG
12.5 TABLET ORAL
Refills: 0 | Status: DISCONTINUED | OUTPATIENT
Start: 2023-05-18 | End: 2023-05-19

## 2023-05-18 RX ORDER — AMIODARONE HYDROCHLORIDE 400 MG/1
400 TABLET ORAL EVERY 12 HOURS
Refills: 0 | Status: DISCONTINUED | OUTPATIENT
Start: 2023-05-18 | End: 2023-05-21

## 2023-05-18 RX ORDER — FUROSEMIDE 40 MG
40 TABLET ORAL ONCE
Refills: 0 | Status: COMPLETED | OUTPATIENT
Start: 2023-05-18 | End: 2023-05-18

## 2023-05-18 RX ORDER — POTASSIUM CHLORIDE 20 MEQ
20 PACKET (EA) ORAL
Refills: 0 | Status: COMPLETED | OUTPATIENT
Start: 2023-05-18 | End: 2023-05-18

## 2023-05-18 RX ORDER — AMIODARONE HYDROCHLORIDE 400 MG/1
400 TABLET ORAL EVERY 12 HOURS
Refills: 0 | Status: DISCONTINUED | OUTPATIENT
Start: 2023-05-18 | End: 2023-05-18

## 2023-05-18 RX ORDER — MAGNESIUM SULFATE 500 MG/ML
2 VIAL (ML) INJECTION
Refills: 0 | Status: COMPLETED | OUTPATIENT
Start: 2023-05-18 | End: 2023-05-18

## 2023-05-18 RX ORDER — METOPROLOL TARTRATE 50 MG
25 TABLET ORAL
Refills: 0 | Status: DISCONTINUED | OUTPATIENT
Start: 2023-05-18 | End: 2023-05-18

## 2023-05-18 RX ADMIN — Medication 81 MILLIGRAM(S): at 11:58

## 2023-05-18 RX ADMIN — Medication 50 MILLIEQUIVALENT(S): at 21:06

## 2023-05-18 RX ADMIN — Medication 20 MILLIGRAM(S): at 05:28

## 2023-05-18 RX ADMIN — Medication 12.5 MILLIGRAM(S): at 23:04

## 2023-05-18 RX ADMIN — TAMSULOSIN HYDROCHLORIDE 0.4 MILLIGRAM(S): 0.4 CAPSULE ORAL at 21:06

## 2023-05-18 RX ADMIN — AMIODARONE HYDROCHLORIDE 200 MILLIGRAM(S): 400 TABLET ORAL at 05:29

## 2023-05-18 RX ADMIN — Medication 12.5 MILLIGRAM(S): at 18:04

## 2023-05-18 RX ADMIN — CHLORHEXIDINE GLUCONATE 1 APPLICATION(S): 213 SOLUTION TOPICAL at 05:29

## 2023-05-18 RX ADMIN — Medication 25 MILLIGRAM(S): at 08:35

## 2023-05-18 RX ADMIN — Medication 50 MILLIEQUIVALENT(S): at 05:42

## 2023-05-18 RX ADMIN — PANTOPRAZOLE SODIUM 40 MILLIGRAM(S): 20 TABLET, DELAYED RELEASE ORAL at 05:42

## 2023-05-18 RX ADMIN — Medication 25 GRAM(S): at 09:29

## 2023-05-18 RX ADMIN — AMIODARONE HYDROCHLORIDE 400 MILLIGRAM(S): 400 TABLET ORAL at 18:03

## 2023-05-18 RX ADMIN — Medication 1 MILLIGRAM(S): at 11:58

## 2023-05-18 RX ADMIN — Medication 25 GRAM(S): at 07:59

## 2023-05-18 RX ADMIN — RIVAROXABAN 20 MILLIGRAM(S): KIT at 18:04

## 2023-05-18 RX ADMIN — Medication 50 MILLIEQUIVALENT(S): at 16:16

## 2023-05-18 RX ADMIN — ATORVASTATIN CALCIUM 20 MILLIGRAM(S): 80 TABLET, FILM COATED ORAL at 21:06

## 2023-05-18 NOTE — PROGRESS NOTE ADULT - ASSESSMENT
an 84 yo male [facility] Hypertension, hyperlipidemia, paroxysmal atrial fibrillation (04/2021), CAD s/p PCI to pLAD, mitral regurgitation s/p mitral clip, coronary artery disease s/p PCI to proximal LAD, cardiomyopathy, and recurrent GI bleed.  MR s/p Mitral clip,  Presents for elective Watchman device.  Patient underwent cardioversion for Afib w RVR on amiodarone drip    Afib w RVR   - s/p Wathcmann  - s/p cardioversion  - s/p mitral clip  - s/p amiodarone drip   - s/p heparin drip   - cw Amiodarone 200 mg PO BID for 14 days and then 200 mg PO OD  - on heparin drip stopped 2 hrs for removal of Right Fem Line  - s/p R. Fem line removal   - Will restart in 1 hr   - cw heparin till 6 pm   - start xarelto 20 mg PO OD at 7 pm on 05/17   - cw aspirin flomax and torsemide 20 mg PO OD      an 84 yo male [facility] Hypertension, hyperlipidemia, paroxysmal atrial fibrillation (04/2021), CAD s/p PCI to pLAD, mitral regurgitation s/p mitral clip, coronary artery disease s/p PCI to proximal LAD, cardiomyopathy, and recurrent GI bleed.  MR s/p Mitral clip,  Presents for elective Watchman device.  Patient underwent cardioversion for Afib w RVR on amiodarone drip    Afib w RVR   - s/p Wathcmann  - s/p cardioversion  - s/p mitral clip  - s/p amiodarone drip   - s/p heparin drip   - cw Amiodarone 200 mg PO BID for 14 days and then 200 mg PO OD  - s/p R. Fem line removal   - cw xarelto 20 mg PO OD  - HR was elevated today to 140s- 150s   - started on metoprolol 25 mg BID  - BP low, 85/45  - Lasix 40 mg IV psu hwhen BP allows  - Patient will have to be shocked (cardioversion) again today around noon   - cw aspirin flomax and torsemide 20 mg PO OD     # Hypokalemia:  - repleted     # Hypotension:  - will restart levophed if needed    an 84 yo male [facility] Hypertension, hyperlipidemia, paroxysmal atrial fibrillation (04/2021), CAD s/p PCI to pLAD, mitral regurgitation s/p mitral clip, coronary artery disease s/p PCI to proximal LAD, cardiomyopathy, and recurrent GI bleed.  MR s/p Mitral clip,  Presents for elective Watchman device.  Patient underwent cardioversion for Afib w RVR on amiodarone drip    IMPRESSION  A Fib with RVR s/p failed Cardioversion  Recurrent GIB s/p Watchman Device implant 5/16  MR s/p mitral clip, no again MR due to LV dysfunction EF 40%  CAD s/p PCI to pLAD  HTN, HLD      PLAN:    CNS:   -no depressants.     HEENT:   -Oral care    PULMONARY:    -HOB @ 45 degrees    CARDIOVASCULAR:   -Increase Amio to 400mg BID  -c/w Xarelto 20mg qd  -c/w aspirin 81mg qd  -c/w lipitor 20mg qhs  -c/w Torsemide 20mg qd, I/V Lasix 40mg X 1  -restart lopressor 12.5mg q6 with holding parameters, d/c if hypotensive  -check lactate, restart levo if becomes hypotensive     GI:   -GI prophylaxis.    -oral feeds    RENAL:    -Follow up lytes.  Correct as needed.     INFECTIOUS DISEASE:   -Follow up cultures: blood and urine.   -CHG 4% daily bath    HEMATOLOGICAL:    -DVT prophylaxis.    ENDOCRINE:    -Follow up FS.  -insulin sliding scale if needed    MUSCULOSKELETAL:   -increase ambulation as tolerated    CCU monitoring   an 82 yo male K[facility] Hypertension, hyperlipidemia, paroxysmal atrial fibrillation (04/2021), CAD s/p PCI to pLAD, mitral regurgitation s/p mitral clip, coronary artery disease s/p PCI to proximal LAD, cardiomyopathy, and recurrent GI bleed.  MR s/p Mitral clip,  Presents for elective Watchman device.  Patient underwent cardioversion for Afib w RVR on amiodarone drip.    IMPRESSION  A Fib with RVR s/p failed Cardioversion  Recurrent GIB s/p Watchman Device implant 5/16  MR s/p mitral clip, no again MR due to LV dysfunction EF 40%  CAD s/p PCI to pLAD  HTN, HLD      PLAN:    CNS:   -no depressants.     HEENT:   -Oral care    PULMONARY:    -HOB @ 45 degrees    CARDIOVASCULAR:   -Increase Amio to 400mg BID  -c/w Xarelto 20mg qd  -c/w aspirin 81mg qd  -c/w lipitor 20mg qhs  -c/w Torsemide 20mg qd, I/V Lasix 40mg X 1  -restart lopressor 12.5mg q6 with holding parameters, d/c if hypotensive  -check lactate, restart levo if becomes hypotensive     GI:   -GI prophylaxis.    -oral feeds    RENAL:    -Follow up lytes.  Correct as needed.     INFECTIOUS DISEASE:   -Follow up cultures: blood and urine.   -CHG 4% daily bath    HEMATOLOGICAL:    -DVT prophylaxis.    ENDOCRINE:    -Follow up FS.  -insulin sliding scale if needed    MUSCULOSKELETAL:   -increase ambulation as tolerated    CCU monitoring

## 2023-05-18 NOTE — PROGRESS NOTE ADULT - ASSESSMENT
Cards: Dr Judd  EP: Dr Pacheco    84 yo male with hx of Hypertension, hyperlipidemia, paroxysmal atrial fibrillation (04/2021), CAD s/p PCI to pLAD, mitral regurgitation s/p mitral clip, coronary artery disease s/p PCI to proximal LAD, cardiomyopathy, and recurrent GI bleed.  MR s/p Mitral clip,  Presents for elective Watchman device.  Patient underwent cardioversion for Afib w RVR prior to Watchman implant and was started on amiodarone drip, now on PO. Hypotensive postop and started on Levo, now off pressors still mildly hypotensive    Impression:  AF sp Watchman  Recurrent GIB  MR s/p mitral clip, no again MR due to LV dysfunction EF 40%  CAD s/p PCI to pLAD  HTN, HLD    Plan:  - Cont Amio 400mg daily  - Cont Xarelto 20mg daily  - ASA 81mg daily  - Cont tele monitoring while in hospital  - NO DCCV until 30 days post Watchman for concern of dislodging Device  - Rate control and treat medically  - Follow up with Dr Pacheco in one month

## 2023-05-18 NOTE — PROGRESS NOTE ADULT - SUBJECTIVE AND OBJECTIVE BOX
INTERVAL HPI/OVERNIGHT EVENTS:  Patient back into AF -120s bpm  Off pressors, still hypotensive ~90/50  Patient feeling well, POD#2 sp Watchman implant    MEDICATIONS  (STANDING):  aMIOdarone    Tablet 400 milliGRAM(s) Oral every 12 hours  aMIOdarone Infusion 0.501 mG/Min (16.7 mL/Hr) IV Continuous <Continuous>  aspirin enteric coated 81 milliGRAM(s) Oral daily  atorvastatin 20 milliGRAM(s) Oral at bedtime  chlorhexidine 2% Cloths 1 Application(s) Topical <User Schedule>  folic acid 1 milliGRAM(s) Oral daily  furosemide   Injectable 40 milliGRAM(s) IV Push once  metoprolol tartrate 12.5 milliGRAM(s) Oral four times a day  norepinephrine Infusion 0.05 MICROgram(s)/kG/Min (3.23 mL/Hr) IV Continuous <Continuous>  pantoprazole    Tablet 40 milliGRAM(s) Oral before breakfast  potassium chloride  20 mEq/100 mL IVPB 20 milliEquivalent(s) IV Intermittent every 2 hours  rivaroxaban 20 milliGRAM(s) Oral with dinner  tamsulosin 0.4 milliGRAM(s) Oral at bedtime  torsemide 20 milliGRAM(s) Oral daily    MEDICATIONS  (PRN):      Allergies    No Known Allergies    Intolerances        REVIEW OF SYSTEMS: No CP, palpitations, dizziness or SOB    Vital Signs Last 24 Hrs  T(C): 36.8 (18 May 2023 08:00), Max: 36.8 (18 May 2023 08:00)  T(F): 98.3 (18 May 2023 08:00), Max: 98.3 (18 May 2023 08:00)  HR: 97 (18 May 2023 13:00) (76 - 137)  BP: 72/49 (18 May 2023 13:00) (69/47 - 76/54)  BP(mean): 57 (18 May 2023 13:00) (53 - 59)  RR: 23 (18 May 2023 13:00) (10 - 33)  SpO2: 96% (18 May 2023 13:00) (96% - 100%)    Parameters below as of 18 May 2023 13:00  Patient On (Oxygen Delivery Method): room air        05-17-23 @ 07:01  -  05-18-23 @ 07:00  --------------------------------------------------------  IN: 1187.3 mL / OUT: 4850 mL / NET: -3662.7 mL    05-18-23 @ 07:01  -  05-18-23 @ 13:34  --------------------------------------------------------  IN: 150 mL / OUT: 1875 mL / NET: -1725 mL        Physical Exam  GENERAL: In no apparent distress, well nourished, and hydrated.  EYES: EOMI, PERRLA, conjunctiva and sclera clear  NECK: Supple  HEART: Regular rate and rhythm; No murmurs, rubs, or gallops.  PULMONARY: Clear to auscultation and perfusion.  No rales, wheezing, or rhonchi bilaterally.  EXTREMITIES:  2+ Peripheral Pulses, No clubbing, cyanosis, or edema  NEUROLOGICAL: Grossly nonfocal    LABS:                        8.5    6.95  )-----------( 182      ( 18 May 2023 04:05 )             28.6     05-18    141  |  105  |  12  ----------------------------<  117<H>  3.0<L>   |  27  |  0.8    Ca    8.2<L>      18 May 2023 04:05  Phos  2.5     05-18  Mg     1.5     05-18    TPro  5.9<L>  /  Alb  3.1<L>  /  TBili  0.2  /  DBili  x   /  AST  14  /  ALT  7   /  AlkPhos  66  05-18    PT/INR - ( 16 May 2023 15:50 )   PT: 13.80 sec;   INR: 1.20 ratio         PTT - ( 17 May 2023 09:21 )  PTT:51.4 sec      RADIOLOGY & ADDITIONAL TESTS:

## 2023-05-18 NOTE — PROGRESS NOTE ADULT - SUBJECTIVE AND OBJECTIVE BOX
SUBJECTIVE / OVERNIGHT EVENTS  Patient was tachycardic, in Afib w rvr this AM, BP low this morning     MEDICATIONS  aMIOdarone    Tablet 200 milliGRAM(s) Oral every 12 hours  aMIOdarone Infusion 0.501 mG/Min IV Continuous <Continuous>  aspirin enteric coated 81 milliGRAM(s) Oral daily  atorvastatin 20 milliGRAM(s) Oral at bedtime  chlorhexidine 2% Cloths 1 Application(s) Topical <User Schedule>  folic acid 1 milliGRAM(s) Oral daily  furosemide   Injectable 40 milliGRAM(s) IV Push once  metoprolol tartrate 25 milliGRAM(s) Oral two times a day  norepinephrine Infusion 0.05 MICROgram(s)/kG/Min IV Continuous <Continuous>  pantoprazole    Tablet 40 milliGRAM(s) Oral before breakfast  potassium chloride  20 mEq/100 mL IVPB 20 milliEquivalent(s) IV Intermittent every 2 hours  rivaroxaban 20 milliGRAM(s) Oral with dinner  tamsulosin 0.4 milliGRAM(s) Oral at bedtime  torsemide 20 milliGRAM(s) Oral daily      VITALS /  EXAM    T(C): 36.8 (05-18-23 @ 08:00), Max: 36.8 (05-18-23 @ 08:00)  HR: 95 (05-18-23 @ 10:00) (76 - 137)  BP: --  RR: 21 (05-18-23 @ 10:00) (10 - 33)  SpO2: 100% (05-18-23 @ 10:00) (96% - 100%)    GENERAL: NAD, well-developed  CHEST/LUNG: Clear to auscultation bilaterally; No wheezes, rales or rhonchi  HEART: Regular rate and rhythm; No murmurs, rubs, or gallops  ABDOMEN: Soft, Nontender, Nondistended; Bowel sounds present, no masses.  EXTREMITIES:  2+ Peripheral Pulses, No clubbing, cyanosis, or edema    I's & O's     05-17-23 @ 07:01  -  05-18-23 @ 07:00  --------------------------------------------------------  IN:    Amiodarone: 66.8 mL    Amiodarone: 116.9 mL    Heparin Infusion: 94 mL    IV PiggyBack: 100 mL    Norepinephrine: 59.6 mL    Oral Fluid: 750 mL  Total IN: 1187.3 mL    OUT:    Voided (mL): 4850 mL  Total OUT: 4850 mL    Total NET: -3662.7 mL      05-18-23 @ 07:01  -  05-18-23 @ 11:06  --------------------------------------------------------  IN:    IV PiggyBack: 100 mL  Total IN: 100 mL    OUT:    Voided (mL): 1700 mL  Total OUT: 1700 mL    Total NET: -1600 mL        LABS             8.5    6.95  )-----------( 182      ( 05-18-23 @ 04:05 )             28.6     141  |  105  |  12  -------------------------<  117   05-18-23 @ 04:05  3.0  |  27  |  0.8    Ca      8.2     05-18-23 @ 04:05  Phos   2.5     05-18-23 @ 04:05  Mg     1.5     05-18-23 @ 04:05    TPro  5.9  /  Alb  3.1  /  TBili  0.2  /  DBili  x   /  AST  14  /  ALT  7   /  AlkPhos  66  /  GGT  x     05-18-23 @ 04:05    PT/INR - ( 05-16-23 @ 15:50 )   PT: 13.80 sec<H>;   INR: 1.20 ratio  PTT - ( 05-17-23 @ 09:21 )  PTT:51.4 sec                Blood Gas Arterial, Lactate: 0.90 mmol/L (05-16-23 @ 17:57)      MICRO / IMAGING / CARDIOLOGY  Telemetry: Reviewed   EKG: Reviewed    CULTURES    IMAGING    CARDIOLOGY   SUBJECTIVE / OVERNIGHT EVENTS  Patient was tachycardic, in Afib w rvr this AM, BP low this morning     MEDICATIONS  aMIOdarone    Tablet 200 milliGRAM(s) Oral every 12 hours  aMIOdarone Infusion 0.501 mG/Min IV Continuous <Continuous>  aspirin enteric coated 81 milliGRAM(s) Oral daily  atorvastatin 20 milliGRAM(s) Oral at bedtime  chlorhexidine 2% Cloths 1 Application(s) Topical <User Schedule>  folic acid 1 milliGRAM(s) Oral daily  furosemide   Injectable 40 milliGRAM(s) IV Push once  metoprolol tartrate 25 milliGRAM(s) Oral two times a day  norepinephrine Infusion 0.05 MICROgram(s)/kG/Min IV Continuous <Continuous>  pantoprazole    Tablet 40 milliGRAM(s) Oral before breakfast  potassium chloride  20 mEq/100 mL IVPB 20 milliEquivalent(s) IV Intermittent every 2 hours  rivaroxaban 20 milliGRAM(s) Oral with dinner  tamsulosin 0.4 milliGRAM(s) Oral at bedtime  torsemide 20 milliGRAM(s) Oral daily      VITALS /  EXAM    T(C): 36.8 (05-18-23 @ 08:00), Max: 36.8 (05-18-23 @ 08:00)  HR: 95 (05-18-23 @ 10:00) (76 - 137)  RR: 21 (05-18-23 @ 10:00) (10 - 33)  SpO2: 100% (05-18-23 @ 10:00) (96% - 100%)    GENERAL: NAD, well-developed  CHEST/LUNG: Clear to auscultation bilaterally; No wheezes, rales or rhonchi  HEART: Irregular rate and rhythm; No murmurs, rubs, or gallops  ABDOMEN: Soft, Nontender, Nondistended; Bowel sounds present, no masses.  EXTREMITIES:  + Peripheral Pulses, No clubbing, cyanosis, or edema    I's & O's     05-17-23 @ 07:01  -  05-18-23 @ 07:00  --------------------------------------------------------  IN:    Amiodarone: 66.8 mL    Amiodarone: 116.9 mL    Heparin Infusion: 94 mL    IV PiggyBack: 100 mL    Norepinephrine: 59.6 mL    Oral Fluid: 750 mL  Total IN: 1187.3 mL    OUT:    Voided (mL): 4850 mL  Total OUT: 4850 mL    Total NET: -3662.7 mL      05-18-23 @ 07:01  -  05-18-23 @ 11:06  --------------------------------------------------------  IN:    IV PiggyBack: 100 mL  Total IN: 100 mL    OUT:    Voided (mL): 1700 mL  Total OUT: 1700 mL    Total NET: -1600 mL        LABS             8.5    6.95  )-----------( 182      ( 05-18-23 @ 04:05 )             28.6     141  |  105  |  12  -------------------------<  117   05-18-23 @ 04:05  3.0  |  27  |  0.8    Ca      8.2     05-18-23 @ 04:05  Phos   2.5     05-18-23 @ 04:05  Mg     1.5     05-18-23 @ 04:05    TPro  5.9  /  Alb  3.1  /  TBili  0.2  /  DBili  x   /  AST  14  /  ALT  7   /  AlkPhos  66  /  GGT  x     05-18-23 @ 04:05    PT/INR - ( 05-16-23 @ 15:50 )   PT: 13.80 sec<H>;   INR: 1.20 ratio  PTT - ( 05-17-23 @ 09:21 )  PTT:51.4 sec                Blood Gas Arterial, Lactate: 0.90 mmol/L (05-16-23 @ 17:57)      MICRO / IMAGING / CARDIOLOGY  Telemetry: Reviewed   EKG: Reviewed    CULTURES    IMAGING    CARDIOLOGY

## 2023-05-19 LAB
ALBUMIN SERPL ELPH-MCNC: 3.3 G/DL — LOW (ref 3.5–5.2)
ALP SERPL-CCNC: 65 U/L — SIGNIFICANT CHANGE UP (ref 30–115)
ALT FLD-CCNC: 8 U/L — SIGNIFICANT CHANGE UP (ref 0–41)
ANION GAP SERPL CALC-SCNC: 6 MMOL/L — LOW (ref 7–14)
AST SERPL-CCNC: 16 U/L — SIGNIFICANT CHANGE UP (ref 0–41)
BASE EXCESS BLDA CALC-SCNC: 8.2 MMOL/L — HIGH (ref -2–3)
BASOPHILS # BLD AUTO: 0.04 K/UL — SIGNIFICANT CHANGE UP (ref 0–0.2)
BASOPHILS NFR BLD AUTO: 0.5 % — SIGNIFICANT CHANGE UP (ref 0–1)
BILIRUB SERPL-MCNC: 0.2 MG/DL — SIGNIFICANT CHANGE UP (ref 0.2–1.2)
BUN SERPL-MCNC: 16 MG/DL — SIGNIFICANT CHANGE UP (ref 10–20)
CALCIUM SERPL-MCNC: 8.6 MG/DL — SIGNIFICANT CHANGE UP (ref 8.4–10.4)
CHLORIDE SERPL-SCNC: 103 MMOL/L — SIGNIFICANT CHANGE UP (ref 98–110)
CO2 SERPL-SCNC: 28 MMOL/L — SIGNIFICANT CHANGE UP (ref 17–32)
CREAT SERPL-MCNC: 0.9 MG/DL — SIGNIFICANT CHANGE UP (ref 0.7–1.5)
EGFR: 85 ML/MIN/1.73M2 — SIGNIFICANT CHANGE UP
EOSINOPHIL # BLD AUTO: 0.36 K/UL — SIGNIFICANT CHANGE UP (ref 0–0.7)
EOSINOPHIL NFR BLD AUTO: 4.4 % — SIGNIFICANT CHANGE UP (ref 0–8)
GAS PNL BLDA: SIGNIFICANT CHANGE UP
GLUCOSE SERPL-MCNC: 150 MG/DL — HIGH (ref 70–99)
HCO3 BLDA-SCNC: 32 MMOL/L — HIGH (ref 21–28)
HCT VFR BLD CALC: 30.2 % — LOW (ref 42–52)
HGB BLD-MCNC: 9.1 G/DL — LOW (ref 14–18)
HOROWITZ INDEX BLDA+IHG-RTO: 21 — SIGNIFICANT CHANGE UP
IMM GRANULOCYTES NFR BLD AUTO: 0.4 % — HIGH (ref 0.1–0.3)
LYMPHOCYTES # BLD AUTO: 1.39 K/UL — SIGNIFICANT CHANGE UP (ref 1.2–3.4)
LYMPHOCYTES # BLD AUTO: 17 % — LOW (ref 20.5–51.1)
MAGNESIUM SERPL-MCNC: 2 MG/DL — SIGNIFICANT CHANGE UP (ref 1.8–2.4)
MCHC RBC-ENTMCNC: 24.1 PG — LOW (ref 27–31)
MCHC RBC-ENTMCNC: 30.1 G/DL — LOW (ref 32–37)
MCV RBC AUTO: 79.9 FL — LOW (ref 80–94)
MONOCYTES # BLD AUTO: 0.92 K/UL — HIGH (ref 0.1–0.6)
MONOCYTES NFR BLD AUTO: 11.2 % — HIGH (ref 1.7–9.3)
NEUTROPHILS # BLD AUTO: 5.45 K/UL — SIGNIFICANT CHANGE UP (ref 1.4–6.5)
NEUTROPHILS NFR BLD AUTO: 66.5 % — SIGNIFICANT CHANGE UP (ref 42.2–75.2)
NRBC # BLD: 0 /100 WBCS — SIGNIFICANT CHANGE UP (ref 0–0)
PCO2 BLDA: 40 MMHG — SIGNIFICANT CHANGE UP (ref 35–48)
PH BLDA: 7.51 — HIGH (ref 7.35–7.45)
PHOSPHATE SERPL-MCNC: 2.9 MG/DL — SIGNIFICANT CHANGE UP (ref 2.1–4.9)
PLATELET # BLD AUTO: 199 K/UL — SIGNIFICANT CHANGE UP (ref 130–400)
PMV BLD: 11.6 FL — HIGH (ref 7.4–10.4)
PO2 BLDA: 93 MMHG — SIGNIFICANT CHANGE UP (ref 83–108)
POTASSIUM SERPL-MCNC: 3.6 MMOL/L — SIGNIFICANT CHANGE UP (ref 3.5–5)
POTASSIUM SERPL-SCNC: 3.6 MMOL/L — SIGNIFICANT CHANGE UP (ref 3.5–5)
PROT SERPL-MCNC: 6.3 G/DL — SIGNIFICANT CHANGE UP (ref 6–8)
RBC # BLD: 3.78 M/UL — LOW (ref 4.7–6.1)
RBC # FLD: 17.1 % — HIGH (ref 11.5–14.5)
SAO2 % BLDA: 99.3 % — HIGH (ref 94–98)
SODIUM SERPL-SCNC: 137 MMOL/L — SIGNIFICANT CHANGE UP (ref 135–146)
WBC # BLD: 8.19 K/UL — SIGNIFICANT CHANGE UP (ref 4.8–10.8)
WBC # FLD AUTO: 8.19 K/UL — SIGNIFICANT CHANGE UP (ref 4.8–10.8)

## 2023-05-19 PROCEDURE — 93010 ELECTROCARDIOGRAM REPORT: CPT

## 2023-05-19 PROCEDURE — 71045 X-RAY EXAM CHEST 1 VIEW: CPT | Mod: 26

## 2023-05-19 PROCEDURE — 99233 SBSQ HOSP IP/OBS HIGH 50: CPT

## 2023-05-19 RX ORDER — METOPROLOL TARTRATE 50 MG
25 TABLET ORAL EVERY 8 HOURS
Refills: 0 | Status: DISCONTINUED | OUTPATIENT
Start: 2023-05-19 | End: 2023-05-20

## 2023-05-19 RX ORDER — MIDODRINE HYDROCHLORIDE 2.5 MG/1
5 TABLET ORAL THREE TIMES A DAY
Refills: 0 | Status: DISCONTINUED | OUTPATIENT
Start: 2023-05-19 | End: 2023-05-21

## 2023-05-19 RX ORDER — METOPROLOL TARTRATE 50 MG
12.5 TABLET ORAL ONCE
Refills: 0 | Status: COMPLETED | OUTPATIENT
Start: 2023-05-19 | End: 2023-05-19

## 2023-05-19 RX ADMIN — AMIODARONE HYDROCHLORIDE 400 MILLIGRAM(S): 400 TABLET ORAL at 05:02

## 2023-05-19 RX ADMIN — AMIODARONE HYDROCHLORIDE 400 MILLIGRAM(S): 400 TABLET ORAL at 17:06

## 2023-05-19 RX ADMIN — Medication 25 MILLIGRAM(S): at 21:11

## 2023-05-19 RX ADMIN — Medication 81 MILLIGRAM(S): at 11:06

## 2023-05-19 RX ADMIN — Medication 12.5 MILLIGRAM(S): at 05:02

## 2023-05-19 RX ADMIN — Medication 12.5 MILLIGRAM(S): at 10:26

## 2023-05-19 RX ADMIN — Medication 20 MILLIGRAM(S): at 05:02

## 2023-05-19 RX ADMIN — MIDODRINE HYDROCHLORIDE 5 MILLIGRAM(S): 2.5 TABLET ORAL at 17:32

## 2023-05-19 RX ADMIN — TAMSULOSIN HYDROCHLORIDE 0.4 MILLIGRAM(S): 0.4 CAPSULE ORAL at 21:09

## 2023-05-19 RX ADMIN — CHLORHEXIDINE GLUCONATE 1 APPLICATION(S): 213 SOLUTION TOPICAL at 05:03

## 2023-05-19 RX ADMIN — ATORVASTATIN CALCIUM 20 MILLIGRAM(S): 80 TABLET, FILM COATED ORAL at 21:09

## 2023-05-19 RX ADMIN — RIVAROXABAN 20 MILLIGRAM(S): KIT at 17:05

## 2023-05-19 RX ADMIN — PANTOPRAZOLE SODIUM 40 MILLIGRAM(S): 20 TABLET, DELAYED RELEASE ORAL at 05:03

## 2023-05-19 RX ADMIN — Medication 1 MILLIGRAM(S): at 11:06

## 2023-05-19 NOTE — PROGRESS NOTE ADULT - SUBJECTIVE AND OBJECTIVE BOX
SUBJECTIVE / OVERNIGHT EVENTS  Patient slept well overnight. No acute complaints this AM. Patient does not report fevers, chills, CP, SOB, or n/v/d. HR an BP this am     MEDICATIONS  aMIOdarone    Tablet 400 milliGRAM(s) Oral every 12 hours  aspirin enteric coated 81 milliGRAM(s) Oral daily  atorvastatin 20 milliGRAM(s) Oral at bedtime  chlorhexidine 2% Cloths 1 Application(s) Topical <User Schedule>  folic acid 1 milliGRAM(s) Oral daily  metoprolol tartrate 25 milliGRAM(s) Oral every 8 hours  norepinephrine Infusion 0.05 MICROgram(s)/kG/Min IV Continuous <Continuous>  pantoprazole    Tablet 40 milliGRAM(s) Oral before breakfast  rivaroxaban 20 milliGRAM(s) Oral with dinner  tamsulosin 0.4 milliGRAM(s) Oral at bedtime  torsemide 20 milliGRAM(s) Oral daily      VITALS /  EXAM    T(C): 36.8 (05-19-23 @ 08:00), Max: 36.8 (05-19-23 @ 06:00)  HR: 122 (05-19-23 @ 10:00) (88 - 122)  BP: 85/59 (05-19-23 @ 10:00) (67/51 - 110/64)  RR: 36 (05-19-23 @ 10:00) (17 - 60)  SpO2: 99% (05-19-23 @ 10:00) (96% - 100%)    GENERAL: NAD, well-developed  CHEST/LUNG: Clear to auscultation bilaterally; No wheezes, rales or rhonchi  HEART: Regular rate and rhythm; No murmurs, rubs, or gallops  ABDOMEN: Soft, Nontender, Nondistended; Bowel sounds present, no masses.  EXTREMITIES:  2+ Peripheral Pulses, No clubbing, cyanosis, or edema    I's & O's     05-18-23 @ 07:01  -  05-19-23 @ 07:00  --------------------------------------------------------  IN:    IV PiggyBack: 300 mL    Oral Fluid: 600 mL  Total IN: 900 mL    OUT:    Voided (mL): 3225 mL  Total OUT: 3225 mL    Total NET: -2325 mL      05-19-23 @ 07:01  -  05-19-23 @ 10:56  --------------------------------------------------------  IN:    Oral Fluid: 240 mL  Total IN: 240 mL    OUT:    Voided (mL): 700 mL  Total OUT: 700 mL    Total NET: -460 mL        LABS             9.1    8.19  )-----------( 199      ( 05-19-23 @ 04:30 )             30.2     137  |  103  |  16  -------------------------<  150   05-19-23 @ 04:30  3.6  |  28  |  0.9    Ca      8.6     05-19-23 @ 04:30  Phos   2.9     05-19-23 @ 04:30  Mg     2.0     05-19-23 @ 04:30    TPro  6.3  /  Alb  3.3  /  TBili  0.2  /  DBili  x   /  AST  16  /  ALT  8   /  AlkPhos  65  /  GGT  x     05-19-23 @ 04:30                  Blood Gas Arterial, Lactate: 1.10 mmol/L (05-19-23 @ 04:09)  Lactate, Blood: 0.8 mmol/L (05-18-23 @ 17:19)  Blood Gas Arterial, Lactate: 0.90 mmol/L (05-16-23 @ 17:57)      MICRO / IMAGING / CARDIOLOGY  Telemetry: Reviewed   EKG: Reviewed    CULTURES    IMAGING  PACS Image:  (05-19-23 @ 06:03)  PACS Image:  (05-18-23 @ 06:22)    CARDIOLOGY   SUBJECTIVE / OVERNIGHT EVENTS  Patient slept well overnight. No acute complaints this AM. Patient does not report fevers, chills, CP, SOB, or n/v/d. HR an BP this am     MEDICATIONS  aMIOdarone    Tablet 400 milliGRAM(s) Oral every 12 hours  aspirin enteric coated 81 milliGRAM(s) Oral daily  atorvastatin 20 milliGRAM(s) Oral at bedtime  chlorhexidine 2% Cloths 1 Application(s) Topical <User Schedule>  folic acid 1 milliGRAM(s) Oral daily  metoprolol tartrate 25 milliGRAM(s) Oral every 8 hours  norepinephrine Infusion 0.05 MICROgram(s)/kG/Min IV Continuous <Continuous>  pantoprazole    Tablet 40 milliGRAM(s) Oral before breakfast  rivaroxaban 20 milliGRAM(s) Oral with dinner  tamsulosin 0.4 milliGRAM(s) Oral at bedtime  torsemide 20 milliGRAM(s) Oral daily      VITALS /  EXAM    T(C): 36.8 (05-19-23 @ 08:00), Max: 36.8 (05-19-23 @ 06:00)  HR: 122 (05-19-23 @ 10:00) (88 - 122)  BP: 85/59 (05-19-23 @ 10:00) (67/51 - 110/64)  RR: 36 (05-19-23 @ 10:00) (17 - 60)  SpO2: 99% (05-19-23 @ 10:00) (96% - 100%)    GENERAL: NAD, well-developed  CHEST/LUNG: Clear to auscultation bilaterally; No wheezes, rales or rhonchi  HEART: tachycardic Irregular RR   ABDOMEN: Soft, Nontender, Nondistended; Bowel sounds present, no masses.  EXTREMITIES:  2+ Peripheral Pulses, No clubbing, cyanosis, or edema    I's & O's     05-18-23 @ 07:01  -  05-19-23 @ 07:00  --------------------------------------------------------  IN:    IV PiggyBack: 300 mL    Oral Fluid: 600 mL  Total IN: 900 mL    OUT:    Voided (mL): 3225 mL  Total OUT: 3225 mL    Total NET: -2325 mL      05-19-23 @ 07:01  -  05-19-23 @ 10:56  --------------------------------------------------------  IN:    Oral Fluid: 240 mL  Total IN: 240 mL    OUT:    Voided (mL): 700 mL  Total OUT: 700 mL    Total NET: -460 mL        LABS             9.1    8.19  )-----------( 199      ( 05-19-23 @ 04:30 )             30.2     137  |  103  |  16  -------------------------<  150   05-19-23 @ 04:30  3.6  |  28  |  0.9    Ca      8.6     05-19-23 @ 04:30  Phos   2.9     05-19-23 @ 04:30  Mg     2.0     05-19-23 @ 04:30    TPro  6.3  /  Alb  3.3  /  TBili  0.2  /  DBili  x   /  AST  16  /  ALT  8   /  AlkPhos  65  /  GGT  x     05-19-23 @ 04:30                  Blood Gas Arterial, Lactate: 1.10 mmol/L (05-19-23 @ 04:09)  Lactate, Blood: 0.8 mmol/L (05-18-23 @ 17:19)  Blood Gas Arterial, Lactate: 0.90 mmol/L (05-16-23 @ 17:57)      MICRO / IMAGING / CARDIOLOGY  Telemetry: Reviewed   EKG: Reviewed    CULTURES    IMAGING  PACS Image:  (05-19-23 @ 06:03)  PACS Image:  (05-18-23 @ 06:22)    CARDIOLOGY

## 2023-05-19 NOTE — CDI QUERY NOTE - NSCDIOTHERTXTBX_GEN_ALL_CORE_HH
Based on the following clinical indicators, and your professional judgment, can the systolic CHF be further specified?    -acute on chronic systolic CHF  -chronic systolic CHF  -other, please specify:  -clinically unable to further specify systolic CHF    CLINICAL INDICATORS:    5/16 Cardiac Rhythm Management: Procedures Performed: Primary Procedures: Left Atrial Appendage Occlusion; Secondary Procedure: Direct Current Cardioversion   Conclusions: Successful deployment of the Watchman FLX Device 24 mm in the left atrial appendage.    5/18 Progress Note Adult-CCU Resident/Attending-  Attestation Statements: In A-fib again today, increasing BB and Amiodarone for rate control. Appeared overloaded in the morning. Furosemide 40 mg IVP 1 dose given. Continue cardiac monitoring. A-fib with RVR, systolic CHF, CAD.    CXR:  5/18-Mild pulmonary vascular congestion. No parenchymal opacity pleural effusion or air leak  5/19- Unchanged mild pulmonary vascular congestion.    furosemide   Injectable 40 milliGRAM(s) IV Push once, admin 5/18  torsemide 20 milliGRAM(s) Oral daily, admin 5/16-19 x 3 doses    Thank you,  Adina Sawant, RN  [phone]

## 2023-05-19 NOTE — PROGRESS NOTE ADULT - ASSESSMENT
an 82 yo male [facility] Hypertension, hyperlipidemia, paroxysmal atrial fibrillation (04/2021), CAD s/p PCI to pLAD, mitral regurgitation s/p mitral clip, coronary artery disease s/p PCI to proximal LAD, cardiomyopathy, and recurrent GI bleed.  MR s/p Mitral clip,  Presents for elective Watchman device.  Patient underwent cardioversion for Afib w RVR on amiodarone drip.    IMPRESSION  A Fib with RVR s/p failed Cardioversion  Recurrent GIB s/p Watchman Device implant 5/16  MR s/p mitral clip, no again MR due to LV dysfunction EF 40%  CAD s/p PCI to pLAD  HTN, HLD      PLAN:    CNS:   -no depressants.     HEENT:   -Oral care    PULMONARY:    -HOB @ 45 degrees    CARDIOVASCULAR:   -cw Amio to 400mg BID  -c/w Xarelto 20mg qd  -c/w aspirin 81mg qd  -c/w lipitor 20mg qhs  -c/w Torsemide 20mg qd,  -restart lopressor 25 mg q8 with holding parameters, d/c if hypotensive  - Lactate wnl   - HR and BP better controlled, might have to shock if HD unstable, DCCV should be avoided if possible until 30 days post watchman   - restart levo if becomes hypotensive   - IVC collapsable and < 2 cm     GI:   -GI prophylaxis.    -oral feeds    RENAL:    -Follow up lytes.  Correct as needed.     INFECTIOUS DISEASE:   -Follow up cultures: blood and urine.   -CHG 4% daily bath    HEMATOLOGICAL:    -DVT prophylaxis.    ENDOCRINE:    -Follow up FS.  -insulin sliding scale if needed    MUSCULOSKELETAL:   -increase ambulation as tolerated    CCU monitoring an 82 yo male K[facility] Hypertension, hyperlipidemia, paroxysmal atrial fibrillation (04/2021), CAD s/p PCI to pLAD, mitral regurgitation s/p mitral clip, coronary artery disease s/p PCI to proximal LAD, cardiomyopathy, and recurrent GI bleed.  MR s/p Mitral clip,  Presents for elective Watchman device.  Patient underwent cardioversion for Afib w RVR on amiodarone drip.    IMPRESSION  A Fib with RVR s/p Cardioversion 5/16 now back in A Fib  Recurrent GIB s/p Watchman Device implant 5/16  MR s/p mitral clip, no again MR due to LV dysfunction EF 40%  CAD s/p PCI to pLAD  HTN, HLD      PLAN:    CNS:   -no depressants.     HEENT:   -Oral care    PULMONARY:    -HOB @ 45 degrees    CARDIOVASCULAR:   -cw Amio to 400mg BID  -c/w Xarelto 20mg qd  -c/w aspirin 81mg qd  -c/w lipitor 20mg qhs  -c/w Torsemide 20mg qd,  -restart lopressor 25 mg q8 with holding parameters   - Lactate wnl   - HR and BP better controlled, might have to shock if HD unstable, DCCV should be avoided if possible until 30 days post watchman   - IVC collapsable and < 2 cm     GI:   -GI prophylaxis.    -oral feeds    RENAL:    -Follow up lytes.  Correct as needed.     INFECTIOUS DISEASE:   -Follow up cultures: blood and urine.   -CHG 4% daily bath    HEMATOLOGICAL:    -DVT prophylaxis.    ENDOCRINE:    -Follow up FS.  -insulin sliding scale if needed    MUSCULOSKELETAL:   -increase ambulation as tolerated    CCU monitoring

## 2023-05-20 LAB
ALBUMIN SERPL ELPH-MCNC: 3.2 G/DL — LOW (ref 3.5–5.2)
ALP SERPL-CCNC: 64 U/L — SIGNIFICANT CHANGE UP (ref 30–115)
ALT FLD-CCNC: 9 U/L — SIGNIFICANT CHANGE UP (ref 0–41)
ANION GAP SERPL CALC-SCNC: 8 MMOL/L — SIGNIFICANT CHANGE UP (ref 7–14)
AST SERPL-CCNC: 14 U/L — SIGNIFICANT CHANGE UP (ref 0–41)
BASE EXCESS BLDA CALC-SCNC: 6.4 MMOL/L — HIGH (ref -2–3)
BASOPHILS # BLD AUTO: 0.04 K/UL — SIGNIFICANT CHANGE UP (ref 0–0.2)
BASOPHILS NFR BLD AUTO: 0.5 % — SIGNIFICANT CHANGE UP (ref 0–1)
BILIRUB SERPL-MCNC: 0.3 MG/DL — SIGNIFICANT CHANGE UP (ref 0.2–1.2)
BUN SERPL-MCNC: 20 MG/DL — SIGNIFICANT CHANGE UP (ref 10–20)
CALCIUM SERPL-MCNC: 8.6 MG/DL — SIGNIFICANT CHANGE UP (ref 8.4–10.4)
CHLORIDE SERPL-SCNC: 101 MMOL/L — SIGNIFICANT CHANGE UP (ref 98–110)
CO2 SERPL-SCNC: 27 MMOL/L — SIGNIFICANT CHANGE UP (ref 17–32)
CREAT SERPL-MCNC: 0.9 MG/DL — SIGNIFICANT CHANGE UP (ref 0.7–1.5)
EGFR: 85 ML/MIN/1.73M2 — SIGNIFICANT CHANGE UP
EOSINOPHIL # BLD AUTO: 0.25 K/UL — SIGNIFICANT CHANGE UP (ref 0–0.7)
EOSINOPHIL NFR BLD AUTO: 3 % — SIGNIFICANT CHANGE UP (ref 0–8)
GAS PNL BLDA: SIGNIFICANT CHANGE UP
GLUCOSE SERPL-MCNC: 114 MG/DL — HIGH (ref 70–99)
HCO3 BLDA-SCNC: 30 MMOL/L — HIGH (ref 21–28)
HCT VFR BLD CALC: 28.3 % — LOW (ref 42–52)
HGB BLD-MCNC: 8.6 G/DL — LOW (ref 14–18)
HOROWITZ INDEX BLDA+IHG-RTO: 21 — SIGNIFICANT CHANGE UP
IMM GRANULOCYTES NFR BLD AUTO: 0.5 % — HIGH (ref 0.1–0.3)
LYMPHOCYTES # BLD AUTO: 1.33 K/UL — SIGNIFICANT CHANGE UP (ref 1.2–3.4)
LYMPHOCYTES # BLD AUTO: 15.9 % — LOW (ref 20.5–51.1)
MAGNESIUM SERPL-MCNC: 1.9 MG/DL — SIGNIFICANT CHANGE UP (ref 1.8–2.4)
MCHC RBC-ENTMCNC: 23.9 PG — LOW (ref 27–31)
MCHC RBC-ENTMCNC: 30.4 G/DL — LOW (ref 32–37)
MCV RBC AUTO: 78.6 FL — LOW (ref 80–94)
MONOCYTES # BLD AUTO: 0.89 K/UL — HIGH (ref 0.1–0.6)
MONOCYTES NFR BLD AUTO: 10.6 % — HIGH (ref 1.7–9.3)
NEUTROPHILS # BLD AUTO: 5.84 K/UL — SIGNIFICANT CHANGE UP (ref 1.4–6.5)
NEUTROPHILS NFR BLD AUTO: 69.5 % — SIGNIFICANT CHANGE UP (ref 42.2–75.2)
NRBC # BLD: 0 /100 WBCS — SIGNIFICANT CHANGE UP (ref 0–0)
PCO2 BLDA: 41 MMHG — SIGNIFICANT CHANGE UP (ref 35–48)
PH BLDA: 7.48 — HIGH (ref 7.35–7.45)
PHOSPHATE SERPL-MCNC: 3.3 MG/DL — SIGNIFICANT CHANGE UP (ref 2.1–4.9)
PLATELET # BLD AUTO: 213 K/UL — SIGNIFICANT CHANGE UP (ref 130–400)
PMV BLD: 11.9 FL — HIGH (ref 7.4–10.4)
PO2 BLDA: 92 MMHG — SIGNIFICANT CHANGE UP (ref 83–108)
POTASSIUM SERPL-MCNC: 3.7 MMOL/L — SIGNIFICANT CHANGE UP (ref 3.5–5)
POTASSIUM SERPL-SCNC: 3.7 MMOL/L — SIGNIFICANT CHANGE UP (ref 3.5–5)
PROT SERPL-MCNC: 6.3 G/DL — SIGNIFICANT CHANGE UP (ref 6–8)
RBC # BLD: 3.6 M/UL — LOW (ref 4.7–6.1)
RBC # FLD: 17 % — HIGH (ref 11.5–14.5)
SAO2 % BLDA: 99.4 % — HIGH (ref 94–98)
SODIUM SERPL-SCNC: 136 MMOL/L — SIGNIFICANT CHANGE UP (ref 135–146)
WBC # BLD: 8.39 K/UL — SIGNIFICANT CHANGE UP (ref 4.8–10.8)
WBC # FLD AUTO: 8.39 K/UL — SIGNIFICANT CHANGE UP (ref 4.8–10.8)

## 2023-05-20 PROCEDURE — 99233 SBSQ HOSP IP/OBS HIGH 50: CPT

## 2023-05-20 PROCEDURE — 93010 ELECTROCARDIOGRAM REPORT: CPT

## 2023-05-20 PROCEDURE — 71045 X-RAY EXAM CHEST 1 VIEW: CPT | Mod: 26

## 2023-05-20 RX ORDER — METOPROLOL TARTRATE 50 MG
25 TABLET ORAL EVERY 6 HOURS
Refills: 0 | Status: DISCONTINUED | OUTPATIENT
Start: 2023-05-20 | End: 2023-05-21

## 2023-05-20 RX ADMIN — PANTOPRAZOLE SODIUM 40 MILLIGRAM(S): 20 TABLET, DELAYED RELEASE ORAL at 06:01

## 2023-05-20 RX ADMIN — Medication 81 MILLIGRAM(S): at 12:02

## 2023-05-20 RX ADMIN — RIVAROXABAN 20 MILLIGRAM(S): KIT at 17:44

## 2023-05-20 RX ADMIN — AMIODARONE HYDROCHLORIDE 400 MILLIGRAM(S): 400 TABLET ORAL at 05:02

## 2023-05-20 RX ADMIN — Medication 1 MILLIGRAM(S): at 12:02

## 2023-05-20 RX ADMIN — CHLORHEXIDINE GLUCONATE 1 APPLICATION(S): 213 SOLUTION TOPICAL at 05:04

## 2023-05-20 RX ADMIN — TAMSULOSIN HYDROCHLORIDE 0.4 MILLIGRAM(S): 0.4 CAPSULE ORAL at 21:51

## 2023-05-20 RX ADMIN — ATORVASTATIN CALCIUM 20 MILLIGRAM(S): 80 TABLET, FILM COATED ORAL at 21:52

## 2023-05-20 RX ADMIN — Medication 25 MILLIGRAM(S): at 17:44

## 2023-05-20 RX ADMIN — MIDODRINE HYDROCHLORIDE 5 MILLIGRAM(S): 2.5 TABLET ORAL at 12:02

## 2023-05-20 RX ADMIN — Medication 25 MILLIGRAM(S): at 12:01

## 2023-05-20 RX ADMIN — AMIODARONE HYDROCHLORIDE 400 MILLIGRAM(S): 400 TABLET ORAL at 17:44

## 2023-05-20 RX ADMIN — Medication 25 MILLIGRAM(S): at 05:02

## 2023-05-20 RX ADMIN — Medication 20 MILLIGRAM(S): at 05:02

## 2023-05-20 RX ADMIN — MIDODRINE HYDROCHLORIDE 5 MILLIGRAM(S): 2.5 TABLET ORAL at 05:02

## 2023-05-20 NOTE — PROGRESS NOTE ADULT - SUBJECTIVE AND OBJECTIVE BOX
INTERVAL HPI/OVERNIGHT EVENTS:  Patient converted to NSR  HD stable, off pressors  S/P Watchman implant POD#4, feeling well    MEDICATIONS  (STANDING):  aMIOdarone    Tablet 400 milliGRAM(s) Oral every 12 hours  aspirin enteric coated 81 milliGRAM(s) Oral daily  atorvastatin 20 milliGRAM(s) Oral at bedtime  chlorhexidine 2% Cloths 1 Application(s) Topical <User Schedule>  folic acid 1 milliGRAM(s) Oral daily  metoprolol tartrate 25 milliGRAM(s) Oral every 8 hours  midodrine. 5 milliGRAM(s) Oral three times a day  pantoprazole    Tablet 40 milliGRAM(s) Oral before breakfast  rivaroxaban 20 milliGRAM(s) Oral with dinner  tamsulosin 0.4 milliGRAM(s) Oral at bedtime  torsemide 20 milliGRAM(s) Oral daily    MEDICATIONS  (PRN):      Allergies    No Known Allergies    Intolerances        REVIEW OF SYSTEMS: No CP, palpitations, dizziness or SOB    Vital Signs Last 24 Hrs  T(C): 36.6 (20 May 2023 08:00), Max: 37 (19 May 2023 13:00)  T(F): 97.9 (20 May 2023 08:00), Max: 98.6 (19 May 2023 13:00)  HR: 77 (20 May 2023 08:00) (77 - 130)  BP: 84/53 (20 May 2023 01:00) (78/59 - 120/64)  BP(mean): 63 (20 May 2023 01:00) (60 - 79)  RR: 20 (20 May 2023 08:00) (17 - 36)  SpO2: 99% (20 May 2023 08:00) (97% - 100%)    Parameters below as of 20 May 2023 08:00  Patient On (Oxygen Delivery Method): room air        05-19-23 @ 07:01  -  05-20-23 @ 07:00  --------------------------------------------------------  IN: 740 mL / OUT: 2850 mL / NET: -2110 mL        Physical Exam  GENERAL: In no apparent distress, well nourished, and hydrated.  EYES: EOMI, PERRLA, conjunctiva and sclera clear  NECK: Supple  HEART: Regular rate and rhythm; No murmurs, rubs, or gallops.  PULMONARY: Clear to auscultation and perfusion.  No rales, wheezing, or rhonchi bilaterally.  EXTREMITIES:  2+ Peripheral Pulses, No clubbing, cyanosis, or edema  NEUROLOGICAL: Grossly nonfocal    LABS:                        8.6    8.39  )-----------( 213      ( 20 May 2023 04:50 )             28.3     05-20    136  |  101  |  20  ----------------------------<  114<H>  3.7   |  27  |  0.9    Ca    8.6      20 May 2023 04:50  Phos  3.3     05-20  Mg     1.9     05-20    TPro  6.3  /  Alb  3.2<L>  /  TBili  0.3  /  DBili  x   /  AST  14  /  ALT  9   /  AlkPhos  64  05-20          RADIOLOGY & ADDITIONAL TESTS:

## 2023-05-20 NOTE — PROGRESS NOTE ADULT - ASSESSMENT
Cards: Dr Judd  EP: Dr Pacheco    82 yo male with hx of Hypertension, hyperlipidemia, paroxysmal atrial fibrillation (04/2021), CAD s/p PCI to pLAD, mitral regurgitation s/p mitral clip, coronary artery disease s/p PCI to proximal LAD, cardiomyopathy, and recurrent GI bleed.  MR s/p Mitral clip,  Presents for elective Watchman device.  Patient underwent cardioversion for Afib w RVR prior to Watchman implant and was started on amiodarone drip, now on PO. Hypotensive postop and started on Levo, now off pressors still mildly hypotensive    Impression:  AF sp Watchman  Recurrent GIB  MR s/p mitral clip, no again MR due to LV dysfunction EF 40%  CAD s/p PCI to pLAD  HTN, HLD    Plan:  - Cont Amio 400mg daily  - Cont Xarelto 20mg daily  - ASA 81mg daily  - Cont tele monitoring while in hospital  - NO DCCV until 30 days post Watchman for concern of dislodging Device  - Rate control and treat medically  - Follow up with Dr Pacheco in one month

## 2023-05-20 NOTE — PROGRESS NOTE ADULT - NS ATTEND AMEND GEN_ALL_CORE FT
complex patient  s/p Watchman Implant  In sinus  Amiodarone 400 mg daily upon discharge  Outpatient [facility] will be planned 6/28/2023
complex patient   s/p WAtchman implant   no complications  in CCU for AF with RVR and hypotension requiring pressors  continue Amiodarone
complex patient  s/p Watchman implant - no complications  continue Amiodarone and Xarelto  back in AF  taper pressors

## 2023-05-20 NOTE — PROGRESS NOTE ADULT - ASSESSMENT
an 82 yo male K[facility] Hypertension, hyperlipidemia, paroxysmal atrial fibrillation (04/2021), CAD s/p PCI to pLAD, mitral regurgitation s/p mitral clip, coronary artery disease s/p PCI to proximal LAD, cardiomyopathy, and recurrent GI bleed.  MR s/p Mitral clip,  Presents for elective Watchman device.  Patient underwent cardioversion for Afib w RVR on amiodarone drip.    IMPRESSION  A Fib with RVR s/p Cardioversion 5/16 now back in A Fib  Recurrent GIB s/p Watchman Device implant 5/16  MR s/p mitral clip, no again MR due to LV dysfunction EF 40%  CAD s/p PCI to pLAD  HTN, HLD      PLAN:    CNS:   -no depressants.     HEENT:   -Oral care    PULMONARY:    -HOB @ 45 degrees    CARDIOVASCULAR:   -cw Amio 400mg BID  -c/w Xarelto 20mg qd  -c/w aspirin 81mg qd  -c/w lipitor 20mg qhs  -c/w Torsemide 20mg qd,  -restart lopressor 25 mg q6 with holding parameters   - Lactate wnl   - HR and BP better controlled, might have to shock if HD unstable, DCCV should be avoided if possible until 30 days post watchman   - IVC collapsable an 2.1 cm collapsable   - midodrine 5 TID to Hold BP and lopressor to control HR (midodrine decrease HR)     GI:   -GI prophylaxis.    -oral feeds    RENAL:    -Follow up lytes.  Correct as needed.     INFECTIOUS DISEASE:   -Follow up cultures: blood and urine.   -CHG 4% daily bath    HEMATOLOGICAL:    -DVT prophylaxis.    ENDOCRINE:    -Follow up FS.  -insulin sliding scale if needed    MUSCULOSKELETAL:   -increase ambulation as tolerated    CCU monitoring

## 2023-05-20 NOTE — PROGRESS NOTE ADULT - SUBJECTIVE AND OBJECTIVE BOX
SUBJECTIVE / OVERNIGHT EVENTS  no events     MEDICATIONS  aMIOdarone    Tablet 400 milliGRAM(s) Oral every 12 hours  aspirin enteric coated 81 milliGRAM(s) Oral daily  atorvastatin 20 milliGRAM(s) Oral at bedtime  chlorhexidine 2% Cloths 1 Application(s) Topical <User Schedule>  folic acid 1 milliGRAM(s) Oral daily  metoprolol tartrate 25 milliGRAM(s) Oral every 6 hours  midodrine. 5 milliGRAM(s) Oral three times a day  pantoprazole    Tablet 40 milliGRAM(s) Oral before breakfast  rivaroxaban 20 milliGRAM(s) Oral with dinner  tamsulosin 0.4 milliGRAM(s) Oral at bedtime  torsemide 20 milliGRAM(s) Oral daily      VITALS /  EXAM    T(C): 36.1 (05-20-23 @ 12:00), Max: 36.9 (05-20-23 @ 06:00)  HR: 76 (05-20-23 @ 14:00) (75 - 128)  BP: 84/53 (05-20-23 @ 01:00) (84/53 - 120/64)  RR: 20 (05-20-23 @ 14:00) (17 - 45)  SpO2: 100% (05-20-23 @ 14:00) (97% - 100%)    GENERAL: NAD, well-developed  CHEST/LUNG: Clear to auscultation bilaterally; No wheezes, rales or rhonchi  HEART: Regular rate and rhythm; No murmurs, rubs, or gallops  ABDOMEN: Soft, Nontender, Nondistended; Bowel sounds present, no masses.  EXTREMITIES:  2+ Peripheral Pulses, No clubbing, cyanosis, or edema    I's & O's     05-19-23 @ 07:01  -  05-20-23 @ 07:00  --------------------------------------------------------  IN:    Oral Fluid: 740 mL  Total IN: 740 mL    OUT:    Norepinephrine: 0 mL    Voided (mL): 2850 mL  Total OUT: 2850 mL    Total NET: -2110 mL      05-20-23 @ 07:01 - 05-20-23 @ 16:20  --------------------------------------------------------  IN:    Oral Fluid: 250 mL  Total IN: 250 mL    OUT:    Voided (mL): 1600 mL  Total OUT: 1600 mL    Total NET: -1350 mL        LABS             8.6    8.39  )-----------( 213      ( 05-20-23 @ 04:50 )             28.3     136  |  101  |  20  -------------------------<  114   05-20-23 @ 04:50  3.7  |  27  |  0.9    Ca      8.6     05-20-23 @ 04:50  Phos   3.3     05-20-23 @ 04:50  Mg     1.9     05-20-23 @ 04:50    TPro  6.3  /  Alb  3.2  /  TBili  0.3  /  DBili  x   /  AST  14  /  ALT  9   /  AlkPhos  64  /  GGT  x     05-20-23 @ 04:50                  Blood Gas Arterial, Lactate: 1.30 mmol/L (05-20-23 @ 04:11)  Blood Gas Arterial, Lactate: 1.10 mmol/L (05-19-23 @ 04:09)  Lactate, Blood: 0.8 mmol/L (05-18-23 @ 17:19)      MICRO / IMAGING / CARDIOLOGY  Telemetry: Reviewed   EKG: Reviewed    CULTURES    IMAGING  PACS Image:  (05-20-23 @ 05:37)  PACS Image:  (05-19-23 @ 06:03)    CARDIOLOGY   SUBJECTIVE / OVERNIGHT EVENTS  no events  overnight     MEDICATIONS  aMIOdarone    Tablet 400 milliGRAM(s) Oral every 12 hours  aspirin enteric coated 81 milliGRAM(s) Oral daily  atorvastatin 20 milliGRAM(s) Oral at bedtime  chlorhexidine 2% Cloths 1 Application(s) Topical <User Schedule>  folic acid 1 milliGRAM(s) Oral daily  metoprolol tartrate 25 milliGRAM(s) Oral every 6 hours  midodrine. 5 milliGRAM(s) Oral three times a day  pantoprazole    Tablet 40 milliGRAM(s) Oral before breakfast  rivaroxaban 20 milliGRAM(s) Oral with dinner  tamsulosin 0.4 milliGRAM(s) Oral at bedtime  torsemide 20 milliGRAM(s) Oral daily      VITALS /  EXAM    T(C): 36.1 (05-20-23 @ 12:00), Max: 36.9 (05-20-23 @ 06:00)  HR: 76 (05-20-23 @ 14:00) (75 - 128)  BP: 84/53 (05-20-23 @ 01:00) (84/53 - 120/64)  RR: 20 (05-20-23 @ 14:00) (17 - 45)  SpO2: 100% (05-20-23 @ 14:00) (97% - 100%)    GENERAL: NAD, well-developed  CHEST/LUNG: Clear to auscultation bilaterally; No wheezes, rales or rhonchi  HEART: Regular rate and rhythm; No murmurs, rubs, or gallops  ABDOMEN: Soft, Nontender, Nondistended; Bowel sounds present, no masses.  EXTREMITIES:  2+ Peripheral Pulses, No clubbing, cyanosis, or edema    I's & O's     05-19-23 @ 07:01  -  05-20-23 @ 07:00  --------------------------------------------------------  IN:    Oral Fluid: 740 mL  Total IN: 740 mL    OUT:    Norepinephrine: 0 mL    Voided (mL): 2850 mL  Total OUT: 2850 mL    Total NET: -2110 mL      05-20-23 @ 07:01 - 05-20-23 @ 16:20  --------------------------------------------------------  IN:    Oral Fluid: 250 mL  Total IN: 250 mL    OUT:    Voided (mL): 1600 mL  Total OUT: 1600 mL    Total NET: -1350 mL        LABS             8.6    8.39  )-----------( 213      ( 05-20-23 @ 04:50 )             28.3     136  |  101  |  20  -------------------------<  114   05-20-23 @ 04:50  3.7  |  27  |  0.9    Ca      8.6     05-20-23 @ 04:50  Phos   3.3     05-20-23 @ 04:50  Mg     1.9     05-20-23 @ 04:50    TPro  6.3  /  Alb  3.2  /  TBili  0.3  /  DBili  x   /  AST  14  /  ALT  9   /  AlkPhos  64  /  GGT  x     05-20-23 @ 04:50                  Blood Gas Arterial, Lactate: 1.30 mmol/L (05-20-23 @ 04:11)  Blood Gas Arterial, Lactate: 1.10 mmol/L (05-19-23 @ 04:09)  Lactate, Blood: 0.8 mmol/L (05-18-23 @ 17:19)      MICRO / IMAGING / CARDIOLOGY  Telemetry: Reviewed   EKG: Reviewed    CULTURES    IMAGING  PACS Image:  (05-20-23 @ 05:37)  PACS Image:  (05-19-23 @ 06:03)    CARDIOLOGY

## 2023-05-21 ENCOUNTER — TRANSCRIPTION ENCOUNTER (OUTPATIENT)
Age: 83
End: 2023-05-21

## 2023-05-21 VITALS
OXYGEN SATURATION: 98 % | RESPIRATION RATE: 18 BRPM | DIASTOLIC BLOOD PRESSURE: 64 MMHG | SYSTOLIC BLOOD PRESSURE: 105 MMHG | HEART RATE: 80 BPM

## 2023-05-21 LAB
ALBUMIN SERPL ELPH-MCNC: 3.5 G/DL — SIGNIFICANT CHANGE UP (ref 3.5–5.2)
ALP SERPL-CCNC: 66 U/L — SIGNIFICANT CHANGE UP (ref 30–115)
ALT FLD-CCNC: 9 U/L — SIGNIFICANT CHANGE UP (ref 0–41)
ANION GAP SERPL CALC-SCNC: 7 MMOL/L — SIGNIFICANT CHANGE UP (ref 7–14)
AST SERPL-CCNC: 14 U/L — SIGNIFICANT CHANGE UP (ref 0–41)
BASOPHILS # BLD AUTO: 0.05 K/UL — SIGNIFICANT CHANGE UP (ref 0–0.2)
BASOPHILS NFR BLD AUTO: 0.6 % — SIGNIFICANT CHANGE UP (ref 0–1)
BILIRUB SERPL-MCNC: 0.4 MG/DL — SIGNIFICANT CHANGE UP (ref 0.2–1.2)
BUN SERPL-MCNC: 20 MG/DL — SIGNIFICANT CHANGE UP (ref 10–20)
CALCIUM SERPL-MCNC: 8.9 MG/DL — SIGNIFICANT CHANGE UP (ref 8.4–10.5)
CHLORIDE SERPL-SCNC: 97 MMOL/L — LOW (ref 98–110)
CO2 SERPL-SCNC: 30 MMOL/L — SIGNIFICANT CHANGE UP (ref 17–32)
CREAT SERPL-MCNC: 1 MG/DL — SIGNIFICANT CHANGE UP (ref 0.7–1.5)
EGFR: 75 ML/MIN/1.73M2 — SIGNIFICANT CHANGE UP
EOSINOPHIL # BLD AUTO: 0.23 K/UL — SIGNIFICANT CHANGE UP (ref 0–0.7)
EOSINOPHIL NFR BLD AUTO: 2.9 % — SIGNIFICANT CHANGE UP (ref 0–8)
GLUCOSE SERPL-MCNC: 107 MG/DL — HIGH (ref 70–99)
HCT VFR BLD CALC: 29.3 % — LOW (ref 42–52)
HGB BLD-MCNC: 8.7 G/DL — LOW (ref 14–18)
IMM GRANULOCYTES NFR BLD AUTO: 0.4 % — HIGH (ref 0.1–0.3)
LYMPHOCYTES # BLD AUTO: 1.18 K/UL — LOW (ref 1.2–3.4)
LYMPHOCYTES # BLD AUTO: 15.1 % — LOW (ref 20.5–51.1)
MAGNESIUM SERPL-MCNC: 1.8 MG/DL — SIGNIFICANT CHANGE UP (ref 1.8–2.4)
MCHC RBC-ENTMCNC: 23.8 PG — LOW (ref 27–31)
MCHC RBC-ENTMCNC: 29.7 G/DL — LOW (ref 32–37)
MCV RBC AUTO: 80.3 FL — SIGNIFICANT CHANGE UP (ref 80–94)
MONOCYTES # BLD AUTO: 1.19 K/UL — HIGH (ref 0.1–0.6)
MONOCYTES NFR BLD AUTO: 15.3 % — HIGH (ref 1.7–9.3)
NEUTROPHILS # BLD AUTO: 5.12 K/UL — SIGNIFICANT CHANGE UP (ref 1.4–6.5)
NEUTROPHILS NFR BLD AUTO: 65.7 % — SIGNIFICANT CHANGE UP (ref 42.2–75.2)
NRBC # BLD: 0 /100 WBCS — SIGNIFICANT CHANGE UP (ref 0–0)
PHOSPHATE SERPL-MCNC: 3 MG/DL — SIGNIFICANT CHANGE UP (ref 2.1–4.9)
PLATELET # BLD AUTO: 228 K/UL — SIGNIFICANT CHANGE UP (ref 130–400)
PMV BLD: 11.5 FL — HIGH (ref 7.4–10.4)
POTASSIUM SERPL-MCNC: 3.5 MMOL/L — SIGNIFICANT CHANGE UP (ref 3.5–5)
POTASSIUM SERPL-SCNC: 3.5 MMOL/L — SIGNIFICANT CHANGE UP (ref 3.5–5)
PROT SERPL-MCNC: 6.6 G/DL — SIGNIFICANT CHANGE UP (ref 6–8)
RBC # BLD: 3.65 M/UL — LOW (ref 4.7–6.1)
RBC # FLD: 17.1 % — HIGH (ref 11.5–14.5)
SODIUM SERPL-SCNC: 134 MMOL/L — LOW (ref 135–146)
WBC # BLD: 7.8 K/UL — SIGNIFICANT CHANGE UP (ref 4.8–10.8)
WBC # FLD AUTO: 7.8 K/UL — SIGNIFICANT CHANGE UP (ref 4.8–10.8)

## 2023-05-21 PROCEDURE — 99233 SBSQ HOSP IP/OBS HIGH 50: CPT

## 2023-05-21 PROCEDURE — 71045 X-RAY EXAM CHEST 1 VIEW: CPT | Mod: 26

## 2023-05-21 PROCEDURE — 93010 ELECTROCARDIOGRAM REPORT: CPT

## 2023-05-21 RX ORDER — MIDODRINE HYDROCHLORIDE 2.5 MG/1
1 TABLET ORAL
Qty: 90 | Refills: 0
Start: 2023-05-21 | End: 2023-06-19

## 2023-05-21 RX ORDER — RIVAROXABAN 15 MG-20MG
1 KIT ORAL
Qty: 30 | Refills: 3
Start: 2023-05-21 | End: 2023-09-17

## 2023-05-21 RX ORDER — AMIODARONE HYDROCHLORIDE 400 MG/1
1 TABLET ORAL
Qty: 28 | Refills: 0 | DISCHARGE
Start: 2023-05-21 | End: 2023-06-03

## 2023-05-21 RX ORDER — POTASSIUM CHLORIDE 20 MEQ
20 PACKET (EA) ORAL ONCE
Refills: 0 | Status: COMPLETED | OUTPATIENT
Start: 2023-05-21 | End: 2023-05-21

## 2023-05-21 RX ORDER — POLYETHYLENE GLYCOL 3350 17 G/17G
17 POWDER, FOR SOLUTION ORAL ONCE
Refills: 0 | Status: COMPLETED | OUTPATIENT
Start: 2023-05-21 | End: 2023-05-21

## 2023-05-21 RX ORDER — AMIODARONE HYDROCHLORIDE 400 MG/1
1 TABLET ORAL
Qty: 28 | Refills: 0
Start: 2023-05-21 | End: 2023-06-03

## 2023-05-21 RX ORDER — METOPROLOL TARTRATE 50 MG
50 TABLET ORAL EVERY 12 HOURS
Refills: 0 | Status: DISCONTINUED | OUTPATIENT
Start: 2023-05-21 | End: 2023-05-21

## 2023-05-21 RX ORDER — AMIODARONE HYDROCHLORIDE 400 MG/1
200 TABLET ORAL
Refills: 0 | Status: DISCONTINUED | OUTPATIENT
Start: 2023-05-21 | End: 2023-05-21

## 2023-05-21 RX ORDER — MAGNESIUM SULFATE 500 MG/ML
1 VIAL (ML) INJECTION ONCE
Refills: 0 | Status: COMPLETED | OUTPATIENT
Start: 2023-05-21 | End: 2023-05-21

## 2023-05-21 RX ORDER — METOPROLOL TARTRATE 50 MG
1 TABLET ORAL
Qty: 60 | Refills: 0
Start: 2023-05-21 | End: 2023-06-19

## 2023-05-21 RX ADMIN — Medication 1 MILLIGRAM(S): at 11:26

## 2023-05-21 RX ADMIN — AMIODARONE HYDROCHLORIDE 400 MILLIGRAM(S): 400 TABLET ORAL at 06:03

## 2023-05-21 RX ADMIN — CHLORHEXIDINE GLUCONATE 1 APPLICATION(S): 213 SOLUTION TOPICAL at 06:07

## 2023-05-21 RX ADMIN — Medication 20 MILLIGRAM(S): at 06:03

## 2023-05-21 RX ADMIN — Medication 100 GRAM(S): at 07:04

## 2023-05-21 RX ADMIN — MIDODRINE HYDROCHLORIDE 5 MILLIGRAM(S): 2.5 TABLET ORAL at 00:17

## 2023-05-21 RX ADMIN — Medication 25 MILLIGRAM(S): at 06:02

## 2023-05-21 RX ADMIN — Medication 81 MILLIGRAM(S): at 11:26

## 2023-05-21 RX ADMIN — Medication 50 MILLIEQUIVALENT(S): at 07:42

## 2023-05-21 RX ADMIN — MIDODRINE HYDROCHLORIDE 5 MILLIGRAM(S): 2.5 TABLET ORAL at 06:03

## 2023-05-21 RX ADMIN — MIDODRINE HYDROCHLORIDE 5 MILLIGRAM(S): 2.5 TABLET ORAL at 11:26

## 2023-05-21 RX ADMIN — PANTOPRAZOLE SODIUM 40 MILLIGRAM(S): 20 TABLET, DELAYED RELEASE ORAL at 06:03

## 2023-05-21 RX ADMIN — POLYETHYLENE GLYCOL 3350 17 GRAM(S): 17 POWDER, FOR SOLUTION ORAL at 13:44

## 2023-05-21 NOTE — DISCHARGE NOTE NURSING/CASE MANAGEMENT/SOCIAL WORK - PATIENT PORTAL LINK FT
You can access the FollowMyHealth Patient Portal offered by [facility] by registering at the following website: [email or web address]. By joining [employer]’s FollowMyHealth portal, you will also be able to view your health information using other applications (apps) compatible with our system.

## 2023-05-21 NOTE — PROGRESS NOTE ADULT - ASSESSMENT
82 yo male KTH Hypertension, hyperlipidemia, paroxysmal atrial fibrillation (04/2021), CAD s/p PCI to pLAD, mitral regurgitation s/p mitral clip, coronary artery disease s/p PCI to proximal LAD, cardiomyopathy, and recurrent GI bleed.  MR s/p Mitral clip,  Presents for elective Watchman device.  Patient underwent cardioversion for Afib w RVR on amiodarone drip.    IMPRESSION  A Fib with RVR s/p Cardioversion 5/16 now back in A Fib  Recurrent GIB s/p Watchman Device implant 5/16  MR s/p mitral clip, no again MR due to LV dysfunction EF 40%  CAD s/p PCI to pLAD  HTN, HLD      PLAN:    CNS:   -no depressants.     HEENT:   -Oral care    PULMONARY:    -HOB @ 45 degrees    CARDIOVASCULAR:   -cw Amio 400mg BID  -c/w Xarelto 20mg qd  -c/w aspirin 81mg qd  -c/w lipitor 20mg qhs  -c/w Torsemide 20mg qd,  - lopressor 25 mg q6 with holding parameters   - Lactate wnl   - HR and BP better controlled, might have to shock if HD unstable, DCCV should be avoided if possible until 30 days post watchman   - IVC collapsable an 2.1 cm collapsable   - midodrine 5 TID to Hold BP and lopressor to control HR (midodrine decrease HR)     GI:   -GI prophylaxis.    -oral feeds    RENAL:    -Follow up lytes.  Correct as needed.     INFECTIOUS DISEASE:   -Follow up cultures: blood and urine.   -CHG 4% daily bath    HEMATOLOGICAL:    -DVT prophylaxis.    ENDOCRINE:    -Follow up FS.  -insulin sliding scale if needed    MUSCULOSKELETAL:   -increase ambulation as tolerated    CCU monitoring 84 yo male KTH Hypertension, hyperlipidemia, paroxysmal atrial fibrillation (04/2021), CAD s/p PCI to pLAD, mitral regurgitation s/p mitral clip, coronary artery disease s/p PCI to proximal LAD, cardiomyopathy, and recurrent GI bleed.  MR s/p Mitral clip,  Presents for elective Watchman device.  Patient underwent cardioversion for Afib w RVR on amiodarone drip.    IMPRESSION  A Fib with RVR s/p Cardioversion 5/16 now back in A Fib  Recurrent GIB s/p Watchman Device implant 5/16  MR s/p mitral clip, no again MR due to LV dysfunction EF 40%  CAD s/p PCI to pLAD  HTN, HLD      PLAN:    CNS:   -no depressants.     HEENT:   -Oral care    PULMONARY:    -HOB @ 45 degrees    CARDIOVASCULAR:   -cw Amio 400mg BID  -c/w Xarelto 20mg qd  -c/w aspirin 81mg qd  -c/w lipitor 20mg qhs  -c/w Torsemide 20mg qd,  - lopressor 25 mg q6 with holding parameters   - HR and BP better controlled, might have to shock if HD unstable, DCCV should be avoided if possible until 30 days post watchman   - IVC collapsable an 2.1 cm collapsable   - midodrine 5 TID to Hold BP and lopressor to control HR      GI:   -GI prophylaxis.    -oral feeds    RENAL:    -Follow up lytes.  Correct as needed.     INFECTIOUS DISEASE:   -Follow up cultures: blood and urine.   -CHG 4% daily bath    HEMATOLOGICAL:    -DVT prophylaxis.    ENDOCRINE:    -Follow up FS.  -insulin sliding scale if needed    MUSCULOSKELETAL:   -increase ambulation as tolerated    CCU monitoring 82 yo male KTH Hypertension, hyperlipidemia, paroxysmal atrial fibrillation (04/2021), CAD s/p PCI to pLAD, mitral regurgitation s/p mitral clip, coronary artery disease s/p PCI to proximal LAD, cardiomyopathy, and recurrent GI bleed.  MR s/p Mitral clip,  Presents for elective Watchman device.  Patient underwent cardioversion for Afib w RVR on amiodarone drip.    IMPRESSION  A Fib with RVR s/p Cardioversion 5/16 now back in A Fib  Recurrent GIB s/p Watchman Device implant 5/16  MR s/p mitral clip, no again MR due to LV dysfunction EF 40%  CAD s/p PCI to pLAD  HTN, HLD      PLAN:    CNS:   -no depressants.     HEENT:   -Oral care    PULMONARY:    -HOB @ 45 degrees    CARDIOVASCULAR:   -Decrease Amio to 200mg BID  -c/w Xarelto 20mg qd  -c/w aspirin 81mg qd  -c/w lipitor 20mg qhs  -c/w Torsemide 20mg qd,  - lopressor 25 mg q6 switch to PO Lopressor 50mg BID  - IVC collapsable an 2.1 cm collapsable   - midodrine 5 TID to Hold BP and lopressor to control HR      GI:   -GI prophylaxis.    -oral feeds    RENAL:    -Follow up lytes.  Correct as needed.     INFECTIOUS DISEASE:   -Follow up cultures: blood and urine.   -CHG 4% daily bath    HEMATOLOGICAL:    -DVT prophylaxis.    ENDOCRINE:    -Follow up FS.  -insulin sliding scale if needed    MUSCULOSKELETAL:   -increase ambulation as tolerated    D/C Home today 84 yo male KTH Hypertension, hyperlipidemia, paroxysmal atrial fibrillation (04/2021), CAD s/p PCI to pLAD, mitral regurgitation s/p mitral clip, coronary artery disease s/p PCI to proximal LAD, cardiomyopathy, and recurrent GI bleed.  MR s/p Mitral clip,  Presents for elective Watchman device.  Patient underwent cardioversion for Afib w RVR on amiodarone drip.    IMPRESSION  A Fib with RVR s/p Cardioversion 5/16 now back in A Fib  Recurrent GIB s/p Watchman Device implant 5/16  MR s/p mitral clip, no again MR due to LV dysfunction EF 40%  CAD s/p PCI to pLAD  HTN, HLD      PLAN:    CNS:   -no depressants.     HEENT:   -Oral care    PULMONARY:    -HOB @ 45 degrees    CARDIOVASCULAR:   -Decrease Amio to 200mg BID  -c/w Xarelto 20mg qd  -c/w aspirin 81mg qd  -c/w lipitor 20mg qhs  -c/w Torsemide 20mg qd,  - lopressor 25 mg q6 switch to PO Lopressor 50mg BID  - IVC 2.1 cm collapsable   - midodrine 5 TID to Hold BP and lopressor to control HR      GI:   -GI prophylaxis.    -oral feeds    RENAL:    -Follow up lytes.  Correct as needed.     INFECTIOUS DISEASE:   -Follow up cultures: blood and urine.   -CHG 4% daily bath    HEMATOLOGICAL:    -DVT prophylaxis.    ENDOCRINE:    -Follow up FS.  -insulin sliding scale if needed    MUSCULOSKELETAL:   -increase ambulation as tolerated    D/C Home today

## 2023-05-21 NOTE — DISCHARGE NOTE PROVIDER - NSDCMRMEDTOKEN_GEN_ALL_CORE_FT
aspirin 81 mg oral delayed release tablet: 1 tab(s) orally once a day  atorvastatin 20 mg oral tablet: 1 tab(s) orally once a day (at bedtime)  bisacodyl 5 mg oral delayed release tablet: 4 tab(s) orally once a day (at bedtime)  folic acid 1 mg oral tablet: 1 tab(s) orally once a day  pantoprazole 40 mg oral delayed release tablet: 1 tab(s) orally 2 times a day  rivaroxaban 10 mg oral tablet: 1 tab(s) orally once a day  tamsulosin 0.4 mg oral capsule: 1 cap(s) orally once a day (at bedtime)  torsemide 20 mg oral tablet: 1 tab(s) orally once a day   amiodarone 200 mg oral tablet: 1 tab(s) orally 2 times a day  aspirin 81 mg oral delayed release tablet: 1 tab(s) orally once a day  atorvastatin 20 mg oral tablet: 1 tab(s) orally once a day (at bedtime)  bisacodyl 5 mg oral delayed release tablet: 4 tab(s) orally once a day (at bedtime)  folic acid 1 mg oral tablet: 1 tab(s) orally once a day  metoprolol tartrate 50 mg oral tablet: 1 tab(s) orally every 12 hours  midodrine 5 mg oral tablet: 1 tab(s) orally 3 times a day  Pacerone 200 mg oral tablet: 1 tab(s) orally once a day Start after 14 days  pantoprazole 40 mg oral delayed release tablet: 1 tab(s) orally 2 times a day  rivaroxaban 20 mg oral tablet: 1 tab(s) orally once a day (before a meal)  tamsulosin 0.4 mg oral capsule: 1 cap(s) orally once a day (at bedtime)  torsemide 20 mg oral tablet: 1 tab(s) orally once a day

## 2023-05-21 NOTE — PROGRESS NOTE ADULT - SUBJECTIVE AND OBJECTIVE BOX
Patient is a 83y old  Male who presents with a chief complaint of Watchman device (20 May 2023 16:20)      INTERVAL HPI/OVERNIGHT EVENTS:   No overnight events   Afebrile, hemodynamically stable     Subjective: Patient seen and examined at bedside.    ICU Vital Signs Last 24 Hrs  T(C): 36.5 (20 May 2023 21:00), Max: 36.9 (20 May 2023 06:00)  T(F): 97.7 (20 May 2023 21:00), Max: 98.5 (20 May 2023 06:00)  HR: 76 (21 May 2023 01:00) (70 - 112)  BP: 95/60 (21 May 2023 01:00) (83/52 - 95/60)  BP(mean): 73 (21 May 2023 01:00) (62 - 73)  ABP: 104/56 (20 May 2023 18:00) (84/60 - 118/70)  ABP(mean): 72 (20 May 2023 18:00) (61 - 84)  RR: 23 (21 May 2023 01:00) (17 - 45)  SpO2: 98% (21 May 2023 01:00) (97% - 100%)    O2 Parameters below as of 21 May 2023 01:00  Patient On (Oxygen Delivery Method): room air          I&O's Summary    19 May 2023 07:01  -  20 May 2023 07:00  --------------------------------------------------------  IN: 740 mL / OUT: 2850 mL / NET: -2110 mL    20 May 2023 07:01  -  21 May 2023 01:26  --------------------------------------------------------  IN: 770 mL / OUT: 2100 mL / NET: -1330 mL          PHYSICAL EXAM:  GENERAL: No acute distress   HEAD:  Atraumatic, Normocephalic  EYES: EOMI, PERRLA, conjunctiva and sclera clear  ENMT: No tonsillar erythema, exudates, or enlargement; Moist mucous membranes  NECK: Supple, No JVD, Normal thyroid  HEART: Regular rate and rhythm; No murmurs, rubs, or gallops  RESPIRATORY: CTA B/L, No W/R/R  ABDOMEN: Soft, Nontender, Nondistended; Bowel sounds present  NEUROLOGY: A&Ox3, nonfocal, moving all extremities  EXTREMITIES:  2+ Peripheral Pulses, No clubbing, cyanosis, or edema  SKIN: warm, dry, normal color, no rash or abnormal lesions    LABS:                        8.6    8.39  )-----------( 213      ( 20 May 2023 04:50 )             28.3     05-20    136  |  101  |  20  ----------------------------<  114<H>  3.7   |  27  |  0.9    Ca    8.6      20 May 2023 04:50  Phos  3.3     05-20  Mg     1.9     05-20    TPro  6.3  /  Alb  3.2<L>  /  TBili  0.3  /  DBili  x   /  AST  14  /  ALT  9   /  AlkPhos  64  05-20        CAPILLARY BLOOD GLUCOSE        ABG - ( 20 May 2023 04:11 )  pH, Arterial: 7.48  pH, Blood: x     /  pCO2: 41    /  pO2: 92    / HCO3: 30    / Base Excess: 6.4   /  SaO2: 99.4                Consultant(s) Notes Reviewed:  [x ] YES  [ ] NO    MEDICATIONS  (STANDING):  aMIOdarone    Tablet 400 milliGRAM(s) Oral every 12 hours  aspirin enteric coated 81 milliGRAM(s) Oral daily  atorvastatin 20 milliGRAM(s) Oral at bedtime  chlorhexidine 2% Cloths 1 Application(s) Topical <User Schedule>  folic acid 1 milliGRAM(s) Oral daily  metoprolol tartrate 25 milliGRAM(s) Oral every 6 hours  midodrine. 5 milliGRAM(s) Oral three times a day  pantoprazole    Tablet 40 milliGRAM(s) Oral before breakfast  rivaroxaban 20 milliGRAM(s) Oral with dinner  tamsulosin 0.4 milliGRAM(s) Oral at bedtime  torsemide 20 milliGRAM(s) Oral daily    MEDICATIONS  (PRN):      Care Discussed with Consultants/Other Providers [ x] YES  [ ] NO    RADIOLOGY & ADDITIONAL TESTS:

## 2023-05-21 NOTE — PROGRESS NOTE ADULT - TIME BILLING
Atrial Fibrillation
CHF, A-fib, MR.
A-fib, CNH, CMP, MR.
Atrial Fibrillaiton
Atrial Fibrillation, Cardiomyopathy
A-fib with RVR, CHF, CMP.
A-fib with RVR, systolic CHF, CAD.

## 2023-05-21 NOTE — DISCHARGE NOTE NURSING/CASE MANAGEMENT/SOCIAL WORK - NSDCPEFALRISK_GEN_ALL_CORE
For information on Fall & Injury Prevention, visit: [email or web address] OR  [email or web address] OR  https://www.cdc.gov/steadi/patient.html

## 2023-05-21 NOTE — DISCHARGE NOTE PROVIDER - CARE PROVIDER_API CALL
Tera Judd)  Cardiovascular Disease; Nuclear Cardiology  [address] [place] [address], [address] [place] [address]  Phone: [phone]  Fax: [phone]  Follow Up Time:

## 2023-05-21 NOTE — DISCHARGE NOTE PROVIDER - NSDCFUSCHEDAPPT_GEN_ALL_CORE_FT
Brant Goodson  [facility] PreAdmits  Scheduled Appointment: 05/23/2023    Brant Goodson  [facility]  HEMONC SI [address]  Scheduled Appointment: 05/23/2023    [facility]  AMBCHEMO SI [address]  Scheduled Appointment: 05/23/2023    [facility]  AMBCHEMO SI [address]  Scheduled Appointment: 05/30/2023    Brant Goodson  [facility] PreAdmits  Scheduled Appointment: 05/30/2023    [facility]  AMBCHEMO SI [address]  Scheduled Appointment: 06/06/2023    Brant Goodson  [facility] PreAdmits  Scheduled Appointment: 06/06/2023    Jonathan Pacheco  [facility]  ELECTROPH [address]  Scheduled Appointment: 06/26/2023    Aurelio Michele  [facility]  CARDIOLOGY [address]  Scheduled Appointment: 07/10/2023    Tera Judd  [facility]  CARDIOLOGY [address]  Scheduled Appointment: 07/13/2023

## 2023-05-21 NOTE — PROGRESS NOTE ADULT - ATTENDING COMMENTS
Patient seen, case d/w CCU team on rounds.  Agree with assessment and plan.  In SR today, tolerating higher doses of BB since starting Midodrine.  Still on Torsemide 20 mg daily.  Continue current care.  Possible d/c home tomorrow if remains stable.
Patient seen, case d/w CCU team on rounds.  Agree with assessment and plan;  A-fib with RVR, ? duration, severe LV dysfunction on presentation for Watchman.  S/p CV, in SR on Amio drip, will switch to PO.  Xarelto restarted, tolerating so far.  S/p MItraClip, severe MR again due to LV dysfunction.  Tapering Levophed, will gradually restart GDMT.  Continue CCU monitoring.
Patient seen, case d/w CCU team on rounds.  Agree with assessment and plan.  Stable for discharge.  Reduce Amiodarone to 200 mg bid for 2 weeks, then daily.  Ambulate patient and discharge home if tolerates.
Patient seen, case d/w CCU team on rounds.  Agree with assessment and plan.  In A-fib again today, increasing BB and Amiodarone for rate control.  Appeared overloaded in the morning. Furosemide 40 mg IVP 1 dose given.  Continue cardiac monitoring.
Patient seen, case d/w CCU team on rounds.  Agree with assessment and plan.  Acute on chronic systolic CHF.  Remains in A-fib with RVR although improving.  Will start Midodrine today to be able to continue to increase BB dose.  Euvolemic today, will continue Torsemide daily.  Repeat CBC, CMP in AM.  Continue CCU monitoring.

## 2023-05-21 NOTE — PROGRESS NOTE ADULT - REASON FOR ADMISSION
Watchman device

## 2023-05-21 NOTE — DISCHARGE NOTE PROVIDER - HOSPITAL COURSE
84 y/o M with PMH of Hypertension, hyperlipidemia, paroxysmal atrial fibrillation (04/2021), mitral regurgitation s/p mitral clip, coronary artery disease s/p PCI to proximal LAD, cardiomyopathy, and recurrent GI bleed.     Patient had multiple hospitalizations for GI bleeds and anemia, including January 2022 and January 2023. He had EGD, colonoscopy, and capsule endoscopy. EGD on 01/19/2023 showed non-erosive gastritis, angioectasias in the second part of the duodenum, and hiatal hernia. Colonoscopy on 01/19/2023 showed polyp in the ascending colon s/p polypectomy, internal and external hemorrhoids, and diverticulosis of the left side of the colon. 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 Patient is planned for enteroscopy with possible APC.     on 3/27/2023 patient had endoscopy and thermal treatment and endoclip for AVMs    Patient is currently on Xarelto 10 mg daily and is following with hematology and gastroenterology. He is not able to tolerate full-dose DOAC due to recurrence of GI bleeds.     Patient has no chest pain, no shortness of breath at rest, no dyspnea on exertion, no dizziness, no lightheadedness, and no syncope.     He presented for elective Watchman device.    IMPRESSION  A Fib with RVR s/p Cardioversion 5/16 now back in A Fib  Recurrent GIB s/p Watchman Device implant 5/16  MR s/p mitral clip, no again MR due to LV dysfunction EF 40%  CAD s/p PCI to pLAD  HTN, HLD      PLAN:    CNS:   -no depressants.     HEENT:   -Oral care    PULMONARY:    -HOB @ 45 degrees    CARDIOVASCULAR:   -Decrease Amio to 200mg BID  -c/w Xarelto 20mg qd  -c/w aspirin 81mg qd  -c/w lipitor 20mg qhs  -c/w Torsemide 20mg qd,  - lopressor 25 mg q6 switch to PO Lopressor 50mg BID  - IVC collapsable an 2.1 cm collapsable   - midodrine 5 TID to Hold BP and lopressor to control HR      GI:   -GI prophylaxis.    -oral feeds    RENAL:    -Follow up lytes.  Correct as needed.     INFECTIOUS DISEASE:   -Follow up cultures: blood and urine.   -CHG 4% daily bath    HEMATOLOGICAL:    -DVT prophylaxis.    ENDOCRINE:    -Follow up FS.  -insulin sliding scale if needed    MUSCULOSKELETAL:   -increase ambulation as tolerated    D/C Home today

## 2023-05-22 ENCOUNTER — NON-APPOINTMENT (OUTPATIENT)
Age: 83
End: 2023-05-22

## 2023-05-23 ENCOUNTER — OUTPATIENT (OUTPATIENT)
Dept: OUTPATIENT SERVICES | Facility: HOSPITAL | Age: 83
LOS: 1 days | End: 2023-05-23
Payer: MEDICARE

## 2023-05-23 ENCOUNTER — LABORATORY RESULT (OUTPATIENT)
Age: 83
End: 2023-05-23

## 2023-05-23 ENCOUNTER — APPOINTMENT (OUTPATIENT)
Dept: INFUSION THERAPY | Facility: CLINIC | Age: 83
End: 2023-05-23
Payer: MEDICARE

## 2023-05-23 ENCOUNTER — APPOINTMENT (OUTPATIENT)
Dept: HEMATOLOGY ONCOLOGY | Facility: CLINIC | Age: 83
End: 2023-05-23
Payer: MEDICARE

## 2023-05-23 VITALS
WEIGHT: 182 LBS | RESPIRATION RATE: 16 BRPM | HEART RATE: 73 BPM | OXYGEN SATURATION: 99 % | TEMPERATURE: 97.5 F | DIASTOLIC BLOOD PRESSURE: 59 MMHG | BODY MASS INDEX: 24.68 KG/M2 | SYSTOLIC BLOOD PRESSURE: 84 MMHG

## 2023-05-23 DIAGNOSIS — D50.9 IRON DEFICIENCY ANEMIA, UNSPECIFIED: ICD-10-CM

## 2023-05-23 DIAGNOSIS — Z98.890 OTHER SPECIFIED POSTPROCEDURAL STATES: Chronic | ICD-10-CM

## 2023-05-23 DIAGNOSIS — Z98.61 CORONARY ANGIOPLASTY STATUS: Chronic | ICD-10-CM

## 2023-05-23 LAB
HCT VFR BLD CALC: 29.1 %
HGB BLD-MCNC: 8.7 G/DL
MCHC RBC-ENTMCNC: 23.3 PG
MCHC RBC-ENTMCNC: 29.9 G/DL
MCV RBC AUTO: 77.8 FL
PLATELET # BLD AUTO: 290 K/UL
PMV BLD: 10 FL
RBC # BLD: 3.74 M/UL
RBC # FLD: 17.2 %
WBC # FLD AUTO: 7.6 K/UL

## 2023-05-23 PROCEDURE — 85027 COMPLETE CBC AUTOMATED: CPT

## 2023-05-23 PROCEDURE — 99213 OFFICE O/P EST LOW 20 MIN: CPT

## 2023-05-23 PROCEDURE — 82728 ASSAY OF FERRITIN: CPT

## 2023-05-23 PROCEDURE — 36415 COLL VENOUS BLD VENIPUNCTURE: CPT

## 2023-05-23 PROCEDURE — 99213 OFFICE O/P EST LOW 20 MIN: CPT | Mod: 25

## 2023-05-23 PROCEDURE — 96365 THER/PROPH/DIAG IV INF INIT: CPT

## 2023-05-23 RX ORDER — IRON SUCROSE 20 MG/ML
200 INJECTION, SOLUTION INTRAVENOUS ONCE
Refills: 0 | Status: COMPLETED | OUTPATIENT
Start: 2023-05-23 | End: 2023-05-23

## 2023-05-23 RX ADMIN — IRON SUCROSE 220 MILLIGRAM(S): 20 INJECTION, SOLUTION INTRAVENOUS at 16:22

## 2023-05-24 LAB — FERRITIN SERPL-MCNC: 28 NG/ML

## 2023-05-24 NOTE — ASSESSMENT
[FreeTextEntry1] : 83 year old male with atrial fibrillation on anticoagulation chronic recurrent upper GI bleeding secondary to angiodysplasias ,                                                                                                                                                      Chronic  iron deficiency anemia .\par S/P admission in January 2022 and transfusion for HGb 5.7 , venofer X 2 , EGD : duodenal AVM .\par Feels better, no active bleeding , Hgb : 9.0\par \par Capsule endoscopy done in Feb 2023, multiple non-bleeding AVMs\par \par Plan:\par --CBC today shows Hgb (8.7), Ferritin 26 from April 2023. He is scheduled for Venofer 200mg x 5 doses. Currently has received 2/5 doses. \par --Weekly CBC and Type and Cross with plan to transfuse for Hb <8 \par --Follow up with GI as scheduled \par --Follow up with cardiology/ EP for Xarelto management \par \par RTC in 3 weeks \par \par Patient was seen and examined and discussed with Dr. Goodson who agrees to the above plan of care.\par \par

## 2023-05-24 NOTE — PHYSICAL EXAM
[Normal] : normoactive bowel sounds, soft and nontender, no hepatosplenomegaly or masses appreciated [de-identified] : well preserved male in no acute distress.  [de-identified] : irregular.

## 2023-05-24 NOTE — HISTORY OF PRESENT ILLNESS
[de-identified] : 82 year old white male who was seen on consultation in the hospital for iron deficiency anemia , ferritin was 8 . he received packed RBCs and venofer . most recent Hgb was 10 . He feels well and denies any hematochezia. EGD showed erosive gastritis , bleeder was cauterized and resumed eliquis on discharge . He had prior episodes of bleeding in the past ( AV malformations ? 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 He had a capsule endoscopy in February which showed multiple non-bleeding AVMs. GI recommended a follow up to do an enteroscopy. He also follows with cardiology as a potential candidate for the Watchman procedure, he was seen today and it was recommended to hold off on this procedure until he is seen again by GI. It was recommended that he continue on Xarelto until his next follow up with cardiology in 2 months. He reports feeling well, denies hematochezia. Hgb today increased to 9.0 since 7.7 in the beginning of February. He is taking PO iron once daily. Last ferritin from February 2023 was 11 however the patient was not able to come in for iron infusions at that time. \par \par 5/2/23 Patient returns for follow up for history of iron deficiency anemia. He had his Ferritin checked on 4/26/23 and it was 26 with CBC today showing a hemoglobin of 8.3. Since his last visit, he had a capsule enteroscopy with some thermal therapy and endoclips applied to non bleeding AVMs as per GI report. \par \par 5/23/23 Patient returns for follow up today for JONAH. He feels well and is denying any complaints. Since his last visit, he underwent the Watchman's procedure but was hospitalized for about 4 days post procedure for AFib with RVR. He was eventually stabilized and discharged on 20mg of Xarelto. He currently denies any bleeding.

## 2023-05-26 DIAGNOSIS — I25.10 ATHEROSCLEROTIC HEART DISEASE OF NATIVE CORONARY ARTERY WITHOUT ANGINA PECTORIS: ICD-10-CM

## 2023-05-26 DIAGNOSIS — Z79.01 LONG TERM (CURRENT) USE OF ANTICOAGULANTS: ICD-10-CM

## 2023-05-26 DIAGNOSIS — I42.9 CARDIOMYOPATHY, UNSPECIFIED: ICD-10-CM

## 2023-05-26 DIAGNOSIS — I48.20 CHRONIC ATRIAL FIBRILLATION, UNSPECIFIED: ICD-10-CM

## 2023-05-26 DIAGNOSIS — Z79.82 LONG TERM (CURRENT) USE OF ASPIRIN: ICD-10-CM

## 2023-05-26 DIAGNOSIS — N40.0 BENIGN PROSTATIC HYPERPLASIA WITHOUT LOWER URINARY TRACT SYMPTOMS: ICD-10-CM

## 2023-05-26 DIAGNOSIS — Z82.49 FAMILY HISTORY OF ISCHEMIC HEART DISEASE AND OTHER DISEASES OF THE CIRCULATORY SYSTEM: ICD-10-CM

## 2023-05-26 DIAGNOSIS — Z95.818 PRESENCE OF OTHER CARDIAC IMPLANTS AND GRAFTS: ICD-10-CM

## 2023-05-26 DIAGNOSIS — E78.5 HYPERLIPIDEMIA, UNSPECIFIED: ICD-10-CM

## 2023-05-26 DIAGNOSIS — E83.42 HYPOMAGNESEMIA: ICD-10-CM

## 2023-05-26 DIAGNOSIS — D50.9 IRON DEFICIENCY ANEMIA, UNSPECIFIED: ICD-10-CM

## 2023-05-26 DIAGNOSIS — I50.23 ACUTE ON CHRONIC SYSTOLIC (CONGESTIVE) HEART FAILURE: ICD-10-CM

## 2023-05-26 DIAGNOSIS — I11.0 HYPERTENSIVE HEART DISEASE WITH HEART FAILURE: ICD-10-CM

## 2023-05-26 DIAGNOSIS — K21.9 GASTRO-ESOPHAGEAL REFLUX DISEASE WITHOUT ESOPHAGITIS: ICD-10-CM

## 2023-05-26 DIAGNOSIS — Z98.61 CORONARY ANGIOPLASTY STATUS: ICD-10-CM

## 2023-05-30 ENCOUNTER — LABORATORY RESULT (OUTPATIENT)
Age: 83
End: 2023-05-30

## 2023-05-30 ENCOUNTER — APPOINTMENT (OUTPATIENT)
Dept: INFUSION THERAPY | Facility: CLINIC | Age: 83
End: 2023-05-30

## 2023-05-30 ENCOUNTER — OUTPATIENT (OUTPATIENT)
Dept: OUTPATIENT SERVICES | Facility: HOSPITAL | Age: 83
LOS: 1 days | End: 2023-05-30
Payer: MEDICARE

## 2023-05-30 DIAGNOSIS — Z98.890 OTHER SPECIFIED POSTPROCEDURAL STATES: Chronic | ICD-10-CM

## 2023-05-30 DIAGNOSIS — D50.9 IRON DEFICIENCY ANEMIA, UNSPECIFIED: ICD-10-CM

## 2023-05-30 DIAGNOSIS — Z98.61 CORONARY ANGIOPLASTY STATUS: Chronic | ICD-10-CM

## 2023-05-30 PROCEDURE — 85027 COMPLETE CBC AUTOMATED: CPT

## 2023-05-30 PROCEDURE — 96365 THER/PROPH/DIAG IV INF INIT: CPT

## 2023-05-30 RX ORDER — IRON SUCROSE 20 MG/ML
200 INJECTION, SOLUTION INTRAVENOUS ONCE
Refills: 0 | Status: COMPLETED | OUTPATIENT
Start: 2023-05-30 | End: 2023-05-30

## 2023-05-30 RX ADMIN — IRON SUCROSE 200 MILLIGRAM(S): 20 INJECTION, SOLUTION INTRAVENOUS at 16:57

## 2023-05-30 RX ADMIN — IRON SUCROSE 220 MILLIGRAM(S): 20 INJECTION, SOLUTION INTRAVENOUS at 16:27

## 2023-05-31 LAB
HCT VFR BLD CALC: 29.1 %
HGB BLD-MCNC: 8.8 G/DL
MCHC RBC-ENTMCNC: 23.8 PG
MCHC RBC-ENTMCNC: 30.2 G/DL
MCV RBC AUTO: 78.6 FL
PLATELET # BLD AUTO: 308 K/UL
PMV BLD: 10.7 FL
RBC # BLD: 3.7 M/UL
RBC # FLD: 18.6 %
WBC # FLD AUTO: 7.67 K/UL

## 2023-06-05 RX ORDER — AMIODARONE HYDROCHLORIDE 400 MG/1
1 TABLET ORAL
Qty: 30 | Refills: 0
Start: 2023-06-05 | End: 2023-07-04

## 2023-06-05 NOTE — PRE-ANESTHESIA EVALUATION ADULT - NSANTHTOTALSCORECAL_ENT_A_CORE
[Fall prevention counseling provided] : Fall prevention counseling provided [Adequate lighting] : Adequate lighting [No throw rugs] : No throw rugs [Use proper foot wear] : Use proper foot wear [Behavioral health counseling provided] : Behavioral health counseling provided [Sleep ___ hours/day] : Sleep [unfilled] hours/day [Engage in a relaxing activity] : Engage in a relaxing activity [Plan in advance] : Plan in advance [AUDIT-C Screening administered and reviewed] : AUDIT-C Screening administered and reviewed [None] : None [Good understanding] : Patient has a good understanding of lifestyle changes and steps needed to achieve self management goal 3

## 2023-06-06 ENCOUNTER — LABORATORY RESULT (OUTPATIENT)
Age: 83
End: 2023-06-06

## 2023-06-06 ENCOUNTER — APPOINTMENT (OUTPATIENT)
Dept: INFUSION THERAPY | Facility: CLINIC | Age: 83
End: 2023-06-06

## 2023-06-06 ENCOUNTER — OUTPATIENT (OUTPATIENT)
Dept: OUTPATIENT SERVICES | Facility: HOSPITAL | Age: 83
LOS: 1 days | End: 2023-06-06
Payer: MEDICARE

## 2023-06-06 DIAGNOSIS — D64.9 ANEMIA, UNSPECIFIED: ICD-10-CM

## 2023-06-06 DIAGNOSIS — D50.9 IRON DEFICIENCY ANEMIA, UNSPECIFIED: ICD-10-CM

## 2023-06-06 DIAGNOSIS — Z98.890 OTHER SPECIFIED POSTPROCEDURAL STATES: Chronic | ICD-10-CM

## 2023-06-06 PROCEDURE — 96365 THER/PROPH/DIAG IV INF INIT: CPT

## 2023-06-06 PROCEDURE — 85027 COMPLETE CBC AUTOMATED: CPT

## 2023-06-06 RX ORDER — IRON SUCROSE 20 MG/ML
200 INJECTION, SOLUTION INTRAVENOUS ONCE
Refills: 0 | Status: COMPLETED | OUTPATIENT
Start: 2023-06-06 | End: 2023-06-06

## 2023-06-06 RX ADMIN — IRON SUCROSE 200 MILLIGRAM(S): 20 INJECTION, SOLUTION INTRAVENOUS at 17:05

## 2023-06-06 RX ADMIN — IRON SUCROSE 220 MILLIGRAM(S): 20 INJECTION, SOLUTION INTRAVENOUS at 16:36

## 2023-06-07 LAB
HCT VFR BLD CALC: 30 %
HGB BLD-MCNC: 9 G/DL
MCHC RBC-ENTMCNC: 24.1 PG
MCHC RBC-ENTMCNC: 30 G/DL
MCV RBC AUTO: 80.2 FL
PLATELET # BLD AUTO: 196 K/UL
PMV BLD: 11.8 FL
RBC # BLD: 3.74 M/UL
RBC # FLD: 19.1 %
WBC # FLD AUTO: 8.13 K/UL

## 2023-06-13 ENCOUNTER — OUTPATIENT (OUTPATIENT)
Dept: OUTPATIENT SERVICES | Facility: HOSPITAL | Age: 83
LOS: 1 days | End: 2023-06-13
Payer: MEDICARE

## 2023-06-13 ENCOUNTER — LABORATORY RESULT (OUTPATIENT)
Age: 83
End: 2023-06-13

## 2023-06-13 ENCOUNTER — APPOINTMENT (OUTPATIENT)
Dept: HEMATOLOGY ONCOLOGY | Facility: CLINIC | Age: 83
End: 2023-06-13
Payer: MEDICARE

## 2023-06-13 VITALS
SYSTOLIC BLOOD PRESSURE: 84 MMHG | HEART RATE: 74 BPM | RESPIRATION RATE: 16 BRPM | WEIGHT: 181 LBS | OXYGEN SATURATION: 98 % | DIASTOLIC BLOOD PRESSURE: 50 MMHG | BODY MASS INDEX: 24.55 KG/M2 | TEMPERATURE: 98.1 F

## 2023-06-13 DIAGNOSIS — D50.9 IRON DEFICIENCY ANEMIA, UNSPECIFIED: ICD-10-CM

## 2023-06-13 DIAGNOSIS — Z98.61 CORONARY ANGIOPLASTY STATUS: Chronic | ICD-10-CM

## 2023-06-13 DIAGNOSIS — Z98.890 OTHER SPECIFIED POSTPROCEDURAL STATES: Chronic | ICD-10-CM

## 2023-06-13 LAB
HCT VFR BLD CALC: 30.3 %
HGB BLD-MCNC: 9.1 G/DL
MCHC RBC-ENTMCNC: 24.5 PG
MCHC RBC-ENTMCNC: 30 G/DL
MCV RBC AUTO: 81.5 FL
PLATELET # BLD AUTO: 285 K/UL
PMV BLD: 10.8 FL
RBC # BLD: 3.72 M/UL
RBC # FLD: 20.7 %
WBC # FLD AUTO: 5.9 K/UL

## 2023-06-13 PROCEDURE — 85027 COMPLETE CBC AUTOMATED: CPT

## 2023-06-13 PROCEDURE — 82728 ASSAY OF FERRITIN: CPT

## 2023-06-13 PROCEDURE — 99213 OFFICE O/P EST LOW 20 MIN: CPT

## 2023-06-13 NOTE — PHYSICAL EXAM
[Normal] : normoactive bowel sounds, soft and nontender, no hepatosplenomegaly or masses appreciated [de-identified] : well preserved male in no acute distress.  [de-identified] : irregular.

## 2023-06-13 NOTE — ASSESSMENT
[FreeTextEntry1] : 82 year old male with atrial fibrillation on anticoagulation chronic recurrent upper GI bleeding secondary to angiodysplasias ,                                                                                                                                                      Chronic  iron deficiency anemia on venofer. \par COntinues on xarelto 10 mg daily . \par \par Hgb : 9.1 and stable .  Ferritin is pending .\par \par Plan : continue to monitor CBC and ferritin .repeat in 2 weeks .\par          follow up with cardiology s/p watchman .

## 2023-06-13 NOTE — HISTORY OF PRESENT ILLNESS
[de-identified] : 82 year old white male who was seen on consultation in the hospital for iron deficiency anemia , ferritin was 8 . he received packed RBCs and venofer . most recent Hgb was 10 . He feels well and denies any hematochezia. EGD showed erosive gastritis , bleeder was cauterized and resumed eliquis on discharge . He had prior episodes of bleeding in the past ( AV malformations ? )  [de-identified] : 2/2/2023 Patient returns for history of recurrent GI bleeding , admitted recently for profound anemia S/P EGD and colonoscopy revealing AVM in the duodenum s/p APC.\par He received 2 units rbcs and venofer X 2 , He feels much better and denies any hematochezia .\par \par 6/13/2023 Patient returns for chronic iron deficiency anemia , last ferritin was 28 on 5/23/2023 , since then he received three additional doses of venofer. He feels well today , Hgb is 9.1 and stable , not required transfusions recently . He reduced xarelto to 10 mg daily . \par

## 2023-06-14 DIAGNOSIS — D50.9 IRON DEFICIENCY ANEMIA, UNSPECIFIED: ICD-10-CM

## 2023-06-14 LAB — FERRITIN SERPL-MCNC: 46 NG/ML

## 2023-06-19 DIAGNOSIS — D64.9 ANEMIA, UNSPECIFIED: ICD-10-CM

## 2023-06-20 ENCOUNTER — APPOINTMENT (OUTPATIENT)
Dept: HEMATOLOGY ONCOLOGY | Facility: CLINIC | Age: 83
End: 2023-06-20

## 2023-06-20 ENCOUNTER — APPOINTMENT (OUTPATIENT)
Dept: INFUSION THERAPY | Facility: CLINIC | Age: 83
End: 2023-06-20

## 2023-06-20 ENCOUNTER — OUTPATIENT (OUTPATIENT)
Dept: OUTPATIENT SERVICES | Facility: HOSPITAL | Age: 83
LOS: 1 days | End: 2023-06-20
Payer: MEDICARE

## 2023-06-20 ENCOUNTER — LABORATORY RESULT (OUTPATIENT)
Age: 83
End: 2023-06-20

## 2023-06-20 DIAGNOSIS — D50.9 IRON DEFICIENCY ANEMIA, UNSPECIFIED: ICD-10-CM

## 2023-06-20 DIAGNOSIS — D64.9 ANEMIA, UNSPECIFIED: ICD-10-CM

## 2023-06-20 LAB
HCT VFR BLD CALC: 28.9 %
HGB BLD-MCNC: 8.8 G/DL
MCHC RBC-ENTMCNC: 24 PG
MCHC RBC-ENTMCNC: 30.4 G/DL
MCV RBC AUTO: 78.7 FL
PLATELET # BLD AUTO: 234 K/UL
PMV BLD: 10.5 FL
RBC # BLD: 3.67 M/UL
RBC # FLD: 19.6 %
WBC # FLD AUTO: 6.31 K/UL

## 2023-06-20 PROCEDURE — 96365 THER/PROPH/DIAG IV INF INIT: CPT

## 2023-06-20 PROCEDURE — 85027 COMPLETE CBC AUTOMATED: CPT

## 2023-06-20 RX ORDER — IRON SUCROSE 20 MG/ML
200 INJECTION, SOLUTION INTRAVENOUS ONCE
Refills: 0 | Status: COMPLETED | OUTPATIENT
Start: 2023-06-20 | End: 2023-06-20

## 2023-06-20 RX ADMIN — IRON SUCROSE 220 MILLIGRAM(S): 20 INJECTION, SOLUTION INTRAVENOUS at 15:15

## 2023-06-20 RX ADMIN — IRON SUCROSE 200 MILLIGRAM(S): 20 INJECTION, SOLUTION INTRAVENOUS at 15:45

## 2023-06-26 ENCOUNTER — APPOINTMENT (OUTPATIENT)
Dept: INFUSION THERAPY | Facility: CLINIC | Age: 83
End: 2023-06-26

## 2023-06-26 ENCOUNTER — OUTPATIENT (OUTPATIENT)
Dept: OUTPATIENT SERVICES | Facility: HOSPITAL | Age: 83
LOS: 1 days | End: 2023-06-26
Payer: MEDICARE

## 2023-06-26 ENCOUNTER — APPOINTMENT (OUTPATIENT)
Dept: ELECTROPHYSIOLOGY | Facility: CLINIC | Age: 83
End: 2023-06-26
Payer: MEDICARE

## 2023-06-26 ENCOUNTER — LABORATORY RESULT (OUTPATIENT)
Age: 83
End: 2023-06-26

## 2023-06-26 VITALS
WEIGHT: 181 LBS | SYSTOLIC BLOOD PRESSURE: 100 MMHG | HEIGHT: 72 IN | DIASTOLIC BLOOD PRESSURE: 68 MMHG | BODY MASS INDEX: 24.52 KG/M2 | HEART RATE: 99 BPM | TEMPERATURE: 98 F

## 2023-06-26 DIAGNOSIS — Z98.890 OTHER SPECIFIED POSTPROCEDURAL STATES: Chronic | ICD-10-CM

## 2023-06-26 DIAGNOSIS — D50.9 IRON DEFICIENCY ANEMIA, UNSPECIFIED: ICD-10-CM

## 2023-06-26 DIAGNOSIS — Z98.61 CORONARY ANGIOPLASTY STATUS: Chronic | ICD-10-CM

## 2023-06-26 DIAGNOSIS — D64.9 ANEMIA, UNSPECIFIED: ICD-10-CM

## 2023-06-26 PROCEDURE — 99215 OFFICE O/P EST HI 40 MIN: CPT

## 2023-06-26 PROCEDURE — 96365 THER/PROPH/DIAG IV INF INIT: CPT

## 2023-06-26 PROCEDURE — 93000 ELECTROCARDIOGRAM COMPLETE: CPT

## 2023-06-26 PROCEDURE — 85027 COMPLETE CBC AUTOMATED: CPT

## 2023-06-26 RX ORDER — IRON SUCROSE 20 MG/ML
200 INJECTION, SOLUTION INTRAVENOUS ONCE
Refills: 0 | Status: COMPLETED | OUTPATIENT
Start: 2023-06-26 | End: 2023-06-26

## 2023-06-26 RX ORDER — DIGOXIN 250 UG/1
250 TABLET ORAL DAILY
Refills: 0 | Status: COMPLETED | COMMUNITY
End: 2023-06-26

## 2023-06-26 RX ADMIN — IRON SUCROSE 220 MILLIGRAM(S): 20 INJECTION, SOLUTION INTRAVENOUS at 15:45

## 2023-06-27 ENCOUNTER — APPOINTMENT (OUTPATIENT)
Dept: HEMATOLOGY ONCOLOGY | Facility: CLINIC | Age: 83
End: 2023-06-27

## 2023-06-27 LAB
HCT VFR BLD CALC: 29.3 %
HGB BLD-MCNC: 9 G/DL
MCHC RBC-ENTMCNC: 24.8 PG
MCHC RBC-ENTMCNC: 30.7 G/DL
MCV RBC AUTO: 80.7 FL
PLATELET # BLD AUTO: 164 K/UL
PMV BLD: 11 FL
RBC # BLD: 3.63 M/UL
RBC # FLD: 20.6 %
WBC # FLD AUTO: 6.67 K/UL

## 2023-07-01 NOTE — CARDIOLOGY SUMMARY
[de-identified] : (06/26/2023): Atrial fibrillation 99 bpm, RBBB, LAFB\par (05/08/2023): Sinus rhythm at 90 bpm, RBBB, non-specific ST-T wave abnormalities. \par (03/07/2023): Sinus rhythm at 96 bpm, RBBB, non-specific ST-T wave abnormalities. \par  [de-identified] : (10/13/2022): 2D echo. LVEF 38%. Moderately decreased global LV systolic function. Moderately reduced RV systolic function. [id number] mitral valve repair with mitral clip. Moderate pericardial effusion.  [de-identified] : 05/16/2023: ANGY closure (Watchman) [de-identified] : (3/27/2023) Endoscopy with thermal treatment and endoclip.

## 2023-07-01 NOTE — END OF VISIT
[Time Spent: ___ minutes] : I have spent [unfilled] minutes of time on the encounter. [FreeTextEntry3] : I was present with the NP/PA during the history and exam of the patient. I discussed patient's management with her in details.I reviewed the NP’s note and agree with the documented findings and plan of care. I would like to take this opportunity to thank you for involving me in this patient care. Please do not hesitate to contact me if you have any further questions at [phone].\par

## 2023-07-01 NOTE — PHYSICAL EXAM
[Well Developed] : well developed [Well Nourished] : well nourished [No Acute Distress] : no acute distress [Normal Conjunctiva] : normal conjunctiva [Normal Venous Pressure] : normal venous pressure [No Carotid Bruit] : no carotid bruit [Normal S1, S2] : normal S1, S2 [No Murmur] : no murmur [No Rub] : no rub [No Gallop] : no gallop [Clear Lung Fields] : clear lung fields [Good Air Entry] : good air entry [No Respiratory Distress] : no respiratory distress  [Soft] : abdomen soft [Non Tender] : non-tender [No Masses/organomegaly] : no masses/organomegaly [Normal Bowel Sounds] : normal bowel sounds [No Edema] : no edema [No Cyanosis] : no cyanosis [No Clubbing] : no clubbing [No Varicosities] : no varicosities [No Rash] : no rash [No Skin Lesions] : no skin lesions [Moves all extremities] : moves all extremities [No Focal Deficits] : no focal deficits [Normal Speech] : normal speech [Alert and Oriented] : alert and oriented [Normal memory] : normal memory [de-identified] : ambulates with a cane

## 2023-07-01 NOTE — HISTORY OF PRESENT ILLNESS
[FreeTextEntry1] : Hypertension, hyperlipidemia, paroxysmal atrial fibrillation (04/2021), mitral regurgitation s/p mitral clip, coronary artery disease s/p PCI to proximal LAD, cardiomyopathy, and recurrent GI bleed. \par \par Patient had multiple hospitalizations for GI bleeds and anemia, including January 2022 and January 2023. He had EGD, colonoscopy, and capsule endoscopy. EGD on 01/19/2023 showed non-erosive gastritis, angioectasias in the second part of the duodenum, and hiatal hernia. Colonoscopy on 01/19/2023 showed polyp in the ascending colon s/p polypectomy, internal and external hemorrhoids, and diverticulosis of the left side of the colon. 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 Patient is planned for enteroscopy with possible APC. \par \par on 3/27/2023 patient had endoscopy and thermal treatment and endoclip for AVMs\par \par Patient is currently on Xarelto 10 mg daily and is following with hematology and gastroenterology. He is not able to tolerate full-dose DOAC due to recurrence of GI bleeds. \par \par 05/16/2023 underwent ANGY closure (Watchman)\par \par Patient has no chest pain, no shortness of breath at rest, no dyspnea on exertion, no dizziness, no lightheadedness, and no syncope. \par \par

## 2023-07-01 NOTE — DISCUSSION/SUMMARY
[FreeTextEntry1] : Mr. Bhavik Miguel is a pleasant 83 year-old man with hypertension, hyperlipidemia, paroxysmal atrial fibrillation, coronary artery disease s/p PCI, cardiomyopathy with EF of 38%, severe MR s/p mitral clip, and recurrent GI bleed. \par \par Patient's CHADSVASC score is 5 (age, HTN, CAD, CHF), and HASBLED score is 4.  \par \par Underwent ANGY closure (Watchman) on 05/16/2023\par \par He had enteroscopy with thermal therapy and endoclip. I recommend GI follow up.\par \par I recommend to stop Digoxin 250 mcg daily. last digoxin level 1.2 in November 2022.\par \par Last EF from 10/2022 was 38%.  \par \par Scheduled for ROSARIO/DCCV on 07/05/2023 with Dr. Judd.\par Labs ordered: CMP, CBC with diff, PT/PTT\par \par I recommend to continue the same medication. but to lower the Amiodarone to 200 mg once a day.\par \par I discussed with patient plan of care in great details. I answered all his questions to his satisfaction. Patient was pleased with the visit.\par \par Patient will follow with me in 6 months’ time. Please do not hesitate to contact me at [phone] if you have any further questions regarding this patient care. [EKG obtained to assist in diagnosis and management of assessed problem(s)] : EKG obtained to assist in diagnosis and management of assessed problem(s)

## 2023-07-03 ENCOUNTER — APPOINTMENT (OUTPATIENT)
Dept: INFUSION THERAPY | Facility: CLINIC | Age: 83
End: 2023-07-03

## 2023-07-03 ENCOUNTER — OUTPATIENT (OUTPATIENT)
Dept: OUTPATIENT SERVICES | Facility: HOSPITAL | Age: 83
LOS: 1 days | End: 2023-07-03
Payer: MEDICARE

## 2023-07-03 ENCOUNTER — LABORATORY RESULT (OUTPATIENT)
Age: 83
End: 2023-07-03

## 2023-07-03 DIAGNOSIS — Z98.890 OTHER SPECIFIED POSTPROCEDURAL STATES: Chronic | ICD-10-CM

## 2023-07-03 DIAGNOSIS — D64.9 ANEMIA, UNSPECIFIED: ICD-10-CM

## 2023-07-03 DIAGNOSIS — Z98.61 CORONARY ANGIOPLASTY STATUS: Chronic | ICD-10-CM

## 2023-07-03 LAB
ALBUMIN SERPL ELPH-MCNC: 3.9 G/DL
ALP BLD-CCNC: 81 U/L
ALT SERPL-CCNC: 12 U/L
ANION GAP SERPL CALC-SCNC: 10 MMOL/L
APTT BLD: 35.6 SEC
AST SERPL-CCNC: 16 U/L
BILIRUB SERPL-MCNC: 0.3 MG/DL
BUN SERPL-MCNC: 16 MG/DL
CALCIUM SERPL-MCNC: 9.5 MG/DL
CHLORIDE SERPL-SCNC: 100 MMOL/L
CO2 SERPL-SCNC: 30 MMOL/L
CREAT SERPL-MCNC: 1.2 MG/DL
EGFR: 60 ML/MIN/1.73M2
GLUCOSE SERPL-MCNC: 86 MG/DL
INR PPP: 1.25 RATIO
POTASSIUM SERPL-SCNC: 4.1 MMOL/L
PROT SERPL-MCNC: 7.1 G/DL
PT BLD: 14.3 SEC
SODIUM SERPL-SCNC: 140 MMOL/L

## 2023-07-03 PROCEDURE — 85027 COMPLETE CBC AUTOMATED: CPT

## 2023-07-03 PROCEDURE — 96365 THER/PROPH/DIAG IV INF INIT: CPT

## 2023-07-03 RX ORDER — IRON SUCROSE 20 MG/ML
200 INJECTION, SOLUTION INTRAVENOUS ONCE
Refills: 0 | Status: COMPLETED | OUTPATIENT
Start: 2023-07-03 | End: 2023-07-03

## 2023-07-03 RX ADMIN — IRON SUCROSE 220 MILLIGRAM(S): 20 INJECTION, SOLUTION INTRAVENOUS at 16:35

## 2023-07-03 RX ADMIN — IRON SUCROSE 200 MILLIGRAM(S): 20 INJECTION, SOLUTION INTRAVENOUS at 17:20

## 2023-07-04 DIAGNOSIS — D64.9 ANEMIA, UNSPECIFIED: ICD-10-CM

## 2023-07-05 ENCOUNTER — OUTPATIENT (OUTPATIENT)
Dept: OUTPATIENT SERVICES | Facility: HOSPITAL | Age: 83
LOS: 1 days | End: 2023-07-05
Payer: MEDICARE

## 2023-07-05 VITALS
OXYGEN SATURATION: 99 % | RESPIRATION RATE: 16 BRPM | DIASTOLIC BLOOD PRESSURE: 67 MMHG | SYSTOLIC BLOOD PRESSURE: 90 MMHG | HEART RATE: 115 BPM

## 2023-07-05 DIAGNOSIS — I48.19 OTHER PERSISTENT ATRIAL FIBRILLATION: ICD-10-CM

## 2023-07-05 DIAGNOSIS — Z98.61 CORONARY ANGIOPLASTY STATUS: Chronic | ICD-10-CM

## 2023-07-05 DIAGNOSIS — Z98.890 OTHER SPECIFIED POSTPROCEDURAL STATES: Chronic | ICD-10-CM

## 2023-07-05 LAB
HCT VFR BLD CALC: 29.8 %
HGB BLD-MCNC: 9.1 G/DL
MCHC RBC-ENTMCNC: 24.6 PG
MCHC RBC-ENTMCNC: 30.5 G/DL
MCV RBC AUTO: 80.5 FL
PLATELET # BLD AUTO: 205 K/UL
PMV BLD: 11.4 FL
RBC # BLD: 3.7 M/UL
RBC # FLD: 19.7 %
WBC # FLD AUTO: 6.14 K/UL

## 2023-07-05 PROCEDURE — 93312 ECHO TRANSESOPHAGEAL: CPT | Mod: 26

## 2023-07-05 PROCEDURE — 93312 ECHO TRANSESOPHAGEAL: CPT

## 2023-07-05 PROCEDURE — 80048 BASIC METABOLIC PNL TOTAL CA: CPT

## 2023-07-05 PROCEDURE — 93325 DOPPLER ECHO COLOR FLOW MAPG: CPT

## 2023-07-05 PROCEDURE — 36415 COLL VENOUS BLD VENIPUNCTURE: CPT

## 2023-07-05 PROCEDURE — 93325 DOPPLER ECHO COLOR FLOW MAPG: CPT | Mod: 26

## 2023-07-05 PROCEDURE — 93320 DOPPLER ECHO COMPLETE: CPT

## 2023-07-05 PROCEDURE — 93320 DOPPLER ECHO COMPLETE: CPT | Mod: 26

## 2023-07-05 PROCEDURE — 92960 CARDIOVERSION ELECTRIC EXT: CPT

## 2023-07-05 NOTE — PACU DISCHARGE NOTE - NSPTMEETSDISCHCRITERIADT_GEN_A_CORE
05-Jul-2023 08:15 Assumed care of patient. Report received from GRAHAM Mckeon. Patient A&Ox 4. Call light within reach, bed locked and in lowest position. Bed alarm on, instructed patient to call for assistance. Patient rating pain 0/10. Questions answered, no needs at this time.    05-Jul-2023 09:10

## 2023-07-05 NOTE — PRE-ANESTHESIA EVALUATION ADULT - NSRADCARDRESULTSFT_GEN_ALL_CORE
TTE 9/15/22  Summary:   1. LV Ejection Fraction by Avelar's Method with a biplane EF of 43 %.   2. Mildly to moderately decreased global left ventricular systolic   function.   3. Mildly enlarged right ventricle.   4. Moderately reduced RV systolic function.   5. Edge to edge mitral valve repair with Mitral clip (XTW central and XT   lateral) Clips appear to eb stable. Mild residual mitral regurgitation   noted at the lateral location. Mean transmitrla pressure gradient is 3   mmHg at HR of 60 BPM.   6. Mild aortic regurgitation.   7. Mild tricuspid regurgitation.   8. Estimated pulmonary arterysystolic pressure is 43.1 mmHg assuming a   right atrial pressure of 15 mmHg, which is consistent with mild pulmonary   hypertension.   9. Moderately enlarged left atrium.  10. LA volume Index is 42.2 ml/m² ml/m2.  11. Moderate pleural effusion in the left lateral region.

## 2023-07-05 NOTE — PRE-ANESTHESIA EVALUATION ADULT - NSPREOPDXFT_GEN_ALL_CORE
Telephone Encounter by Shereen Hale at 11/15/17 12:03 PM     Author:  Shereen Hale Service:  (none) Author Type:  Patient      Filed:  11/15/17 12:03 PM Encounter Date:  11/13/2017 Status:  Signed     :  Shereen Hale (Patient )            Faxed[MS1.1M]      Revision History        User Key Date/Time User Provider Type Action    > MS1.1 11/15/17 12:03 PM Shereen Hale Patient  Sign    M - Manual             Watchman follow up

## 2023-07-05 NOTE — H&P CARDIOLOGY - HISTORY OF PRESENT ILLNESS
83 year-old M with HTN, HLD, paroxysmal atrial fibrillation, CAD s/p PCI to pLAD, cardiomyopathy with EF of 38%, severe MR s/p mitral clip, 05/16/2023 underwent ANGY closure (Watchman), h/o recurrent GI bleed with  multiple hospitalizations for GI bleeds and anemia, including January 2022 and January 2023. He had EGD, colonoscopy, and capsule endoscopy. EGD on 01/19/2023 showed non-erosive gastritis, angioectasias in the second part of the duodenum, and hiatal hernia. Colonoscopy on 01/19/2023 showed polyp in the ascending colon s/p polypectomy, internal and external hemorrhoids, and diverticulosis of the left side of the colon. 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  Patient is currently on Xarelto 10 mg daily and is following with hematology and gastroenterology. He is not able to tolerate full-dose DOAC due to recurrence of GI bleeds.  Patient now presents for ROSARIO with possible DCCV.

## 2023-07-06 DIAGNOSIS — I48.19 OTHER PERSISTENT ATRIAL FIBRILLATION: ICD-10-CM

## 2023-07-07 RX ORDER — RIVAROXABAN 10 MG/1
10 TABLET, FILM COATED ORAL
Qty: 30 | Refills: 0 | Status: DISCONTINUED | COMMUNITY
Start: 1900-01-01 | End: 2023-07-07

## 2023-07-10 ENCOUNTER — RESULT CHARGE (OUTPATIENT)
Age: 83
End: 2023-07-10

## 2023-07-10 ENCOUNTER — APPOINTMENT (OUTPATIENT)
Dept: HEMATOLOGY ONCOLOGY | Facility: CLINIC | Age: 83
End: 2023-07-10

## 2023-07-10 ENCOUNTER — APPOINTMENT (OUTPATIENT)
Dept: CARDIOLOGY | Facility: CLINIC | Age: 83
End: 2023-07-10
Payer: MEDICARE

## 2023-07-10 ENCOUNTER — OUTPATIENT (OUTPATIENT)
Dept: OUTPATIENT SERVICES | Facility: HOSPITAL | Age: 83
LOS: 1 days | End: 2023-07-10
Payer: MEDICARE

## 2023-07-10 ENCOUNTER — LABORATORY RESULT (OUTPATIENT)
Age: 83
End: 2023-07-10

## 2023-07-10 VITALS
DIASTOLIC BLOOD PRESSURE: 68 MMHG | HEART RATE: 73 BPM | WEIGHT: 186 LBS | HEIGHT: 72 IN | BODY MASS INDEX: 25.19 KG/M2 | SYSTOLIC BLOOD PRESSURE: 100 MMHG

## 2023-07-10 DIAGNOSIS — D64.9 ANEMIA, UNSPECIFIED: ICD-10-CM

## 2023-07-10 DIAGNOSIS — Z98.61 CORONARY ANGIOPLASTY STATUS: Chronic | ICD-10-CM

## 2023-07-10 LAB
HCT VFR BLD CALC: 32.1 %
HGB BLD-MCNC: 9.5 G/DL
MCHC RBC-ENTMCNC: 24 PG
MCHC RBC-ENTMCNC: 29.6 G/DL
MCV RBC AUTO: 81.1 FL
PLATELET # BLD AUTO: 297 K/UL
PMV BLD: 9.9 FL
RBC # BLD: 3.96 M/UL
RBC # FLD: 19.4 %
WBC # FLD AUTO: 6.25 K/UL

## 2023-07-10 PROCEDURE — 93000 ELECTROCARDIOGRAM COMPLETE: CPT

## 2023-07-10 PROCEDURE — 99214 OFFICE O/P EST MOD 30 MIN: CPT

## 2023-07-10 PROCEDURE — 85027 COMPLETE CBC AUTOMATED: CPT

## 2023-07-10 NOTE — ASSESSMENT
[FreeTextEntry1] : AF s/p DCCV\par Rhythm controlled\par [id number] = (5).  s/p WATCHMAN (GIB)\par \par Moderate to severe LV dysfunction.\par s/p MitraClip (worsening residual MR in setting of decompensation)\par Relatively compensated \par \par BP controlled

## 2023-07-10 NOTE — DISCUSSION/SUMMARY
[FreeTextEntry1] : Cont current Rx at present (clarify for visit with Dr. Judd)\par Repeat ECHO after rhythm control and reinstitution of GDMT to reevaluate LVSF / MR.\par Follow-up [facility] 3-months

## 2023-07-10 NOTE — PHYSICAL EXAM
[de-identified] : well appearing, no distress [de-identified] : reg, nL s1/s2, no m/r/g [de-identified] : CTA [de-identified] : warm, trace edema [de-identified] : alert, normal affect, logical conversation

## 2023-07-10 NOTE — REASON FOR VISIT
[FreeTextEntry1] : Multiple episodes of GI bleeding / hospitalizations.  had endoscopic treatment of AVM.  Ultimately, s/p WATCHMAN and anticoagulation stopped.  Subsequent DCCV / rhythm control.\par \par LV function deteriorated in setting of above with worsening of residual MR post Clip (initially mild).\par \par Now feels well.  Breathing comfortable.\par \par Wants to start exercising.\par \par Some Rx confusion.  Has appointment with Dr. Mayfield coming up.

## 2023-07-13 ENCOUNTER — APPOINTMENT (OUTPATIENT)
Dept: CARDIOLOGY | Facility: CLINIC | Age: 83
End: 2023-07-13
Payer: MEDICARE

## 2023-07-13 VITALS
BODY MASS INDEX: 24.65 KG/M2 | HEART RATE: 105 BPM | HEIGHT: 72 IN | SYSTOLIC BLOOD PRESSURE: 100 MMHG | DIASTOLIC BLOOD PRESSURE: 60 MMHG | WEIGHT: 182 LBS

## 2023-07-13 PROCEDURE — 99214 OFFICE O/P EST MOD 30 MIN: CPT

## 2023-07-13 PROCEDURE — 93000 ELECTROCARDIOGRAM COMPLETE: CPT

## 2023-07-13 NOTE — HISTORY OF PRESENT ILLNESS
[FreeTextEntry1] : 83-yo male with h/o mild LV dysfunction (LVEF 44%).\par \par H/o HTN, hyperlipidemia. Patient was found to be in A-fib in April 2021. Prior GIB\par S/p PCI of proximal LAD.\par \par Pt presents for a follow up visit. He had endoscopic treatment of AVM 3/27/23, s/p ANGY closure Watchman 5/16/23- AC stopped on ASA and Plavix now, s/p Mitraclip, LV function deteriorated with worsening of residual MR. S/p recent DCCV. He denies chest pain, COLLIER or palpitations, but c/o fatigue after walking two blocks. \par \par Hgb 9.5.\par \par Recent ROSARIO:\par LVEF 30-35%\par LAE, RENEE\par Moderately reduced RV function\par Moderate to severe MR\par Moderate TR, Mild to moderate UT\par Atheroma aoritc arch\par Watchman in place

## 2023-07-13 NOTE — PHYSICAL EXAM
[Well Developed] : well developed [Well Nourished] : well nourished [No Acute Distress] : no acute distress [Normal Conjunctiva] : normal conjunctiva [Normal Venous Pressure] : normal venous pressure [No Carotid Bruit] : no carotid bruit [No Rub] : no rub [Clear Lung Fields] : clear lung fields [Good Air Entry] : good air entry [No Respiratory Distress] : no respiratory distress  [Soft] : abdomen soft [Non Tender] : non-tender [Normal Bowel Sounds] : normal bowel sounds [Normal Gait] : normal gait [No Cyanosis] : no cyanosis [No Clubbing] : no clubbing [No Varicosities] : no varicosities [No Rash] : no rash [Moves all extremities] : moves all extremities [Normal Speech] : normal speech [Alert and Oriented] : alert and oriented [Normal memory] : normal memory [Murmur] : murmur [No Edema] : no edema [de-identified] : 2/6 SM at the apex [de-identified] : irregular S1 and S2

## 2023-07-13 NOTE — ASSESSMENT
[FreeTextEntry1] : 83-yo male with h/o HTN\par CAD, s/p CAMDEN of proximal LAD.\par Recurrent GIB. \par Persistent A-fib. S/p Watchman, s/p recent DCCV. Recurrent A-fib today.\par S/p MitraClip. Moderate to severe MR again, likely due to worsened LV function.\par LVEF 30-35%.\par \par \par Plan:\par Continue Amiodarone and Metoprolol for rate control. Unable to increase due to BP.\par Increase Midodrine to 3 times a day.\par Continue ASA and Plavix.\par May need ICD and AVN ablation for rate control, will discuss with EP. \par F/u in 1 month, will repeat blood work at that point.\par \par \par Tera Judd MD\par

## 2023-07-13 NOTE — CARDIOLOGY SUMMARY
[de-identified] : 07/13/23:\par A-fib, /min, RBBB. [de-identified] : ECHO 10/2022:\par LVEF 38%\par Moderately rediced LV and RV function\par \par 03/10/22:\par LVEF 48%, G2DD\par LAE, RENEE\par Mod-severe MR.\par \par 05/14/21:\par LVEF 44%, G2DD\par Mod LAE\par Mod-severe MR.

## 2023-07-13 NOTE — REVIEW OF SYSTEMS
[Dyspnea on exertion] : dyspnea during exertion [Negative] : Neurological [Fever] : no fever [Chills] : no chills [Feeling Fatigued] : feeling fatigued [Lower Ext Edema] : no extremity edema [Leg Claudication] : no intermittent leg claudication [Palpitations] : no palpitations [Orthopnea] : no orthopnea [Syncope] : no syncope [Rash] : no rash [Anxiety] : no anxiety [Easy Bleeding] : no tendency for easy bleeding [Easy Bruising] : no tendency for easy bruising

## 2023-07-14 ENCOUNTER — RESULT CHARGE (OUTPATIENT)
Age: 83
End: 2023-07-14

## 2023-07-15 NOTE — ED ADULT TRIAGE NOTE - IDEAL BODY WEIGHT(KG)
78 REVIEW OF SYSTEMS:    CONSTITUTIONAL: (+) extremity weakness, no fevers or chills  EYES/ENT: No visual changes;  No vertigo or throat pain   NECK: No pain or stiffness  RESPIRATORY: No cough, wheezing, hemoptysis; No shortness of breath  CARDIOVASCULAR: No chest pain or palpitations  GASTROINTESTINAL: No abdominal or epigastric pain. No nausea, vomiting, or hematemesis; No diarrhea or constipation. No melena or hematochezia.  GENITOURINARY: No dysuria, frequency or hematuria  NEUROLOGICAL: No numbness or weakness  SKIN: No itching, burning, rashes, or lesions   All other review of systems is negative unless indicated above.

## 2023-07-17 ENCOUNTER — APPOINTMENT (OUTPATIENT)
Dept: HEMATOLOGY ONCOLOGY | Facility: CLINIC | Age: 83
End: 2023-07-17

## 2023-07-18 ENCOUNTER — APPOINTMENT (OUTPATIENT)
Dept: INFUSION THERAPY | Facility: CLINIC | Age: 83
End: 2023-07-18

## 2023-07-24 ENCOUNTER — APPOINTMENT (OUTPATIENT)
Dept: HEMATOLOGY ONCOLOGY | Facility: CLINIC | Age: 83
End: 2023-07-24

## 2023-07-24 ENCOUNTER — LABORATORY RESULT (OUTPATIENT)
Age: 83
End: 2023-07-24

## 2023-07-24 ENCOUNTER — OUTPATIENT (OUTPATIENT)
Dept: OUTPATIENT SERVICES | Facility: HOSPITAL | Age: 83
LOS: 1 days | End: 2023-07-24
Payer: MEDICARE

## 2023-07-24 DIAGNOSIS — Z98.890 OTHER SPECIFIED POSTPROCEDURAL STATES: Chronic | ICD-10-CM

## 2023-07-24 DIAGNOSIS — D64.9 ANEMIA, UNSPECIFIED: ICD-10-CM

## 2023-07-24 DIAGNOSIS — Z98.61 CORONARY ANGIOPLASTY STATUS: Chronic | ICD-10-CM

## 2023-07-24 LAB
HCT VFR BLD CALC: 33.8 %
HGB BLD-MCNC: 10.1 G/DL
MCHC RBC-ENTMCNC: 23.7 PG
MCHC RBC-ENTMCNC: 29.9 G/DL
MCV RBC AUTO: 79.2 FL
PLATELET # BLD AUTO: 278 K/UL
PMV BLD: 10.2 FL
RBC # BLD: 4.27 M/UL
RBC # FLD: 18.2 %
WBC # FLD AUTO: 6.03 K/UL

## 2023-07-24 PROCEDURE — 85027 COMPLETE CBC AUTOMATED: CPT

## 2023-07-24 PROCEDURE — 36416 COLLJ CAPILLARY BLOOD SPEC: CPT

## 2023-07-24 NOTE — CHART NOTE - NSCHARTNOTEFT_GEN_A_CORE
PACU ANESTHESIA ADMISSION NOTE      Procedure: EGD  Post op diagnosis:  Anemia        __x__  Patent Airway    __x__  Full return of protective reflexes    ____  Full recovery from anesthesia / back to baseline     Vitals:   T: 98.1          R:  12                BP:   90/52               Sat:  98%                 P: 84      Mental Status:  ____ Awake   _____ Alert   __x___ Drowsy   _____ Sedated    Nausea/Vomiting:  ____ NO  ______Yes,   See Post - Op Orders          Pain Scale (0-10):  _____    Treatment: ____ None    ___x_ See Post - Op/PCA Orders    Post - Operative Fluids:   ____ Oral   __x__ See Post - Op Orders    Plan: Discharge:   ____Home       ___x__Floor     _____Critical Care    _____  Other:_________________    Comments: Pt tolerated procedure well, no anesthesia related complications. Care of pt endorsed to PACU, report given to PACU RN. Discharge when criteria are met. Minoxidil Pregnancy And Lactation Text: This medication has not been assigned a Pregnancy Risk Category but animal studies failed to show danger with the topical medication. It is unknown if the medication is excreted in breast milk.

## 2023-07-25 ENCOUNTER — APPOINTMENT (OUTPATIENT)
Dept: INFUSION THERAPY | Facility: CLINIC | Age: 83
End: 2023-07-25

## 2023-07-31 ENCOUNTER — OUTPATIENT (OUTPATIENT)
Dept: OUTPATIENT SERVICES | Facility: HOSPITAL | Age: 83
LOS: 1 days | End: 2023-07-31
Payer: MEDICARE

## 2023-07-31 ENCOUNTER — LABORATORY RESULT (OUTPATIENT)
Age: 83
End: 2023-07-31

## 2023-07-31 ENCOUNTER — APPOINTMENT (OUTPATIENT)
Dept: HEMATOLOGY ONCOLOGY | Facility: CLINIC | Age: 83
End: 2023-07-31

## 2023-07-31 DIAGNOSIS — Z98.61 CORONARY ANGIOPLASTY STATUS: Chronic | ICD-10-CM

## 2023-07-31 DIAGNOSIS — D64.9 ANEMIA, UNSPECIFIED: ICD-10-CM

## 2023-07-31 DIAGNOSIS — Z98.890 OTHER SPECIFIED POSTPROCEDURAL STATES: Chronic | ICD-10-CM

## 2023-07-31 PROCEDURE — 85027 COMPLETE CBC AUTOMATED: CPT

## 2023-07-31 PROCEDURE — 36416 COLLJ CAPILLARY BLOOD SPEC: CPT

## 2023-08-01 ENCOUNTER — APPOINTMENT (OUTPATIENT)
Dept: INFUSION THERAPY | Facility: CLINIC | Age: 83
End: 2023-08-01

## 2023-08-01 LAB
HCT VFR BLD CALC: 36.8 %
HGB BLD-MCNC: 10.6 G/DL
MCHC RBC-ENTMCNC: 23.3 PG
MCHC RBC-ENTMCNC: 28.8 G/DL
MCV RBC AUTO: 81.1 FL
PLATELET # BLD AUTO: 230 K/UL
PMV BLD: 11.7 FL
RBC # BLD: 4.54 M/UL
RBC # FLD: 18.6 %
WBC # FLD AUTO: 8.06 K/UL

## 2023-08-03 ENCOUNTER — APPOINTMENT (OUTPATIENT)
Dept: CARDIOTHORACIC SURGERY | Facility: CLINIC | Age: 83
End: 2023-08-03

## 2023-08-03 NOTE — HISTORY OF PRESENT ILLNESS
[FreeTextEntry1] : Mr. MIGEL MOYA is a 83 year M that arrives today for a post op visit. Patient is status post TAVR on ____________ via _______________approach. Patient had [employer]/Merlos ______(valve sz).  Patient arrives to the office for their _____________visit. On arrival patient ________. Denies SOB or palpitation. NYHA class    . Patient currently living at (home/assisted living) with (alone/ aid). Access site looks _______.

## 2023-08-10 ENCOUNTER — APPOINTMENT (OUTPATIENT)
Dept: CARDIOLOGY | Facility: CLINIC | Age: 83
End: 2023-08-10
Payer: MEDICARE

## 2023-08-10 VITALS
HEART RATE: 77 BPM | WEIGHT: 187 LBS | BODY MASS INDEX: 25.33 KG/M2 | DIASTOLIC BLOOD PRESSURE: 58 MMHG | HEIGHT: 72 IN | SYSTOLIC BLOOD PRESSURE: 100 MMHG

## 2023-08-10 PROCEDURE — 93000 ELECTROCARDIOGRAM COMPLETE: CPT

## 2023-08-10 PROCEDURE — 99214 OFFICE O/P EST MOD 30 MIN: CPT

## 2023-08-10 NOTE — CARDIOLOGY SUMMARY
[de-identified] : 08/10/23: SR 77/min, RBBB. [de-identified] : ECHO 10/2022:\par  LVEF 38%\par  Moderately rediced LV and RV function\par  \par  03/10/22:\par  LVEF 48%, G2DD\par  LAE, RENEE\par  Mod-severe MR.\par  \par  05/14/21:\par  LVEF 44%, G2DD\par  Mod LAE\par  Mod-severe MR.

## 2023-08-10 NOTE — PHYSICAL EXAM
[Well Developed] : well developed [Well Nourished] : well nourished [No Acute Distress] : no acute distress [Normal Conjunctiva] : normal conjunctiva [Normal Venous Pressure] : normal venous pressure [No Carotid Bruit] : no carotid bruit [No Rub] : no rub [Murmur] : murmur [Clear Lung Fields] : clear lung fields [Good Air Entry] : good air entry [No Respiratory Distress] : no respiratory distress  [Soft] : abdomen soft [Non Tender] : non-tender [Normal Bowel Sounds] : normal bowel sounds [Normal Gait] : normal gait [No Edema] : no edema [No Cyanosis] : no cyanosis [No Clubbing] : no clubbing [No Varicosities] : no varicosities [No Rash] : no rash [Moves all extremities] : moves all extremities [Normal Speech] : normal speech [Alert and Oriented] : alert and oriented [Normal memory] : normal memory [de-identified] : irregular S1 and S2 [Normal S1, S2] : normal S1, S2 [S4] : S4 [de-identified] : 2/6 SM at the apex

## 2023-08-10 NOTE — HISTORY OF PRESENT ILLNESS
[FreeTextEntry1] : 84 y/o male with h/o mild LV dysfunction (LVEF 44%).  H/o HTN, hyperlipidemia. Patient was found to be in A-fib in April 2021. Prior GIB S/p PCI of proximal LAD.  Pt presents for a follow up visit. He had endoscopic treatment of AVM 3/27/23, s/p ANGY closure Watchman 5/16/23- AC stopped on ASA and Plavix now, s/p Mitraclip, LV function deteriorated with worsening of residual MR.   S/p recent DCCV. He denies chest pain, COLLIER or palpitations, but c/o fatigue after walking two blocks.   Hgb 9.5.  Recent ROSARIO: LVEF 30-35% LAE, RENEE Moderately reduced RV function Moderate to severe MR Moderate TR, Mild to moderate IN Atheroma aoritc arch Watchman in place

## 2023-08-10 NOTE — REVIEW OF SYSTEMS
[Feeling Fatigued] : feeling fatigued [Dyspnea on exertion] : dyspnea during exertion [Negative] : Neurological [Fever] : no fever [Chills] : no chills [Lower Ext Edema] : no extremity edema [Leg Claudication] : no intermittent leg claudication [Palpitations] : no palpitations [Orthopnea] : no orthopnea [Syncope] : no syncope [Rash] : no rash [Anxiety] : no anxiety [Easy Bleeding] : no tendency for easy bleeding [Easy Bruising] : no tendency for easy bruising

## 2023-08-10 NOTE — ASSESSMENT
[FreeTextEntry1] : 83-yo male with h/o HTN CAD, s/p CAMDEN of proximal LAD. Recurrent GIB.  Persistent A-fib. S/p Watchman, s/p recent DCCV. In SR and doing better. S/p MitraClip. Moderate to severe MR again, likely due to worsened LV function. LVEF 30-35%.   Plan: Continue Amiodarone and Metoprolol.  Continue Midodrine 3 times a day for now Continue ASA and Plavix. Will repeat ECHO in 2 months to reassess EF and MR. Will need BiV ICD if LVEF is still < 35%. F/u in 2 months.  Tera Judd MD

## 2023-08-11 ENCOUNTER — RESULT CHARGE (OUTPATIENT)
Age: 83
End: 2023-08-11

## 2023-08-14 ENCOUNTER — LABORATORY RESULT (OUTPATIENT)
Age: 83
End: 2023-08-14

## 2023-08-14 ENCOUNTER — OUTPATIENT (OUTPATIENT)
Dept: OUTPATIENT SERVICES | Facility: HOSPITAL | Age: 83
LOS: 1 days | End: 2023-08-14
Payer: MEDICARE

## 2023-08-14 ENCOUNTER — APPOINTMENT (OUTPATIENT)
Dept: HEMATOLOGY ONCOLOGY | Facility: CLINIC | Age: 83
End: 2023-08-14

## 2023-08-14 DIAGNOSIS — Z98.890 OTHER SPECIFIED POSTPROCEDURAL STATES: Chronic | ICD-10-CM

## 2023-08-14 DIAGNOSIS — D64.9 ANEMIA, UNSPECIFIED: ICD-10-CM

## 2023-08-14 DIAGNOSIS — Z98.61 CORONARY ANGIOPLASTY STATUS: Chronic | ICD-10-CM

## 2023-08-14 LAB
HCT VFR BLD CALC: 33 %
HGB BLD-MCNC: 9.6 G/DL
IRON SATN MFR SERPL: 59 %
IRON SERPL-MCNC: 182 UG/DL
MCHC RBC-ENTMCNC: 23 PG
MCHC RBC-ENTMCNC: 29.1 G/DL
MCV RBC AUTO: 79.1 FL
PLATELET # BLD AUTO: 310 K/UL
PMV BLD: 10 FL
RBC # BLD: 4.17 M/UL
RBC # FLD: 18 %
TIBC SERPL-MCNC: 307 UG/DL
UIBC SERPL-MCNC: 125 UG/DL
WBC # FLD AUTO: 6.15 K/UL

## 2023-08-14 PROCEDURE — 83540 ASSAY OF IRON: CPT

## 2023-08-14 PROCEDURE — 83550 IRON BINDING TEST: CPT

## 2023-08-14 PROCEDURE — 85027 COMPLETE CBC AUTOMATED: CPT

## 2023-08-14 PROCEDURE — 82728 ASSAY OF FERRITIN: CPT

## 2023-08-14 PROCEDURE — 36415 COLL VENOUS BLD VENIPUNCTURE: CPT

## 2023-08-15 DIAGNOSIS — D64.9 ANEMIA, UNSPECIFIED: ICD-10-CM

## 2023-08-15 LAB — FERRITIN SERPL-MCNC: 18 NG/ML

## 2023-08-17 ENCOUNTER — OUTPATIENT (OUTPATIENT)
Dept: OUTPATIENT SERVICES | Facility: HOSPITAL | Age: 83
LOS: 1 days | End: 2023-08-17
Payer: MEDICARE

## 2023-08-17 ENCOUNTER — APPOINTMENT (OUTPATIENT)
Dept: HEMATOLOGY ONCOLOGY | Facility: CLINIC | Age: 83
End: 2023-08-17
Payer: MEDICARE

## 2023-08-17 VITALS
SYSTOLIC BLOOD PRESSURE: 83 MMHG | HEIGHT: 72 IN | TEMPERATURE: 97.1 F | DIASTOLIC BLOOD PRESSURE: 37 MMHG | RESPIRATION RATE: 16 BRPM | BODY MASS INDEX: 26.28 KG/M2 | OXYGEN SATURATION: 98 % | HEART RATE: 66 BPM | WEIGHT: 194 LBS

## 2023-08-17 DIAGNOSIS — Z98.61 CORONARY ANGIOPLASTY STATUS: Chronic | ICD-10-CM

## 2023-08-17 DIAGNOSIS — D64.9 ANEMIA, UNSPECIFIED: ICD-10-CM

## 2023-08-17 DIAGNOSIS — Z98.890 OTHER SPECIFIED POSTPROCEDURAL STATES: Chronic | ICD-10-CM

## 2023-08-17 PROCEDURE — 99213 OFFICE O/P EST LOW 20 MIN: CPT

## 2023-08-17 NOTE — ASU PATIENT PROFILE, ADULT - AS SC BRADEN FRICTION
Spoke to patient and relayed message as shown below. Patient verbalized understanding of whole message and had no further questions at this time. (3) no apparent problem

## 2023-08-22 ENCOUNTER — LABORATORY RESULT (OUTPATIENT)
Age: 83
End: 2023-08-22

## 2023-08-22 ENCOUNTER — OUTPATIENT (OUTPATIENT)
Dept: OUTPATIENT SERVICES | Facility: HOSPITAL | Age: 83
LOS: 1 days | End: 2023-08-22
Payer: MEDICARE

## 2023-08-22 ENCOUNTER — APPOINTMENT (OUTPATIENT)
Dept: INFUSION THERAPY | Facility: CLINIC | Age: 83
End: 2023-08-22

## 2023-08-22 DIAGNOSIS — D64.9 ANEMIA, UNSPECIFIED: ICD-10-CM

## 2023-08-22 DIAGNOSIS — Z98.61 CORONARY ANGIOPLASTY STATUS: Chronic | ICD-10-CM

## 2023-08-22 DIAGNOSIS — Z98.890 OTHER SPECIFIED POSTPROCEDURAL STATES: Chronic | ICD-10-CM

## 2023-08-22 LAB
HCT VFR BLD CALC: 32.5 %
HGB BLD-MCNC: 9.5 G/DL
MCHC RBC-ENTMCNC: 22.8 PG
MCHC RBC-ENTMCNC: 29.2 G/DL
MCV RBC AUTO: 78.1 FL
PLATELET # BLD AUTO: 261 K/UL
PMV BLD: 10.2 FL
RBC # BLD: 4.16 M/UL
RBC # FLD: 17.8 %
WBC # FLD AUTO: 5.59 K/UL

## 2023-08-22 PROCEDURE — 85027 COMPLETE CBC AUTOMATED: CPT

## 2023-08-22 PROCEDURE — 96365 THER/PROPH/DIAG IV INF INIT: CPT

## 2023-08-22 RX ORDER — IRON SUCROSE 20 MG/ML
200 INJECTION, SOLUTION INTRAVENOUS ONCE
Refills: 0 | Status: COMPLETED | OUTPATIENT
Start: 2023-08-22 | End: 2023-08-22

## 2023-08-22 RX ADMIN — IRON SUCROSE 220 MILLIGRAM(S): 20 INJECTION, SOLUTION INTRAVENOUS at 16:06

## 2023-08-23 DIAGNOSIS — D64.9 ANEMIA, UNSPECIFIED: ICD-10-CM

## 2023-08-23 NOTE — ASSESSMENT
[FreeTextEntry1] : 82 year old male with atrial fibrillation on anticoagulation chronic recurrent upper GI bleeding secondary to angiodysplasias. Chronic iron deficiency anemia on venofer, last infusions given in early July  S/P Watchman procedure in May, no longer on Xarelto. Now on ASA and Plavix  PLAN:  --CBC from 8/14 shows decreased Hgb (9.6), ferritin dropped to 18 --Will proceed with Venofer x 4 --Weekly CBC, repeat ferritin in 4 weeks to reassess iron stores  --Follow up with cardiology as scheduled    RTC in 2 months   Patient was seen and examined and discussed with Dr. Goodson who agrees to the above plan of care.

## 2023-08-23 NOTE — HISTORY OF PRESENT ILLNESS
[de-identified] : 82 year old white male who was seen on consultation in the hospital for iron deficiency anemia , ferritin was 8 . he received packed RBCs and venofer . most recent Hgb was 10 . He feels well and denies any hematochezia. EGD showed erosive gastritis , bleeder was cauterized and resumed eliquis on discharge . He had prior episodes of bleeding in the past ( AV malformations ? 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 He received 2 units rbcs and venofer X 2 , He feels much better and denies any hematochezia .  6/13/2023 Patient returns for chronic iron deficiency anemia , last ferritin was 28 on 5/23/2023 , since then he received three additional doses of venofer. He feels well today , Hgb is 9.1 and stable , not required transfusions recently . He reduced xarelto to 10 mg daily .   8/17/2023: Patient returns for follow up for chronic iron deficiency anemia secondary to GI bleeding. He is s/p Watchman procedure in May and in no longer on Xarelto. He is now on ASA and Plavix. He feels well, however "not 100%". Last Venofer was given in early July.

## 2023-08-23 NOTE — PATIENT PROFILE ADULT - DO YOU NEED ADDITIONAL SERVICES TO MANAGE ANY OF THESE MEDICAL CONDITIONS AT HOME?
Consent: The patient's consent was obtained including but not limited to risks of crusting, scabbing, blistering, scarring, darker or lighter pigmentary change, recurrence, incomplete removal and infection. Show Applicator Variable?: Yes Render Note In Bullet Format When Appropriate: No Medical Necessity Information: It is in your best interest to select a reason for this procedure from the list below. All of these items fulfill various CMS LCD requirements except the new and changing color options. Detail Level: Detailed Post-Care Instructions: I reviewed with the patient in detail post-care instructions. Patient is to wear sunprotection, and avoid picking at any of the treated lesions. Pt may apply Vaseline to crusted or scabbing areas. Spray Paint Text: The liquid nitrogen was applied to the skin utilizing a spray paint frosting technique. Medical Necessity Clause: This procedure was medically necessary because the lesions that were treated were: no

## 2023-08-24 ENCOUNTER — APPOINTMENT (OUTPATIENT)
Dept: CARDIOLOGY | Facility: CLINIC | Age: 83
End: 2023-08-24
Payer: MEDICARE

## 2023-08-24 PROCEDURE — 93306 TTE W/DOPPLER COMPLETE: CPT

## 2023-08-29 ENCOUNTER — APPOINTMENT (OUTPATIENT)
Dept: INFUSION THERAPY | Facility: CLINIC | Age: 83
End: 2023-08-29

## 2023-08-29 ENCOUNTER — OUTPATIENT (OUTPATIENT)
Dept: OUTPATIENT SERVICES | Facility: HOSPITAL | Age: 83
LOS: 1 days | End: 2023-08-29
Payer: MEDICARE

## 2023-08-29 DIAGNOSIS — D64.9 ANEMIA, UNSPECIFIED: ICD-10-CM

## 2023-08-29 DIAGNOSIS — Z98.61 CORONARY ANGIOPLASTY STATUS: Chronic | ICD-10-CM

## 2023-08-29 DIAGNOSIS — Z98.890 OTHER SPECIFIED POSTPROCEDURAL STATES: Chronic | ICD-10-CM

## 2023-08-29 PROCEDURE — 96365 THER/PROPH/DIAG IV INF INIT: CPT

## 2023-08-29 RX ORDER — IRON SUCROSE 20 MG/ML
200 INJECTION, SOLUTION INTRAVENOUS ONCE
Refills: 0 | Status: COMPLETED | OUTPATIENT
Start: 2023-08-29 | End: 2023-08-29

## 2023-08-29 RX ADMIN — IRON SUCROSE 200 MILLIGRAM(S): 20 INJECTION, SOLUTION INTRAVENOUS at 16:55

## 2023-08-29 RX ADMIN — IRON SUCROSE 220 MILLIGRAM(S): 20 INJECTION, SOLUTION INTRAVENOUS at 16:25

## 2023-09-01 ENCOUNTER — APPOINTMENT (OUTPATIENT)
Dept: CARDIOTHORACIC SURGERY | Facility: CLINIC | Age: 83
End: 2023-09-01
Payer: MEDICARE

## 2023-09-01 PROCEDURE — 99442: CPT | Mod: 95

## 2023-09-01 RX ORDER — METOPROLOL TARTRATE 50 MG/1
50 TABLET, FILM COATED ORAL
Qty: 180 | Refills: 3 | Status: DISCONTINUED | COMMUNITY
End: 2023-09-01

## 2023-09-01 NOTE — HISTORY OF PRESENT ILLNESS
[Home] : at home, [unfilled] , at the time of the visit. [Medical Office: ([facility])___] : at the medical office located in  [Spouse] : spouse

## 2023-09-01 NOTE — ASSESSMENT
[FreeTextEntry1] : Mr. MIGEL MOYA 83 year M with HTN, hyperlipidemia, A-fib in April 2021. Prior GIB s/p EGD with cauterization and PCI to the proximal LAD. Echo revealed moderate to severe mitral regurgitation.  He underwent implantation of Chantelle clip on 09/14/22. He is called today for review of his echo for MR.  Plan: Patient states he is doing. well Following his healthcare teams Dr. Taylor adjusted medications and will repeat echo for EF eval and possible ICD if no improvement. Patient does hold Beta blocker when BP less than 100 systolic and is symptomatic. F/U with Cardio for further management

## 2023-09-05 ENCOUNTER — OUTPATIENT (OUTPATIENT)
Dept: OUTPATIENT SERVICES | Facility: HOSPITAL | Age: 83
LOS: 1 days | End: 2023-09-05
Payer: MEDICARE

## 2023-09-05 ENCOUNTER — APPOINTMENT (OUTPATIENT)
Dept: INFUSION THERAPY | Facility: CLINIC | Age: 83
End: 2023-09-05

## 2023-09-05 DIAGNOSIS — Z98.890 OTHER SPECIFIED POSTPROCEDURAL STATES: Chronic | ICD-10-CM

## 2023-09-05 DIAGNOSIS — D50.9 IRON DEFICIENCY ANEMIA, UNSPECIFIED: ICD-10-CM

## 2023-09-05 DIAGNOSIS — Z98.61 CORONARY ANGIOPLASTY STATUS: Chronic | ICD-10-CM

## 2023-09-05 PROCEDURE — 96365 THER/PROPH/DIAG IV INF INIT: CPT

## 2023-09-05 RX ORDER — IRON SUCROSE 20 MG/ML
200 INJECTION, SOLUTION INTRAVENOUS ONCE
Refills: 0 | Status: COMPLETED | OUTPATIENT
Start: 2023-09-05 | End: 2023-09-05

## 2023-09-05 RX ADMIN — IRON SUCROSE 200 MILLIGRAM(S): 20 INJECTION, SOLUTION INTRAVENOUS at 16:15

## 2023-09-05 RX ADMIN — IRON SUCROSE 220 MILLIGRAM(S): 20 INJECTION, SOLUTION INTRAVENOUS at 15:44

## 2023-09-06 DIAGNOSIS — D50.9 IRON DEFICIENCY ANEMIA, UNSPECIFIED: ICD-10-CM

## 2023-09-12 ENCOUNTER — APPOINTMENT (OUTPATIENT)
Dept: INFUSION THERAPY | Facility: CLINIC | Age: 83
End: 2023-09-12

## 2023-09-12 ENCOUNTER — OUTPATIENT (OUTPATIENT)
Dept: OUTPATIENT SERVICES | Facility: HOSPITAL | Age: 83
LOS: 1 days | End: 2023-09-12
Payer: MEDICARE

## 2023-09-12 DIAGNOSIS — D50.9 IRON DEFICIENCY ANEMIA, UNSPECIFIED: ICD-10-CM

## 2023-09-12 DIAGNOSIS — Z98.61 CORONARY ANGIOPLASTY STATUS: Chronic | ICD-10-CM

## 2023-09-12 DIAGNOSIS — Z98.890 OTHER SPECIFIED POSTPROCEDURAL STATES: Chronic | ICD-10-CM

## 2023-09-12 PROCEDURE — 96365 THER/PROPH/DIAG IV INF INIT: CPT

## 2023-09-12 RX ORDER — IRON SUCROSE 20 MG/ML
200 INJECTION, SOLUTION INTRAVENOUS ONCE
Refills: 0 | Status: COMPLETED | OUTPATIENT
Start: 2023-09-12 | End: 2023-09-12

## 2023-09-12 RX ADMIN — IRON SUCROSE 220 MILLIGRAM(S): 20 INJECTION, SOLUTION INTRAVENOUS at 15:50

## 2023-09-12 RX ADMIN — IRON SUCROSE 200 MILLIGRAM(S): 20 INJECTION, SOLUTION INTRAVENOUS at 16:10

## 2023-09-12 NOTE — H&P ADULT - VTE RISK ASSESSMENT
VTE Assessment already completed for this visit Rotation Flap Text: The defect edges were debeveled with a #15 scalpel blade. Given the location of the defect, shape of the defect and the proximity to free margins a rotation flap was deemed most appropriate. Using a sterile surgical marker, an appropriate rotation flap was drawn incorporating the defect and placing the expected incisions within the relaxed skin tension lines where possible. The area thus outlined was incised deep to adipose tissue with a #15 scalpel blade. The skin margins were undermined to an appropriate distance in all directions utilizing iris scissors. Following this, the designed flap was carried over into the primary defect and sutured into place.

## 2023-09-14 ENCOUNTER — APPOINTMENT (OUTPATIENT)
Dept: CARDIOLOGY | Facility: CLINIC | Age: 83
End: 2023-09-14

## 2023-09-19 ENCOUNTER — OUTPATIENT (OUTPATIENT)
Dept: OUTPATIENT SERVICES | Facility: HOSPITAL | Age: 83
LOS: 1 days | End: 2023-09-19
Payer: MEDICARE

## 2023-09-19 ENCOUNTER — APPOINTMENT (OUTPATIENT)
Dept: INFUSION THERAPY | Facility: CLINIC | Age: 83
End: 2023-09-19

## 2023-09-19 DIAGNOSIS — D50.9 IRON DEFICIENCY ANEMIA, UNSPECIFIED: ICD-10-CM

## 2023-09-19 DIAGNOSIS — Z98.890 OTHER SPECIFIED POSTPROCEDURAL STATES: Chronic | ICD-10-CM

## 2023-09-19 DIAGNOSIS — Z98.61 CORONARY ANGIOPLASTY STATUS: Chronic | ICD-10-CM

## 2023-09-19 PROCEDURE — 96365 THER/PROPH/DIAG IV INF INIT: CPT

## 2023-09-19 RX ORDER — IRON SUCROSE 20 MG/ML
200 INJECTION, SOLUTION INTRAVENOUS ONCE
Refills: 0 | Status: COMPLETED | OUTPATIENT
Start: 2023-09-19 | End: 2023-09-19

## 2023-09-19 RX ADMIN — IRON SUCROSE 220 MILLIGRAM(S): 20 INJECTION, SOLUTION INTRAVENOUS at 16:14

## 2023-09-19 RX ADMIN — IRON SUCROSE 200 MILLIGRAM(S): 20 INJECTION, SOLUTION INTRAVENOUS at 16:45

## 2023-10-05 ENCOUNTER — APPOINTMENT (OUTPATIENT)
Dept: ORTHOPEDIC SURGERY | Facility: CLINIC | Age: 83
End: 2023-10-05
Payer: MEDICARE

## 2023-10-05 ENCOUNTER — APPOINTMENT (OUTPATIENT)
Dept: ORTHOPEDIC SURGERY | Facility: ASSISTED LIVING FACILITY | Age: 83
End: 2023-10-05

## 2023-10-05 PROCEDURE — 99213 OFFICE O/P EST LOW 20 MIN: CPT

## 2023-10-06 NOTE — PATIENT PROFILE ADULT - FUNCTIONAL ASSESSMENT - DAILY ACTIVITY SECTION LABEL
Department of Anesthesiology  Postprocedure Note    Patient: Jonel Joshua MRN: [id number]  YOB: 1958  Date of evaluation: 10/6/2023      Procedure Summary     Date: 10/06/23 Room / Location: [address] [place] [address]    Anesthesia Start: 1035 Anesthesia Stop: 1103    Procedure: RIGHT ENDOSCOPIC CARPAL TUNNEL RELEASE (Right: Wrist) Diagnosis:       Right carpal tunnel syndrome      (Right carpal tunnel syndrome [G56.01])    Surgeons: Tad Spann MD Responsible Provider: SIMEON Arias CRNA    Anesthesia Type: MAC ASA Status: 2          Anesthesia Type: No value filed.     Prema Phase I: Prema Score: 10    Prema Phase II:        Anesthesia Post Evaluation    Patient location during evaluation: PACU  Patient participation: complete - patient participated  Level of consciousness: awake and lethargic  Pain score: 0  Airway patency: patent  Nausea & Vomiting: no nausea and no vomiting  Complications: no  Cardiovascular status: blood pressure returned to baseline and hemodynamically stable  Respiratory status: acceptable, room air and spontaneous ventilation  Hydration status: euvolemic  Multimodal analgesia pain management approach  Pain management: adequate and satisfactory to patient
.

## 2023-10-10 ENCOUNTER — APPOINTMENT (OUTPATIENT)
Dept: HEMATOLOGY ONCOLOGY | Facility: CLINIC | Age: 83
End: 2023-10-10

## 2023-10-12 ENCOUNTER — APPOINTMENT (OUTPATIENT)
Dept: HEMATOLOGY ONCOLOGY | Facility: CLINIC | Age: 83
End: 2023-10-12
Payer: MEDICARE

## 2023-10-12 ENCOUNTER — OUTPATIENT (OUTPATIENT)
Dept: OUTPATIENT SERVICES | Facility: HOSPITAL | Age: 83
LOS: 1 days | End: 2023-10-12
Payer: MEDICARE

## 2023-10-12 ENCOUNTER — LABORATORY RESULT (OUTPATIENT)
Age: 83
End: 2023-10-12

## 2023-10-12 VITALS
WEIGHT: 195 LBS | TEMPERATURE: 97.7 F | SYSTOLIC BLOOD PRESSURE: 101 MMHG | HEIGHT: 72 IN | OXYGEN SATURATION: 98 % | DIASTOLIC BLOOD PRESSURE: 64 MMHG | RESPIRATION RATE: 16 BRPM | BODY MASS INDEX: 26.41 KG/M2 | HEART RATE: 113 BPM

## 2023-10-12 DIAGNOSIS — Z98.61 CORONARY ANGIOPLASTY STATUS: Chronic | ICD-10-CM

## 2023-10-12 DIAGNOSIS — D64.9 ANEMIA, UNSPECIFIED: ICD-10-CM

## 2023-10-12 DIAGNOSIS — Z98.890 OTHER SPECIFIED POSTPROCEDURAL STATES: Chronic | ICD-10-CM

## 2023-10-12 LAB
HCT VFR BLD CALC: 32.2 %
HGB BLD-MCNC: 9.4 G/DL
MCHC RBC-ENTMCNC: 23.7 PG
MCHC RBC-ENTMCNC: 29.2 G/DL
MCV RBC AUTO: 81.1 FL
PLATELET # BLD AUTO: 243 K/UL
PMV BLD: 10.6 FL
RBC # BLD: 3.97 M/UL
RBC # FLD: 20 %
RETICS # AUTO: 1.8 %
RETICS AGGREG/RBC NFR: 70.2 K/UL
WBC # FLD AUTO: 5.4 K/UL

## 2023-10-12 PROCEDURE — 36415 COLL VENOUS BLD VENIPUNCTURE: CPT

## 2023-10-12 PROCEDURE — 85046 RETICYTE/HGB CONCENTRATE: CPT

## 2023-10-12 PROCEDURE — 99213 OFFICE O/P EST LOW 20 MIN: CPT

## 2023-10-12 PROCEDURE — 85027 COMPLETE CBC AUTOMATED: CPT

## 2023-10-12 PROCEDURE — 82728 ASSAY OF FERRITIN: CPT

## 2023-10-13 DIAGNOSIS — D64.9 ANEMIA, UNSPECIFIED: ICD-10-CM

## 2023-10-13 LAB — FERRITIN SERPL-MCNC: 23 NG/ML

## 2023-10-17 ENCOUNTER — APPOINTMENT (OUTPATIENT)
Dept: CARDIOLOGY | Facility: CLINIC | Age: 83
End: 2023-10-17
Payer: MEDICARE

## 2023-10-17 PROCEDURE — 93306 TTE W/DOPPLER COMPLETE: CPT

## 2023-10-26 ENCOUNTER — OUTPATIENT (OUTPATIENT)
Dept: OUTPATIENT SERVICES | Facility: HOSPITAL | Age: 83
LOS: 1 days | End: 2023-10-26
Payer: MEDICARE

## 2023-10-26 ENCOUNTER — APPOINTMENT (OUTPATIENT)
Dept: INFUSION THERAPY | Facility: CLINIC | Age: 83
End: 2023-10-26

## 2023-10-26 DIAGNOSIS — Z98.61 CORONARY ANGIOPLASTY STATUS: Chronic | ICD-10-CM

## 2023-10-26 DIAGNOSIS — D64.9 ANEMIA, UNSPECIFIED: ICD-10-CM

## 2023-10-26 DIAGNOSIS — Z98.890 OTHER SPECIFIED POSTPROCEDURAL STATES: Chronic | ICD-10-CM

## 2023-10-26 PROCEDURE — 96365 THER/PROPH/DIAG IV INF INIT: CPT

## 2023-10-26 RX ORDER — IRON SUCROSE 20 MG/ML
200 INJECTION, SOLUTION INTRAVENOUS ONCE
Refills: 0 | Status: COMPLETED | OUTPATIENT
Start: 2023-10-26 | End: 2023-10-26

## 2023-10-26 RX ADMIN — IRON SUCROSE 200 MILLIGRAM(S): 20 INJECTION, SOLUTION INTRAVENOUS at 11:45

## 2023-10-26 RX ADMIN — IRON SUCROSE 220 MILLIGRAM(S): 20 INJECTION, SOLUTION INTRAVENOUS at 11:16

## 2023-10-27 ENCOUNTER — APPOINTMENT (OUTPATIENT)
Dept: PULMONOLOGY | Facility: CLINIC | Age: 83
End: 2023-10-27
Payer: MEDICARE

## 2023-10-27 VITALS
HEART RATE: 114 BPM | HEIGHT: 72 IN | RESPIRATION RATE: 14 BRPM | OXYGEN SATURATION: 98 % | DIASTOLIC BLOOD PRESSURE: 80 MMHG | BODY MASS INDEX: 25.87 KG/M2 | SYSTOLIC BLOOD PRESSURE: 120 MMHG | WEIGHT: 191 LBS

## 2023-10-27 DIAGNOSIS — E04.1 NONTOXIC SINGLE THYROID NODULE: ICD-10-CM

## 2023-10-27 PROCEDURE — 99213 OFFICE O/P EST LOW 20 MIN: CPT

## 2023-10-30 ENCOUNTER — APPOINTMENT (OUTPATIENT)
Dept: CARDIOLOGY | Facility: CLINIC | Age: 83
End: 2023-10-30
Payer: MEDICARE

## 2023-10-30 VITALS
DIASTOLIC BLOOD PRESSURE: 60 MMHG | HEART RATE: 110 BPM | SYSTOLIC BLOOD PRESSURE: 98 MMHG | BODY MASS INDEX: 26.28 KG/M2 | HEIGHT: 72 IN | WEIGHT: 194 LBS

## 2023-10-30 DIAGNOSIS — I34.0 NONRHEUMATIC MITRAL (VALVE) INSUFFICIENCY: ICD-10-CM

## 2023-10-30 PROCEDURE — 93000 ELECTROCARDIOGRAM COMPLETE: CPT

## 2023-10-30 PROCEDURE — 99214 OFFICE O/P EST MOD 30 MIN: CPT

## 2023-11-02 ENCOUNTER — APPOINTMENT (OUTPATIENT)
Dept: INFUSION THERAPY | Facility: CLINIC | Age: 83
End: 2023-11-02

## 2023-11-02 ENCOUNTER — OUTPATIENT (OUTPATIENT)
Dept: OUTPATIENT SERVICES | Facility: HOSPITAL | Age: 83
LOS: 1 days | End: 2023-11-02
Payer: MEDICARE

## 2023-11-02 ENCOUNTER — LABORATORY RESULT (OUTPATIENT)
Age: 83
End: 2023-11-02

## 2023-11-02 VITALS
RESPIRATION RATE: 16 BRPM | SYSTOLIC BLOOD PRESSURE: 113 MMHG | TEMPERATURE: 98.6 F | OXYGEN SATURATION: 100 % | DIASTOLIC BLOOD PRESSURE: 67 MMHG | HEART RATE: 107 BPM

## 2023-11-02 DIAGNOSIS — Z98.61 CORONARY ANGIOPLASTY STATUS: Chronic | ICD-10-CM

## 2023-11-02 DIAGNOSIS — Z98.890 OTHER SPECIFIED POSTPROCEDURAL STATES: Chronic | ICD-10-CM

## 2023-11-02 DIAGNOSIS — D64.9 ANEMIA, UNSPECIFIED: ICD-10-CM

## 2023-11-02 LAB
HCT VFR BLD CALC: 35.7 %
HGB BLD-MCNC: 10.4 G/DL
MCHC RBC-ENTMCNC: 22.6 PG
MCHC RBC-ENTMCNC: 29.1 G/DL
MCV RBC AUTO: 77.6 FL
PLATELET # BLD AUTO: 343 K/UL
PMV BLD: 10.2 FL
RBC # BLD: 4.6 M/UL
RBC # FLD: 20.5 %
WBC # FLD AUTO: 5.21 K/UL

## 2023-11-02 PROCEDURE — 85027 COMPLETE CBC AUTOMATED: CPT

## 2023-11-02 PROCEDURE — 96365 THER/PROPH/DIAG IV INF INIT: CPT

## 2023-11-02 RX ORDER — IRON SUCROSE 20 MG/ML
200 INJECTION, SOLUTION INTRAVENOUS ONCE
Refills: 0 | Status: COMPLETED | OUTPATIENT
Start: 2023-11-02 | End: 2023-11-02

## 2023-11-02 RX ADMIN — IRON SUCROSE 220 MILLIGRAM(S): 20 INJECTION, SOLUTION INTRAVENOUS at 10:50

## 2023-11-02 RX ADMIN — IRON SUCROSE 200 MILLIGRAM(S): 20 INJECTION, SOLUTION INTRAVENOUS at 11:20

## 2023-11-03 DIAGNOSIS — D64.9 ANEMIA, UNSPECIFIED: ICD-10-CM

## 2023-11-07 ENCOUNTER — APPOINTMENT (OUTPATIENT)
Dept: ELECTROPHYSIOLOGY | Facility: CLINIC | Age: 83
End: 2023-11-07
Payer: MEDICARE

## 2023-11-07 VITALS
WEIGHT: 191 LBS | SYSTOLIC BLOOD PRESSURE: 100 MMHG | RESPIRATION RATE: 18 BRPM | HEART RATE: 92 BPM | HEIGHT: 72 IN | TEMPERATURE: 97.1 F | DIASTOLIC BLOOD PRESSURE: 80 MMHG | BODY MASS INDEX: 25.87 KG/M2

## 2023-11-07 PROCEDURE — 93000 ELECTROCARDIOGRAM COMPLETE: CPT

## 2023-11-07 PROCEDURE — 99215 OFFICE O/P EST HI 40 MIN: CPT

## 2023-11-09 ENCOUNTER — OUTPATIENT (OUTPATIENT)
Dept: OUTPATIENT SERVICES | Facility: HOSPITAL | Age: 83
LOS: 1 days | End: 2023-11-09
Payer: MEDICARE

## 2023-11-09 ENCOUNTER — APPOINTMENT (OUTPATIENT)
Dept: INFUSION THERAPY | Facility: CLINIC | Age: 83
End: 2023-11-09

## 2023-11-09 VITALS
RESPIRATION RATE: 20 BRPM | HEART RATE: 92 BPM | TEMPERATURE: 98 F | DIASTOLIC BLOOD PRESSURE: 72 MMHG | OXYGEN SATURATION: 99 % | HEIGHT: 73 IN | WEIGHT: 197.31 LBS | SYSTOLIC BLOOD PRESSURE: 114 MMHG

## 2023-11-09 DIAGNOSIS — I50.22 CHRONIC SYSTOLIC (CONGESTIVE) HEART FAILURE: ICD-10-CM

## 2023-11-09 DIAGNOSIS — Z98.890 OTHER SPECIFIED POSTPROCEDURAL STATES: Chronic | ICD-10-CM

## 2023-11-09 DIAGNOSIS — Z98.61 CORONARY ANGIOPLASTY STATUS: Chronic | ICD-10-CM

## 2023-11-09 DIAGNOSIS — Z01.818 ENCOUNTER FOR OTHER PREPROCEDURAL EXAMINATION: ICD-10-CM

## 2023-11-09 LAB
ALBUMIN SERPL ELPH-MCNC: 3.8 G/DL — SIGNIFICANT CHANGE UP (ref 3.5–5.2)
ALBUMIN SERPL ELPH-MCNC: 3.8 G/DL — SIGNIFICANT CHANGE UP (ref 3.5–5.2)
ALP SERPL-CCNC: 106 U/L — SIGNIFICANT CHANGE UP (ref 30–115)
ALP SERPL-CCNC: 106 U/L — SIGNIFICANT CHANGE UP (ref 30–115)
ALT FLD-CCNC: 79 U/L — HIGH (ref 0–41)
ALT FLD-CCNC: 79 U/L — HIGH (ref 0–41)
ANION GAP SERPL CALC-SCNC: 13 MMOL/L — SIGNIFICANT CHANGE UP (ref 7–14)
ANION GAP SERPL CALC-SCNC: 13 MMOL/L — SIGNIFICANT CHANGE UP (ref 7–14)
APPEARANCE UR: CLEAR — SIGNIFICANT CHANGE UP
APPEARANCE UR: CLEAR — SIGNIFICANT CHANGE UP
APTT BLD: 34.3 SEC — SIGNIFICANT CHANGE UP (ref 27–39.2)
APTT BLD: 34.3 SEC — SIGNIFICANT CHANGE UP (ref 27–39.2)
AST SERPL-CCNC: 55 U/L — HIGH (ref 0–41)
AST SERPL-CCNC: 55 U/L — HIGH (ref 0–41)
BACTERIA # UR AUTO: NEGATIVE /HPF — SIGNIFICANT CHANGE UP
BACTERIA # UR AUTO: NEGATIVE /HPF — SIGNIFICANT CHANGE UP
BASOPHILS # BLD AUTO: 0.03 K/UL — SIGNIFICANT CHANGE UP (ref 0–0.2)
BASOPHILS # BLD AUTO: 0.03 K/UL — SIGNIFICANT CHANGE UP (ref 0–0.2)
BASOPHILS NFR BLD AUTO: 0.5 % — SIGNIFICANT CHANGE UP (ref 0–1)
BASOPHILS NFR BLD AUTO: 0.5 % — SIGNIFICANT CHANGE UP (ref 0–1)
BILIRUB SERPL-MCNC: 0.9 MG/DL — SIGNIFICANT CHANGE UP (ref 0.2–1.2)
BILIRUB SERPL-MCNC: 0.9 MG/DL — SIGNIFICANT CHANGE UP (ref 0.2–1.2)
BILIRUB UR-MCNC: NEGATIVE — SIGNIFICANT CHANGE UP
BILIRUB UR-MCNC: NEGATIVE — SIGNIFICANT CHANGE UP
BLD GP AB SCN SERPL QL: SIGNIFICANT CHANGE UP
BLD GP AB SCN SERPL QL: SIGNIFICANT CHANGE UP
BUN SERPL-MCNC: 16 MG/DL — SIGNIFICANT CHANGE UP (ref 10–20)
BUN SERPL-MCNC: 16 MG/DL — SIGNIFICANT CHANGE UP (ref 10–20)
CALCIUM SERPL-MCNC: 9.2 MG/DL — SIGNIFICANT CHANGE UP (ref 8.4–10.5)
CALCIUM SERPL-MCNC: 9.2 MG/DL — SIGNIFICANT CHANGE UP (ref 8.4–10.5)
CAST: 5 /LPF — HIGH (ref 0–4)
CAST: 5 /LPF — HIGH (ref 0–4)
CHLORIDE SERPL-SCNC: 105 MMOL/L — SIGNIFICANT CHANGE UP (ref 98–110)
CHLORIDE SERPL-SCNC: 105 MMOL/L — SIGNIFICANT CHANGE UP (ref 98–110)
CO2 SERPL-SCNC: 26 MMOL/L — SIGNIFICANT CHANGE UP (ref 17–32)
CO2 SERPL-SCNC: 26 MMOL/L — SIGNIFICANT CHANGE UP (ref 17–32)
COLOR SPEC: YELLOW — SIGNIFICANT CHANGE UP
COLOR SPEC: YELLOW — SIGNIFICANT CHANGE UP
CREAT SERPL-MCNC: 1.4 MG/DL — SIGNIFICANT CHANGE UP (ref 0.7–1.5)
CREAT SERPL-MCNC: 1.4 MG/DL — SIGNIFICANT CHANGE UP (ref 0.7–1.5)
DIFF PNL FLD: NEGATIVE — SIGNIFICANT CHANGE UP
DIFF PNL FLD: NEGATIVE — SIGNIFICANT CHANGE UP
EGFR: 50 ML/MIN/1.73M2 — LOW
EGFR: 50 ML/MIN/1.73M2 — LOW
EOSINOPHIL # BLD AUTO: 0.07 K/UL — SIGNIFICANT CHANGE UP (ref 0–0.7)
EOSINOPHIL # BLD AUTO: 0.07 K/UL — SIGNIFICANT CHANGE UP (ref 0–0.7)
EOSINOPHIL NFR BLD AUTO: 1.2 % — SIGNIFICANT CHANGE UP (ref 0–8)
EOSINOPHIL NFR BLD AUTO: 1.2 % — SIGNIFICANT CHANGE UP (ref 0–8)
GLUCOSE SERPL-MCNC: 95 MG/DL — SIGNIFICANT CHANGE UP (ref 70–99)
GLUCOSE SERPL-MCNC: 95 MG/DL — SIGNIFICANT CHANGE UP (ref 70–99)
GLUCOSE UR QL: NEGATIVE MG/DL — SIGNIFICANT CHANGE UP
GLUCOSE UR QL: NEGATIVE MG/DL — SIGNIFICANT CHANGE UP
HCT VFR BLD CALC: 38.6 % — LOW (ref 42–52)
HCT VFR BLD CALC: 38.6 % — LOW (ref 42–52)
HGB BLD-MCNC: 11.5 G/DL — LOW (ref 14–18)
HGB BLD-MCNC: 11.5 G/DL — LOW (ref 14–18)
IMM GRANULOCYTES NFR BLD AUTO: 0.7 % — HIGH (ref 0.1–0.3)
IMM GRANULOCYTES NFR BLD AUTO: 0.7 % — HIGH (ref 0.1–0.3)
INR BLD: 1.5 RATIO — HIGH (ref 0.65–1.3)
INR BLD: 1.5 RATIO — HIGH (ref 0.65–1.3)
KETONES UR-MCNC: NEGATIVE MG/DL — SIGNIFICANT CHANGE UP
KETONES UR-MCNC: NEGATIVE MG/DL — SIGNIFICANT CHANGE UP
LEUKOCYTE ESTERASE UR-ACNC: NEGATIVE — SIGNIFICANT CHANGE UP
LEUKOCYTE ESTERASE UR-ACNC: NEGATIVE — SIGNIFICANT CHANGE UP
LYMPHOCYTES # BLD AUTO: 0.62 K/UL — LOW (ref 1.2–3.4)
LYMPHOCYTES # BLD AUTO: 0.62 K/UL — LOW (ref 1.2–3.4)
LYMPHOCYTES # BLD AUTO: 10.9 % — LOW (ref 20.5–51.1)
LYMPHOCYTES # BLD AUTO: 10.9 % — LOW (ref 20.5–51.1)
MCHC RBC-ENTMCNC: 23.7 PG — LOW (ref 27–31)
MCHC RBC-ENTMCNC: 23.7 PG — LOW (ref 27–31)
MCHC RBC-ENTMCNC: 29.8 G/DL — LOW (ref 32–37)
MCHC RBC-ENTMCNC: 29.8 G/DL — LOW (ref 32–37)
MCV RBC AUTO: 79.4 FL — LOW (ref 80–94)
MCV RBC AUTO: 79.4 FL — LOW (ref 80–94)
MONOCYTES # BLD AUTO: 0.65 K/UL — HIGH (ref 0.1–0.6)
MONOCYTES # BLD AUTO: 0.65 K/UL — HIGH (ref 0.1–0.6)
MONOCYTES NFR BLD AUTO: 11.5 % — HIGH (ref 1.7–9.3)
MONOCYTES NFR BLD AUTO: 11.5 % — HIGH (ref 1.7–9.3)
MRSA PCR RESULT.: NEGATIVE — SIGNIFICANT CHANGE UP
MRSA PCR RESULT.: NEGATIVE — SIGNIFICANT CHANGE UP
NEUTROPHILS # BLD AUTO: 4.26 K/UL — SIGNIFICANT CHANGE UP (ref 1.4–6.5)
NEUTROPHILS # BLD AUTO: 4.26 K/UL — SIGNIFICANT CHANGE UP (ref 1.4–6.5)
NEUTROPHILS NFR BLD AUTO: 75.2 % — SIGNIFICANT CHANGE UP (ref 42.2–75.2)
NEUTROPHILS NFR BLD AUTO: 75.2 % — SIGNIFICANT CHANGE UP (ref 42.2–75.2)
NITRITE UR-MCNC: NEGATIVE — SIGNIFICANT CHANGE UP
NITRITE UR-MCNC: NEGATIVE — SIGNIFICANT CHANGE UP
NRBC # BLD: 0 /100 WBCS — SIGNIFICANT CHANGE UP (ref 0–0)
NRBC # BLD: 0 /100 WBCS — SIGNIFICANT CHANGE UP (ref 0–0)
PH UR: 5.5 — SIGNIFICANT CHANGE UP (ref 5–8)
PH UR: 5.5 — SIGNIFICANT CHANGE UP (ref 5–8)
PLATELET # BLD AUTO: 276 K/UL — SIGNIFICANT CHANGE UP (ref 130–400)
PLATELET # BLD AUTO: 276 K/UL — SIGNIFICANT CHANGE UP (ref 130–400)
PMV BLD: 11.2 FL — HIGH (ref 7.4–10.4)
PMV BLD: 11.2 FL — HIGH (ref 7.4–10.4)
POTASSIUM SERPL-MCNC: 3.8 MMOL/L — SIGNIFICANT CHANGE UP (ref 3.5–5)
POTASSIUM SERPL-MCNC: 3.8 MMOL/L — SIGNIFICANT CHANGE UP (ref 3.5–5)
POTASSIUM SERPL-SCNC: 3.8 MMOL/L — SIGNIFICANT CHANGE UP (ref 3.5–5)
POTASSIUM SERPL-SCNC: 3.8 MMOL/L — SIGNIFICANT CHANGE UP (ref 3.5–5)
PROT SERPL-MCNC: 6.9 G/DL — SIGNIFICANT CHANGE UP (ref 6–8)
PROT SERPL-MCNC: 6.9 G/DL — SIGNIFICANT CHANGE UP (ref 6–8)
PROT UR-MCNC: 100 MG/DL
PROT UR-MCNC: 100 MG/DL
PROTHROM AB SERPL-ACNC: 17.2 SEC — HIGH (ref 9.95–12.87)
PROTHROM AB SERPL-ACNC: 17.2 SEC — HIGH (ref 9.95–12.87)
RBC # BLD: 4.86 M/UL — SIGNIFICANT CHANGE UP (ref 4.7–6.1)
RBC # BLD: 4.86 M/UL — SIGNIFICANT CHANGE UP (ref 4.7–6.1)
RBC # FLD: 20.8 % — HIGH (ref 11.5–14.5)
RBC # FLD: 20.8 % — HIGH (ref 11.5–14.5)
RBC CASTS # UR COMP ASSIST: 1 /HPF — SIGNIFICANT CHANGE UP (ref 0–4)
RBC CASTS # UR COMP ASSIST: 1 /HPF — SIGNIFICANT CHANGE UP (ref 0–4)
SODIUM SERPL-SCNC: 144 MMOL/L — SIGNIFICANT CHANGE UP (ref 135–146)
SODIUM SERPL-SCNC: 144 MMOL/L — SIGNIFICANT CHANGE UP (ref 135–146)
SP GR SPEC: 1.02 — SIGNIFICANT CHANGE UP (ref 1–1.03)
SP GR SPEC: 1.02 — SIGNIFICANT CHANGE UP (ref 1–1.03)
SQUAMOUS # UR AUTO: 1 /HPF — SIGNIFICANT CHANGE UP (ref 0–5)
SQUAMOUS # UR AUTO: 1 /HPF — SIGNIFICANT CHANGE UP (ref 0–5)
UROBILINOGEN FLD QL: 1 MG/DL — SIGNIFICANT CHANGE UP (ref 0.2–1)
UROBILINOGEN FLD QL: 1 MG/DL — SIGNIFICANT CHANGE UP (ref 0.2–1)
WBC # BLD: 5.67 K/UL — SIGNIFICANT CHANGE UP (ref 4.8–10.8)
WBC # BLD: 5.67 K/UL — SIGNIFICANT CHANGE UP (ref 4.8–10.8)
WBC # FLD AUTO: 5.67 K/UL — SIGNIFICANT CHANGE UP (ref 4.8–10.8)
WBC # FLD AUTO: 5.67 K/UL — SIGNIFICANT CHANGE UP (ref 4.8–10.8)
WBC UR QL: 1 /HPF — SIGNIFICANT CHANGE UP (ref 0–5)
WBC UR QL: 1 /HPF — SIGNIFICANT CHANGE UP (ref 0–5)

## 2023-11-09 PROCEDURE — 93010 ELECTROCARDIOGRAM REPORT: CPT

## 2023-11-09 PROCEDURE — 86900 BLOOD TYPING SEROLOGIC ABO: CPT

## 2023-11-09 PROCEDURE — 85025 COMPLETE CBC W/AUTO DIFF WBC: CPT

## 2023-11-09 PROCEDURE — 99214 OFFICE O/P EST MOD 30 MIN: CPT | Mod: 25

## 2023-11-09 PROCEDURE — 87640 STAPH A DNA AMP PROBE: CPT

## 2023-11-09 PROCEDURE — 86901 BLOOD TYPING SEROLOGIC RH(D): CPT

## 2023-11-09 PROCEDURE — 85730 THROMBOPLASTIN TIME PARTIAL: CPT

## 2023-11-09 PROCEDURE — 87086 URINE CULTURE/COLONY COUNT: CPT

## 2023-11-09 PROCEDURE — 80053 COMPREHEN METABOLIC PANEL: CPT

## 2023-11-09 PROCEDURE — 85610 PROTHROMBIN TIME: CPT

## 2023-11-09 PROCEDURE — 87641 MR-STAPH DNA AMP PROBE: CPT

## 2023-11-09 PROCEDURE — 86850 RBC ANTIBODY SCREEN: CPT

## 2023-11-09 PROCEDURE — 81001 URINALYSIS AUTO W/SCOPE: CPT

## 2023-11-09 PROCEDURE — 36415 COLL VENOUS BLD VENIPUNCTURE: CPT

## 2023-11-09 PROCEDURE — 93005 ELECTROCARDIOGRAM TRACING: CPT

## 2023-11-09 RX ORDER — RIVAROXABAN 15 MG-20MG
1 KIT ORAL
Refills: 0 | DISCHARGE

## 2023-11-09 NOTE — H&P PST ADULT - REASON FOR ADMISSION
Patient is a83  year old  male presenting to PAST in preparation for ICD BIV IMPLANT  on 11/16/23  under sedation anesthesia by Dr. abreu

## 2023-11-09 NOTE — H&P PST ADULT - GASTROINTESTINAL
Patient being referred for TAVR evaluation   FYI to Dr. Jarvis Grewal and Juan Ramon Whitten  Will be undergoing cath with Dr. Kayla Gerber 3/1    Carissa Azevedo DO normal/soft/nontender/nondistended/normal active bowel sounds

## 2023-11-10 DIAGNOSIS — Z01.818 ENCOUNTER FOR OTHER PREPROCEDURAL EXAMINATION: ICD-10-CM

## 2023-11-10 DIAGNOSIS — I50.22 CHRONIC SYSTOLIC (CONGESTIVE) HEART FAILURE: ICD-10-CM

## 2023-11-10 LAB
CULTURE RESULTS: SIGNIFICANT CHANGE UP
CULTURE RESULTS: SIGNIFICANT CHANGE UP
SPECIMEN SOURCE: SIGNIFICANT CHANGE UP
SPECIMEN SOURCE: SIGNIFICANT CHANGE UP

## 2023-11-16 ENCOUNTER — INPATIENT (INPATIENT)
Facility: HOSPITAL | Age: 83
LOS: 0 days | Discharge: HOME | DRG: 277 | End: 2023-11-17
Attending: STUDENT IN AN ORGANIZED HEALTH CARE EDUCATION/TRAINING PROGRAM | Admitting: STUDENT IN AN ORGANIZED HEALTH CARE EDUCATION/TRAINING PROGRAM
Payer: MEDICARE

## 2023-11-16 ENCOUNTER — TRANSCRIPTION ENCOUNTER (OUTPATIENT)
Age: 83
End: 2023-11-16

## 2023-11-16 ENCOUNTER — APPOINTMENT (OUTPATIENT)
Dept: INFUSION THERAPY | Facility: CLINIC | Age: 83
End: 2023-11-16

## 2023-11-16 ENCOUNTER — APPOINTMENT (OUTPATIENT)
Dept: ELECTROPHYSIOLOGY | Facility: HOSPITAL | Age: 83
End: 2023-11-16

## 2023-11-16 VITALS
HEIGHT: 72 IN | OXYGEN SATURATION: 98 % | TEMPERATURE: 97 F | HEART RATE: 81 BPM | RESPIRATION RATE: 26 BRPM | SYSTOLIC BLOOD PRESSURE: 119 MMHG | DIASTOLIC BLOOD PRESSURE: 88 MMHG | WEIGHT: 197.31 LBS

## 2023-11-16 DIAGNOSIS — Z98.890 OTHER SPECIFIED POSTPROCEDURAL STATES: Chronic | ICD-10-CM

## 2023-11-16 DIAGNOSIS — I50.23 ACUTE ON CHRONIC SYSTOLIC (CONGESTIVE) HEART FAILURE: ICD-10-CM

## 2023-11-16 DIAGNOSIS — Z98.61 CORONARY ANGIOPLASTY STATUS: Chronic | ICD-10-CM

## 2023-11-16 DIAGNOSIS — I50.22 CHRONIC SYSTOLIC (CONGESTIVE) HEART FAILURE: ICD-10-CM

## 2023-11-16 PROCEDURE — 82248 BILIRUBIN DIRECT: CPT

## 2023-11-16 PROCEDURE — 80074 ACUTE HEPATITIS PANEL: CPT

## 2023-11-16 PROCEDURE — 93010 ELECTROCARDIOGRAM REPORT: CPT

## 2023-11-16 PROCEDURE — C1777: CPT

## 2023-11-16 PROCEDURE — C1889: CPT

## 2023-11-16 PROCEDURE — 36415 COLL VENOUS BLD VENIPUNCTURE: CPT

## 2023-11-16 PROCEDURE — 80053 COMPREHEN METABOLIC PANEL: CPT

## 2023-11-16 PROCEDURE — C1898: CPT

## 2023-11-16 PROCEDURE — C1882: CPT

## 2023-11-16 PROCEDURE — 71045 X-RAY EXAM CHEST 1 VIEW: CPT

## 2023-11-16 PROCEDURE — 76705 ECHO EXAM OF ABDOMEN: CPT

## 2023-11-16 PROCEDURE — C1892: CPT

## 2023-11-16 PROCEDURE — C1730: CPT

## 2023-11-16 PROCEDURE — 93283 PRGRMG EVAL IMPLANTABLE DFB: CPT

## 2023-11-16 PROCEDURE — C1769: CPT

## 2023-11-16 PROCEDURE — 33225 L VENTRIC PACING LEAD ADD-ON: CPT

## 2023-11-16 PROCEDURE — 93005 ELECTROCARDIOGRAM TRACING: CPT | Mod: XU

## 2023-11-16 PROCEDURE — 71045 X-RAY EXAM CHEST 1 VIEW: CPT | Mod: 26

## 2023-11-16 PROCEDURE — 71046 X-RAY EXAM CHEST 2 VIEWS: CPT

## 2023-11-16 PROCEDURE — 33249 INSJ/RPLCMT DEFIB W/LEAD(S): CPT

## 2023-11-16 PROCEDURE — 74018 RADEX ABDOMEN 1 VIEW: CPT | Mod: 26

## 2023-11-16 PROCEDURE — 74018 RADEX ABDOMEN 1 VIEW: CPT

## 2023-11-16 RX ORDER — ASPIRIN/CALCIUM CARB/MAGNESIUM 324 MG
1 TABLET ORAL
Qty: 0 | Refills: 0 | DISCHARGE
Start: 2023-11-16

## 2023-11-16 RX ORDER — RIVAROXABAN 15 MG-20MG
1 KIT ORAL
Refills: 0 | DISCHARGE

## 2023-11-16 RX ORDER — FOLIC ACID 0.8 MG
1 TABLET ORAL
Qty: 0 | Refills: 0 | DISCHARGE
Start: 2023-11-16

## 2023-11-16 RX ORDER — PREGABALIN 225 MG/1
1000 CAPSULE ORAL DAILY
Refills: 0 | Status: DISCONTINUED | OUTPATIENT
Start: 2023-11-16 | End: 2023-11-17

## 2023-11-16 RX ORDER — MIDODRINE HYDROCHLORIDE 2.5 MG/1
5 TABLET ORAL THREE TIMES A DAY
Refills: 0 | Status: DISCONTINUED | OUTPATIENT
Start: 2023-11-16 | End: 2023-11-17

## 2023-11-16 RX ORDER — PREGABALIN 225 MG/1
1 CAPSULE ORAL
Refills: 0 | DISCHARGE

## 2023-11-16 RX ORDER — ACETAMINOPHEN 500 MG
650 TABLET ORAL EVERY 6 HOURS
Refills: 0 | Status: DISCONTINUED | OUTPATIENT
Start: 2023-11-16 | End: 2023-11-17

## 2023-11-16 RX ORDER — SIMETHICONE 80 MG/1
80 TABLET, CHEWABLE ORAL ONCE
Refills: 0 | Status: COMPLETED | OUTPATIENT
Start: 2023-11-16 | End: 2023-11-16

## 2023-11-16 RX ORDER — LANOLIN ALCOHOL/MO/W.PET/CERES
5 CREAM (GRAM) TOPICAL AT BEDTIME
Refills: 0 | Status: DISCONTINUED | OUTPATIENT
Start: 2023-11-16 | End: 2023-11-17

## 2023-11-16 RX ORDER — AMIODARONE HYDROCHLORIDE 400 MG/1
200 TABLET ORAL DAILY
Refills: 0 | Status: DISCONTINUED | OUTPATIENT
Start: 2023-11-16 | End: 2023-11-17

## 2023-11-16 RX ORDER — CEFAZOLIN SODIUM 1 G
2000 VIAL (EA) INJECTION ONCE
Refills: 0 | Status: COMPLETED | OUTPATIENT
Start: 2023-11-16 | End: 2023-11-16

## 2023-11-16 RX ORDER — PANTOPRAZOLE SODIUM 20 MG/1
40 TABLET, DELAYED RELEASE ORAL
Refills: 0 | Status: DISCONTINUED | OUTPATIENT
Start: 2023-11-16 | End: 2023-11-17

## 2023-11-16 RX ORDER — TAMSULOSIN HYDROCHLORIDE 0.4 MG/1
0.4 CAPSULE ORAL AT BEDTIME
Refills: 0 | Status: DISCONTINUED | OUTPATIENT
Start: 2023-11-16 | End: 2023-11-17

## 2023-11-16 RX ORDER — CLOPIDOGREL BISULFATE 75 MG/1
1 TABLET, FILM COATED ORAL
Qty: 0 | Refills: 0 | DISCHARGE
Start: 2023-11-16

## 2023-11-16 RX ORDER — FOLIC ACID 0.8 MG
1 TABLET ORAL DAILY
Refills: 0 | Status: DISCONTINUED | OUTPATIENT
Start: 2023-11-16 | End: 2023-11-17

## 2023-11-16 RX ORDER — CLOPIDOGREL BISULFATE 75 MG/1
1 TABLET, FILM COATED ORAL
Refills: 0 | DISCHARGE

## 2023-11-16 RX ORDER — ATORVASTATIN CALCIUM 80 MG/1
20 TABLET, FILM COATED ORAL AT BEDTIME
Refills: 0 | Status: DISCONTINUED | OUTPATIENT
Start: 2023-11-16 | End: 2023-11-17

## 2023-11-16 RX ORDER — METOPROLOL TARTRATE 50 MG
1 TABLET ORAL
Qty: 0 | Refills: 0 | DISCHARGE
Start: 2023-11-16

## 2023-11-16 RX ORDER — METOPROLOL TARTRATE 50 MG
1 TABLET ORAL
Refills: 0 | DISCHARGE

## 2023-11-16 RX ORDER — AMIODARONE HYDROCHLORIDE 400 MG/1
1 TABLET ORAL
Qty: 0 | Refills: 0 | DISCHARGE
Start: 2023-11-16

## 2023-11-16 RX ORDER — CEFAZOLIN SODIUM 1 G
1000 VIAL (EA) INJECTION EVERY 8 HOURS
Refills: 0 | Status: COMPLETED | OUTPATIENT
Start: 2023-11-16 | End: 2023-11-17

## 2023-11-16 RX ORDER — METOPROLOL TARTRATE 50 MG
50 TABLET ORAL EVERY 12 HOURS
Refills: 0 | Status: DISCONTINUED | OUTPATIENT
Start: 2023-11-16 | End: 2023-11-17

## 2023-11-16 RX ORDER — ATORVASTATIN CALCIUM 80 MG/1
1 TABLET, FILM COATED ORAL
Qty: 0 | Refills: 0 | DISCHARGE
Start: 2023-11-16

## 2023-11-16 RX ORDER — ASPIRIN/CALCIUM CARB/MAGNESIUM 324 MG
81 TABLET ORAL DAILY
Refills: 0 | Status: DISCONTINUED | OUTPATIENT
Start: 2023-11-16 | End: 2023-11-17

## 2023-11-16 RX ORDER — PREGABALIN 225 MG/1
1 CAPSULE ORAL
Qty: 0 | Refills: 0 | DISCHARGE
Start: 2023-11-16

## 2023-11-16 RX ORDER — CEFAZOLIN SODIUM 1 G
1000 VIAL (EA) INJECTION ONCE
Refills: 0 | Status: COMPLETED | OUTPATIENT
Start: 2023-11-16 | End: 2023-11-16

## 2023-11-16 RX ORDER — MIDODRINE HYDROCHLORIDE 2.5 MG/1
2 TABLET ORAL
Refills: 0 | DISCHARGE

## 2023-11-16 RX ORDER — INFLUENZA VIRUS VACCINE 15; 15; 15; 15 UG/.5ML; UG/.5ML; UG/.5ML; UG/.5ML
0.7 SUSPENSION INTRAMUSCULAR ONCE
Refills: 0 | Status: DISCONTINUED | OUTPATIENT
Start: 2023-11-16 | End: 2023-11-17

## 2023-11-16 RX ORDER — CLOPIDOGREL BISULFATE 75 MG/1
75 TABLET, FILM COATED ORAL DAILY
Refills: 0 | Status: DISCONTINUED | OUTPATIENT
Start: 2023-11-16 | End: 2023-11-17

## 2023-11-16 RX ORDER — MIDODRINE HYDROCHLORIDE 2.5 MG/1
1 TABLET ORAL
Qty: 0 | Refills: 0 | DISCHARGE
Start: 2023-11-16

## 2023-11-16 RX ORDER — TAMSULOSIN HYDROCHLORIDE 0.4 MG/1
1 CAPSULE ORAL
Qty: 0 | Refills: 0 | DISCHARGE
Start: 2023-11-16

## 2023-11-16 RX ADMIN — Medication 650 MILLIGRAM(S): at 18:41

## 2023-11-16 RX ADMIN — Medication 5 MILLIGRAM(S): at 21:05

## 2023-11-16 RX ADMIN — ATORVASTATIN CALCIUM 20 MILLIGRAM(S): 80 TABLET, FILM COATED ORAL at 21:05

## 2023-11-16 RX ADMIN — MIDODRINE HYDROCHLORIDE 5 MILLIGRAM(S): 2.5 TABLET ORAL at 18:41

## 2023-11-16 RX ADMIN — SIMETHICONE 80 MILLIGRAM(S): 80 TABLET, CHEWABLE ORAL at 23:38

## 2023-11-16 RX ADMIN — Medication 100 MILLIGRAM(S): at 14:10

## 2023-11-16 RX ADMIN — Medication 100 MILLIGRAM(S): at 18:40

## 2023-11-16 RX ADMIN — Medication 50 MILLIGRAM(S): at 18:41

## 2023-11-16 RX ADMIN — Medication 100 MILLIGRAM(S): at 11:16

## 2023-11-16 RX ADMIN — Medication 650 MILLIGRAM(S): at 19:02

## 2023-11-16 RX ADMIN — TAMSULOSIN HYDROCHLORIDE 0.4 MILLIGRAM(S): 0.4 CAPSULE ORAL at 21:05

## 2023-11-16 NOTE — DISCHARGE NOTE PROVIDER - CARE PROVIDER_API CALL
Jonathan Pacheco  Cardiac Electrophysiology  [address] [place] [address], [address]  [place] [address]  Phone: [phone]  Fax: [phone]  Established Patient  Follow Up Time: 1 month   Jonathan Pacheco  Cardiac Electrophysiology  [address] [place] [address], [address]  [place] [address]  Phone: [phone]  Fax: [phone]  Established Patient  Scheduled Appointment: 12/07/2023 09:30 AM

## 2023-11-16 NOTE — DISCHARGE NOTE PROVIDER - NSDCMRMEDTOKEN_GEN_ALL_CORE_FT
amiodarone 200 mg oral tablet: 1 tab(s) orally once a day  aspirin 81 mg oral delayed release tablet: 1 tab(s) orally once a day  atorvastatin 20 mg oral tablet: 1 tab(s) orally once a day (at bedtime)  bisacodyl 5 mg oral delayed release tablet: 1 tab(s) orally once a day (at bedtime)  clopidogrel 75 mg oral tablet: 1 tab(s) orally once a day  cyanocobalamin 1000 mcg oral tablet: 1 tab(s) orally once a day  folic acid 1 mg oral tablet: 1 tab(s) orally once a day  metoprolol tartrate 50 mg oral tablet: 1 tab(s) orally every 12 hours  midodrine 5 mg oral tablet: 1 tab(s) orally 3 times a day  pantoprazole 40 mg oral delayed release tablet: 1 tab(s) orally  tamsulosin 0.4 mg oral capsule: 1 cap(s) orally once a day (at bedtime)  torsemide 20 mg oral tablet: 1 tab(s) orally once a day   aspirin 81 mg oral delayed release tablet: 1 tab(s) orally once a day  bisacodyl 5 mg oral delayed release tablet: 1 tab(s) orally once a day (at bedtime)  clopidogrel 75 mg oral tablet: 1 tab(s) orally once a day  cyanocobalamin 1000 mcg oral tablet: 1 tab(s) orally once a day  folic acid 1 mg oral tablet: 1 tab(s) orally once a day  metoprolol tartrate 50 mg oral tablet: 1 tab(s) orally every 12 hours  tamsulosin 0.4 mg oral capsule: 1 cap(s) orally once a day (at bedtime)  torsemide 20 mg oral tablet: 1 tab(s) orally once a day

## 2023-11-16 NOTE — DISCHARGE NOTE PROVIDER - NSDCCPCAREPLAN_GEN_ALL_CORE_FT
PRINCIPAL DISCHARGE DIAGNOSIS  Diagnosis: Ischemic cardiomyopathy  Assessment and Plan of Treatment:

## 2023-11-16 NOTE — ASU PATIENT PROFILE, ADULT - FALL HARM RISK - RISK INTERVENTIONS

## 2023-11-16 NOTE — DISCHARGE NOTE PROVIDER - ATTENDING DISCHARGE PHYSICAL EXAMINATION:
Left chest wall incision site is clean, dry, and intact with no hematoma or tenderness. Amiodarone and atorvastatin discontinued in setting of transaminitis.

## 2023-11-16 NOTE — DISCHARGE NOTE PROVIDER - HOSPITAL COURSE
Patient is a 83y Male  with PMHx of HTN, HLD, MR S/P Mitral Clip, CAD S/P PCI, HFrEF LVEF 30-35%, PAF previous GI Bleeds s/p Watchman who presented to [facility] for elective BIV-ICD implant. On 11/16/23 patient underwent successful BIV-IVD implant. CS lead was unsuccessful but left bundle HIS lead was placed with success. Patient was monitored overnight. On POD 1 patient remains HD stable with no complaints.  Examination of ICD pocket is C/D/I with no hematoma or erythema. POD1 CXR showed no PTX and device interrogation reveals normal device function. Will follow up win 4-6 weeks for AVN RFA. Patient is being DC home in stable condition. Patient is a 83y Male with PMHx of HTN, HLD, MR S/P Mitral Clip, CAD S/P PCI, HFrEF LVEF 30-35%, PAF previous GI Bleeds s/p Watchman who presented to [facility] for elective ICD implant. On 11/16/23 patient underwent successful [employer] DC ICD implant. CS lead was unsuccessful but RA and 2 RV leads with left bundle area lead in lieu of CS lead with Dr. Pacheco. Patient was monitored overnight. On POD 1 patient remains HD stable with no complaints. Examination of ICD pocket is C/D/I with no hematoma or erythema. POD1 CXR showed no PTX and device interrogation reveals normal device function. Will follow up win 4-6 weeks for AVN RFA. Patient is being DC home in stable condition. Patient is a 83y Male with PMHx of HTN, HLD, MR S/P Mitral Clip, CAD S/P PCI, HFrEF LVEF 30-35%, PAF previous GI Bleeds s/p Watchman who presented to [facility] for elective ICD implant. On 11/16/23 patient underwent successful [employer] DC ICD implant. CS lead was unsuccessful but RA and 2 RV leads with left bundle area lead in lieu of CS lead with Dr. Pacheco. Patient was monitored overnight. On POD 1 patient remains HD stable with no complaints. Examination of ICD pocket is C/D/I with no hematoma or erythema. POD1 CXR showed no PTX and device interrogation reveals normal device function. However, CXR showing b/l pleural effusions, another dose of torsemide 20 mg x1 was given in the afternoon with sufficient UOP. Prior to discharge, repeat hepatic function panel showed worsening transaminitis , AST//231. RUQ US (11/17) revealing cholelithiasis. Timmons sign negative on physical exam, hepatitis panel sent, will need to follow-up results as outpatient. Given worsening LFTs, amiodarone and atorvastatin was discontinued. Pt's midodrine was also discontinued given pt's orthostatic BP is negative. Patient is being DC home in stable condition.

## 2023-11-16 NOTE — DISCHARGE NOTE PROVIDER - PROVIDER TOKENS
PROVIDER:[TOKEN:[[id number]],FOLLOWUP:[1 month],ESTABLISHEDPATIENT:[T]] PROVIDER:[TOKEN:[[id number]],SCHEDULEDAPPT:[12/07/2023],SCHEDULEDAPPTTIME:[09:30 AM],ESTABLISHEDPATIENT:[T]]

## 2023-11-16 NOTE — PATIENT PROFILE ADULT - FALL HARM RISK - HARM RISK INTERVENTIONS
Assistance with ambulation/Assistance OOB with selected safe patient handling equipment/Communicate Risk of Fall with Harm to all staff/Discuss with provider need for PT consult/Monitor gait and stability/Provide patient with walking aids - walker, cane, crutches/Reinforce activity limits and safety measures with patient and family/Sit up slowly, dangle for a short time, stand at bedside before walking/Tailored Fall Risk Interventions/Use of alarms - bed, chair and/or voice tab/Visual Cue: Yellow wristband and red socks/Bed in lowest position, wheels locked, appropriate side rails in place/Call bell, personal items and telephone in reach/Instruct patient to call for assistance before getting out of bed or chair/Non-slip footwear when patient is out of bed/[place] to call system/Physically safe environment - no spills, clutter or unnecessary equipment/Purposeful Proactive Rounding/Room/bathroom lighting operational, light cord in reach

## 2023-11-16 NOTE — CHART NOTE - NSCHARTNOTEFT_GEN_A_CORE
PACU ANESTHESIA ADMISSION NOTE    _x___  Patent Airway    _x___  Full return of protective reflexes    ___x_  Full recovery from anesthesia / back to baseline     Vitals:   T:   37        R:    14              BP:    98/72              Sat:   100                P: 78      Mental Status:  __x__ Awake   ___x__ Alert   _____ Drowsy   _____ Sedated    Nausea/Vomiting:  _x___ NO  ______Yes,   See Post - Op Orders          Pain Scale (0-10):  _____    Treatment: ___x_ None    ____ See Post - Op/PCA Orders    Post - Operative Fluids:   ____ Oral   __x__ See Post - Op Orders    Plan: Discharge:   ___x_Home       _____Floor     _____Critical Care    _____  Other:_________________    Comments:    no anesthesia complications noted, endorsed to PACU RN
Electrophysiology Brief Post-Operative Note    I have personally seen and examined the patient.  I agree with the history and physical which I have reviewed and noted any changes below.      DEVICE COMPANY:  -[employer]    PRE-OP DIAGNOSIS: Chronic systolic Heart Failure, planned AV zayra Ablation    POST-OP DIAGNOSIS: Chronic systolic Heart Failure, planned AV zayra Ablation    PROCEDURE:  Biventricular defibrillator implant, with Left bundle Area Pacing    Physician: ZAMZAM Pacheco MD  Assistant: None    ANESTHESIA TYPE:  [  ]General Anesthesia  [X] Sedation  [X] Local/Regional    CONDITION  [  ] Critical  [  ] Serious  [  ]Fair  [X]Good    SPECIMENS REMOVED (IF APPLICABLE): None    IMPLANTS (IF APPLICABLE)  Biventricular Defibrillator implant, with Left bundle Area Pacing    FINDINGS (see below)   -Successful biventricular defibrillator implant,  with Left bundle Area Pacing   -Attempted CS lead placement unsuccessful   -No immediate complications   -Estimated Blood Loss: 25  mL   -Contrast used: 15 cc    PLAN OF CARE  - Ancef 1g IVq8 h for 3 doses  - Chest X-ray portable now  - Chest X-ray PA/Lat tomorrow at 6am  - Device interrogation tomorrow in am  - Discontinue Heparin, Lovenox, and NOACS for 48 hours  - No anticoagulation unless discussed with EP attending      FOLLOW-UP  -Outpatient follow-up with Electrophysiology in 3-4 weeks     [address] [place] [address] ([address])[facility] [phone]
I saw and examined patient and I reviewed his chart and blood work. I attest that there has been no clinical change in patient's condition since last assessment documented in H&P, consult, or last office visit.

## 2023-11-16 NOTE — PRE-ANESTHESIA EVALUATION ADULT - WEIGHT IN KG
Problem: Pressure Injury, Risk for  Goal: # Skin remains intact  Outcome: Outcome Met, Continue evaluating goal progress toward completion  Goal: No new pressure injury (PI) development  Outcome: Outcome Met, Continue evaluating goal progress toward completion     Problem: Delirium, Risk for  Goal: # No symptoms of delirium  Description: Evaluate delirium symptoms under active problem when present  Outcome: Outcome Met, Continue evaluating goal progress toward completion     Problem: Impaired Physical Mobility  Goal: # Bed mobility, ambulation, and ADLs are maintained or returned to baseline during hospitalization  Outcome: Outcome Met, Continue evaluating goal progress toward completion     Problem: VTE, Risk for  Goal: # No s/s of VTE  Outcome: Outcome Met, Continue evaluating goal progress toward completion     Problem: Hemodialysis  Goal: Vascular access device site free of signs & symptoms of infection  Outcome: Outcome Met, Continue evaluating goal progress toward completion  Goal: Dialysis: Safe, effective, and comfortable hemodialysis treatment (Hemodialysis nurse only)  Outcome: Outcome Met, Continue evaluating goal progress toward completion  Goal: Dialysis: Free of complications related to initiation/termination of dialysis (Hemodialysis nurse only)  Outcome: Outcome Met, Continue evaluating goal progress toward completion     Problem: Fluid Volume Excess, Risk for  Goal: # Absence of Rapid Weight Gain (no more than 2kg in 24 hours)  Description: FVE Risk Patients may gain weight (but not more than 2 kg) but may not require aggressive treatment if in the absence of dyspnea; FVE (actual) patients should be monitored to achieve no weight gain.   Outcome: Outcome Met, Continue evaluating goal progress toward completion  Goal: # Absence of New Onset Dyspnea  Description: Dyspnea greater than SOB with Activity may be indicator of fluid volume excess  Outcome: Outcome Met, Continue evaluating goal progress  toward completion         89.5

## 2023-11-16 NOTE — DISCHARGE NOTE PROVIDER - NSDCFUSCHEDAPPT_GEN_ALL_CORE_FT
[facility]  ONCORTHO [address]  Scheduled Appointment: 11/17/2023    Brant Goodson  [facility] PreAdmits  Scheduled Appointment: 11/24/2023    [facility]  AMBCHEMO SI [address]  Scheduled Appointment: 11/24/2023    [facility]  ELECTROPH [address]  Scheduled Appointment: 12/07/2023    [facility]  HEMONC SI [address]  Scheduled Appointment: 12/22/2023    Brant Goodson  [facility] PreAdmits  Scheduled Appointment: 12/22/2023    Brant Goodson  [facility] PreAdmits  Scheduled Appointment: 12/26/2023    Brant Goodson  [facility]  HEMONC SI [address]  Scheduled Appointment: 12/26/2023    Tera Judd  [facility]  CARDIOLOGY [address]  Scheduled Appointment: 01/22/2024     Brant Goodson  [facility] PreAdmits  Scheduled Appointment: 11/24/2023    [facility]  AMBCHEMO SI [address]  Scheduled Appointment: 11/24/2023    [facility]  ELECTROPH [address]  Scheduled Appointment: 12/07/2023    [facility]  HEMONC SI [address]  Scheduled Appointment: 12/22/2023    Brant Goodson  [facility] PreAdmits  Scheduled Appointment: 12/22/2023    Brant Goodson  [facility] PreAdmits  Scheduled Appointment: 12/26/2023    Brant Goodson  [facility]  HEMONC SI [address]  Scheduled Appointment: 12/26/2023    Tera Judd  [facility]  CARDIOLOGY [address]  Scheduled Appointment: 01/22/2024

## 2023-11-16 NOTE — DISCHARGE NOTE PROVIDER - NSDCCPTREATMENT_GEN_ALL_CORE_FT
PRINCIPAL PROCEDURE  Procedure: Implantation of biv ICD  Findings and Treatment:   - Do not shower for 5 days  - Do not drive or operate heavy machinery for 1 week   - Do not submerge in water (example: baths, swimming) for 1 month.  - Do not lift your left arm greater than shoulder height for 6 weeks.  - Do not lift anything heavier than 5-10 lbs with your left arm for 6 weeks.  - Any sudden swelling, redness, fever, discharge, or severe pain, call the Electrophysiology Office at [phone].     PRINCIPAL PROCEDURE  Procedure: Insertion of dual chamber implantable cardiac defibrillator  Findings and Treatment: - Do not shower for 5 days  - Do not drive or operate heavy machinery for 1 week   - Do not submerge in water (example: baths, swimming) for 1 month.  - Do not lift your left arm greater than shoulder height for 6 weeks.  - Do not lift anything heavier than 5-10 lbs with your left arm for 6 weeks.  - Any sudden swelling, redness, fever, discharge, or severe pain, call the Electrophysiology Office at [phone].

## 2023-11-17 ENCOUNTER — APPOINTMENT (OUTPATIENT)
Dept: ORTHOPEDIC SURGERY | Facility: CLINIC | Age: 83
End: 2023-11-17

## 2023-11-17 ENCOUNTER — TRANSCRIPTION ENCOUNTER (OUTPATIENT)
Age: 83
End: 2023-11-17

## 2023-11-17 VITALS
DIASTOLIC BLOOD PRESSURE: 68 MMHG | HEART RATE: 75 BPM | OXYGEN SATURATION: 100 % | TEMPERATURE: 98 F | RESPIRATION RATE: 19 BRPM | SYSTOLIC BLOOD PRESSURE: 110 MMHG

## 2023-11-17 LAB
ALBUMIN SERPL ELPH-MCNC: 3.9 G/DL — SIGNIFICANT CHANGE UP (ref 3.5–5.2)
ALBUMIN SERPL ELPH-MCNC: 3.9 G/DL — SIGNIFICANT CHANGE UP (ref 3.5–5.2)
ALP SERPL-CCNC: 150 U/L — HIGH (ref 30–115)
ALP SERPL-CCNC: 150 U/L — HIGH (ref 30–115)
ALT FLD-CCNC: 231 U/L — HIGH (ref 0–41)
ALT FLD-CCNC: 231 U/L — HIGH (ref 0–41)
ANION GAP SERPL CALC-SCNC: 13 MMOL/L — SIGNIFICANT CHANGE UP (ref 7–14)
ANION GAP SERPL CALC-SCNC: 13 MMOL/L — SIGNIFICANT CHANGE UP (ref 7–14)
AST SERPL-CCNC: 261 U/L — HIGH (ref 0–41)
AST SERPL-CCNC: 261 U/L — HIGH (ref 0–41)
BILIRUB SERPL-MCNC: 0.9 MG/DL — SIGNIFICANT CHANGE UP (ref 0.2–1.2)
BILIRUB SERPL-MCNC: 0.9 MG/DL — SIGNIFICANT CHANGE UP (ref 0.2–1.2)
BUN SERPL-MCNC: 22 MG/DL — HIGH (ref 10–20)
BUN SERPL-MCNC: 22 MG/DL — HIGH (ref 10–20)
CALCIUM SERPL-MCNC: 9.6 MG/DL — SIGNIFICANT CHANGE UP (ref 8.4–10.4)
CALCIUM SERPL-MCNC: 9.6 MG/DL — SIGNIFICANT CHANGE UP (ref 8.4–10.4)
CHLORIDE SERPL-SCNC: 103 MMOL/L — SIGNIFICANT CHANGE UP (ref 98–110)
CHLORIDE SERPL-SCNC: 103 MMOL/L — SIGNIFICANT CHANGE UP (ref 98–110)
CO2 SERPL-SCNC: 23 MMOL/L — SIGNIFICANT CHANGE UP (ref 17–32)
CO2 SERPL-SCNC: 23 MMOL/L — SIGNIFICANT CHANGE UP (ref 17–32)
CREAT SERPL-MCNC: 1.5 MG/DL — SIGNIFICANT CHANGE UP (ref 0.7–1.5)
CREAT SERPL-MCNC: 1.5 MG/DL — SIGNIFICANT CHANGE UP (ref 0.7–1.5)
EGFR: 46 ML/MIN/1.73M2 — LOW
EGFR: 46 ML/MIN/1.73M2 — LOW
GLUCOSE SERPL-MCNC: 113 MG/DL — HIGH (ref 70–99)
GLUCOSE SERPL-MCNC: 113 MG/DL — HIGH (ref 70–99)
POTASSIUM SERPL-MCNC: 4.4 MMOL/L — SIGNIFICANT CHANGE UP (ref 3.5–5)
POTASSIUM SERPL-MCNC: 4.4 MMOL/L — SIGNIFICANT CHANGE UP (ref 3.5–5)
POTASSIUM SERPL-SCNC: 4.4 MMOL/L — SIGNIFICANT CHANGE UP (ref 3.5–5)
POTASSIUM SERPL-SCNC: 4.4 MMOL/L — SIGNIFICANT CHANGE UP (ref 3.5–5)
PROT SERPL-MCNC: 6.6 G/DL — SIGNIFICANT CHANGE UP (ref 6–8)
PROT SERPL-MCNC: 6.6 G/DL — SIGNIFICANT CHANGE UP (ref 6–8)
SODIUM SERPL-SCNC: 139 MMOL/L — SIGNIFICANT CHANGE UP (ref 135–146)
SODIUM SERPL-SCNC: 139 MMOL/L — SIGNIFICANT CHANGE UP (ref 135–146)

## 2023-11-17 PROCEDURE — 71046 X-RAY EXAM CHEST 2 VIEWS: CPT | Mod: 26

## 2023-11-17 PROCEDURE — 99239 HOSP IP/OBS DSCHRG MGMT >30: CPT

## 2023-11-17 PROCEDURE — 99233 SBSQ HOSP IP/OBS HIGH 50: CPT | Mod: 24

## 2023-11-17 PROCEDURE — 93283 PRGRMG EVAL IMPLANTABLE DFB: CPT | Mod: 26

## 2023-11-17 PROCEDURE — 76705 ECHO EXAM OF ABDOMEN: CPT | Mod: 26

## 2023-11-17 RX ORDER — POLYETHYLENE GLYCOL 3350 17 G/17G
17 POWDER, FOR SOLUTION ORAL ONCE
Refills: 0 | Status: COMPLETED | OUTPATIENT
Start: 2023-11-17 | End: 2023-11-17

## 2023-11-17 RX ORDER — PANTOPRAZOLE SODIUM 20 MG/1
1 TABLET, DELAYED RELEASE ORAL
Refills: 0 | DISCHARGE

## 2023-11-17 RX ADMIN — POLYETHYLENE GLYCOL 3350 17 GRAM(S): 17 POWDER, FOR SOLUTION ORAL at 11:15

## 2023-11-17 RX ADMIN — Medication 20 MILLIGRAM(S): at 15:04

## 2023-11-17 RX ADMIN — Medication 100 MILLIGRAM(S): at 09:29

## 2023-11-17 RX ADMIN — Medication 100 MILLIGRAM(S): at 02:19

## 2023-11-17 NOTE — PROGRESS NOTE ADULT - SUBJECTIVE AND OBJECTIVE BOX
INTERVAL HPI/OVERNIGHT EVENTS:    Patient s/p SJM ICD implant (RA and 2 RV leads with left bundle area lead in lieu of CS lead) on 11/16/2023 with dr. Pacheco.  No event over night. Pt without complains    MEDICATIONS  (STANDING):  aspirin enteric coated 81 milliGRAM(s) Oral daily  bisacodyl 5 milliGRAM(s) Oral at bedtime  clopidogrel Tablet 75 milliGRAM(s) Oral daily  cyanocobalamin 1000 MICROGram(s) Oral daily  folic acid 1 milliGRAM(s) Oral daily  influenza  Vaccine (HIGH DOSE) 0.7 milliLiter(s) IntraMuscular once  melatonin 5 milliGRAM(s) Oral at bedtime  metoprolol tartrate 50 milliGRAM(s) Oral every 12 hours  pantoprazole    Tablet 40 milliGRAM(s) Oral before breakfast  polyethylene glycol 3350 17 Gram(s) Oral once  tamsulosin 0.4 milliGRAM(s) Oral at bedtime  torsemide 20 milliGRAM(s) Oral daily  torsemide 20 milliGRAM(s) Oral once    MEDICATIONS  (PRN):  Allergies    No Known Allergies  Intolerances      Vital Signs Last 24 Hrs  T(C): 36.3 (17 Nov 2023 07:38), Max: 36.8 (16 Nov 2023 14:59)  T(F): 97.4 (17 Nov 2023 07:38), Max: 98.2 (16 Nov 2023 14:59)  HR: 84 (17 Nov 2023 07:38) (73 - 84)  BP: 120/84 (17 Nov 2023 07:38) (98/72 - 120/84)  BP(mean): 98 (17 Nov 2023 07:38) (82 - 98)  RR: 20 (17 Nov 2023 07:38) (10 - 22)  SpO2: 96% (17 Nov 2023 07:38) (96% - 100%)    Parameters below as of 17 Nov 2023 07:38  Patient On (Oxygen Delivery Method): room air        GENERAL: In no apparent distress, well nourished, and hydrated.  HEART: Regular rate and rhythm; No murmurs, rubs, or gallops.  	Wound healing well; No hematoma; no bleeding  PULMONARY: Clear to auscultation and perfusion.  No rales, wheezing, or rhonchi bilaterally.  ABDOMEN: Soft, Nontender, Nondistended; Bowel sounds present  EXTREMITIES:  2+ Peripheral Pulses, No clubbing, cyanosis, or edema  NEUROLOGICAL: Grossly nonfocal    12 Lead ECG:   Ventricular Rate 77 BPM  Atrial Rate 77 BPM  P-R Interval 188 ms  QRS Duration 114 ms  Q-T Interval 422 ms  QTC Calculation(Bazett) 477 ms  P Axis 63 degrees  R Axis 170 degrees  T Axis 103 degrees  Diagnosis Line Atrial-sensed ventricular-paced rhythm  Confirmed by Brennan Moss ([id number]) on 11/16/2023 6:41:15 PM (11-16-23 @ 15:25)      RADIOLOGY & ADDITIONAL TESTS:  XR CHEST PA LAT 2V   ORDERED BY: KRISTIAN WARD   PROCEDURE DATE:  11/17/2023      INTERPRETATION:  Reason for study : ICD implant. Rule out pneumothorax  Technique:  Frontal/lateral view the chest  Lateral view is obscured by overlying upper extremity.    Comparison: Chest x-ray11/16/2023  Findings:  Overlying strips left upper thorax.  Support devices: Stable left ICD. Multiple myrna clips  Cardiac/ Mediastinum: Stable  Lungs/ Pleura: Posterior blunting costophrenic angles noted on lateral   view. No consolidation or pneumothorax.  Skeletal/ soft tissues: Stable  Impression: Small posterior effusions. No consolidation or pneumothorax.

## 2023-11-17 NOTE — PROGRESS NOTE ADULT - ASSESSMENT
Patient s/p SJM ICD implant (RA and 2 RV leads with left bundle area lead in lieu of CS lead) on 11/16/2023 with dr. Pacheco.    - CXR id none, no pneumothorax, leads are at the expected position.   - Device interrogation done, functions well.  - FU in the EP office for wound check with NP in 1 month    No Heavy lifting >5 lbs. Do not raise your arm above shoulder level for 4-6 weeks.   No driving for 2 weeks  No shower, bathtub, no wetting the bandage for 5 days.   Can take a shower on 10/22/2023 , remove dressing at that time, leave Steri-strips in place

## 2023-11-17 NOTE — PROGRESS NOTE ADULT - NS ATTEND AMEND GEN_ALL_CORE FT
Complex Patient  Abnormal LFTs  s/p BiV-ICD implant (RV apex ICD lead, Left-bundle area pacing in lieu of CS lead for pacing LV/LB)    recommend stop Amiodarone; stop atorvastatin  check hepatitis serology  Planning AV zayra ablation on f/u; will avoid anti-arrhythmics for now.

## 2023-11-17 NOTE — DISCHARGE NOTE NURSING/CASE MANAGEMENT/SOCIAL WORK - PATIENT PORTAL LINK FT
You can access the FollowMyHealth Patient Portal offered by [facility] by registering at the following website: [email or web address]. By joining [facility]’s FollowMyHealth portal, you will also be able to view your health information using other applications (apps) compatible with our system.

## 2023-11-18 LAB
HAV IGM SER-ACNC: SIGNIFICANT CHANGE UP
HAV IGM SER-ACNC: SIGNIFICANT CHANGE UP
HBV CORE IGM SER-ACNC: SIGNIFICANT CHANGE UP
HBV CORE IGM SER-ACNC: SIGNIFICANT CHANGE UP
HBV SURFACE AG SER-ACNC: SIGNIFICANT CHANGE UP
HBV SURFACE AG SER-ACNC: SIGNIFICANT CHANGE UP
HCV AB S/CO SERPL IA: 0.1 S/CO — SIGNIFICANT CHANGE UP (ref 0–0.99)
HCV AB S/CO SERPL IA: 0.1 S/CO — SIGNIFICANT CHANGE UP (ref 0–0.99)
HCV AB SERPL-IMP: SIGNIFICANT CHANGE UP
HCV AB SERPL-IMP: SIGNIFICANT CHANGE UP

## 2023-11-22 DIAGNOSIS — I87.8 OTHER SPECIFIED DISORDERS OF VEINS: ICD-10-CM

## 2023-11-22 DIAGNOSIS — E78.5 HYPERLIPIDEMIA, UNSPECIFIED: ICD-10-CM

## 2023-11-22 DIAGNOSIS — Z79.899 OTHER LONG TERM (CURRENT) DRUG THERAPY: ICD-10-CM

## 2023-11-22 DIAGNOSIS — I50.22 CHRONIC SYSTOLIC (CONGESTIVE) HEART FAILURE: ICD-10-CM

## 2023-11-22 DIAGNOSIS — I11.0 HYPERTENSIVE HEART DISEASE WITH HEART FAILURE: ICD-10-CM

## 2023-11-22 DIAGNOSIS — I34.0 NONRHEUMATIC MITRAL (VALVE) INSUFFICIENCY: ICD-10-CM

## 2023-11-22 DIAGNOSIS — I25.5 ISCHEMIC CARDIOMYOPATHY: ICD-10-CM

## 2023-11-22 DIAGNOSIS — I25.10 ATHEROSCLEROTIC HEART DISEASE OF NATIVE CORONARY ARTERY WITHOUT ANGINA PECTORIS: ICD-10-CM

## 2023-11-22 DIAGNOSIS — Z79.02 LONG TERM (CURRENT) USE OF ANTITHROMBOTICS/ANTIPLATELETS: ICD-10-CM

## 2023-11-22 DIAGNOSIS — K80.20 CALCULUS OF GALLBLADDER WITHOUT CHOLECYSTITIS WITHOUT OBSTRUCTION: ICD-10-CM

## 2023-11-22 DIAGNOSIS — Z79.82 LONG TERM (CURRENT) USE OF ASPIRIN: ICD-10-CM

## 2023-11-22 DIAGNOSIS — D50.9 IRON DEFICIENCY ANEMIA, UNSPECIFIED: ICD-10-CM

## 2023-11-22 DIAGNOSIS — Z95.818 PRESENCE OF OTHER CARDIAC IMPLANTS AND GRAFTS: ICD-10-CM

## 2023-11-22 DIAGNOSIS — Z82.49 FAMILY HISTORY OF ISCHEMIC HEART DISEASE AND OTHER DISEASES OF THE CIRCULATORY SYSTEM: ICD-10-CM

## 2023-11-22 DIAGNOSIS — I48.0 PAROXYSMAL ATRIAL FIBRILLATION: ICD-10-CM

## 2023-11-22 DIAGNOSIS — Z87.891 PERSONAL HISTORY OF NICOTINE DEPENDENCE: ICD-10-CM

## 2023-11-22 DIAGNOSIS — Z87.19 PERSONAL HISTORY OF OTHER DISEASES OF THE DIGESTIVE SYSTEM: ICD-10-CM

## 2023-11-22 DIAGNOSIS — N40.0 BENIGN PROSTATIC HYPERPLASIA WITHOUT LOWER URINARY TRACT SYMPTOMS: ICD-10-CM

## 2023-11-22 DIAGNOSIS — K21.9 GASTRO-ESOPHAGEAL REFLUX DISEASE WITHOUT ESOPHAGITIS: ICD-10-CM

## 2023-11-24 ENCOUNTER — APPOINTMENT (OUTPATIENT)
Dept: INFUSION THERAPY | Facility: CLINIC | Age: 83
End: 2023-11-24

## 2023-12-04 ENCOUNTER — NON-APPOINTMENT (OUTPATIENT)
Age: 83
End: 2023-12-04

## 2023-12-07 ENCOUNTER — APPOINTMENT (OUTPATIENT)
Dept: ELECTROPHYSIOLOGY | Facility: CLINIC | Age: 83
End: 2023-12-07
Payer: MEDICARE

## 2023-12-07 VITALS
DIASTOLIC BLOOD PRESSURE: 60 MMHG | SYSTOLIC BLOOD PRESSURE: 100 MMHG | HEIGHT: 72 IN | TEMPERATURE: 98 F | HEART RATE: 92 BPM

## 2023-12-07 DIAGNOSIS — Z48.89 ENCOUNTER FOR OTHER SPECIFIED SURGICAL AFTERCARE: ICD-10-CM

## 2023-12-07 PROCEDURE — 93284 PRGRMG EVAL IMPLANTABLE DFB: CPT

## 2023-12-07 PROCEDURE — 93000 ELECTROCARDIOGRAM COMPLETE: CPT | Mod: 59

## 2023-12-07 PROCEDURE — 99214 OFFICE O/P EST MOD 30 MIN: CPT

## 2023-12-18 ENCOUNTER — APPOINTMENT (OUTPATIENT)
Dept: ELECTROPHYSIOLOGY | Facility: CLINIC | Age: 83
End: 2023-12-18

## 2023-12-22 ENCOUNTER — LABORATORY RESULT (OUTPATIENT)
Age: 83
End: 2023-12-22

## 2023-12-22 ENCOUNTER — APPOINTMENT (OUTPATIENT)
Dept: HEMATOLOGY ONCOLOGY | Facility: CLINIC | Age: 83
End: 2023-12-22
Payer: MEDICARE

## 2023-12-22 ENCOUNTER — OUTPATIENT (OUTPATIENT)
Dept: OUTPATIENT SERVICES | Facility: HOSPITAL | Age: 83
LOS: 1 days | End: 2023-12-22
Payer: MEDICARE

## 2023-12-22 DIAGNOSIS — Z98.61 CORONARY ANGIOPLASTY STATUS: Chronic | ICD-10-CM

## 2023-12-22 DIAGNOSIS — D64.9 ANEMIA, UNSPECIFIED: ICD-10-CM

## 2023-12-22 DIAGNOSIS — Z98.890 OTHER SPECIFIED POSTPROCEDURAL STATES: Chronic | ICD-10-CM

## 2023-12-22 LAB
HCT VFR BLD CALC: 34.4 %
HGB BLD-MCNC: 10.5 G/DL
IRON SATN MFR SERPL: 14 %
IRON SERPL-MCNC: 39 UG/DL
MCHC RBC-ENTMCNC: 23.6 PG
MCHC RBC-ENTMCNC: 30.5 G/DL
MCV RBC AUTO: 77.5 FL
PLATELET # BLD AUTO: 285 K/UL
PMV BLD: 9.5 FL
RBC # BLD: 4.44 M/UL
RBC # FLD: 23.2 %
RETICS # AUTO: 2 %
RETICS AGGREG/RBC NFR: 90.1 K/UL
TIBC SERPL-MCNC: 274 UG/DL
UIBC SERPL-MCNC: 235 UG/DL
WBC # FLD AUTO: 6.83 K/UL

## 2023-12-22 PROCEDURE — 99213 OFFICE O/P EST LOW 20 MIN: CPT

## 2023-12-22 PROCEDURE — 83550 IRON BINDING TEST: CPT

## 2023-12-22 PROCEDURE — 83540 ASSAY OF IRON: CPT

## 2023-12-22 PROCEDURE — 85046 RETICYTE/HGB CONCENTRATE: CPT

## 2023-12-22 PROCEDURE — 82728 ASSAY OF FERRITIN: CPT

## 2023-12-22 PROCEDURE — 85027 COMPLETE CBC AUTOMATED: CPT

## 2023-12-23 DIAGNOSIS — D64.9 ANEMIA, UNSPECIFIED: ICD-10-CM

## 2023-12-24 LAB — FERRITIN SERPL-MCNC: 68 NG/ML

## 2023-12-26 ENCOUNTER — APPOINTMENT (OUTPATIENT)
Dept: HEMATOLOGY ONCOLOGY | Facility: CLINIC | Age: 83
End: 2023-12-26

## 2023-12-29 ENCOUNTER — APPOINTMENT (OUTPATIENT)
Dept: ORTHOPEDIC SURGERY | Facility: CLINIC | Age: 83
End: 2023-12-29
Payer: MEDICARE

## 2023-12-29 PROCEDURE — 99213 OFFICE O/P EST LOW 20 MIN: CPT | Mod: 25

## 2023-12-29 PROCEDURE — 20610 DRAIN/INJ JOINT/BURSA W/O US: CPT | Mod: 50

## 2023-12-29 NOTE — DISCUSSION/SUMMARY
[de-identified] : Bilateral knee Synvisc 1 gel injections.   History of Present Illness Patient is a 83-year-old male who reports to the office for subsequent reevaluation of his bilateral knee pain/osteoarthritis. He is here to receive bilateral knee Synvisc 1 gel injections.  Allergies No Known Drug Allergies  Physical Exam Bilateral knee exam is as follows: No effusion noted. No erythema or ecchymosis. Able to perform active straight leg raise. Knee flexion from 0 to 110 degrees. Mild medial/lateral joint line tenderness to palpation. Calf is soft and nontender. Light touch intact throughout. Nonantalgic gait.     Discussion/Summary With the patient's approval, the bilateral knee Synvisc-one gel injections were performed in the office today. See the attached procedure note for further details. Explained to the patient that the full effect of the medication may take up to 6 weeks for it to kick in.  The patient was advised to rest/ice the area and can alternate with warm compresses. Instructed not to do any strenuous activity that would further aggravate their symptoms.  Patient will follow-up in 5-6 months for further evaluation. All of the patient's questions/concerns were answered in detail.  Procedure  Large joint injection was performed bilaterally of the knee. The site was prepped with alcohol, ethyl chloride sprayed topically and sterile technique used. An injection of 48mg of Synvisc-one was used.  Patient tolerated procedure well. Patient was advised to call if redness, pain or fever occur and apply ice for 15 minutes out of every hour for the next 12-24 hours as tolerated.    The risks benefits, and alternatives have been discussed, and verbal consent was obtained.

## 2024-01-06 ENCOUNTER — RESULT REVIEW (OUTPATIENT)
Age: 84
End: 2024-01-06

## 2024-01-06 ENCOUNTER — OUTPATIENT (OUTPATIENT)
Dept: OUTPATIENT SERVICES | Facility: HOSPITAL | Age: 84
LOS: 1 days | End: 2024-01-06
Payer: MEDICARE

## 2024-01-06 DIAGNOSIS — Z98.890 OTHER SPECIFIED POSTPROCEDURAL STATES: Chronic | ICD-10-CM

## 2024-01-06 DIAGNOSIS — R91.1 SOLITARY PULMONARY NODULE: ICD-10-CM

## 2024-01-06 PROCEDURE — 71250 CT THORAX DX C-: CPT

## 2024-01-06 PROCEDURE — 76536 US EXAM OF HEAD AND NECK: CPT | Mod: 26

## 2024-01-06 PROCEDURE — 71250 CT THORAX DX C-: CPT | Mod: 26,MH

## 2024-01-06 PROCEDURE — 76536 US EXAM OF HEAD AND NECK: CPT

## 2024-01-07 DIAGNOSIS — R91.1 SOLITARY PULMONARY NODULE: ICD-10-CM

## 2024-01-08 ENCOUNTER — NON-APPOINTMENT (OUTPATIENT)
Age: 84
End: 2024-01-08

## 2024-01-08 NOTE — ASU DISCHARGE PLAN (ADULT/PEDIATRIC) - ***IN THE EVENT THAT YOU DEVELOP A COMPLICATION AND YOU ARE UNABLE TO REACH YOUR OWN PHYSICIAN, YOU MAY CONTACT:
Medication:   Requested Prescriptions     Pending Prescriptions Disp Refills    pantoprazole (PROTONIX) 20 MG tablet [Pharmacy Med Name: Pantoprazole Sodium Oral Tablet Delayed Release 20 MG] 30 tablet 0     Sig: TAKE 1 TABLET BY MOUTH EVERY DAY        Last Filled:      Patient Phone Number: [phone] (home)     Last appt: 3/2/2023   Next appt: Visit date not found    Last OARRS:        No data to display                   Statement Selected

## 2024-01-17 NOTE — ED PROVIDER NOTE - NS ED MD DISPO DIVISION
Daily fiber supplement like citrucel, fibercon, or metamucil.  Anusol cream rectally 2x/day for 2 weeks.   If no improvement, follow up with Dr. Murlilo on 2/19/24.   
[facility]

## 2024-01-22 ENCOUNTER — APPOINTMENT (OUTPATIENT)
Dept: CARDIOLOGY | Facility: CLINIC | Age: 84
End: 2024-01-22
Payer: MEDICARE

## 2024-01-22 VITALS
HEIGHT: 72 IN | HEART RATE: 75 BPM | SYSTOLIC BLOOD PRESSURE: 104 MMHG | DIASTOLIC BLOOD PRESSURE: 74 MMHG | BODY MASS INDEX: 25.73 KG/M2 | WEIGHT: 190 LBS

## 2024-01-22 DIAGNOSIS — R94.31 ABNORMAL ELECTROCARDIOGRAM [ECG] [EKG]: ICD-10-CM

## 2024-01-22 PROCEDURE — 93000 ELECTROCARDIOGRAM COMPLETE: CPT

## 2024-01-22 PROCEDURE — 99214 OFFICE O/P EST MOD 30 MIN: CPT

## 2024-01-22 RX ORDER — CLOPIDOGREL BISULFATE 75 MG/1
75 TABLET, FILM COATED ORAL DAILY
Qty: 30 | Refills: 6 | Status: DISCONTINUED | COMMUNITY
Start: 2023-07-07 | End: 2024-01-22

## 2024-01-22 RX ORDER — ASPIRIN 81 MG
81 TABLET, DELAYED RELEASE (ENTERIC COATED) ORAL DAILY
Refills: 0 | Status: DISCONTINUED | COMMUNITY
End: 2024-01-22

## 2024-01-22 RX ORDER — TORSEMIDE 10 MG/1
10 TABLET ORAL
Qty: 90 | Refills: 0 | Status: DISCONTINUED | COMMUNITY
Start: 2023-08-21

## 2024-01-22 NOTE — PHYSICAL EXAM
[Well Developed] : well developed [Well Nourished] : well nourished [No Acute Distress] : no acute distress [Normal Conjunctiva] : normal conjunctiva [Normal Venous Pressure] : normal venous pressure [No Carotid Bruit] : no carotid bruit [Normal S1, S2] : normal S1, S2 [No Rub] : no rub [S4] : S4 [Murmur] : murmur [Clear Lung Fields] : clear lung fields [Good Air Entry] : good air entry [No Respiratory Distress] : no respiratory distress  [Soft] : abdomen soft [Non Tender] : non-tender [Normal Bowel Sounds] : normal bowel sounds [Normal Gait] : normal gait [No Cyanosis] : no cyanosis [No Clubbing] : no clubbing [No Varicosities] : no varicosities [No Rash] : no rash [Moves all extremities] : moves all extremities [Normal Speech] : normal speech [Alert and Oriented] : alert and oriented [Normal memory] : normal memory [Edema ___] : edema [unfilled] [de-identified] : well appearing, no distress [de-identified] : 2/6 SM at the apex [de-identified] : reg, nL s1/s2, no m/r/g [de-identified] : warm, trace edema [de-identified] : alert, normal affect, logical conversation

## 2024-01-22 NOTE — ASSESSMENT
[FreeTextEntry1] : 83-yo male  CAD, s/p CAMDEN of proximal LAD. Recurrent GIB. Persistent A-fib/flutter. S/p Watchman. Converted to SR with PO Amiodarone. S/p MitraClip. Mild MR now. 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 S/P BiV ICD 11/16/2023. Patient is in SR now, may not need AVN ablation at this point.   Plan: Continue Amiodarone. Continue  mg daily. Continue Midodrine 3 times a day for now. Repeat 2D ECHO. Will discuss plan with EP after that. Recent blood work from PCP. F/u in 4 months  Tera Judd MD.

## 2024-01-22 NOTE — REASON FOR VISIT
[Symptom and Test Evaluation] : symptom and test evaluation [Cardiac Failure] : cardiac failure [FreeTextEntry1] : Multiple episodes of GI bleeding / hospitalizations.  had endoscopic treatment of AVM.  Ultimately, s/p WATCHMAN and anticoagulation stopped.  Subsequent DCCV / rhythm control.\par  \par  LV function deteriorated in setting of above with worsening of residual MR post Clip (initially mild).\par  \par  Now feels well.  Breathing comfortable.\par  \par  Wants to start exercising.\par  \par  Some Rx confusion.  Has appointment with Dr. Mayfield coming up.

## 2024-01-22 NOTE — HISTORY OF PRESENT ILLNESS
[FreeTextEntry1] : 82 y/o male with h/o mild LV dysfunction (LVEF 44%).  H/o HTN, hyperlipidemia. Patient was found to be in A-fib in April 2021. Prior GIB S/p PCI of proximal LAD.  Pt presents for a follow up visit. He had endoscopic treatment of AVM 3/27/23, s/p ANGY closure Watchman 5/16/23- AC stopped on ASA and Plavix now, s/p Mitraclip, LV function deteriorated with worsening of residual MR.   S/p recent DCCV. He denies chest pain, COLLIER or palpitations, but c/o fatigue after walking two blocks.   10/30/2023: EF is 27% on recent TTE. Patient is tachycardic. He reports having low BP at home 77/50, on Midodrine. Denies dizziness. Patient is anemic, has an appointment with hematologist tomorrow.   01/22/2024: Patient presents for F/U visit. He is s/p BiV ICD on 11/16/2023 with . Patient is still on Amiodarone. He is in SR today. His BP is borderline low at home, takes Midodrine. Still c/o fatigue and COLLIER but reports improvement. , LDL 40, Iron 33, Hg 12.  ROSARIO:08/2023 LVEF 30-35% LAE, RENEE Moderately reduced RV function Moderate to severe MR Moderate TR, Mild to moderate [place] Atheroma aoritc arch Watchman in place  ROSARIO 10/17/2023 EF 27%, moderate enlargement of LA/RA/RV, moderate TR, trace pericardial effusion

## 2024-01-22 NOTE — CARDIOLOGY SUMMARY
[de-identified] : 1/22/2024: SR 75/min, IVCD  [de-identified] : ECHO 10/2022:\par  LVEF 38%\par  Moderately rediced LV and RV function\par  \par  03/10/22:\par  LVEF 48%, G2DD\par  LAE, RENEE\par  Mod-severe MR.\par  \par  05/14/21:\par  LVEF 44%, G2DD\par  Mod LAE\par  Mod-severe MR.

## 2024-01-24 ENCOUNTER — LABORATORY RESULT (OUTPATIENT)
Age: 84
End: 2024-01-24

## 2024-01-24 ENCOUNTER — OUTPATIENT (OUTPATIENT)
Dept: OUTPATIENT SERVICES | Facility: HOSPITAL | Age: 84
LOS: 1 days | End: 2024-01-24
Payer: MEDICARE

## 2024-01-24 ENCOUNTER — APPOINTMENT (OUTPATIENT)
Dept: HEMATOLOGY ONCOLOGY | Facility: CLINIC | Age: 84
End: 2024-01-24

## 2024-01-24 DIAGNOSIS — Z98.890 OTHER SPECIFIED POSTPROCEDURAL STATES: Chronic | ICD-10-CM

## 2024-01-24 DIAGNOSIS — D64.9 ANEMIA, UNSPECIFIED: ICD-10-CM

## 2024-01-24 DIAGNOSIS — Z98.61 CORONARY ANGIOPLASTY STATUS: Chronic | ICD-10-CM

## 2024-01-24 LAB
HCT VFR BLD CALC: 41.6 %
HGB BLD-MCNC: 12.8 G/DL
MCHC RBC-ENTMCNC: 26.2 PG
MCHC RBC-ENTMCNC: 30.8 G/DL
MCV RBC AUTO: 85.1 FL
PLATELET # BLD AUTO: 232 K/UL
PMV BLD: 9.5 FL
RBC # BLD: 4.89 M/UL
RBC # FLD: 22.6 %
WBC # FLD AUTO: 5.69 K/UL

## 2024-01-24 PROCEDURE — 85027 COMPLETE CBC AUTOMATED: CPT

## 2024-01-24 PROCEDURE — 82728 ASSAY OF FERRITIN: CPT

## 2024-01-24 PROCEDURE — 36415 COLL VENOUS BLD VENIPUNCTURE: CPT

## 2024-01-25 DIAGNOSIS — D64.9 ANEMIA, UNSPECIFIED: ICD-10-CM

## 2024-01-25 LAB — FERRITIN SERPL-MCNC: 66 NG/ML

## 2024-02-15 ENCOUNTER — APPOINTMENT (OUTPATIENT)
Dept: CARDIOLOGY | Facility: CLINIC | Age: 84
End: 2024-02-15
Payer: MEDICARE

## 2024-02-15 PROCEDURE — 93306 TTE W/DOPPLER COMPLETE: CPT

## 2024-02-29 ENCOUNTER — APPOINTMENT (OUTPATIENT)
Dept: PULMONOLOGY | Facility: CLINIC | Age: 84
End: 2024-02-29
Payer: MEDICARE

## 2024-02-29 VITALS
RESPIRATION RATE: 14 BRPM | DIASTOLIC BLOOD PRESSURE: 62 MMHG | HEART RATE: 81 BPM | SYSTOLIC BLOOD PRESSURE: 100 MMHG | BODY MASS INDEX: 25.33 KG/M2 | WEIGHT: 187 LBS | OXYGEN SATURATION: 98 % | HEIGHT: 72 IN

## 2024-02-29 DIAGNOSIS — R91.1 SOLITARY PULMONARY NODULE: ICD-10-CM

## 2024-02-29 PROCEDURE — G2211 COMPLEX E/M VISIT ADD ON: CPT

## 2024-02-29 PROCEDURE — 99214 OFFICE O/P EST MOD 30 MIN: CPT

## 2024-02-29 NOTE — REASON FOR VISIT
[Follow-Up] : a follow-up visit [Pulmonary Nodules] : pulmonary nodules [Shortness of Breath] : shortness of breath

## 2024-03-07 ENCOUNTER — OUTPATIENT (OUTPATIENT)
Dept: OUTPATIENT SERVICES | Facility: HOSPITAL | Age: 84
LOS: 1 days | End: 2024-03-07
Payer: MEDICARE

## 2024-03-07 ENCOUNTER — APPOINTMENT (OUTPATIENT)
Dept: HEMATOLOGY ONCOLOGY | Facility: CLINIC | Age: 84
End: 2024-03-07
Payer: MEDICARE

## 2024-03-07 ENCOUNTER — APPOINTMENT (OUTPATIENT)
Dept: CARDIOLOGY | Facility: CLINIC | Age: 84
End: 2024-03-07

## 2024-03-07 VITALS
BODY MASS INDEX: 25.73 KG/M2 | HEART RATE: 84 BPM | HEIGHT: 72 IN | WEIGHT: 190 LBS | SYSTOLIC BLOOD PRESSURE: 93 MMHG | OXYGEN SATURATION: 98 % | TEMPERATURE: 97.5 F | RESPIRATION RATE: 14 BRPM | DIASTOLIC BLOOD PRESSURE: 59 MMHG

## 2024-03-07 DIAGNOSIS — Z98.61 CORONARY ANGIOPLASTY STATUS: Chronic | ICD-10-CM

## 2024-03-07 DIAGNOSIS — Z98.890 OTHER SPECIFIED POSTPROCEDURAL STATES: Chronic | ICD-10-CM

## 2024-03-07 DIAGNOSIS — D50.9 IRON DEFICIENCY ANEMIA, UNSPECIFIED: ICD-10-CM

## 2024-03-07 DIAGNOSIS — D64.9 ANEMIA, UNSPECIFIED: ICD-10-CM

## 2024-03-07 PROCEDURE — 99213 OFFICE O/P EST LOW 20 MIN: CPT

## 2024-03-07 PROCEDURE — 80053 COMPREHEN METABOLIC PANEL: CPT

## 2024-03-07 PROCEDURE — 85027 COMPLETE CBC AUTOMATED: CPT

## 2024-03-07 NOTE — ASSESSMENT
[FreeTextEntry1] : 84 year old male with atrial fibrillation on anticoagulation chronic recurrent upper GI bleeding secondary to angiodysplasias. Chronic iron deficiency anemia on venofer, last infusions given in early July S/P Watchman procedure in May, no longer on Xarelto or Plavix. Now on ASA only S/P pacemaker and valve clipping   PLAN: --CBC shows improvement in Hgb to 14.2, remainder stable  --Patient is taking PO iron once daily and tolerating well, has not have IV iron since last year  --Currently only on ASA --Follow up with cardiology as scheduled  Repeat CBC, ferritin in 2 months RTC in 4 months   Patient was seen and examined and discussed with Dr. Goodson who agrees to the above plan of care.

## 2024-03-07 NOTE — PHYSICAL EXAM
[Normal] : clear to auscultation bilaterally, no dullness, no wheezing [de-identified] : Pacemaker

## 2024-03-07 NOTE — PHYSICAL EXAM
[Normal] : clear to auscultation bilaterally, no dullness, no wheezing [de-identified] : Pacemaker

## 2024-03-07 NOTE — HISTORY OF PRESENT ILLNESS
[de-identified] : 82 year old white male who was seen on consultation in the hospital for iron deficiency anemia , ferritin was 8 . he received packed RBCs and venofer . most recent Hgb was 10 . He feels well and denies any hematochezia. EGD showed erosive gastritis , bleeder was cauterized and resumed eliquis on discharge . He had prior episodes of bleeding in the past ( AV malformations ? 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 He received 2 units rbcs and venofer X 2 , He feels much better and denies any hematochezia .  6/13/2023 Patient returns for chronic iron deficiency anemia , last ferritin was 28 on 5/23/2023 , since then he received three additional doses of venofer. He feels well today , Hgb is 9.1 and stable , not required transfusions recently . He reduced xarelto to 10 mg daily .   3/7/2024: Patient returns for follow up for chronic iron deficiency anemia due to chronic GI bleeding. No longer on Xarelto or Plavix, only ASA. He feels overall well, now new compalints today. He now has a pacemaker.

## 2024-03-07 NOTE — HISTORY OF PRESENT ILLNESS
[de-identified] : 82 year old white male who was seen on consultation in the hospital for iron deficiency anemia , ferritin was 8 . he received packed RBCs and venofer . most recent Hgb was 10 . He feels well and denies any hematochezia. EGD showed erosive gastritis , bleeder was cauterized and resumed eliquis on discharge . He had prior episodes of bleeding in the past ( AV malformations ? 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 He received 2 units rbcs and venofer X 2 , He feels much better and denies any hematochezia .  6/13/2023 Patient returns for chronic iron deficiency anemia , last ferritin was 28 on 5/23/2023 , since then he received three additional doses of venofer. He feels well today , Hgb is 9.1 and stable , not required transfusions recently . He reduced xarelto to 10 mg daily .   3/7/2024: Patient returns for follow up for chronic iron deficiency anemia due to chronic GI bleeding. No longer on Xarelto or Plavix, only ASA. He feels overall well, now new compalints today. He now has a pacemaker.

## 2024-03-08 ENCOUNTER — NON-APPOINTMENT (OUTPATIENT)
Age: 84
End: 2024-03-08

## 2024-03-08 ENCOUNTER — APPOINTMENT (OUTPATIENT)
Dept: CARDIOLOGY | Facility: CLINIC | Age: 84
End: 2024-03-08
Payer: MEDICARE

## 2024-03-08 DIAGNOSIS — D50.9 IRON DEFICIENCY ANEMIA, UNSPECIFIED: ICD-10-CM

## 2024-03-08 LAB
ALBUMIN SERPL ELPH-MCNC: 3.7 G/DL
ALP BLD-CCNC: 100 U/L
ALT SERPL-CCNC: 30 U/L
ANION GAP SERPL CALC-SCNC: 11 MMOL/L
AST SERPL-CCNC: 33 U/L
BASOPHILS # BLD AUTO: 0.03 K/UL
BASOPHILS NFR BLD AUTO: 0.6 %
BILIRUB SERPL-MCNC: 0.6 MG/DL
BUN SERPL-MCNC: 13 MG/DL
CALCIUM SERPL-MCNC: 9.1 MG/DL
CHLORIDE SERPL-SCNC: 104 MMOL/L
CO2 SERPL-SCNC: 26 MMOL/L
CREAT SERPL-MCNC: 1.1 MG/DL
EGFR: 66 ML/MIN/1.73M2
EOSINOPHIL # BLD AUTO: 0.08 K/UL
EOSINOPHIL NFR BLD AUTO: 1.6 %
GLUCOSE SERPL-MCNC: 105 MG/DL
HCT VFR BLD CALC: 45.7 %
HGB BLD-MCNC: 14.2 G/DL
IMM GRANULOCYTES NFR BLD AUTO: 0.6 %
LYMPHOCYTES # BLD AUTO: 1 K/UL
LYMPHOCYTES NFR BLD AUTO: 19.7 %
MAN DIFF?: NORMAL
MCHC RBC-ENTMCNC: 27.6 PG
MCHC RBC-ENTMCNC: 31.1 G/DL
MCV RBC AUTO: 88.9 FL
MONOCYTES # BLD AUTO: 0.67 K/UL
MONOCYTES NFR BLD AUTO: 13.2 %
NEUTROPHILS # BLD AUTO: 3.26 K/UL
NEUTROPHILS NFR BLD AUTO: 64.3 %
PLATELET # BLD AUTO: 167 K/UL
PMV BLD AUTO: 0 /100 WBCS
POTASSIUM SERPL-SCNC: 4.2 MMOL/L
PROT SERPL-MCNC: 6.6 G/DL
RBC # BLD: 5.14 M/UL
RBC # FLD: 17.9 %
SODIUM SERPL-SCNC: 141 MMOL/L
WBC # FLD AUTO: 5.07 K/UL

## 2024-03-08 PROCEDURE — 93296 REM INTERROG EVL PM/IDS: CPT

## 2024-03-08 PROCEDURE — 93295 DEV INTERROG REMOTE 1/2/MLT: CPT

## 2024-03-12 ENCOUNTER — APPOINTMENT (OUTPATIENT)
Dept: CARDIOLOGY | Facility: CLINIC | Age: 84
End: 2024-03-12
Payer: MEDICARE

## 2024-03-12 VITALS
HEART RATE: 79 BPM | WEIGHT: 194 LBS | BODY MASS INDEX: 26.28 KG/M2 | SYSTOLIC BLOOD PRESSURE: 118 MMHG | DIASTOLIC BLOOD PRESSURE: 80 MMHG | HEIGHT: 72 IN

## 2024-03-12 DIAGNOSIS — Z95.818 OTHER SPECIFIED POSTPROCEDURAL STATES: ICD-10-CM

## 2024-03-12 DIAGNOSIS — Z98.890 OTHER SPECIFIED POSTPROCEDURAL STATES: ICD-10-CM

## 2024-03-12 PROCEDURE — 99214 OFFICE O/P EST MOD 30 MIN: CPT

## 2024-03-12 PROCEDURE — 93000 ELECTROCARDIOGRAM COMPLETE: CPT

## 2024-03-12 NOTE — ASSESSMENT
[FreeTextEntry1] : 84-yo male  CAD, s/p CAMDEN of proximal LAD. Recurrent GIB. Persistent A-fib/flutter. S/p Watchman. Converted to SR with PO Amiodarone. S/p MitraClip. Mild MR now. 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 S/P BiV ICD 11/16/2023. Patient is in SR now, may not need AVN ablation at this point.  Plan: Continue Amiodarone. Continue  mg daily. D/c Midodrine, will continue to monitor BP. Patient will bring his medication bottles to next visit.  Will try to start GDMT if BP remains stable without Midodrine. F/u in 2 weeks.  Tera Judd MD.

## 2024-03-12 NOTE — PHYSICAL EXAM
[Well Nourished] : well nourished [Well Developed] : well developed [No Acute Distress] : no acute distress [Normal Conjunctiva] : normal conjunctiva [Normal Venous Pressure] : normal venous pressure [No Carotid Bruit] : no carotid bruit [Normal S1, S2] : normal S1, S2 [No Rub] : no rub [S4] : S4 [Murmur] : murmur [Clear Lung Fields] : clear lung fields [Good Air Entry] : good air entry [No Respiratory Distress] : no respiratory distress  [Soft] : abdomen soft [Non Tender] : non-tender [Normal Gait] : normal gait [Normal Bowel Sounds] : normal bowel sounds [No Cyanosis] : no cyanosis [No Varicosities] : no varicosities [No Clubbing] : no clubbing [No Rash] : no rash [Edema ___] : edema [unfilled] [Normal Speech] : normal speech [Moves all extremities] : moves all extremities [Normal memory] : normal memory [Alert and Oriented] : alert and oriented [de-identified] : well appearing, no distress [de-identified] : reg, nL s1/s2, no m/r/g [de-identified] : 2/6 SM at the apex [de-identified] : warm, trace edema [de-identified] : alert, normal affect, logical conversation

## 2024-03-12 NOTE — REVIEW OF SYSTEMS
[Feeling Fatigued] : feeling fatigued [Dyspnea on exertion] : dyspnea during exertion [Negative] : Neurological [Fever] : no fever [Chills] : no chills [Lower Ext Edema] : no extremity edema [Leg Claudication] : no intermittent leg claudication [Palpitations] : no palpitations [Orthopnea] : no orthopnea [Syncope] : no syncope [Rash] : no rash [Anxiety] : no anxiety [Easy Bruising] : no tendency for easy bruising [Easy Bleeding] : no tendency for easy bleeding

## 2024-03-12 NOTE — HISTORY OF PRESENT ILLNESS
[FreeTextEntry1] : 84 y/o male with h/o mild LV dysfunction (LVEF 44%).  H/o HTN, hyperlipidemia. Patient was found to be in A-fib in April 2021. Prior GIB S/p PCI of proximal LAD.  He had endoscopic treatment of AVM 3/27/23, s/p ANGY closure Watchman 5/16/23- AC stopped on ASA and Plavix now, s/p Mitraclip, LV function deteriorated with worsening of residual MR.   S/p DCCV.  He is s/p BiV ICD on 11/16/2023 with   Patient presents for a follow up. BP at home 95-110s/50s-70s.  Takes Midodrine TID. Has mild COLLIER and leg edema, on torsemide 20 daily.   2D ECHO 2/2024: LVEF 26% Mildly enlarged RV LAE, RENEE Ascending aorta 4.0 Mitraclip, mild MR Moderate TR Small pericardial effusion

## 2024-03-12 NOTE — CARDIOLOGY SUMMARY
[de-identified] : 3/12/24: SR, V-paced.  [de-identified] : ROSARIO 10/17/2023 EF 27%, moderate enlargement of LA/RA/RV, moderate TR, trace pericardial effusion ECHO 10/2022: LVEF 38% Moderately rediced LV and RV function  03/10/22: LVEF 48%, G2DD LAE, RENEE Mod-severe MR.  05/14/21: LVEF 44%, G2DD Mod LAE Mod-severe MR. [de-identified] : ROSARIO:08/2023 LVEF 30-35% LAE, RENEE Moderately reduced RV function Moderate to severe MR Moderate TR, Mild to moderate [place] Atheroma aoritc arch Watchman in place

## 2024-03-16 PROBLEM — D64.9 ANEMIA, UNSPECIFIED TYPE: Status: ACTIVE | Noted: 2021-02-26

## 2024-03-26 ENCOUNTER — APPOINTMENT (OUTPATIENT)
Dept: CARDIOLOGY | Facility: CLINIC | Age: 84
End: 2024-03-26
Payer: MEDICARE

## 2024-03-26 VITALS — HEART RATE: 83 BPM | DIASTOLIC BLOOD PRESSURE: 60 MMHG | SYSTOLIC BLOOD PRESSURE: 92 MMHG

## 2024-03-26 DIAGNOSIS — Z98.890 OTHER SPECIFIED POSTPROCEDURAL STATES: ICD-10-CM

## 2024-03-26 DIAGNOSIS — Z95.818 OTHER SPECIFIED POSTPROCEDURAL STATES: ICD-10-CM

## 2024-03-26 DIAGNOSIS — I42.8 OTHER CARDIOMYOPATHIES: ICD-10-CM

## 2024-03-26 PROCEDURE — 93000 ELECTROCARDIOGRAM COMPLETE: CPT

## 2024-03-26 PROCEDURE — 99214 OFFICE O/P EST MOD 30 MIN: CPT

## 2024-03-26 RX ORDER — BISACODYL 5 MG/1
5 TABLET ORAL
Refills: 0 | Status: DISCONTINUED | COMMUNITY
End: 2024-03-26

## 2024-03-26 RX ORDER — AMIODARONE HYDROCHLORIDE 200 MG/1
200 TABLET ORAL DAILY
Qty: 90 | Refills: 3 | Status: DISCONTINUED | COMMUNITY
Start: 2023-06-26 | End: 2024-03-26

## 2024-03-28 ENCOUNTER — APPOINTMENT (OUTPATIENT)
Dept: CARDIOLOGY | Facility: CLINIC | Age: 84
End: 2024-03-28

## 2024-03-28 VITALS
WEIGHT: 195 LBS | SYSTOLIC BLOOD PRESSURE: 92 MMHG | BODY MASS INDEX: 26.41 KG/M2 | HEART RATE: 81 BPM | OXYGEN SATURATION: 97 % | DIASTOLIC BLOOD PRESSURE: 64 MMHG | HEIGHT: 72 IN

## 2024-03-28 LAB
ALBUMIN SERPL ELPH-MCNC: 4.2 G/DL
ALP BLD-CCNC: 111 U/L
ALT SERPL-CCNC: 35 U/L
ANION GAP SERPL CALC-SCNC: 14 MMOL/L
AST SERPL-CCNC: 47 U/L
BILIRUB SERPL-MCNC: 0.8 MG/DL
BUN SERPL-MCNC: 25 MG/DL
CALCIUM SERPL-MCNC: 9.8 MG/DL
CHLORIDE SERPL-SCNC: 98 MMOL/L
CO2 SERPL-SCNC: 27 MMOL/L
CREAT SERPL-MCNC: 1.4 MG/DL
EGFR: 50 ML/MIN/1.73M2
GLUCOSE SERPL-MCNC: 99 MG/DL
MAGNESIUM SERPL-MCNC: 1.6 MG/DL
POTASSIUM SERPL-SCNC: 3.9 MMOL/L
PROT SERPL-MCNC: 7.2 G/DL
SODIUM SERPL-SCNC: 139 MMOL/L

## 2024-03-28 PROCEDURE — 94618 PULMONARY STRESS TESTING: CPT

## 2024-03-28 PROCEDURE — 93308 TTE F-UP OR LMTD: CPT

## 2024-03-28 PROCEDURE — 93000 ELECTROCARDIOGRAM COMPLETE: CPT | Mod: 1L

## 2024-03-28 PROCEDURE — 99205 OFFICE O/P NEW HI 60 MIN: CPT | Mod: 1L

## 2024-03-28 PROCEDURE — G2211 COMPLEX E/M VISIT ADD ON: CPT

## 2024-03-28 RX ORDER — METOPROLOL TARTRATE 50 MG/1
50 TABLET, FILM COATED ORAL
Refills: 0 | Status: DISCONTINUED | COMMUNITY
Start: 1900-01-01 | End: 2024-03-28

## 2024-03-28 RX ORDER — MIDODRINE HYDROCHLORIDE 5 MG/1
5 TABLET ORAL 3 TIMES DAILY
Qty: 270 | Refills: 3 | Status: DISCONTINUED | COMMUNITY
End: 2024-03-28

## 2024-03-29 LAB — NT-PROBNP SERPL-MCNC: 4620 PG/ML

## 2024-03-29 NOTE — ASSESSMENT
[FreeTextEntry1] : 85 y/o M with h/o CAD s/p PCI to LAD, ICM, chronic systolic HF, MR s/p clip, afib s/p watchman off A/C coming for evaluation of HF syndrome. He is grossly overloaded  PFTs April 2023: Mod restrictive defect, no obstruction, mod decrease in diffusing capacity   TTE done 1/15/2024 reviewed: Severely decreased LV systolic function, LVEF is <20% on my view, Grade 3 DD, mitral clip with residual mild MR. The RV is severely dilated with mild systolic dysfunction and moderate TR, IVC dilated and not collapsing c/w RAP 15 mmHg.   ECG today shows atrial flutter with ventricular rate at 81 bpm   6MWT: 130 meters, 4 stops. Limitations - fatigue/back pain, Lowest SpO2 88%.   Acute on chronic systolic HF/ atrial flutter / TR  Grossly overloaded  NYHA class III 6MWD today  POCUS assessment shows IVC 2.8 not collapsing c/w RAP 15 mmHg Increase torsemide to 40 mg twice daily for 4 days then 20 mg BID  Start spironolactone 25 mg daily  If K level less than 4 will add potassium tablet while taking high dose diuretics  Switch metoprolol to succinate 50 mg BID  Stay off midodrine  Will optimize GDMT gradually given borderline low BP  LV wall are thick on echo, will send for PYP scan next appt, however, the LV wall thickness has remained stable for the past 8 years (oldest echo reviewed done 12/2018)  Continue amiodarone for aflutter and follow up with EP, if he stays in atrial flutter, he might benefit from DCCV or ablation once euvolemic and GDMT optimized  Blood work in 2 weeks Extensive counseling provided regarding importance of diet and daily weight monitoring  Discussed with his Cardiologist, Dr. Judd  RTLACY in 3-4 weeks    I spent 65 minutes with the patient. More than half of this time was spent face-to-face with the patient discussing about diagnosis, treatment plan, prognosis, answering questions and providing counseling regarding importance of low sodium diet and monitoring daily weight and vital signs at home. The rest of the time was spent reviewing labs, imaging studies and discussing with other providers.    Mitchell Dale MD, FACC, [facility]  Advanced Heart Failure/ Mechanical Circulatory Support Pulmonary Hypertension and Cardiac Amyloidosis  [facility]

## 2024-03-29 NOTE — REASON FOR VISIT
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 He is coming for evaluation of HF syndrome. He reports having SOB even with minimal exertion. He can't do much in the house due to this. His SOB has been getting worsening progressively. He denies any chest pain, LOC, orthopnea, PND.  He reports being compliant with meds and low sodium diet.

## 2024-04-08 LAB
ALBUMIN SERPL ELPH-MCNC: 3.9 G/DL
ALP BLD-CCNC: 114 U/L
ALT SERPL-CCNC: 24 U/L
ANION GAP SERPL CALC-SCNC: 14 MMOL/L
AST SERPL-CCNC: 30 U/L
BILIRUB SERPL-MCNC: 1.1 MG/DL
BUN SERPL-MCNC: 21 MG/DL
CALCIUM SERPL-MCNC: 9.8 MG/DL
CHLORIDE SERPL-SCNC: 96 MMOL/L
CO2 SERPL-SCNC: 28 MMOL/L
CREAT SERPL-MCNC: 1.4 MG/DL
EGFR: 50 ML/MIN/1.73M2
GLUCOSE SERPL-MCNC: 116 MG/DL
MAGNESIUM SERPL-MCNC: 1.6 MG/DL
POTASSIUM SERPL-SCNC: 4.2 MMOL/L
PROT SERPL-MCNC: 7.7 G/DL
SODIUM SERPL-SCNC: 138 MMOL/L

## 2024-04-16 NOTE — PHYSICAL EXAM
[No Acute Distress] : no acute distress [Normal Oropharynx] : normal oropharynx [Normal Appearance] : normal appearance [No Neck Mass] : no neck mass [Normal Rate/Rhythm] : normal rate/rhythm [Normal S1, S2] : normal s1, s2 [No Murmurs] : no murmurs [No Resp Distress] : no resp distress [Clear to Auscultation Bilaterally] : clear to auscultation bilaterally [1+ Pitting] : 1+ pitting [TextBox_105] : edema almost resolved

## 2024-04-16 NOTE — HISTORY OF PRESENT ILLNESS
[TextBox_4] : Result of the CT scan was discussed again with the patient told him about the stable nodule and small pleural effusion patient currently on Lasix 20 mg overall feels better Currently no cough wheezing shortness of breath He did follow with her primary doctor Dr. Antoine  regarding the thyroid nodule and told him nothing to do now we will repeat may be ultrasound in 1 year, a copy of the result was given to him and told him to make sure to follow-up with her again and have an ultrasound repeated by end of this year

## 2024-04-16 NOTE — PLAN
[TextEntry] : Will repeat chest x-ray prescription given to him told him to do it in 2 to 3 weeks and to call me back after if any worsening effusion we might consider thoracentesis Continue Lasix Follow with cardiology Follow-up with PMD regarding the thyroid Follow-up in June 2024 for possible repeat CAT scan

## 2024-04-16 NOTE — REVIEW OF SYSTEMS
[Negative] : Endocrine [Chest Tightness] : no chest tightness [Dyspnea] : no dyspnea [SOB on Exertion] : no sob on exertion [TextBox_44] : see HPI

## 2024-04-18 ENCOUNTER — APPOINTMENT (OUTPATIENT)
Dept: HEART FAILURE | Facility: CLINIC | Age: 84
End: 2024-04-18
Payer: MEDICARE

## 2024-04-18 VITALS
DIASTOLIC BLOOD PRESSURE: 80 MMHG | BODY MASS INDEX: 24.52 KG/M2 | WEIGHT: 181 LBS | HEIGHT: 72 IN | HEART RATE: 72 BPM | OXYGEN SATURATION: 98 % | SYSTOLIC BLOOD PRESSURE: 98 MMHG

## 2024-04-18 LAB
ALBUMIN SERPL ELPH-MCNC: 4.4 G/DL
ALP BLD-CCNC: 117 U/L
ALT SERPL-CCNC: 39 U/L
ANION GAP SERPL CALC-SCNC: 14 MMOL/L
AST SERPL-CCNC: 51 U/L
BILIRUB SERPL-MCNC: 0.7 MG/DL
BUN SERPL-MCNC: 23 MG/DL
CALCIUM SERPL-MCNC: 10.2 MG/DL
CHLORIDE SERPL-SCNC: 99 MMOL/L
CO2 SERPL-SCNC: 25 MMOL/L
CREAT SERPL-MCNC: 1.3 MG/DL
EGFR: 54 ML/MIN/1.73M2
GLUCOSE SERPL-MCNC: 101 MG/DL
MAGNESIUM SERPL-MCNC: 1.9 MG/DL
POTASSIUM SERPL-SCNC: 5.1 MMOL/L
PROT SERPL-MCNC: 8.5 G/DL
SODIUM SERPL-SCNC: 138 MMOL/L

## 2024-04-18 PROCEDURE — G2211 COMPLEX E/M VISIT ADD ON: CPT

## 2024-04-18 PROCEDURE — 93000 ELECTROCARDIOGRAM COMPLETE: CPT

## 2024-04-18 PROCEDURE — 99214 OFFICE O/P EST MOD 30 MIN: CPT

## 2024-04-18 PROCEDURE — 93308 TTE F-UP OR LMTD: CPT

## 2024-04-18 NOTE — REASON FOR VISIT
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 He is coming for evaluation of HF syndrome. He reports having SOB even with minimal exertion. He can't do much in the house due to this. His SOB has been getting worsening progressively. He denies any chest pain, LOC, orthopnea, PND.  He reports being compliant with meds and low sodium diet.   4/18  Patient reports feeling much better since last appt, he lost~14 lbs and can walk more than one block before getting SOB. No PND, orhtopnea, N/v. He is compliant with meds.

## 2024-04-18 NOTE — ADDENDUM
[FreeTextEntry1] : Intermediate risk for cardiovascular complications related to cataract surgery at this time. May proceed with surgery.

## 2024-04-18 NOTE — ASSESSMENT
[FreeTextEntry1] : 83 y/o M with h/o CAD s/p PCI to LAD, ICM, chronic systolic HF, MR s/p clip, afib s/p watchman off A/C coming for evaluation of HF syndrome. He is grossly overloaded  PFTs April 2023: Mod restrictive defect, no obstruction, mod decrease in diffusing capacity   TTE done 1/15/2024 reviewed: Severely decreased LV systolic function, LVEF is <20% on my view, Grade 3 DD, mitral clip with residual mild MR. The RV is severely dilated with mild systolic dysfunction and moderate TR, IVC dilated and not collapsing c/w RAP 15 mmHg.   ECG today shows atrial flutter with ventricular rate at 81 bpm   6MWT 3/28: 130 meters, 4 stops. Limitations - fatigue/back pain, Lowest SpO2 88%.    Acute on chronic systolic HF/ atrial flutter / TR  Patient is euvolemic on exam, POCUS shows IVC 1.9 cm and collapsing >50% NYHA class II   Continue spironolactone 25 mg daily  Lower torsemide to 10 mg daily, extra tablet as needed for weight gain of 2 lb in one day Start Farxiga 10 mg daily Switch metoprolol to succinate 50 mg BID  Continue amiodarone for aflutter and follow up with EP, if he stays in atrial flutter, he might benefit from DCCV or ablation once euvolemic and GDMT optimized  Blood work today Extensive counseling provided regarding importance of diet and daily weight monitoring  RTO in 3 months   Will send for labs today and based on results will: DC K, lower torsemide to 10, add SGLT2i, rto in 3 months, discuss with Dr. Pelletier.   Mitchell Dale MD, FACC, [facility]  Advanced Heart Failure/ Mechanical Circulatory Support Pulmonary Hypertension and Cardiac Amyloidosis  [facility]

## 2024-04-18 NOTE — ASSESSMENT
[FreeTextEntry1] : 84-yo male  CAD, s/p CAMDEN of proximal LAD. Recurrent GIB. Persistent A-fib/flutter. S/p Watchman. Converted to SR with PO Amiodarone but in A-flutter now after stopping it for unclear reason. S/p MitraClip. Mild MR now. Progressive cardiomyopathy, etiology unclear. S/P BiV ICD 11/16/2023.   Plan: Resume Amiodarone. Continue  mg daily. Continue Midodrine for now. EP f/u. May need cardioversion if remains in A-flutter. HF evaluation. F/u in 2 months.  Tera Judd MD.

## 2024-04-18 NOTE — CARDIOLOGY SUMMARY
[de-identified] : 3/26/24: A-flutter, V-paced.  [de-identified] : ROSARIO 10/17/2023 EF 27%, moderate enlargement of LA/RA/RV, moderate TR, trace pericardial effusion ECHO 10/2022: LVEF 38% Moderately rediced LV and RV function  03/10/22: LVEF 48%, G2DD LAE, RENEE Mod-severe MR.  05/14/21: LVEF 44%, G2DD Mod LAE Mod-severe MR. [de-identified] : ROSARIO:08/2023 LVEF 30-35% LAE, RENEE Moderately reduced RV function Moderate to severe MR Moderate TR, Mild to moderate [place] Atheroma aoritc arch Watchman in place

## 2024-04-18 NOTE — PHYSICAL EXAM
[Well Developed] : well developed [Well Nourished] : well nourished [Normal Conjunctiva] : normal conjunctiva [No Acute Distress] : no acute distress [No Carotid Bruit] : no carotid bruit [Normal Venous Pressure] : normal venous pressure [No Rub] : no rub [Normal S1, S2] : normal S1, S2 [S4] : S4 [Murmur] : murmur [Good Air Entry] : good air entry [Clear Lung Fields] : clear lung fields [No Respiratory Distress] : no respiratory distress  [Soft] : abdomen soft [Non Tender] : non-tender [Normal Bowel Sounds] : normal bowel sounds [Normal Gait] : normal gait [No Clubbing] : no clubbing [No Cyanosis] : no cyanosis [No Varicosities] : no varicosities [Edema ___] : edema [unfilled] [No Rash] : no rash [Moves all extremities] : moves all extremities [Normal Speech] : normal speech [Alert and Oriented] : alert and oriented [Normal memory] : normal memory [No Gallop] : no gallop [de-identified] : well appearing, no distress [de-identified] : 2/6 SM at the apex [de-identified] : irreg s1/s2, no m/r/g [de-identified] : warm, trace edema [de-identified] : alert, normal affect, logical conversation

## 2024-04-18 NOTE — HISTORY OF PRESENT ILLNESS
[FreeTextEntry1] : 84 y/o male with h/o mild LV dysfunction (LVEF 44%).  H/o HTN, hyperlipidemia. Patient was found to be in A-fib in April 2021. Prior GIB S/p PCI of proximal LAD.  He had endoscopic treatment of AVM 3/27/23, s/p ANGY closure Watchman 5/16/23- AC stopped on ASA and Plavix now, s/p Mitraclip, LV function deteriorated with worsening of residual MR.   S/p DCCV.  He is s/p BiV ICD on 11/16/2023 with   Patient presents for a follow up. BP at home 80s-110s/50s-70s.  Takes Midodrine TID, did not stop it as instructed. Has mild COLLIER and leg edema, on torsemide 20 daily.   2D ECHO 2/2024: LVEF 26% Mildly enlarged RV LAE, RENEE Ascending aorta 4.0 Mitraclip, mild MR Moderate TR Small pericardial effusion

## 2024-04-19 LAB — NT-PROBNP SERPL-MCNC: 2240 PG/ML

## 2024-04-19 RX ORDER — POTASSIUM CHLORIDE 1500 MG/1
20 TABLET, FILM COATED, EXTENDED RELEASE ORAL
Qty: 30 | Refills: 0 | Status: DISCONTINUED | COMMUNITY
Start: 2024-03-29 | End: 2024-04-19

## 2024-04-26 NOTE — H&P PST ADULT - BMI (KG/M2)
PATIENT SAID SHE RECEIVED A CALL FROM OUR OFFICE, SHE IS RETURNING OUR CALL. PHONE NUMBER [phone]   26

## 2024-04-30 ENCOUNTER — NON-APPOINTMENT (OUTPATIENT)
Age: 84
End: 2024-04-30

## 2024-05-07 ENCOUNTER — APPOINTMENT (OUTPATIENT)
Age: 84
End: 2024-05-07

## 2024-05-07 ENCOUNTER — LABORATORY RESULT (OUTPATIENT)
Age: 84
End: 2024-05-07

## 2024-05-08 LAB
FERRITIN SERPL-MCNC: 191 NG/ML
HCT VFR BLD CALC: 48.3 %
HGB BLD-MCNC: 16 G/DL
MCHC RBC-ENTMCNC: 27.9 PG
MCHC RBC-ENTMCNC: 33.1 G/DL
MCV RBC AUTO: 84.3 FL
PLATELET # BLD AUTO: 204 K/UL
PMV BLD: 9.7 FL
RBC # BLD: 5.73 M/UL
RBC # FLD: 18.3 %
WBC # FLD AUTO: 7.07 K/UL

## 2024-05-21 ENCOUNTER — RX RENEWAL (OUTPATIENT)
Age: 84
End: 2024-05-21

## 2024-05-23 ENCOUNTER — APPOINTMENT (OUTPATIENT)
Dept: CARDIOLOGY | Facility: CLINIC | Age: 84
End: 2024-05-23
Payer: MEDICARE

## 2024-05-23 ENCOUNTER — NON-APPOINTMENT (OUTPATIENT)
Age: 84
End: 2024-05-23

## 2024-05-23 VITALS
WEIGHT: 186 LBS | SYSTOLIC BLOOD PRESSURE: 100 MMHG | BODY MASS INDEX: 25.19 KG/M2 | HEART RATE: 84 BPM | DIASTOLIC BLOOD PRESSURE: 68 MMHG | HEIGHT: 72 IN

## 2024-05-23 DIAGNOSIS — D50.9 IRON DEFICIENCY ANEMIA, UNSPECIFIED: ICD-10-CM

## 2024-05-23 DIAGNOSIS — I10 ESSENTIAL (PRIMARY) HYPERTENSION: ICD-10-CM

## 2024-05-23 DIAGNOSIS — I25.10 ATHEROSCLEROTIC HEART DISEASE OF NATIVE CORONARY ARTERY W/OUT ANGINA PECTORIS: ICD-10-CM

## 2024-05-23 DIAGNOSIS — I50.22 CHRONIC SYSTOLIC (CONGESTIVE) HEART FAILURE: ICD-10-CM

## 2024-05-23 PROCEDURE — 93000 ELECTROCARDIOGRAM COMPLETE: CPT

## 2024-05-23 PROCEDURE — 99214 OFFICE O/P EST MOD 30 MIN: CPT

## 2024-05-23 NOTE — PHYSICAL EXAM
[Well Developed] : well developed [Well Nourished] : well nourished [No Acute Distress] : no acute distress [Normal Conjunctiva] : normal conjunctiva [Normal Venous Pressure] : normal venous pressure [No Carotid Bruit] : no carotid bruit [No Rub] : no rub [No Gallop] : no gallop [Murmur] : murmur [Clear Lung Fields] : clear lung fields [Good Air Entry] : good air entry [No Respiratory Distress] : no respiratory distress  [Soft] : abdomen soft [Non Tender] : non-tender [Normal Bowel Sounds] : normal bowel sounds [Normal Gait] : normal gait [No Cyanosis] : no cyanosis [No Clubbing] : no clubbing [No Varicosities] : no varicosities [Edema ___] : edema [unfilled] [No Rash] : no rash [Moves all extremities] : moves all extremities [Normal Speech] : normal speech [Alert and Oriented] : alert and oriented [Normal memory] : normal memory [Normal S1, S2] : normal S1, S2 [S4] : S4 [de-identified] : well appearing, no distress [de-identified] : 2/6 SM at the apex [de-identified] : warm, trace edema [de-identified] : alert, normal affect, logical conversation

## 2024-05-23 NOTE — CARDIOLOGY SUMMARY
[de-identified] : 5/23/24: SR, V-paced.  [de-identified] : ROSARIO 10/17/2023 EF 27%, moderate enlargement of LA/RA/RV, moderate TR, trace pericardial effusion ECHO 10/2022: LVEF 38% Moderately rediced LV and RV function  03/10/22: LVEF 48%, G2DD LAE, RENEE Mod-severe MR.  05/14/21: LVEF 44%, G2DD Mod LAE Mod-severe MR. [de-identified] : ROSARIO:08/2023 LVEF 30-35% LAE, RENEE Moderately reduced RV function Moderate to severe MR Moderate TR, Mild to moderate [place] Atheroma aoritc arch Watchman in place

## 2024-05-23 NOTE — ASSESSMENT
[FreeTextEntry1] : 84-yo male  CAD, s/p CAMDEN of proximal LAD. Recurrent GIB. Persistent A-fib/flutter. S/p Watchman. Converted to SR with PO Amiodarone. S/p MitraClip. Mild MR now. Progressive cardiomyopathy, etiology unclear. S/P BiV ICD 11/16/2023.  Appears euvolemic today.  Plan: Resume Amiodarone. Continue  mg daily. EP f/u. HF F/U BW from PCP. Low-salt diet discussed. F/u in 6 weeks.  Tera Judd MD.

## 2024-05-23 NOTE — HISTORY OF PRESENT ILLNESS
[FreeTextEntry1] : 82 y/o male with h/o mild LV dysfunction (LVEF 44%).  H/o HTN, hyperlipidemia. Patient was found to be in A-fib in April 2021. Prior GIB S/p PCI of proximal LAD.  He had endoscopic treatment of AVM 3/27/23, s/p ANGY closure Watchman 5/16/23- AC stopped on ASA and Plavix now, s/p Mitraclip, LV function deteriorated with worsening of residual MR.   S/p DCCV.  He is s/p BiV ICD on 11/16/2023 with   Patient presents for a follow up. Feels well. No leg edema now. Fatigue improved. SOB improved. Taking metoprolol once a day instead if BID due to SBP 80s.   2D ECHO 2/2024: LVEF 26% Mildly enlarged RV LAE, RENEE Ascending aorta 4.0 Mitraclip, mild MR Moderate TR Small pericardial effusion

## 2024-05-31 ENCOUNTER — APPOINTMENT (OUTPATIENT)
Dept: ORTHOPEDIC SURGERY | Facility: CLINIC | Age: 84
End: 2024-05-31
Payer: MEDICARE

## 2024-05-31 DIAGNOSIS — M17.0 BILATERAL PRIMARY OSTEOARTHRITIS OF KNEE: ICD-10-CM

## 2024-05-31 PROCEDURE — 99213 OFFICE O/P EST LOW 20 MIN: CPT

## 2024-05-31 NOTE — DISCUSSION/SUMMARY
[de-identified] : Knee pain follow-up.  History of Present Illness Patient is an 84-year-old male who reports to the office for subsequent reevaluation of his bilateral knee pain. He had gel injections done back in December 2023 which gave him relief. He states that they have been starting to wear off and his pain has restarted. Walking, certain range of motion, and palpating certain areas of the knee aggravate the patient's pain at times. Denies any numbness or tingling.  Allergies No Known Drug Allergies  Physical Exam Bilateral knee exam is as follows: No effusion noted. No erythema or ecchymosis. Able to perform active straight leg raise. Knee flexion from 0 to 110 degrees. Calf is soft and nontender. Light touch intact throughout. Mild medial joint line tenderness to palpation. Nonantalgic gait. Ambulation with cane.  Discussion/Summary Patient may take Tylenol as needed for pain. The patient was advised to rest/ice the area and may alternate with warm compresses as needed. He may continue the knee conditioning exercises at home.  Patient has benefited from viscosupplementation injections in the past.  Bilateral knee Euflexxa gel injection series ordered so he may receive that at his next visit.  Follow-up in 6 weeks. All of the patient's questions/concerns were answered in detail.

## 2024-06-10 ENCOUNTER — APPOINTMENT (OUTPATIENT)
Dept: ELECTROPHYSIOLOGY | Facility: CLINIC | Age: 84
End: 2024-06-10
Payer: MEDICARE

## 2024-06-10 VITALS
DIASTOLIC BLOOD PRESSURE: 67 MMHG | HEART RATE: 56 BPM | HEIGHT: 72 IN | BODY MASS INDEX: 25.18 KG/M2 | TEMPERATURE: 97.6 F | RESPIRATION RATE: 17 BRPM | WEIGHT: 185.9 LBS | SYSTOLIC BLOOD PRESSURE: 129 MMHG

## 2024-06-10 DIAGNOSIS — Z45.02 ENCOUNTER FOR ADJUSTMENT AND MANAGEMENT OF AUTOMATIC IMPLANTABLE CARDIAC DEFIBRILLATOR: ICD-10-CM

## 2024-06-10 DIAGNOSIS — Z95.818 PRESENCE OF OTHER CARDIAC IMPLANTS AND GRAFTS: ICD-10-CM

## 2024-06-10 DIAGNOSIS — I48.92 UNSPECIFIED ATRIAL FLUTTER: ICD-10-CM

## 2024-06-10 DIAGNOSIS — I48.91 UNSPECIFIED ATRIAL FIBRILLATION: ICD-10-CM

## 2024-06-10 PROCEDURE — 93284 PRGRMG EVAL IMPLANTABLE DFB: CPT

## 2024-06-10 PROCEDURE — 99214 OFFICE O/P EST MOD 30 MIN: CPT

## 2024-06-10 RX ORDER — TAMSULOSIN HYDROCHLORIDE 0.4 MG/1
0.4 CAPSULE ORAL DAILY
Refills: 0 | Status: ACTIVE | COMMUNITY

## 2024-06-10 RX ORDER — PANTOPRAZOLE 40 MG/1
40 TABLET, DELAYED RELEASE ORAL DAILY
Refills: 0 | Status: ACTIVE | COMMUNITY

## 2024-06-10 RX ORDER — ASPIRIN 81 MG/1
81 TABLET, CHEWABLE ORAL DAILY
Refills: 0 | Status: ACTIVE | COMMUNITY
Start: 2024-01-22

## 2024-06-10 RX ORDER — ATORVASTATIN CALCIUM 20 MG/1
20 TABLET, FILM COATED ORAL
Qty: 90 | Refills: 1 | Status: ACTIVE | COMMUNITY
Start: 2021-12-22

## 2024-06-10 RX ORDER — PNV NO.95/FERROUS FUM/FOLIC AC 28MG-0.8MG
TABLET ORAL DAILY
Refills: 0 | Status: ACTIVE | COMMUNITY

## 2024-06-10 RX ORDER — AMIODARONE HYDROCHLORIDE 200 MG/1
200 TABLET ORAL
Qty: 90 | Refills: 1 | Status: ACTIVE | COMMUNITY
Start: 2024-03-26

## 2024-06-10 RX ORDER — METOPROLOL SUCCINATE 50 MG/1
50 TABLET, EXTENDED RELEASE ORAL
Qty: 60 | Refills: 2 | Status: ACTIVE | COMMUNITY
Start: 2024-03-28

## 2024-06-10 RX ORDER — FOLIC ACID 1 MG/1
1 TABLET ORAL
Qty: 90 | Refills: 0 | Status: ACTIVE | COMMUNITY
Start: 2020-05-13

## 2024-06-10 RX ORDER — SPIRONOLACTONE 25 MG/1
25 TABLET ORAL
Qty: 90 | Refills: 3 | Status: ACTIVE | COMMUNITY
Start: 2024-03-28

## 2024-06-10 RX ORDER — TORSEMIDE 10 MG/1
10 TABLET ORAL
Qty: 90 | Refills: 3 | Status: ACTIVE | COMMUNITY

## 2024-06-10 RX ORDER — CHLORHEXIDINE GLUCONATE 4 %
325 (65 FE) LIQUID (ML) TOPICAL TWICE DAILY
Qty: 60 | Refills: 0 | Status: ACTIVE | COMMUNITY
Start: 2021-05-27

## 2024-06-10 NOTE — CARDIOLOGY SUMMARY
[de-identified] : 05/23/2024: SR with PVCs, A-sensed, V-paced @ 84bpm   QTc 510ms  QRS 120ms 12/7/2023: SR, A-sensed, V-paced @ 92bpm (11/7/2023) Atrial Flutter with 2:1 AV conduction; ventricular rate 92 bpm. (06/26/2023): Atrial fibrillation 99 bpm, RBBB, LAFB (05/08/2023): Sinus rhythm at 90 bpm, RBBB, non-specific ST-T wave abnormalities.  (03/07/2023): Sinus rhythm at 96 bpm, RBBB, non-specific ST-T wave abnormalities.   [de-identified] : (10/17/2023) EF 27% (8/24/2023) EF 28% (7/5/2023) EF 30-35% (10/13/2022): 2D echo. LVEF 38%. Moderately decreased global LV systolic function. Moderately reduced RV systolic function. [id number] mitral valve repair with mitral clip. Moderate pericardial effusion.  [de-identified] : 11/16/2023: CRT-D (abbott) implanted  [de-identified] : 05/16/2023: ANGY closure (Watchman) [de-identified] : (3/27/2023) Endoscopy with thermal treatment and endoclip.

## 2024-06-10 NOTE — HISTORY OF PRESENT ILLNESS
[FreeTextEntry1] : Hypertension, hyperlipidemia, paroxysmal atrial fibrillation (04/2021), mitral regurgitation s/p mitral clip, coronary artery disease s/p PCI to proximal LAD, cardiomyopathy, and recurrent GI bleed.   Patient had multiple hospitalizations for GI bleeds and anemia, including January 2022 and January 2023. He had EGD, colonoscopy, and capsule endoscopy. EGD on 01/19/2023 showed non-erosive gastritis, angioectasias in the second part of the duodenum, and hiatal hernia. Colonoscopy on 01/19/2023 showed polyp in the ascending colon s/p polypectomy, internal and external hemorrhoids, and diverticulosis of the left side of the colon. 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 Patient is planned for enteroscopy with possible APC.   on 3/27/2023 patient had endoscopy and thermal treatment and endoclip for AVMs  Patient was on Xarelto 10 mg daily and is following with hematology and gastroenterology. He is not able to tolerate full-dose DOAC due to recurrence of GI bleeds.   05/16/2023 underwent ANGY closure (Watchman) Watchman PLAN: -05/16-06/27/23 pt on Xarelto 20mg QD and ASA 81mg -ROSARIO/DCCV on 07/05/2023 post watchman: appropriate seal.  -07/05-01/05/2024 Plavix 75mg and ASA 81mg -Starting 01/06/2024 stop Plavix and continue ASA 162mg QD  11/7/2023: EF remained less than 35% despite GDMT; on midodrine for low BP; remains in AFl with rates often above 100 bpm  06/10/24: Today the patient is feeling generally well with no acute complaints. Denies CP, SOB, palpitations, dizziness, syncope. Pt had stopped amio for unclear reason in March and converted back into AF/AFL but was restarted by Dr. Judd. Pt currently in AF today, feeling well.  Patient has no chest pain, no shortness of breath at rest, moderate dyspnea on exertion, no dizziness, no lightheadedness, and no syncope.

## 2024-06-10 NOTE — DISCUSSION/SUMMARY
[FreeTextEntry1] : Mr. Bhavik Miguel is a pleasant 84-year-old man with hypertension, hyperlipidemia, persistent atrial flutter and atrial fibrillation, coronary artery disease s/p PCI, cardiomyopathy with EF of 28%, severe MR s/p mitral clip, recurrent GI bleed, s/p enteroscopy with thermal therapy and endoclip, ANGY closure (Watchman) on 05/16/2023, combined cardiomyopathy, chronic systolic heart failure NYHA III.   Patient's CHADSVASC score is 5 (age, HTN, CAD, CHF), and HASBLED score is 4.    ROSARIO/DCCV on 07/05/2023 post watchman: appropriate seal.   I recommend to continue the same medication. Pt stopped Plavix 01/06/2024 and started ASA 162mg QD.  EF remained low despite GDMT. pt now s/p BiV-ICD implant.   Patient in AF today - asymptomatic. Pt states he has been feeling much better lately. He has been going on walks.  - Discussed with Dr. Pacheco no need for AV zayra ablation at this time since patient is rate controlled. Will monitor.  - AT/AF [place] 5.9% - Continue amio 200mg QD. Labs from PCP 1/11/24: TSH and T4 normal. QTc 510ms -will monitor.  - Incision site well healed, clean, dry, no drainage, no redness, no bruising. Pt has no pain.  - Device interrogated and reprogrammed as described in procedure. Device function normal. LV LEAD is LBB. BP 95%. All parameters stable. AF events. See Device Printout.  # Remote monitoring - patient is enrolled   Patient will f/u in 5-6 months' time or earlier if needed. Please do not hesitate to contact me at [phone] if you have any further questions regarding this patient care.

## 2024-06-10 NOTE — PHYSICAL EXAM
[Well Developed] : well developed [Well Nourished] : well nourished [No Acute Distress] : no acute distress [Normal Conjunctiva] : normal conjunctiva [Normal Venous Pressure] : normal venous pressure [No Carotid Bruit] : no carotid bruit [Normal S1, S2] : normal S1, S2 [No Murmur] : no murmur [No Rub] : no rub [No Gallop] : no gallop [Clear Lung Fields] : clear lung fields [Good Air Entry] : good air entry [No Respiratory Distress] : no respiratory distress  [Soft] : abdomen soft [Non Tender] : non-tender [No Masses/organomegaly] : no masses/organomegaly [Normal Bowel Sounds] : normal bowel sounds [No Edema] : no edema [No Cyanosis] : no cyanosis [No Clubbing] : no clubbing [No Varicosities] : no varicosities [No Rash] : no rash [No Skin Lesions] : no skin lesions [Moves all extremities] : moves all extremities [No Focal Deficits] : no focal deficits [Normal Speech] : normal speech [Alert and Oriented] : alert and oriented [Normal memory] : normal memory [General Appearance - Well Developed] : well developed [Normal Appearance] : normal appearance [Well Groomed] : well groomed [General Appearance - Well Nourished] : well nourished [No Deformities] : no deformities [General Appearance - In No Acute Distress] : no acute distress [] : no respiratory distress [Respiration, Rhythm And Depth] : normal respiratory rhythm and effort [Left Infraclavicular] : left infraclavicular area [Clean] : clean [Dry] : dry [Well-Healed] : well-healed [de-identified] : ambulates with a cane [FreeTextEntry2] : removed 4x4

## 2024-06-10 NOTE — PROCEDURE
[Atrial Fibrillation] : atrial fibrillation [See Device Printout] : See device printout [CRT-D] : Cardiac resynchronization therapy defibrillator [DDD] : DDD [Normal] : The battery status is normal. [Sensing Amplitude ___mv] : sensing amplitude was [unfilled] mv [Lead Imp:  ___ohms] : lead impedance was [unfilled] ohms [___V @] : [unfilled] V [___ ms] : [unfilled] ms [Auto Capture "On"] : auto capture was switched "On" [de-identified] : [employer] [de-identified] : Aminata Hopkins [de-identified] : [id number] [de-identified] : 11/16/2023 [de-identified] : 70 [de-identified] : Longevity: 6.7 YEARS [de-identified] : *LV LEAD is LBB LEAD* AP 25% BP 95% AT/AF [place] 5.9%

## 2024-06-18 ENCOUNTER — APPOINTMENT (OUTPATIENT)
Dept: PULMONOLOGY | Facility: CLINIC | Age: 84
End: 2024-06-18
Payer: MEDICARE

## 2024-06-18 VITALS
OXYGEN SATURATION: 98 % | DIASTOLIC BLOOD PRESSURE: 80 MMHG | HEIGHT: 72 IN | BODY MASS INDEX: 25.06 KG/M2 | HEART RATE: 60 BPM | SYSTOLIC BLOOD PRESSURE: 100 MMHG | WEIGHT: 185 LBS

## 2024-06-18 DIAGNOSIS — R06.02 SHORTNESS OF BREATH: ICD-10-CM

## 2024-06-18 DIAGNOSIS — R91.8 OTHER NONSPECIFIC ABNORMAL FINDING OF LUNG FIELD: ICD-10-CM

## 2024-06-18 PROCEDURE — 99213 OFFICE O/P EST LOW 20 MIN: CPT

## 2024-06-18 NOTE — PLAN
[TextEntry] : cxr reordered to be done now to follow if any worsening effusion told patient to call me after  ct scan jan 2025  diuretics as per cardiology  follow cardiology

## 2024-06-18 NOTE — HISTORY OF PRESENT ILLNESS
[TextBox_4] : Patient coming for follow-up overall he is feeling good no cough wheezing shortness of breath been followed by cardiology and diuretics he did not do the chest x-ray yet CT scan from January reviewed go more with CHF 6-minute walking test result reviewed Cardiology note and CHF note reviewed

## 2024-06-24 NOTE — DISCHARGE NOTE PROVIDER - NSDCACTIVITY_GEN_ALL_CORE
Dear NIKKIE   It was nice seeing you in the nephrology clinic today     Today we discussed the following:     #Chronic kidney disease stage III-baseline kidney function 50-60%-most likely related to diabetes.  Most recent kidney function is 59%     #Protein leak in the urine-most like related to uncontrolled diabetes. Less likely related to lupus.  Continue Farxiga 10 mg and continue losartan 50 mg twice daily.  Routine leak is improving      #Uncontrolled diabetes-will need to get hemoglobin A1c less than 7.5     #Avoid taking meloxicam/Mobic-this medication can cause further damage to your kidneys     #Limit salt intake, limit processed meat.  Increase fresh fruits and vegetables    # Calcium is high-avoid taking calcium supplements and keep yourself well-hydrated     Follow-up in 6 months with repeat blood work and urinalysis prior to next visit     Please call our office if you have any question  Thank you for coming to see me today     Neal Tanner MD, MS, KEVIN MEEKS  Clinical  - [employer]  Nephrologist - [facility] - [facility]   No heavy lifting/straining

## 2024-07-04 NOTE — DISCHARGE NOTE NURSING/CASE MANAGEMENT/SOCIAL WORK - NSDCPEPT PROEDMA_GEN_ALL_CORE
Problem: Discharge Planning  Goal: Discharge to home or other facility with appropriate resources  7/4/2024 1047 by Noa Daugherty, RN  Outcome: Progressing     Problem: Pain  Goal: Verbalizes/displays adequate comfort level or baseline comfort level  7/4/2024 1047 by Noa Daugherty, RN  Outcome: Progressing      Yes

## 2024-07-09 ENCOUNTER — LABORATORY RESULT (OUTPATIENT)
Age: 84
End: 2024-07-09

## 2024-07-09 ENCOUNTER — OUTPATIENT (OUTPATIENT)
Dept: OUTPATIENT SERVICES | Facility: HOSPITAL | Age: 84
LOS: 1 days | End: 2024-07-09
Payer: MEDICARE

## 2024-07-09 ENCOUNTER — APPOINTMENT (OUTPATIENT)
Age: 84
End: 2024-07-09
Payer: MEDICARE

## 2024-07-09 VITALS
WEIGHT: 187 LBS | HEIGHT: 72 IN | SYSTOLIC BLOOD PRESSURE: 101 MMHG | OXYGEN SATURATION: 98 % | TEMPERATURE: 98 F | BODY MASS INDEX: 25.33 KG/M2 | RESPIRATION RATE: 17 BRPM | HEART RATE: 82 BPM | DIASTOLIC BLOOD PRESSURE: 67 MMHG

## 2024-07-09 DIAGNOSIS — Z98.61 CORONARY ANGIOPLASTY STATUS: Chronic | ICD-10-CM

## 2024-07-09 DIAGNOSIS — D64.9 ANEMIA, UNSPECIFIED: ICD-10-CM

## 2024-07-09 LAB
HCT VFR BLD CALC: 48.4 %
HGB BLD-MCNC: 15.7 G/DL
MCHC RBC-ENTMCNC: 29.9 PG
MCHC RBC-ENTMCNC: 32.4 G/DL
MCV RBC AUTO: 92.2 FL
PLATELET # BLD AUTO: 204 K/UL
PMV BLD: 9.8 FL
RBC # FLD: 16.9 %
WBC # FLD AUTO: 6.91 K/UL

## 2024-07-09 PROCEDURE — 85027 COMPLETE CBC AUTOMATED: CPT

## 2024-07-09 PROCEDURE — 83540 ASSAY OF IRON: CPT

## 2024-07-09 PROCEDURE — 99213 OFFICE O/P EST LOW 20 MIN: CPT

## 2024-07-09 PROCEDURE — 82728 ASSAY OF FERRITIN: CPT

## 2024-07-09 PROCEDURE — 80053 COMPREHEN METABOLIC PANEL: CPT

## 2024-07-09 PROCEDURE — 83550 IRON BINDING TEST: CPT

## 2024-07-10 DIAGNOSIS — D64.9 ANEMIA, UNSPECIFIED: ICD-10-CM

## 2024-07-10 LAB
ALBUMIN SERPL ELPH-MCNC: 4.1 G/DL
ALP BLD-CCNC: 109 U/L
ALT SERPL-CCNC: 42 U/L
ANION GAP SERPL CALC-SCNC: 13 MMOL/L
AST SERPL-CCNC: 51 U/L
BUN SERPL-MCNC: 29 MG/DL
CALCIUM SERPL-MCNC: 9.3 MG/DL
CHLORIDE SERPL-SCNC: 99 MMOL/L
CO2 SERPL-SCNC: 24 MMOL/L
CREAT SERPL-MCNC: 1.5 MG/DL
EGFR: 46 ML/MIN/1.73M2
GLUCOSE SERPL-MCNC: 127 MG/DL
IRON SATN MFR SERPL: 17 %
IRON SERPL-MCNC: 50 UG/DL
POTASSIUM SERPL-SCNC: 4.2 MMOL/L
PROT SERPL-MCNC: 7.5 G/DL
SODIUM SERPL-SCNC: 136 MMOL/L
TIBC SERPL-MCNC: 296 UG/DL

## 2024-07-11 LAB — FERRITIN SERPL-MCNC: 54 NG/ML

## 2024-07-18 ENCOUNTER — APPOINTMENT (OUTPATIENT)
Dept: CARDIOLOGY | Facility: CLINIC | Age: 84
End: 2024-07-18
Payer: MEDICARE

## 2024-07-18 VITALS
HEART RATE: 95 BPM | DIASTOLIC BLOOD PRESSURE: 62 MMHG | HEIGHT: 72 IN | WEIGHT: 185 LBS | SYSTOLIC BLOOD PRESSURE: 82 MMHG | BODY MASS INDEX: 25.06 KG/M2

## 2024-07-18 DIAGNOSIS — D50.9 IRON DEFICIENCY ANEMIA, UNSPECIFIED: ICD-10-CM

## 2024-07-18 DIAGNOSIS — Z95.818 OTHER SPECIFIED POSTPROCEDURAL STATES: ICD-10-CM

## 2024-07-18 DIAGNOSIS — I42.8 OTHER CARDIOMYOPATHIES: ICD-10-CM

## 2024-07-18 DIAGNOSIS — Z98.890 OTHER SPECIFIED POSTPROCEDURAL STATES: ICD-10-CM

## 2024-07-18 DIAGNOSIS — I34.0 NONRHEUMATIC MITRAL (VALVE) INSUFFICIENCY: ICD-10-CM

## 2024-07-18 DIAGNOSIS — I50.22 CHRONIC SYSTOLIC (CONGESTIVE) HEART FAILURE: ICD-10-CM

## 2024-07-18 DIAGNOSIS — Z95.818 PRESENCE OF OTHER CARDIAC IMPLANTS AND GRAFTS: ICD-10-CM

## 2024-07-18 DIAGNOSIS — I48.91 UNSPECIFIED ATRIAL FIBRILLATION: ICD-10-CM

## 2024-07-18 DIAGNOSIS — I25.10 ATHEROSCLEROTIC HEART DISEASE OF NATIVE CORONARY ARTERY W/OUT ANGINA PECTORIS: ICD-10-CM

## 2024-07-18 PROCEDURE — 93000 ELECTROCARDIOGRAM COMPLETE: CPT

## 2024-07-18 PROCEDURE — G2211 COMPLEX E/M VISIT ADD ON: CPT

## 2024-07-18 PROCEDURE — 99214 OFFICE O/P EST MOD 30 MIN: CPT

## 2024-07-22 LAB
HCT VFR BLD CALC: 52.5 %
HGB BLD-MCNC: 16.8 G/DL
MCHC RBC-ENTMCNC: 30.7 PG
MCHC RBC-ENTMCNC: 32 G/DL
MCV RBC AUTO: 95.8 FL
PLATELET # BLD AUTO: 227 K/UL
PMV BLD AUTO: 0 /100 WBCS
PMV BLD: 10.8 FL
RBC # BLD: 5.48 M/UL
RBC # FLD: 16.7 %
WBC # FLD AUTO: 7.18 K/UL

## 2024-07-23 LAB
ALBUMIN SERPL ELPH-MCNC: 4.3 G/DL
ALP BLD-CCNC: 93 U/L
ALT SERPL-CCNC: 29 U/L
ANION GAP SERPL CALC-SCNC: 14 MMOL/L
AST SERPL-CCNC: 32 U/L
BILIRUB SERPL-MCNC: 0.7 MG/DL
BUN SERPL-MCNC: 25 MG/DL
CALCIUM SERPL-MCNC: 9.4 MG/DL
CHLORIDE SERPL-SCNC: 99 MMOL/L
CHOLEST SERPL-MCNC: 132 MG/DL
CO2 SERPL-SCNC: 26 MMOL/L
CREAT SERPL-MCNC: 1.6 MG/DL
EGFR: 42 ML/MIN/1.73M2
ESTIMATED AVERAGE GLUCOSE: 137 MG/DL
GLUCOSE SERPL-MCNC: 125 MG/DL
HBA1C MFR BLD HPLC: 6.4 %
HDLC SERPL-MCNC: 47 MG/DL
LDLC SERPL CALC-MCNC: 62 MG/DL
NONHDLC SERPL-MCNC: 85 MG/DL
POTASSIUM SERPL-SCNC: 4.6 MMOL/L
PROT SERPL-MCNC: 7.6 G/DL
SODIUM SERPL-SCNC: 139 MMOL/L
TRIGL SERPL-MCNC: 115 MG/DL
TSH SERPL-ACNC: 3.29 UIU/ML

## 2024-07-24 ENCOUNTER — APPOINTMENT (OUTPATIENT)
Age: 84
End: 2024-07-24

## 2024-07-24 ENCOUNTER — OUTPATIENT (OUTPATIENT)
Dept: OUTPATIENT SERVICES | Facility: HOSPITAL | Age: 84
LOS: 1 days | End: 2024-07-24
Payer: MEDICARE

## 2024-07-24 VITALS
DIASTOLIC BLOOD PRESSURE: 69 MMHG | SYSTOLIC BLOOD PRESSURE: 100 MMHG | TEMPERATURE: 97 F | HEART RATE: 92 BPM | OXYGEN SATURATION: 98 %

## 2024-07-24 DIAGNOSIS — Z98.890 OTHER SPECIFIED POSTPROCEDURAL STATES: Chronic | ICD-10-CM

## 2024-07-24 DIAGNOSIS — D64.9 ANEMIA, UNSPECIFIED: ICD-10-CM

## 2024-07-24 DIAGNOSIS — Z98.61 CORONARY ANGIOPLASTY STATUS: Chronic | ICD-10-CM

## 2024-07-24 PROCEDURE — 96365 THER/PROPH/DIAG IV INF INIT: CPT

## 2024-07-24 RX ORDER — IRON SUCROSE 20 MG/ML
200 INJECTION, SOLUTION INTRAVENOUS ONCE
Refills: 0 | Status: COMPLETED | OUTPATIENT
Start: 2024-07-24 | End: 2024-07-24

## 2024-07-24 RX ADMIN — IRON SUCROSE 200 MILLIGRAM(S): 20 INJECTION, SOLUTION INTRAVENOUS at 16:38

## 2024-07-24 RX ADMIN — IRON SUCROSE 100 MILLIGRAM(S): 20 INJECTION, SOLUTION INTRAVENOUS at 16:08

## 2024-07-25 ENCOUNTER — APPOINTMENT (OUTPATIENT)
Dept: ORTHOPEDIC SURGERY | Facility: CLINIC | Age: 84
End: 2024-07-25
Payer: MEDICARE

## 2024-07-25 PROCEDURE — 20610 DRAIN/INJ JOINT/BURSA W/O US: CPT | Mod: 50

## 2024-07-25 PROCEDURE — 99213 OFFICE O/P EST LOW 20 MIN: CPT | Mod: 25

## 2024-07-25 NOTE — PROCEDURE
Administered By (Optional): Dr. Feliz [Large Joint Injection] : Large joint injection How Many Mls Were Removed From The 40 Mg/Ml (5ml) Vial When Preparing The Injectable Solution?: 0 [Bilateral] : bilaterally of the Consent: The risks of atrophy were reviewed with the patient. [Knee] : knee Kenalog Preparation: Kenalog [Alcohol] : alcohol Concentration Of Solution Injected (Mg/Ml): 20.0 [Ethyl Chloride sprayed topically] : ethyl chloride sprayed topically Total Volume Injected (Ccs- Only Use Numbers And Decimals): 0.3 [Sterile technique used] : sterile technique used Show Inventory Tab: Hide [Euflexxa(20mg)] : 20mg of Euflexxa Validate Note Data When Using Inventory: Yes [#1] : series #1 Medical Necessity Clause: This procedure was medically necessary because the lesions that were treated were: [] : Patient tolerated procedure well Bill For Wasted Drug?: no [Call if redness, pain or fever occur] : call if redness, pain or fever occur Detail Level: Simple [Apply ice for 15min out of every hour for the next 12-24 hours as tolerated] : apply ice for 15 minutes out of every hour for the next 12-24 hours as tolerated Kenalog Type Of Vial: Multiple Dose [Risks, benefits, alternatives discussed / Verbal consent obtained] : the risks benefits, and alternatives have been discussed, and verbal consent was obtained

## 2024-07-25 NOTE — DISCUSSION/SUMMARY
[de-identified] : Bilateral knee first Euflexxa gel injection  HPI Patient is an 84-year old male reports to office for subsequent reevaluation of knee pain.  Here to receive gel injection.  Knee exam is as follows: No effusion noted.  No erythema or ecchymosis.  Able to perform active straight leg raise.  Knee flexion from 0 to 110 degrees.  Calf is soft and nontender.  Light touch intact throughout.  Nonantalgic gait.  Assessment/plan With the patient's approval, the bilateral knee first Euflexxa gel injections were performed in office today. See the attached procedure note for further details.  Explained to the patient that the full effect of the medication may take up to 6 weeks for it to kick in.  The patient was advised to rest/ice the area and can alternate with warm compresses.  Instructed not to do any strenuous activity that would further aggravate their symptoms.  Patient will follow-up in 1 week for second set of injections.  All of the patient's questions/concerns were answered in detail.

## 2024-08-01 ENCOUNTER — APPOINTMENT (OUTPATIENT)
Dept: ORTHOPEDIC SURGERY | Facility: CLINIC | Age: 84
End: 2024-08-01
Payer: MEDICARE

## 2024-08-01 PROCEDURE — 20610 DRAIN/INJ JOINT/BURSA W/O US: CPT | Mod: 50

## 2024-08-01 NOTE — DISCUSSION/SUMMARY
[de-identified] : Bilateral knee second Euflexxa gel injection  HPI Patient is an 84-year old male reports to office for subsequent reevaluation of knee pain. Here to receive gel injection.  Assessment/plan With the patient's approval, the bilateral knee second Euflexxa gel injections were performed in office today. See the attached procedure note for further details. Explained to the patient that the full effect of the medication may take up to 6 weeks for it to kick in.  The patient was advised to rest/ice the area and can alternate with warm compresses. Instructed not to do any strenuous activity that would further aggravate their symptoms.  Patient will follow-up in 1 week for third and final round of injections. All of the patient's questions/concerns were answered in detail.  Procedure Large joint injection was performed bilaterally of the knee. The site was prepped with alcohol, ethyl chloride sprayed topically and sterile technique used. An injection of 20mg of Euflexxa, series #2 was used.  Patient tolerated procedure well. Patient was advised to call if redness, pain or fever occur and apply ice for 15 minutes out of every hour for the next 12-24 hours as tolerated.    The risks benefits, and alternatives have been discussed, and verbal consent was obtained.

## 2024-08-02 ENCOUNTER — OUTPATIENT (OUTPATIENT)
Dept: OUTPATIENT SERVICES | Facility: HOSPITAL | Age: 84
LOS: 1 days | End: 2024-08-02
Payer: MEDICARE

## 2024-08-02 ENCOUNTER — APPOINTMENT (OUTPATIENT)
Age: 84
End: 2024-08-02

## 2024-08-02 VITALS — SYSTOLIC BLOOD PRESSURE: 95 MMHG | TEMPERATURE: 97 F | DIASTOLIC BLOOD PRESSURE: 61 MMHG | HEART RATE: 70 BPM

## 2024-08-02 DIAGNOSIS — Z98.61 CORONARY ANGIOPLASTY STATUS: Chronic | ICD-10-CM

## 2024-08-02 DIAGNOSIS — Z98.890 OTHER SPECIFIED POSTPROCEDURAL STATES: Chronic | ICD-10-CM

## 2024-08-02 DIAGNOSIS — D64.9 ANEMIA, UNSPECIFIED: ICD-10-CM

## 2024-08-02 PROCEDURE — 96365 THER/PROPH/DIAG IV INF INIT: CPT

## 2024-08-02 RX ORDER — IRON SUCROSE 20 MG/ML
200 INJECTION, SOLUTION INTRAVENOUS ONCE
Refills: 0 | Status: COMPLETED | OUTPATIENT
Start: 2024-08-02 | End: 2024-08-02

## 2024-08-02 RX ADMIN — IRON SUCROSE 200 MILLIGRAM(S): 20 INJECTION, SOLUTION INTRAVENOUS at 16:12

## 2024-08-02 RX ADMIN — IRON SUCROSE 100 MILLIGRAM(S): 20 INJECTION, SOLUTION INTRAVENOUS at 15:42

## 2024-08-03 DIAGNOSIS — D64.9 ANEMIA, UNSPECIFIED: ICD-10-CM

## 2024-08-08 ENCOUNTER — APPOINTMENT (OUTPATIENT)
Dept: ORTHOPEDIC SURGERY | Facility: CLINIC | Age: 84
End: 2024-08-08

## 2024-08-08 PROCEDURE — 20610 DRAIN/INJ JOINT/BURSA W/O US: CPT | Mod: 50

## 2024-08-08 NOTE — DISCUSSION/SUMMARY
[de-identified] : Bilateral knee third and final Euflexxa gel injection  HPI Patient is an 84-year old male reports to office for subsequent reevaluation of knee pain. Here to receive gel injection.  Assessment/plan With the patient's approval, the bilateral knee third and final Euflexxa gel injections were performed in office today. See the attached procedure note for further details. Explained to the patient that the full effect of the medication may take up to 6 weeks for it to kick in.  The patient was advised to rest/ice the area and can alternate with warm compresses. Instructed not to do any strenuous activity that would further aggravate their symptoms.  Patient will follow-up in 5 to 6 months for repeat evaluation. All of the patient's questions/concerns were answered in detail.  Procedure Large joint injection was performed bilaterally of the knee. The site was prepped with alcohol, ethyl chloride sprayed topically and sterile technique used. An injection of 20mg of Euflexxa, series #3 was used.  Patient tolerated procedure well. Patient was advised to call if redness, pain or fever occur and apply ice for 15 minutes out of every hour for the next 12-24 hours as tolerated.  The risks benefits, and alternatives have been discussed, and verbal consent was obtained.

## 2024-08-15 ENCOUNTER — APPOINTMENT (OUTPATIENT)
Dept: HEART FAILURE | Facility: CLINIC | Age: 84
End: 2024-08-15
Payer: MEDICARE

## 2024-08-15 VITALS
HEART RATE: 70 BPM | HEIGHT: 72 IN | OXYGEN SATURATION: 98 % | BODY MASS INDEX: 26.01 KG/M2 | DIASTOLIC BLOOD PRESSURE: 60 MMHG | SYSTOLIC BLOOD PRESSURE: 98 MMHG | WEIGHT: 192 LBS

## 2024-08-15 DIAGNOSIS — R06.02 SHORTNESS OF BREATH: ICD-10-CM

## 2024-08-15 DIAGNOSIS — I50.22 CHRONIC SYSTOLIC (CONGESTIVE) HEART FAILURE: ICD-10-CM

## 2024-08-15 DIAGNOSIS — Z95.818 OTHER SPECIFIED POSTPROCEDURAL STATES: ICD-10-CM

## 2024-08-15 DIAGNOSIS — Z98.890 OTHER SPECIFIED POSTPROCEDURAL STATES: ICD-10-CM

## 2024-08-15 PROCEDURE — 99214 OFFICE O/P EST MOD 30 MIN: CPT

## 2024-08-15 PROCEDURE — 93000 ELECTROCARDIOGRAM COMPLETE: CPT

## 2024-08-15 PROCEDURE — G2211 COMPLEX E/M VISIT ADD ON: CPT

## 2024-08-15 RX ORDER — ERGOCALCIFEROL 1.25 MG/1
1.25 MG CAPSULE, LIQUID FILLED ORAL
Refills: 0 | Status: ACTIVE | COMMUNITY

## 2024-08-15 NOTE — ASSESSMENT
[FreeTextEntry1] : 83 y/o M with h/o CAD s/p PCI to LAD, ICM, chronic systolic HF, MR s/p clip, afib s/p watchman off A/C coming for evaluation of HF syndrome.  - PFTs April 2023: Mod restrictive defect, no obstruction, mod decrease in diffusing capacity   TTE done 1/15/2024 reviewed: Severely decreased LV systolic function, LVEF is <20% on my view, Grade 3 DD, mitral clip with residual mild MR. The RV is severely dilated with mild systolic dysfunction and moderate TR, IVC dilated and not collapsing c/w RAP 15 mmHg.   ECG today shows atrial flutter with ventricular rate at 81 bpm   ECG April 18th - Atrial paced rhythm with ventricular rate at 72 bpm  6MWT 3/28: 130 meters, 4 stops. Limitations - fatigue/back pain, Lowest SpO2 88%.     Chronic systolic HF/ atrial flutter / TR  Patient is slightly overloaded on exam, JVP ~9 cmH2O Take torsemide 20 mg daily x2 days then one tablet daily - Take extra as needed for weight gain of 2 lbs in one day Continue Farxiga 10 mg daily NYHA class II   Continue spironolactone 25 mg daily  Continue metoprolol to succinate 50 mg BID  Decrease amiodarone to 100 mg daily - ECG today shows atrial paced rhythm  Will no introduce any other agent for GDMT given low BP at home Will send for TTE and decide on further changes in GDMT Blood work today, including pro-BNP Referral to cardiac rehab given Extensive counseling provided regarding importance of diet and daily weight monitoring  RTO in 4 months   Mitchell Dale MD, FAC, [facility]  Advanced Heart Failure/ Mechanical Circulatory Support Pulmonary Hypertension and Cardiac Amyloidosis  [facility]

## 2024-08-15 NOTE — REASON FOR VISIT
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 He is coming for evaluation of HF syndrome. He reports having SOB even with minimal exertion. He can't do much in the house due to this. His SOB has been getting worsening progressively. He denies any chest pain, LOC, orthopnea, PND.  He reports being compliant with meds and low sodium diet.   4/18  Patient reports feeling much better since last appt, he lost~14 lbs and can walk more than one block before getting SOB. No PND, orhtopnea, N/v. He is compliant with meds.    8/15- Patient coming for routine visit. He reports feeling well, no significant SOB on exertion, bloating, LOC, N/V. His Hb improved significantly after iv iron. He is on torsemide 10 mg daily and hasn't required any extra doses. He is compliant with meds.  BP at home 90s-90s/50s

## 2024-08-22 ENCOUNTER — OUTPATIENT (OUTPATIENT)
Dept: OUTPATIENT SERVICES | Facility: HOSPITAL | Age: 84
LOS: 1 days | End: 2024-08-22

## 2024-08-22 ENCOUNTER — APPOINTMENT (OUTPATIENT)
Dept: CARDIOLOGY | Facility: CLINIC | Age: 84
End: 2024-08-22

## 2024-08-22 DIAGNOSIS — Z98.890 OTHER SPECIFIED POSTPROCEDURAL STATES: ICD-10-CM

## 2024-08-22 DIAGNOSIS — Z98.61 CORONARY ANGIOPLASTY STATUS: Chronic | ICD-10-CM

## 2024-08-22 DIAGNOSIS — Z98.890 OTHER SPECIFIED POSTPROCEDURAL STATES: Chronic | ICD-10-CM

## 2024-08-22 LAB
ALBUMIN SERPL ELPH-MCNC: 4.1 G/DL
ALP BLD-CCNC: 89 U/L
ALT SERPL-CCNC: 27 U/L
ANION GAP SERPL CALC-SCNC: 11 MMOL/L
AST SERPL-CCNC: 32 U/L
BILIRUB SERPL-MCNC: 0.5 MG/DL
BUN SERPL-MCNC: 18 MG/DL
CALCIUM SERPL-MCNC: 9.5 MG/DL
CHLORIDE SERPL-SCNC: 100 MMOL/L
CO2 SERPL-SCNC: 27 MMOL/L
CREAT SERPL-MCNC: 1.2 MG/DL
EGFR: 60 ML/MIN/1.73M2
GLUCOSE SERPL-MCNC: 102 MG/DL
MAGNESIUM SERPL-MCNC: 1.9 MG/DL
NT-PROBNP SERPL-MCNC: 1757 PG/ML
POTASSIUM SERPL-SCNC: 4.8 MMOL/L
PROT SERPL-MCNC: 7.4 G/DL
SODIUM SERPL-SCNC: 138 MMOL/L

## 2024-08-22 PROCEDURE — 93306 TTE W/DOPPLER COMPLETE: CPT

## 2024-08-23 DIAGNOSIS — Z98.890 OTHER SPECIFIED POSTPROCEDURAL STATES: ICD-10-CM

## 2024-08-26 ENCOUNTER — OUTPATIENT (OUTPATIENT)
Dept: OUTPATIENT SERVICES | Facility: HOSPITAL | Age: 84
LOS: 1 days | End: 2024-08-26
Payer: MEDICARE

## 2024-08-26 ENCOUNTER — APPOINTMENT (OUTPATIENT)
Age: 84
End: 2024-08-26

## 2024-08-26 ENCOUNTER — LABORATORY RESULT (OUTPATIENT)
Age: 84
End: 2024-08-26

## 2024-08-26 DIAGNOSIS — D64.9 ANEMIA, UNSPECIFIED: ICD-10-CM

## 2024-08-26 DIAGNOSIS — Z98.61 CORONARY ANGIOPLASTY STATUS: Chronic | ICD-10-CM

## 2024-08-26 DIAGNOSIS — Z98.890 OTHER SPECIFIED POSTPROCEDURAL STATES: Chronic | ICD-10-CM

## 2024-08-26 LAB
HCT VFR BLD CALC: 47.2 %
HGB BLD-MCNC: 15.5 G/DL
IRON SATN MFR SERPL: 29 %
IRON SERPL-MCNC: 74 UG/DL
MCHC RBC-ENTMCNC: 30.2 PG
MCHC RBC-ENTMCNC: 32.8 G/DL
MCV RBC AUTO: 92 FL
PLATELET # BLD AUTO: 200 K/UL
PMV BLD: 10 FL
RBC # BLD: 5.13 M/UL
RBC # FLD: 15 %
TIBC SERPL-MCNC: 254 UG/DL
UIBC SERPL-MCNC: 180 UG/DL
WBC # FLD AUTO: 6.29 K/UL

## 2024-08-26 PROCEDURE — 83550 IRON BINDING TEST: CPT

## 2024-08-26 PROCEDURE — 36415 COLL VENOUS BLD VENIPUNCTURE: CPT

## 2024-08-26 PROCEDURE — 82728 ASSAY OF FERRITIN: CPT

## 2024-08-26 PROCEDURE — 85027 COMPLETE CBC AUTOMATED: CPT

## 2024-08-26 PROCEDURE — 83540 ASSAY OF IRON: CPT

## 2024-08-27 LAB — FERRITIN SERPL-MCNC: 136 NG/ML

## 2024-08-29 ENCOUNTER — OUTPATIENT (OUTPATIENT)
Dept: OUTPATIENT SERVICES | Facility: HOSPITAL | Age: 84
LOS: 1 days | End: 2024-08-29
Payer: MEDICARE

## 2024-08-29 ENCOUNTER — APPOINTMENT (OUTPATIENT)
Age: 84
End: 2024-08-29
Payer: MEDICARE

## 2024-08-29 VITALS
RESPIRATION RATE: 16 BRPM | WEIGHT: 190 LBS | HEIGHT: 72 IN | OXYGEN SATURATION: 97 % | BODY MASS INDEX: 25.73 KG/M2 | SYSTOLIC BLOOD PRESSURE: 95 MMHG | HEART RATE: 70 BPM | TEMPERATURE: 97.4 F | DIASTOLIC BLOOD PRESSURE: 62 MMHG

## 2024-08-29 DIAGNOSIS — Z98.890 OTHER SPECIFIED POSTPROCEDURAL STATES: Chronic | ICD-10-CM

## 2024-08-29 DIAGNOSIS — D64.9 ANEMIA, UNSPECIFIED: ICD-10-CM

## 2024-08-29 DIAGNOSIS — Z98.61 CORONARY ANGIOPLASTY STATUS: Chronic | ICD-10-CM

## 2024-08-29 PROCEDURE — 99213 OFFICE O/P EST LOW 20 MIN: CPT

## 2024-08-29 NOTE — BEGINNING OF VISIT
[0] : 2) Feeling down, depressed, or hopeless: Not at all (0) [PHQ-2 Negative] : PHQ-2 Negative [[id number]] : 0 [Former] : Former [Reviewed, no changes] : Reviewed, no changes [Abdominal Pain] : no abdominal pain [Vomiting] : no vomiting [Constipation] : no constipation

## 2024-08-29 NOTE — BEGINNING OF VISIT
[0] : 2) Feeling down, depressed, or hopeless: Not at all (0) [PHQ-2 Negative] : PHQ-2 Negative [[id number]] : 0 [Former] : Former [Reviewed, no changes] : Reviewed, no changes [Abdominal Pain] : no abdominal pain [Vomiting] : no vomiting [Constipation] : no constipation

## 2024-08-29 NOTE — BEGINNING OF VISIT
[0] : 2) Feeling down, depressed, or hopeless: Not at all (0) [PHQ-2 Negative] : PHQ-2 Negative [[id number]] : 0 [Former] : Former [Reviewed, no changes] : Reviewed, no changes [Abdominal Pain] : no abdominal pain [Vomiting] : no vomiting [Constipation] : no constipation

## 2024-09-03 NOTE — PHYSICAL EXAM
[Normal] : normoactive bowel sounds, soft and nontender, no hepatosplenomegaly or masses appreciated [de-identified] : well preserved male in no acute distress.  [de-identified] : irregular.

## 2024-09-03 NOTE — HISTORY OF PRESENT ILLNESS
[de-identified] : 82 year old white male who was seen on consultation in the hospital for iron deficiency anemia , ferritin was 8 . he received packed RBCs and venofer . most recent Hgb was 10 . He feels well and denies any hematochezia. EGD showed erosive gastritis , bleeder was cauterized and resumed eliquis on discharge . He had prior episodes of bleeding in the past ( AV malformations ? 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 He received 2 units rbcs and venofer X 2 , He feels much better and denies any hematochezia .  6/13/2023 Patient returns for chronic iron deficiency anemia , last ferritin was 28 on 5/23/2023 , since then he received three additional doses of venofer. He feels well today , Hgb is 9.1 and stable , not required transfusions recently . He reduced xarelto to 10 mg daily .    8/29/2024 Patient returns for follow up , he is no longer on anticoagulation since his Watchman's procedure . He denies blood per rectum and is stable from cardiac standpoint .

## 2024-09-03 NOTE — PHYSICAL EXAM
[Normal] : normoactive bowel sounds, soft and nontender, no hepatosplenomegaly or masses appreciated [de-identified] : well preserved male in no acute distress.  [de-identified] : irregular.

## 2024-09-03 NOTE — PHYSICAL EXAM
[Normal] : normoactive bowel sounds, soft and nontender, no hepatosplenomegaly or masses appreciated [de-identified] : well preserved male in no acute distress.  [de-identified] : irregular.

## 2024-09-03 NOTE — HISTORY OF PRESENT ILLNESS
[de-identified] : 82 year old white male who was seen on consultation in the hospital for iron deficiency anemia , ferritin was 8 . he received packed RBCs and venofer . most recent Hgb was 10 . He feels well and denies any hematochezia. EGD showed erosive gastritis , bleeder was cauterized and resumed eliquis on discharge . He had prior episodes of bleeding in the past ( AV malformations ? 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 He received 2 units rbcs and venofer X 2 , He feels much better and denies any hematochezia .  6/13/2023 Patient returns for chronic iron deficiency anemia , last ferritin was 28 on 5/23/2023 , since then he received three additional doses of venofer. He feels well today , Hgb is 9.1 and stable , not required transfusions recently . He reduced xarelto to 10 mg daily .    8/29/2024 Patient returns for follow up , he is no longer on anticoagulation since his Watchman's procedure . He denies blood per rectum and is stable from cardiac standpoint .

## 2024-09-03 NOTE — ASSESSMENT
[FreeTextEntry1] : Iron deficiency anemia secondary to GI bleeding on anticoagulation S/P Watchman's procedure. Off xarelto .  CBC and iron studies have normalized.  Plan: no need for iron infusions ,           continue to monitor cbc , ferritin every 3 months .

## 2024-09-03 NOTE — HISTORY OF PRESENT ILLNESS
[de-identified] : 82 year old white male who was seen on consultation in the hospital for iron deficiency anemia , ferritin was 8 . he received packed RBCs and venofer . most recent Hgb was 10 . He feels well and denies any hematochezia. EGD showed erosive gastritis , bleeder was cauterized and resumed eliquis on discharge . He had prior episodes of bleeding in the past ( AV malformations ? 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 He received 2 units rbcs and venofer X 2 , He feels much better and denies any hematochezia .  6/13/2023 Patient returns for chronic iron deficiency anemia , last ferritin was 28 on 5/23/2023 , since then he received three additional doses of venofer. He feels well today , Hgb is 9.1 and stable , not required transfusions recently . He reduced xarelto to 10 mg daily .    8/29/2024 Patient returns for follow up , he is no longer on anticoagulation since his Watchman's procedure . He denies blood per rectum and is stable from cardiac standpoint .

## 2024-09-12 ENCOUNTER — NON-APPOINTMENT (OUTPATIENT)
Age: 84
End: 2024-09-12

## 2024-09-13 ENCOUNTER — APPOINTMENT (OUTPATIENT)
Dept: CARDIOLOGY | Facility: CLINIC | Age: 84
End: 2024-09-13

## 2024-09-13 PROCEDURE — 93296 REM INTERROG EVL PM/IDS: CPT

## 2024-09-13 PROCEDURE — 93295 DEV INTERROG REMOTE 1/2/MLT: CPT

## 2024-09-16 NOTE — ASU PATIENT PROFILE, ADULT - NS TRANSFER EYEGLASSES PAIRS
1 pair
Is This A New Presentation, Or A Follow-Up?: Skin Lesion
What Type Of Note Output Would You Prefer (Optional)?: Standard Output
How Severe Is Your Skin Lesion?: moderate
Has Your Skin Lesion Been Treated?: not been treated

## 2024-09-24 ENCOUNTER — APPOINTMENT (OUTPATIENT)
Dept: PULMONOLOGY | Facility: CLINIC | Age: 84
End: 2024-09-24
Payer: MEDICARE

## 2024-09-24 VITALS
HEART RATE: 68 BPM | RESPIRATION RATE: 14 BRPM | SYSTOLIC BLOOD PRESSURE: 102 MMHG | BODY MASS INDEX: 25.33 KG/M2 | WEIGHT: 187 LBS | OXYGEN SATURATION: 97 % | HEIGHT: 72 IN | DIASTOLIC BLOOD PRESSURE: 70 MMHG

## 2024-09-24 DIAGNOSIS — R06.02 SHORTNESS OF BREATH: ICD-10-CM

## 2024-09-24 DIAGNOSIS — R91.1 SOLITARY PULMONARY NODULE: ICD-10-CM

## 2024-09-24 PROCEDURE — 99213 OFFICE O/P EST LOW 20 MIN: CPT

## 2024-09-24 PROCEDURE — G2211 COMPLEX E/M VISIT ADD ON: CPT

## 2024-09-24 NOTE — HISTORY OF PRESENT ILLNESS
[TextBox_4] : coming for follow up  denies any cough wheeze or sob  cardiology note Dr Andrews note reviewed  cxr reviewed no effusion

## 2024-09-24 NOTE — PHYSICAL EXAM
[No Acute Distress] : no acute distress [Normal Oropharynx] : normal oropharynx [Normal Appearance] : normal appearance 04-Apr-2017 12:07 [No Neck Mass] : no neck mass [Normal Rate/Rhythm] : normal rate/rhythm [Normal S1, S2] : normal s1, s2 [No Murmurs] : no murmurs [No Resp Distress] : no resp distress [Clear to Auscultation Bilaterally] : clear to auscultation bilaterally [1+ Pitting] : 1+ pitting [TextBox_105] : edema almost resolved  Abdomen soft, non-tender, no guarding.

## 2024-09-24 NOTE — PLAN
[TextEntry] : on torsemide and clint  told if any sob cough or wheeze to call me  follow up jan 2025 repeat ct scan  follow cardiology

## 2024-10-24 ENCOUNTER — APPOINTMENT (OUTPATIENT)
Dept: CARDIOLOGY | Facility: CLINIC | Age: 84
End: 2024-10-24
Payer: MEDICARE

## 2024-10-24 VITALS
BODY MASS INDEX: 25.33 KG/M2 | WEIGHT: 187 LBS | DIASTOLIC BLOOD PRESSURE: 64 MMHG | HEART RATE: 70 BPM | HEIGHT: 72 IN | SYSTOLIC BLOOD PRESSURE: 92 MMHG

## 2024-10-24 DIAGNOSIS — Z95.818 OTHER SPECIFIED POSTPROCEDURAL STATES: ICD-10-CM

## 2024-10-24 DIAGNOSIS — D50.9 IRON DEFICIENCY ANEMIA, UNSPECIFIED: ICD-10-CM

## 2024-10-24 DIAGNOSIS — I42.8 OTHER CARDIOMYOPATHIES: ICD-10-CM

## 2024-10-24 DIAGNOSIS — R73.03 PREDIABETES.: ICD-10-CM

## 2024-10-24 DIAGNOSIS — I10 ESSENTIAL (PRIMARY) HYPERTENSION: ICD-10-CM

## 2024-10-24 DIAGNOSIS — I48.91 UNSPECIFIED ATRIAL FIBRILLATION: ICD-10-CM

## 2024-10-24 DIAGNOSIS — Z98.890 OTHER SPECIFIED POSTPROCEDURAL STATES: ICD-10-CM

## 2024-10-24 DIAGNOSIS — I48.92 UNSPECIFIED ATRIAL FLUTTER: ICD-10-CM

## 2024-10-24 DIAGNOSIS — R07.9 CHEST PAIN, UNSPECIFIED: ICD-10-CM

## 2024-10-24 DIAGNOSIS — Z95.818 PRESENCE OF OTHER CARDIAC IMPLANTS AND GRAFTS: ICD-10-CM

## 2024-10-24 DIAGNOSIS — I50.22 CHRONIC SYSTOLIC (CONGESTIVE) HEART FAILURE: ICD-10-CM

## 2024-10-24 DIAGNOSIS — I25.10 ATHEROSCLEROTIC HEART DISEASE OF NATIVE CORONARY ARTERY W/OUT ANGINA PECTORIS: ICD-10-CM

## 2024-10-24 PROCEDURE — 99214 OFFICE O/P EST MOD 30 MIN: CPT

## 2024-10-24 PROCEDURE — G2211 COMPLEX E/M VISIT ADD ON: CPT

## 2024-10-24 PROCEDURE — 93000 ELECTROCARDIOGRAM COMPLETE: CPT

## 2024-11-12 ENCOUNTER — APPOINTMENT (OUTPATIENT)
Age: 84
End: 2024-11-12

## 2024-11-15 ENCOUNTER — RX RENEWAL (OUTPATIENT)
Age: 84
End: 2024-11-15

## 2024-11-25 ENCOUNTER — APPOINTMENT (OUTPATIENT)
Age: 84
End: 2024-11-25

## 2024-11-25 ENCOUNTER — OUTPATIENT (OUTPATIENT)
Dept: OUTPATIENT SERVICES | Facility: HOSPITAL | Age: 84
LOS: 1 days | End: 2024-11-25
Payer: MEDICARE

## 2024-11-25 ENCOUNTER — LABORATORY RESULT (OUTPATIENT)
Age: 84
End: 2024-11-25

## 2024-11-25 DIAGNOSIS — Z98.890 OTHER SPECIFIED POSTPROCEDURAL STATES: Chronic | ICD-10-CM

## 2024-11-25 DIAGNOSIS — D64.9 ANEMIA, UNSPECIFIED: ICD-10-CM

## 2024-11-25 DIAGNOSIS — Z98.61 CORONARY ANGIOPLASTY STATUS: Chronic | ICD-10-CM

## 2024-11-25 LAB
HCT VFR BLD CALC: 43.1 %
HGB BLD-MCNC: 14.4 G/DL
MCHC RBC-ENTMCNC: 29.4 PG
MCHC RBC-ENTMCNC: 33.4 G/DL
MCV RBC AUTO: 88 FL
PLATELET # BLD AUTO: 185 K/UL
PMV BLD: 10 FL
RBC # BLD: 4.9 M/UL
RBC # FLD: 15.9 %
WBC # FLD AUTO: 7.14 K/UL

## 2024-11-25 PROCEDURE — 85027 COMPLETE CBC AUTOMATED: CPT

## 2024-11-25 PROCEDURE — 82728 ASSAY OF FERRITIN: CPT

## 2024-11-25 PROCEDURE — 36415 COLL VENOUS BLD VENIPUNCTURE: CPT

## 2024-11-26 DIAGNOSIS — D64.9 ANEMIA, UNSPECIFIED: ICD-10-CM

## 2024-11-26 LAB — FERRITIN SERPL-MCNC: 121 NG/ML

## 2024-12-02 ENCOUNTER — APPOINTMENT (OUTPATIENT)
Dept: ELECTROPHYSIOLOGY | Facility: CLINIC | Age: 84
End: 2024-12-02

## 2024-12-12 ENCOUNTER — NON-APPOINTMENT (OUTPATIENT)
Age: 84
End: 2024-12-12

## 2024-12-12 ENCOUNTER — APPOINTMENT (OUTPATIENT)
Dept: HEART FAILURE | Facility: CLINIC | Age: 84
End: 2024-12-12
Payer: MEDICARE

## 2024-12-12 VITALS
HEIGHT: 72 IN | BODY MASS INDEX: 26.41 KG/M2 | HEART RATE: 69 BPM | OXYGEN SATURATION: 97 % | SYSTOLIC BLOOD PRESSURE: 98 MMHG | WEIGHT: 195 LBS | DIASTOLIC BLOOD PRESSURE: 68 MMHG

## 2024-12-12 DIAGNOSIS — I50.22 CHRONIC SYSTOLIC (CONGESTIVE) HEART FAILURE: ICD-10-CM

## 2024-12-12 DIAGNOSIS — R73.03 PREDIABETES.: ICD-10-CM

## 2024-12-12 DIAGNOSIS — Z95.818 OTHER SPECIFIED POSTPROCEDURAL STATES: ICD-10-CM

## 2024-12-12 DIAGNOSIS — Z98.890 OTHER SPECIFIED POSTPROCEDURAL STATES: ICD-10-CM

## 2024-12-12 DIAGNOSIS — I10 ESSENTIAL (PRIMARY) HYPERTENSION: ICD-10-CM

## 2024-12-12 DIAGNOSIS — D50.9 IRON DEFICIENCY ANEMIA, UNSPECIFIED: ICD-10-CM

## 2024-12-12 DIAGNOSIS — R06.02 SHORTNESS OF BREATH: ICD-10-CM

## 2024-12-12 PROCEDURE — G2211 COMPLEX E/M VISIT ADD ON: CPT

## 2024-12-12 PROCEDURE — 93000 ELECTROCARDIOGRAM COMPLETE: CPT

## 2024-12-12 PROCEDURE — 99215 OFFICE O/P EST HI 40 MIN: CPT

## 2024-12-12 RX ORDER — LOSARTAN POTASSIUM 25 MG/1
25 TABLET, FILM COATED ORAL DAILY
Qty: 1 | Refills: 3 | Status: ACTIVE | COMMUNITY
Start: 2024-12-12 | End: 1900-01-01

## 2024-12-13 LAB
ALBUMIN SERPL ELPH-MCNC: 4.2 G/DL
ALP BLD-CCNC: 87 U/L
ALT SERPL-CCNC: 20 U/L
ANION GAP SERPL CALC-SCNC: 11 MMOL/L
AST SERPL-CCNC: 27 U/L
BILIRUB SERPL-MCNC: 0.5 MG/DL
BUN SERPL-MCNC: 18 MG/DL
CALCIUM SERPL-MCNC: 10.2 MG/DL
CHLORIDE SERPL-SCNC: 99 MMOL/L
CO2 SERPL-SCNC: 28 MMOL/L
CREAT SERPL-MCNC: 1.3 MG/DL
EGFR: 54 ML/MIN/1.73M2
GLUCOSE SERPL-MCNC: 94 MG/DL
MAGNESIUM SERPL-MCNC: 1.9 MG/DL
NT-PROBNP SERPL-MCNC: 1182 PG/ML
POTASSIUM SERPL-SCNC: 4.6 MMOL/L
PROT SERPL-MCNC: 7.6 G/DL
SODIUM SERPL-SCNC: 138 MMOL/L

## 2025-01-24 ENCOUNTER — NON-APPOINTMENT (OUTPATIENT)
Age: 85
End: 2025-01-24

## 2025-01-24 ENCOUNTER — APPOINTMENT (OUTPATIENT)
Dept: HEART FAILURE | Facility: CLINIC | Age: 85
End: 2025-01-24

## 2025-01-24 VITALS
WEIGHT: 197 LBS | BODY MASS INDEX: 26.68 KG/M2 | HEART RATE: 70 BPM | HEIGHT: 72 IN | SYSTOLIC BLOOD PRESSURE: 92 MMHG | DIASTOLIC BLOOD PRESSURE: 58 MMHG | OXYGEN SATURATION: 98 %

## 2025-01-24 DIAGNOSIS — R06.00 DYSPNEA, UNSPECIFIED: ICD-10-CM

## 2025-01-24 DIAGNOSIS — I50.22 CHRONIC SYSTOLIC (CONGESTIVE) HEART FAILURE: ICD-10-CM

## 2025-01-24 PROCEDURE — 99215 OFFICE O/P EST HI 40 MIN: CPT

## 2025-01-24 PROCEDURE — G2211 COMPLEX E/M VISIT ADD ON: CPT

## 2025-01-24 PROCEDURE — 93000 ELECTROCARDIOGRAM COMPLETE: CPT

## 2025-01-24 RX ORDER — DAPAGLIFLOZIN 10 MG/1
10 TABLET, FILM COATED ORAL DAILY
Qty: 30 | Refills: 3 | Status: ACTIVE | COMMUNITY
Start: 2025-01-24 | End: 1900-01-01

## 2025-01-29 ENCOUNTER — APPOINTMENT (OUTPATIENT)
Dept: PULMONOLOGY | Facility: CLINIC | Age: 85
End: 2025-01-29
Payer: MEDICARE

## 2025-01-29 VITALS
DIASTOLIC BLOOD PRESSURE: 62 MMHG | RESPIRATION RATE: 14 BRPM | BODY MASS INDEX: 25.6 KG/M2 | SYSTOLIC BLOOD PRESSURE: 108 MMHG | HEART RATE: 72 BPM | OXYGEN SATURATION: 99 % | WEIGHT: 189 LBS | HEIGHT: 72 IN

## 2025-01-29 DIAGNOSIS — R91.8 OTHER NONSPECIFIC ABNORMAL FINDING OF LUNG FIELD: ICD-10-CM

## 2025-01-29 DIAGNOSIS — R06.02 SHORTNESS OF BREATH: ICD-10-CM

## 2025-01-29 PROCEDURE — G2211 COMPLEX E/M VISIT ADD ON: CPT

## 2025-01-29 PROCEDURE — 99213 OFFICE O/P EST LOW 20 MIN: CPT

## 2025-01-30 LAB
ALBUMIN SERPL ELPH-MCNC: 4.2 G/DL
ALP BLD-CCNC: 98 U/L
ALT SERPL-CCNC: 15 U/L
ANION GAP SERPL CALC-SCNC: 12 MMOL/L
AST SERPL-CCNC: 24 U/L
BASOPHILS # BLD AUTO: 0.04 K/UL
BASOPHILS NFR BLD AUTO: 0.6 %
BILIRUB SERPL-MCNC: 0.3 MG/DL
BUN SERPL-MCNC: 18 MG/DL
CALCIUM SERPL-MCNC: 9.7 MG/DL
CHLORIDE SERPL-SCNC: 100 MMOL/L
CO2 SERPL-SCNC: 28 MMOL/L
CREAT SERPL-MCNC: 1.4 MG/DL
EGFR: 49 ML/MIN/1.73M2
EOSINOPHIL # BLD AUTO: 0.12 K/UL
EOSINOPHIL NFR BLD AUTO: 1.7 %
GLUCOSE SERPL-MCNC: 104 MG/DL
HCT VFR BLD CALC: 47.1 %
HGB BLD-MCNC: 15 G/DL
IMM GRANULOCYTES NFR BLD AUTO: 0.7 %
LYMPHOCYTES # BLD AUTO: 1.62 K/UL
LYMPHOCYTES NFR BLD AUTO: 23.3 %
MAGNESIUM SERPL-MCNC: 1.7 MG/DL
MAN DIFF?: NORMAL
MCHC RBC-ENTMCNC: 30.1 PG
MCHC RBC-ENTMCNC: 31.8 G/DL
MCV RBC AUTO: 94.6 FL
MONOCYTES # BLD AUTO: 0.77 K/UL
MONOCYTES NFR BLD AUTO: 11.1 %
NEUTROPHILS # BLD AUTO: 4.36 K/UL
NEUTROPHILS NFR BLD AUTO: 62.6 %
NT-PROBNP SERPL-MCNC: 1362 PG/ML
PLATELET # BLD AUTO: 247 K/UL
PMV BLD AUTO: 0 /100 WBCS
POTASSIUM SERPL-SCNC: 4.5 MMOL/L
PROT SERPL-MCNC: 7.7 G/DL
RBC # BLD: 4.98 M/UL
RBC # FLD: 14.7 %
SODIUM SERPL-SCNC: 140 MMOL/L
WBC # FLD AUTO: 6.96 K/UL

## 2025-02-03 ENCOUNTER — APPOINTMENT (OUTPATIENT)
Dept: PULMONOLOGY | Facility: HOSPITAL | Age: 85
End: 2025-02-03

## 2025-02-07 ENCOUNTER — APPOINTMENT (OUTPATIENT)
Dept: ORTHOPEDIC SURGERY | Facility: CLINIC | Age: 85
End: 2025-02-07

## 2025-02-13 ENCOUNTER — APPOINTMENT (OUTPATIENT)
Dept: ORTHOPEDIC SURGERY | Facility: CLINIC | Age: 85
End: 2025-02-13

## 2025-02-13 ENCOUNTER — APPOINTMENT (OUTPATIENT)
Dept: ORTHOPEDIC SURGERY | Facility: CLINIC | Age: 85
End: 2025-02-13
Payer: MEDICARE

## 2025-02-13 DIAGNOSIS — M17.0 BILATERAL PRIMARY OSTEOARTHRITIS OF KNEE: ICD-10-CM

## 2025-02-13 PROCEDURE — 99213 OFFICE O/P EST LOW 20 MIN: CPT

## 2025-02-20 ENCOUNTER — APPOINTMENT (OUTPATIENT)
Dept: CARDIOLOGY | Facility: CLINIC | Age: 85
End: 2025-02-20
Payer: MEDICARE

## 2025-02-20 VITALS
SYSTOLIC BLOOD PRESSURE: 96 MMHG | BODY MASS INDEX: 26.68 KG/M2 | WEIGHT: 197 LBS | HEIGHT: 72 IN | HEART RATE: 69 BPM | DIASTOLIC BLOOD PRESSURE: 72 MMHG

## 2025-02-20 DIAGNOSIS — I48.92 UNSPECIFIED ATRIAL FLUTTER: ICD-10-CM

## 2025-02-20 DIAGNOSIS — R07.9 CHEST PAIN, UNSPECIFIED: ICD-10-CM

## 2025-02-20 DIAGNOSIS — R06.02 SHORTNESS OF BREATH: ICD-10-CM

## 2025-02-20 DIAGNOSIS — I10 ESSENTIAL (PRIMARY) HYPERTENSION: ICD-10-CM

## 2025-02-20 DIAGNOSIS — Z87.891 PERSONAL HISTORY OF NICOTINE DEPENDENCE: ICD-10-CM

## 2025-02-20 DIAGNOSIS — I25.10 ATHEROSCLEROTIC HEART DISEASE OF NATIVE CORONARY ARTERY W/OUT ANGINA PECTORIS: ICD-10-CM

## 2025-02-20 DIAGNOSIS — I50.22 CHRONIC SYSTOLIC (CONGESTIVE) HEART FAILURE: ICD-10-CM

## 2025-02-20 DIAGNOSIS — Z95.818 OTHER SPECIFIED POSTPROCEDURAL STATES: ICD-10-CM

## 2025-02-20 DIAGNOSIS — Z98.890 OTHER SPECIFIED POSTPROCEDURAL STATES: ICD-10-CM

## 2025-02-20 DIAGNOSIS — I48.91 UNSPECIFIED ATRIAL FIBRILLATION: ICD-10-CM

## 2025-02-20 DIAGNOSIS — I42.8 OTHER CARDIOMYOPATHIES: ICD-10-CM

## 2025-02-20 PROCEDURE — G2211 COMPLEX E/M VISIT ADD ON: CPT

## 2025-02-20 PROCEDURE — 99214 OFFICE O/P EST MOD 30 MIN: CPT

## 2025-02-20 PROCEDURE — 93000 ELECTROCARDIOGRAM COMPLETE: CPT

## 2025-02-24 ENCOUNTER — APPOINTMENT (OUTPATIENT)
Age: 85
End: 2025-02-24
Payer: MEDICARE

## 2025-02-24 ENCOUNTER — OUTPATIENT (OUTPATIENT)
Dept: OUTPATIENT SERVICES | Facility: HOSPITAL | Age: 85
LOS: 1 days | End: 2025-02-24
Payer: MEDICARE

## 2025-02-24 VITALS
TEMPERATURE: 97.9 F | HEART RATE: 78 BPM | DIASTOLIC BLOOD PRESSURE: 65 MMHG | RESPIRATION RATE: 16 BRPM | WEIGHT: 199 LBS | SYSTOLIC BLOOD PRESSURE: 100 MMHG | BODY MASS INDEX: 26.95 KG/M2 | HEIGHT: 72 IN

## 2025-02-24 DIAGNOSIS — Z98.890 OTHER SPECIFIED POSTPROCEDURAL STATES: Chronic | ICD-10-CM

## 2025-02-24 DIAGNOSIS — D64.9 ANEMIA, UNSPECIFIED: ICD-10-CM

## 2025-02-24 DIAGNOSIS — Z98.61 CORONARY ANGIOPLASTY STATUS: Chronic | ICD-10-CM

## 2025-02-24 PROCEDURE — 99213 OFFICE O/P EST LOW 20 MIN: CPT

## 2025-02-25 NOTE — DISCHARGE NOTE PROVIDER - NS AS DC PROVIDER CONTACT Y/N MULTI
Plan: continue daily selenium sulfide wash
Detail Level: Zone
Yes
Plan: Recommended Hibiclens for daily back and chest wash. Expressed one pustule on chest to confirm acne, ruling out milia
Detail Level: Detailed

## 2025-03-03 ENCOUNTER — NON-APPOINTMENT (OUTPATIENT)
Age: 85
End: 2025-03-03

## 2025-03-03 ENCOUNTER — APPOINTMENT (OUTPATIENT)
Dept: CARDIOLOGY | Facility: CLINIC | Age: 85
End: 2025-03-03
Payer: MEDICARE

## 2025-03-03 PROCEDURE — 93295 DEV INTERROG REMOTE 1/2/MLT: CPT

## 2025-03-03 PROCEDURE — 93296 REM INTERROG EVL PM/IDS: CPT

## 2025-03-11 NOTE — ED ADULT NURSE NOTE - NSIMPLEMENTINTERV_GEN_ALL_ED
Detail Level: Simple Size Of Lesion: 2 mm Morphology Per Location (Optional): BM Implemented All Universal Safety Interventions:  [place] to call system. Call bell, personal items and telephone within reach. Instruct patient to call for assistance. Room bathroom lighting operational. Non-slip footwear when patient is off stretcher. Physically safe environment: no spills, clutter or unnecessary equipment. Stretcher in lowest position, wheels locked, appropriate side rails in place. Size Of Lesion: 7 x 3 mm Size Of Lesion: 4 x 2 mm Size Of Lesion: 3 mm Morphology Per Location (Optional): BSM

## 2025-03-13 ENCOUNTER — NON-APPOINTMENT (OUTPATIENT)
Age: 85
End: 2025-03-13

## 2025-04-01 NOTE — BRIEF OPERATIVE NOTE - ANTIBIOTIC PROTOCOL
[FreeTextEntry1] : We discussed general tinnitus treatment including masking, amplification when appropriate, alternative treatments, helpful smartphone applications, CBT and OTC medications.  HAE declined; RTC for an ear cleaning in 6 months; annual audios, sooner prn any changes.  I suggested again that he try the Cevimeline  Followed protocol

## 2025-04-04 NOTE — DISCHARGE NOTE NURSING/CASE MANAGEMENT/SOCIAL WORK - FLU SEASON?
Group Topic: BH Skills Training     Date: 4/4/2025  Start Time: 1000  End Time: 1030  Facilitators: Denise Rivas CTRS    Focus: Journaling  Number in attendance: 4  To provide a group session to encourage self reflection through reflections worksheet as facilitated by the therapist    Method: Group  Attendance: Present  Participation: Active  Patient Response: Appropriate feedback, Attentive, Good eye contact, and Interested in topic  Mood: Anxious and Normal  Affect: Type: Depressed and Euthymic (normal mood)   Range: Blunted/flat   Congruency: Congruent   Stability: Stable  Behavior/Socialization: Appropriate to group, Cooperative, and Engaged  Thought Process: Focused and Goal-directed  Task Performance: Follows directions  Patient Evaluation: Independent - full participation     Yes...

## 2025-04-07 ENCOUNTER — NON-APPOINTMENT (OUTPATIENT)
Age: 85
End: 2025-04-07

## 2025-04-17 ENCOUNTER — APPOINTMENT (OUTPATIENT)
Dept: ORTHOPEDIC SURGERY | Facility: CLINIC | Age: 85
End: 2025-04-17
Payer: MEDICARE

## 2025-04-17 PROCEDURE — 20610 DRAIN/INJ JOINT/BURSA W/O US: CPT | Mod: 50

## 2025-04-17 PROCEDURE — 99213 OFFICE O/P EST LOW 20 MIN: CPT | Mod: 25

## 2025-04-24 ENCOUNTER — APPOINTMENT (OUTPATIENT)
Dept: ORTHOPEDIC SURGERY | Facility: CLINIC | Age: 85
End: 2025-04-24
Payer: MEDICARE

## 2025-04-24 PROCEDURE — 20610 DRAIN/INJ JOINT/BURSA W/O US: CPT | Mod: 50

## 2025-04-25 ENCOUNTER — APPOINTMENT (OUTPATIENT)
Dept: HEART FAILURE | Facility: CLINIC | Age: 85
End: 2025-04-25

## 2025-04-25 ENCOUNTER — APPOINTMENT (OUTPATIENT)
Dept: ELECTROPHYSIOLOGY | Facility: CLINIC | Age: 85
End: 2025-04-25
Payer: MEDICARE

## 2025-04-25 ENCOUNTER — NON-APPOINTMENT (OUTPATIENT)
Age: 85
End: 2025-04-25

## 2025-04-25 VITALS
OXYGEN SATURATION: 96 % | HEART RATE: 70 BPM | SYSTOLIC BLOOD PRESSURE: 88 MMHG | WEIGHT: 202 LBS | BODY MASS INDEX: 27.36 KG/M2 | HEIGHT: 72 IN | DIASTOLIC BLOOD PRESSURE: 62 MMHG

## 2025-04-25 VITALS
WEIGHT: 194 LBS | HEART RATE: 69 BPM | SYSTOLIC BLOOD PRESSURE: 92 MMHG | DIASTOLIC BLOOD PRESSURE: 60 MMHG | BODY MASS INDEX: 26.28 KG/M2 | HEIGHT: 72 IN

## 2025-04-25 DIAGNOSIS — I50.22 CHRONIC SYSTOLIC (CONGESTIVE) HEART FAILURE: ICD-10-CM

## 2025-04-25 DIAGNOSIS — Z45.02 ENCOUNTER FOR ADJUSTMENT AND MANAGEMENT OF AUTOMATIC IMPLANTABLE CARDIAC DEFIBRILLATOR: ICD-10-CM

## 2025-04-25 DIAGNOSIS — R06.00 DYSPNEA, UNSPECIFIED: ICD-10-CM

## 2025-04-25 DIAGNOSIS — I48.91 UNSPECIFIED ATRIAL FIBRILLATION: ICD-10-CM

## 2025-04-25 DIAGNOSIS — I10 ESSENTIAL (PRIMARY) HYPERTENSION: ICD-10-CM

## 2025-04-25 DIAGNOSIS — I48.92 UNSPECIFIED ATRIAL FLUTTER: ICD-10-CM

## 2025-04-25 PROCEDURE — 99214 OFFICE O/P EST MOD 30 MIN: CPT

## 2025-04-25 PROCEDURE — 93000 ELECTROCARDIOGRAM COMPLETE: CPT | Mod: 59

## 2025-04-25 PROCEDURE — G2211 COMPLEX E/M VISIT ADD ON: CPT

## 2025-04-25 PROCEDURE — 93000 ELECTROCARDIOGRAM COMPLETE: CPT

## 2025-04-25 PROCEDURE — 93290 INTERROG DEV EVAL ICPMS IP: CPT

## 2025-04-25 PROCEDURE — 93284 PRGRMG EVAL IMPLANTABLE DFB: CPT

## 2025-04-25 PROCEDURE — 99215 OFFICE O/P EST HI 40 MIN: CPT

## 2025-04-25 RX ORDER — LOSARTAN POTASSIUM 25 MG/1
25 TABLET, FILM COATED ORAL DAILY
Refills: 0 | Status: ACTIVE | COMMUNITY

## 2025-05-01 ENCOUNTER — APPOINTMENT (OUTPATIENT)
Dept: ORTHOPEDIC SURGERY | Facility: CLINIC | Age: 85
End: 2025-05-01
Payer: MEDICARE

## 2025-05-01 DIAGNOSIS — M17.0 BILATERAL PRIMARY OSTEOARTHRITIS OF KNEE: ICD-10-CM

## 2025-05-01 PROCEDURE — 20610 DRAIN/INJ JOINT/BURSA W/O US: CPT | Mod: 50

## 2025-05-08 ENCOUNTER — NON-APPOINTMENT (OUTPATIENT)
Age: 85
End: 2025-05-08

## 2025-05-21 ENCOUNTER — APPOINTMENT (OUTPATIENT)
Dept: PULMONOLOGY | Facility: CLINIC | Age: 85
End: 2025-05-21
Payer: MEDICARE

## 2025-05-21 VITALS
BODY MASS INDEX: 26.14 KG/M2 | SYSTOLIC BLOOD PRESSURE: 102 MMHG | HEIGHT: 72 IN | WEIGHT: 193 LBS | HEART RATE: 75 BPM | RESPIRATION RATE: 14 BRPM | OXYGEN SATURATION: 98 % | DIASTOLIC BLOOD PRESSURE: 62 MMHG

## 2025-05-21 DIAGNOSIS — R06.02 SHORTNESS OF BREATH: ICD-10-CM

## 2025-05-21 DIAGNOSIS — R91.8 OTHER NONSPECIFIC ABNORMAL FINDING OF LUNG FIELD: ICD-10-CM

## 2025-05-21 PROCEDURE — 99214 OFFICE O/P EST MOD 30 MIN: CPT

## 2025-05-21 PROCEDURE — G2211 COMPLEX E/M VISIT ADD ON: CPT

## 2025-05-27 ENCOUNTER — RX RENEWAL (OUTPATIENT)
Age: 85
End: 2025-05-27

## 2025-05-28 ENCOUNTER — OUTPATIENT (OUTPATIENT)
Dept: OUTPATIENT SERVICES | Facility: HOSPITAL | Age: 85
LOS: 1 days | End: 2025-05-28
Payer: MEDICARE

## 2025-05-28 ENCOUNTER — APPOINTMENT (OUTPATIENT)
Age: 85
End: 2025-05-28

## 2025-05-28 DIAGNOSIS — Z98.61 CORONARY ANGIOPLASTY STATUS: Chronic | ICD-10-CM

## 2025-05-28 DIAGNOSIS — Z98.890 OTHER SPECIFIED POSTPROCEDURAL STATES: Chronic | ICD-10-CM

## 2025-05-28 DIAGNOSIS — D64.9 ANEMIA, UNSPECIFIED: ICD-10-CM

## 2025-05-28 LAB
AUTO BASOPHILS #: 0.04 K/UL
AUTO BASOPHILS %: 0.6 %
AUTO EOSINOPHILS #: 0.11 K/UL
AUTO EOSINOPHILS %: 1.6 %
AUTO IMMATURE GRANULOCYTES #: 0.04 K/UL
AUTO LYMPHOCYTES #: 1.69 K/UL
AUTO LYMPHOCYTES %: 24.5 %
AUTO MONOCYTES #: 0.78 K/UL
AUTO MONOCYTES %: 11.3 %
AUTO NEUTROPHILS #: 4.24 K/UL
AUTO NEUTROPHILS %: 61.4 %
AUTO NRBC #: 0 K/UL
HCT VFR BLD CALC: 43.7 %
HGB BLD-MCNC: 14 G/DL
IMM GRANULOCYTES NFR BLD AUTO: 0.6 %
IRON SATN MFR SERPL: 16 %
IRON SERPL-MCNC: 48 UG/DL
MAN DIFF?: NORMAL
MCHC RBC-ENTMCNC: 28.3 PG
MCHC RBC-ENTMCNC: 32 G/DL
MCV RBC AUTO: 88.3 FL
PLATELET # BLD AUTO: 228 K/UL
PMV BLD AUTO: 0 /100 WBCS
PMV BLD: 10.1 FL
RBC # BLD: 4.95 M/UL
RBC # FLD: 15.6 %
TIBC SERPL-MCNC: 299 UG/DL
UIBC SERPL-MCNC: 251 UG/DL
WBC # FLD AUTO: 6.9 K/UL

## 2025-05-28 PROCEDURE — 83550 IRON BINDING TEST: CPT

## 2025-05-28 PROCEDURE — 36415 COLL VENOUS BLD VENIPUNCTURE: CPT

## 2025-05-28 PROCEDURE — 83540 ASSAY OF IRON: CPT

## 2025-05-28 PROCEDURE — 85025 COMPLETE CBC W/AUTO DIFF WBC: CPT

## 2025-05-28 PROCEDURE — 82728 ASSAY OF FERRITIN: CPT

## 2025-05-29 DIAGNOSIS — D64.9 ANEMIA, UNSPECIFIED: ICD-10-CM

## 2025-05-29 LAB — FERRITIN SERPL-MCNC: 22 NG/ML

## 2025-06-04 ENCOUNTER — OUTPATIENT (OUTPATIENT)
Dept: OUTPATIENT SERVICES | Facility: HOSPITAL | Age: 85
LOS: 1 days | End: 2025-06-04
Payer: MEDICARE

## 2025-06-04 ENCOUNTER — APPOINTMENT (OUTPATIENT)
Age: 85
End: 2025-06-04

## 2025-06-04 DIAGNOSIS — Z98.890 OTHER SPECIFIED POSTPROCEDURAL STATES: Chronic | ICD-10-CM

## 2025-06-04 DIAGNOSIS — D64.9 ANEMIA, UNSPECIFIED: ICD-10-CM

## 2025-06-04 DIAGNOSIS — Z98.61 CORONARY ANGIOPLASTY STATUS: Chronic | ICD-10-CM

## 2025-06-04 PROCEDURE — 96365 THER/PROPH/DIAG IV INF INIT: CPT

## 2025-06-04 RX ORDER — IRON SUCROSE 20 MG/ML
200 INJECTION, SOLUTION INTRAVENOUS ONCE
Refills: 0 | Status: COMPLETED | OUTPATIENT
Start: 2025-06-04 | End: 2025-06-04

## 2025-06-04 RX ADMIN — IRON SUCROSE 200 MILLIGRAM(S): 20 INJECTION, SOLUTION INTRAVENOUS at 16:38

## 2025-06-11 ENCOUNTER — APPOINTMENT (OUTPATIENT)
Age: 85
End: 2025-06-11

## 2025-06-11 ENCOUNTER — OUTPATIENT (OUTPATIENT)
Dept: OUTPATIENT SERVICES | Facility: HOSPITAL | Age: 85
LOS: 1 days | End: 2025-06-11
Payer: MEDICARE

## 2025-06-11 DIAGNOSIS — Z98.61 CORONARY ANGIOPLASTY STATUS: Chronic | ICD-10-CM

## 2025-06-11 DIAGNOSIS — Z98.890 OTHER SPECIFIED POSTPROCEDURAL STATES: Chronic | ICD-10-CM

## 2025-06-11 DIAGNOSIS — D64.9 ANEMIA, UNSPECIFIED: ICD-10-CM

## 2025-06-11 PROCEDURE — 96365 THER/PROPH/DIAG IV INF INIT: CPT

## 2025-06-11 RX ORDER — IRON SUCROSE 20 MG/ML
200 INJECTION, SOLUTION INTRAVENOUS ONCE
Refills: 0 | Status: COMPLETED | OUTPATIENT
Start: 2025-06-11 | End: 2025-06-11

## 2025-06-11 RX ADMIN — IRON SUCROSE 200 MILLIGRAM(S): 20 INJECTION, SOLUTION INTRAVENOUS at 15:15

## 2025-06-11 RX ADMIN — IRON SUCROSE 200 MILLIGRAM(S): 20 INJECTION, SOLUTION INTRAVENOUS at 15:45

## 2025-06-12 DIAGNOSIS — D64.9 ANEMIA, UNSPECIFIED: ICD-10-CM

## 2025-06-18 ENCOUNTER — OUTPATIENT (OUTPATIENT)
Dept: OUTPATIENT SERVICES | Facility: HOSPITAL | Age: 85
LOS: 1 days | End: 2025-06-18
Payer: MEDICARE

## 2025-06-18 ENCOUNTER — APPOINTMENT (OUTPATIENT)
Age: 85
End: 2025-06-18

## 2025-06-18 VITALS
HEART RATE: 68 BPM | TEMPERATURE: 98 F | OXYGEN SATURATION: 98 % | RESPIRATION RATE: 18 BRPM | DIASTOLIC BLOOD PRESSURE: 57 MMHG | SYSTOLIC BLOOD PRESSURE: 93 MMHG

## 2025-06-18 DIAGNOSIS — D64.9 ANEMIA, UNSPECIFIED: ICD-10-CM

## 2025-06-18 DIAGNOSIS — Z98.890 OTHER SPECIFIED POSTPROCEDURAL STATES: Chronic | ICD-10-CM

## 2025-06-18 DIAGNOSIS — Z98.61 CORONARY ANGIOPLASTY STATUS: Chronic | ICD-10-CM

## 2025-06-18 PROCEDURE — 96365 THER/PROPH/DIAG IV INF INIT: CPT

## 2025-06-18 RX ORDER — IRON SUCROSE 20 MG/ML
200 INJECTION, SOLUTION INTRAVENOUS ONCE
Refills: 0 | Status: COMPLETED | OUTPATIENT
Start: 2025-06-18 | End: 2025-06-18

## 2025-06-18 RX ADMIN — IRON SUCROSE 200 MILLIGRAM(S): 20 INJECTION, SOLUTION INTRAVENOUS at 15:55

## 2025-06-18 RX ADMIN — IRON SUCROSE 200 MILLIGRAM(S): 20 INJECTION, SOLUTION INTRAVENOUS at 15:21

## 2025-06-26 ENCOUNTER — APPOINTMENT (OUTPATIENT)
Dept: CARDIOLOGY | Facility: CLINIC | Age: 85
End: 2025-06-26
Payer: MEDICARE

## 2025-06-26 VITALS
HEIGHT: 72 IN | HEART RATE: 80 BPM | WEIGHT: 194 LBS | BODY MASS INDEX: 26.28 KG/M2 | SYSTOLIC BLOOD PRESSURE: 84 MMHG | DIASTOLIC BLOOD PRESSURE: 60 MMHG

## 2025-06-26 PROCEDURE — 99214 OFFICE O/P EST MOD 30 MIN: CPT

## 2025-06-26 PROCEDURE — 93000 ELECTROCARDIOGRAM COMPLETE: CPT

## 2025-06-26 PROCEDURE — G2211 COMPLEX E/M VISIT ADD ON: CPT

## 2025-07-29 ENCOUNTER — NON-APPOINTMENT (OUTPATIENT)
Age: 85
End: 2025-07-29

## 2025-07-29 ENCOUNTER — APPOINTMENT (OUTPATIENT)
Dept: CARDIOLOGY | Facility: CLINIC | Age: 85
End: 2025-07-29
Payer: MEDICARE

## 2025-07-29 PROCEDURE — 93296 REM INTERROG EVL PM/IDS: CPT

## 2025-07-29 PROCEDURE — 93295 DEV INTERROG REMOTE 1/2/MLT: CPT

## 2025-08-25 ENCOUNTER — APPOINTMENT (OUTPATIENT)
Age: 85
End: 2025-08-25
Payer: MEDICARE

## 2025-08-25 VITALS
WEIGHT: 199 LBS | TEMPERATURE: 98.3 F | RESPIRATION RATE: 16 BRPM | BODY MASS INDEX: 26.95 KG/M2 | HEIGHT: 72 IN | DIASTOLIC BLOOD PRESSURE: 74 MMHG | SYSTOLIC BLOOD PRESSURE: 103 MMHG | OXYGEN SATURATION: 100 % | HEART RATE: 105 BPM

## 2025-08-25 DIAGNOSIS — D64.9 ANEMIA, UNSPECIFIED: ICD-10-CM

## 2025-08-25 LAB
AUTO BASOPHILS #: 0.04 K/UL
AUTO BASOPHILS %: 0.5 %
AUTO EOSINOPHILS #: 0.08 K/UL
AUTO EOSINOPHILS %: 1 %
AUTO IMMATURE GRANULOCYTES #: 0.04 K/UL
AUTO LYMPHOCYTES #: 1.64 K/UL
AUTO LYMPHOCYTES %: 21.3 %
AUTO MONOCYTES #: 0.94 K/UL
AUTO MONOCYTES %: 12.2 %
AUTO NEUTROPHILS #: 4.95 K/UL
AUTO NEUTROPHILS %: 64.5 %
AUTO NRBC #: 0 K/UL
HCT VFR BLD CALC: 45.6 %
HGB BLD-MCNC: 14.9 G/DL
IMM GRANULOCYTES NFR BLD AUTO: 0.5 %
MAN DIFF?: NORMAL
MCHC RBC-ENTMCNC: 28 PG
MCHC RBC-ENTMCNC: 32.7 G/DL
MCV RBC AUTO: 85.7 FL
PLATELET # BLD AUTO: 250 K/UL
PMV BLD AUTO: 0 /100 WBCS
PMV BLD: 10.3 FL
RBC # BLD: 5.32 M/UL
RBC # FLD: 16.2 %
WBC # FLD AUTO: 7.69 K/UL

## 2025-08-25 PROCEDURE — 99213 OFFICE O/P EST LOW 20 MIN: CPT

## 2025-08-26 LAB — FERRITIN SERPL-MCNC: 44 NG/ML

## 2025-08-29 ENCOUNTER — APPOINTMENT (OUTPATIENT)
Dept: CARDIOLOGY | Facility: CLINIC | Age: 85
End: 2025-08-29

## 2025-08-29 ENCOUNTER — APPOINTMENT (OUTPATIENT)
Dept: HEART FAILURE | Facility: CLINIC | Age: 85
End: 2025-08-29

## 2025-08-29 VITALS
DIASTOLIC BLOOD PRESSURE: 72 MMHG | HEIGHT: 72 IN | HEART RATE: 81 BPM | OXYGEN SATURATION: 99 % | WEIGHT: 195 LBS | SYSTOLIC BLOOD PRESSURE: 110 MMHG | BODY MASS INDEX: 26.41 KG/M2

## 2025-08-29 DIAGNOSIS — I25.5 ISCHEMIC CARDIOMYOPATHY: ICD-10-CM

## 2025-08-29 DIAGNOSIS — R06.02 SHORTNESS OF BREATH: ICD-10-CM

## 2025-08-29 PROCEDURE — 99214 OFFICE O/P EST MOD 30 MIN: CPT

## 2025-08-29 PROCEDURE — G2211 COMPLEX E/M VISIT ADD ON: CPT

## 2025-08-29 PROCEDURE — 93306 TTE W/DOPPLER COMPLETE: CPT

## 2025-09-02 LAB
ALBUMIN SERPL ELPH-MCNC: 4.4 G/DL
ALP BLD-CCNC: 87 U/L
ALT SERPL-CCNC: 18 U/L
ANION GAP SERPL CALC-SCNC: 13 MMOL/L
AST SERPL-CCNC: 27 U/L
BASOPHILS # BLD AUTO: 0.04 K/UL
BASOPHILS NFR BLD AUTO: 0.5 %
BILIRUB SERPL-MCNC: 0.5 MG/DL
BUN SERPL-MCNC: 19 MG/DL
CALCIUM SERPL-MCNC: 10.3 MG/DL
CHLORIDE SERPL-SCNC: 102 MMOL/L
CO2 SERPL-SCNC: 27 MMOL/L
CREAT SERPL-MCNC: 1.4 MG/DL
EGFRCR SERPLBLD CKD-EPI 2021: 49 ML/MIN/1.73M2
EOSINOPHIL # BLD AUTO: 0.05 K/UL
EOSINOPHIL NFR BLD AUTO: 0.7 %
GLUCOSE SERPL-MCNC: 116 MG/DL
HCT VFR BLD CALC: 48.6 %
HGB BLD-MCNC: 15.2 G/DL
IMM GRANULOCYTES NFR BLD AUTO: 0.3 %
LYMPHOCYTES # BLD AUTO: 1.62 K/UL
LYMPHOCYTES NFR BLD AUTO: 22.2 %
MAGNESIUM SERPL-MCNC: 1.9 MG/DL
MAN DIFF?: NORMAL
MCHC RBC-ENTMCNC: 27.8 PG
MCHC RBC-ENTMCNC: 31.3 G/DL
MCV RBC AUTO: 88.8 FL
MONOCYTES # BLD AUTO: 0.77 K/UL
MONOCYTES NFR BLD AUTO: 10.6 %
NEUTROPHILS # BLD AUTO: 4.79 K/UL
NEUTROPHILS NFR BLD AUTO: 65.7 %
NT-PROBNP SERPL-MCNC: 1718 PG/ML
PLATELET # BLD AUTO: 254 K/UL
PMV BLD AUTO: 0 /100 WBCS
PMV BLD: 11.1 FL
POTASSIUM SERPL-SCNC: 5.2 MMOL/L
PROT SERPL-MCNC: 8.1 G/DL
RBC # BLD: 5.47 M/UL
RBC # FLD: 16.2 %
SODIUM SERPL-SCNC: 142 MMOL/L
WBC # FLD AUTO: 7.29 K/UL